# Patient Record
Sex: FEMALE | Race: WHITE | NOT HISPANIC OR LATINO | Employment: OTHER | URBAN - METROPOLITAN AREA
[De-identification: names, ages, dates, MRNs, and addresses within clinical notes are randomized per-mention and may not be internally consistent; named-entity substitution may affect disease eponyms.]

---

## 2024-02-01 PROBLEM — E78.5 HLD (HYPERLIPIDEMIA): Status: ACTIVE | Noted: 2024-02-01

## 2024-02-01 PROBLEM — I50.32 CHRONIC DIASTOLIC HEART FAILURE (MULTI): Status: ACTIVE | Noted: 2024-02-01

## 2024-02-01 PROBLEM — C50.519 MALIGNANT NEOPLASM OF LOWER-OUTER QUADRANT OF FEMALE BREAST (MULTI): Status: ACTIVE | Noted: 2024-02-01

## 2024-02-01 PROBLEM — R07.89 CHEST PAIN, MIDSTERNAL: Status: ACTIVE | Noted: 2024-02-01

## 2024-02-01 PROBLEM — R60.0 LEG EDEMA: Status: ACTIVE | Noted: 2024-02-01

## 2024-02-01 PROBLEM — I25.10 CAD (CORONARY ARTERY DISEASE): Status: ACTIVE | Noted: 2024-02-01

## 2024-02-01 PROBLEM — I10 HTN (HYPERTENSION): Status: ACTIVE | Noted: 2024-02-01

## 2024-02-07 ENCOUNTER — OFFICE VISIT (OUTPATIENT)
Dept: CARDIOLOGY | Facility: CLINIC | Age: 79
End: 2024-02-07
Payer: MEDICARE

## 2024-02-07 VITALS
DIASTOLIC BLOOD PRESSURE: 80 MMHG | SYSTOLIC BLOOD PRESSURE: 138 MMHG | HEART RATE: 71 BPM | WEIGHT: 171 LBS | BODY MASS INDEX: 26.78 KG/M2 | OXYGEN SATURATION: 96 %

## 2024-02-07 DIAGNOSIS — E78.49 OTHER HYPERLIPIDEMIA: ICD-10-CM

## 2024-02-07 DIAGNOSIS — I25.10 CORONARY ARTERY DISEASE INVOLVING NATIVE CORONARY ARTERY OF NATIVE HEART WITHOUT ANGINA PECTORIS: ICD-10-CM

## 2024-02-07 DIAGNOSIS — I10 PRIMARY HYPERTENSION: ICD-10-CM

## 2024-02-07 DIAGNOSIS — G47.62 NOCTURNAL LEG CRAMPS: Primary | ICD-10-CM

## 2024-02-07 DIAGNOSIS — I50.32 CHRONIC DIASTOLIC HEART FAILURE (MULTI): ICD-10-CM

## 2024-02-07 PROCEDURE — 3075F SYST BP GE 130 - 139MM HG: CPT | Performed by: INTERNAL MEDICINE

## 2024-02-07 PROCEDURE — 3079F DIAST BP 80-89 MM HG: CPT | Performed by: INTERNAL MEDICINE

## 2024-02-07 PROCEDURE — 1036F TOBACCO NON-USER: CPT | Performed by: INTERNAL MEDICINE

## 2024-02-07 PROCEDURE — 1159F MED LIST DOCD IN RCRD: CPT | Performed by: INTERNAL MEDICINE

## 2024-02-07 PROCEDURE — 99214 OFFICE O/P EST MOD 30 MIN: CPT | Performed by: INTERNAL MEDICINE

## 2024-02-07 RX ORDER — ATORVASTATIN CALCIUM 20 MG/1
1 TABLET, FILM COATED ORAL
COMMUNITY
Start: 2023-01-05

## 2024-02-07 RX ORDER — METOPROLOL TARTRATE 100 MG/1
100 TABLET ORAL DAILY
COMMUNITY

## 2024-02-07 RX ORDER — ONDANSETRON 8 MG/1
TABLET, ORALLY DISINTEGRATING ORAL EVERY 8 HOURS PRN
COMMUNITY
Start: 2023-08-15

## 2024-02-07 RX ORDER — HUMAN INSULIN 100 [USP'U]/ML
INJECTION, SUSPENSION SUBCUTANEOUS
COMMUNITY
Start: 2012-11-26

## 2024-02-07 RX ORDER — CILOSTAZOL 50 MG/1
50 TABLET ORAL 2 TIMES DAILY
COMMUNITY
Start: 2013-07-18 | End: 2024-11-08

## 2024-02-07 RX ORDER — SUCRALFATE 1 G/10ML
SUSPENSION ORAL
COMMUNITY
Start: 2023-09-12

## 2024-02-07 RX ORDER — POLYMYXIN B SULFATE AND TRIMETHOPRIM 1; 10000 MG/ML; [USP'U]/ML
SOLUTION OPHTHALMIC
COMMUNITY
Start: 2023-07-17

## 2024-02-07 RX ORDER — TORSEMIDE 20 MG/1
1 TABLET ORAL
COMMUNITY
Start: 2023-11-03

## 2024-02-07 RX ORDER — SOLIFENACIN SUCCINATE 5 MG/1
TABLET, FILM COATED ORAL
COMMUNITY
Start: 2023-10-12

## 2024-02-07 RX ORDER — HYDROXYZINE PAMOATE 50 MG/1
50 CAPSULE ORAL
COMMUNITY
Start: 2022-08-30

## 2024-02-07 RX ORDER — BLOOD SUGAR DIAGNOSTIC
STRIP MISCELLANEOUS
COMMUNITY
Start: 2013-07-16

## 2024-02-07 RX ORDER — METFORMIN HYDROCHLORIDE 500 MG/1
TABLET ORAL
COMMUNITY
Start: 2019-02-18

## 2024-02-07 RX ORDER — FUROSEMIDE 20 MG/1
40 TABLET ORAL
COMMUNITY

## 2024-02-07 RX ORDER — BETAMETHASONE DIPROPIONATE 0.5 MG/G
CREAM TOPICAL 2 TIMES DAILY
COMMUNITY

## 2024-02-07 RX ORDER — OXYCODONE AND ACETAMINOPHEN 10; 325 MG/1; MG/1
1 TABLET ORAL EVERY 6 HOURS PRN
COMMUNITY
Start: 2024-02-07 | End: 2024-03-08

## 2024-02-07 ASSESSMENT — ENCOUNTER SYMPTOMS: DYSPNEA ON EXERTION: 1

## 2024-02-07 NOTE — PROGRESS NOTES
Subjective   Nancy Long is a 78 y.o. female.    Chief Complaint:  Follow-up coronary artery disease.    HPI    We last saw her in August 2023.  She has had no anginal symptoms.  She has mild exertional dyspnea but otherwise is asymptomatic.  She complains of nausea after taking statins including atorvastatin and rosuvastatin.  She has a number of arthritis problems.    Her diagnosis of coronary artery disease is based on a positive calcium score of 1399 consistent with the presence of extensive atherosclerotic coronary artery disease.     Past cardiac history: Significant for hypertension and hyperlipidemia. Cardiac risk factors include a positive smoking history of up to 1 pack/day. Denies a family history of heart disease.     Past medical history: Significant for a history of kidney stones. She has chronic renal insufficiency. History of breast cancer.      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     Family history: Denies family history of premature coronary artery disease     Review of Systems   Cardiovascular:  Positive for dyspnea on exertion.   Musculoskeletal:  Positive for arthritis and joint pain.       Visit Vitals  /80 (BP Location: Left arm, Patient Position: Sitting, BP Cuff Size: Adult)   Pulse 71   Wt 77.6 kg (171 lb)   SpO2 96%   BMI 26.78 kg/m²   Smoking Status Former   BSA 1.92 m²        Objective     Constitutional:       Appearance: Not in distress.   Neck:      Vascular: JVD normal.   Pulmonary:      Breath sounds: Normal breath sounds.   Cardiovascular:      Normal rate. Regular rhythm. Normal S1. Normal S2.       Murmurs: There is no murmur.      No gallop.    Pulses:     Intact distal pulses.   Edema:     Peripheral edema absent.   Abdominal:      General: There is no distension.      Palpations: Abdomen is soft.   Neurological:      Mental Status: Alert.         Lab Review:   Lab Results   Component  Value Date     02/04/2020    K 4.3 02/04/2020     02/04/2020    CO2 21 02/04/2020    BUN 25 (H) 02/04/2020    CREATININE 1.32 (H) 02/04/2020    GLUCOSE 171 (H) 02/04/2020    CALCIUM 9.1 02/04/2020       Assessment:    1.  Coronary artery disease.  Extensive coronary disease on the basis of coronary CT calcium scoring.  A stress thallium study 1 year ago showed no ischemia with normal obstructive function.  Will continue medical management for her coronary disease.    2.  Hypertension.  Blood pressures are controlled.    3.  Hyperlipidemia.  She admits that she is not very compliant with her atorvastatin because it causes nausea.  We have recommended she take her atorvastatin at night before she goes to bed.  We have asked her to get a follow-up lipid profile in 2 months.  Should she continue to demonstrate it significant hyperlipidemia and is unable to tolerate the atorvastatin, we would then proceed with on the injectable drugs.    4.  Chronic renal sufficiency.  Most recent creatinine is 1.4.  This is stable and from her prior evaluation.  Let us potassium levels 4.8.

## 2024-02-07 NOTE — PATIENT INSTRUCTIONS
Take atorvastatin 20 mg at night before you go to bed.    If you cannot take atorvastatin we will start one of the injectable medications.    Get a blood test when you see Dr. Bonilla.

## 2024-07-29 PROBLEM — E11.9 DM (DIABETES MELLITUS) (MULTI): Status: ACTIVE | Noted: 2024-07-29

## 2024-07-29 PROBLEM — E10.9 TYPE 1 DIABETES MELLITUS WITHOUT COMPLICATION (MULTI): Status: ACTIVE | Noted: 2018-06-04

## 2024-07-29 PROBLEM — F41.1 GAD (GENERALIZED ANXIETY DISORDER): Status: ACTIVE | Noted: 2020-03-05

## 2024-07-29 PROBLEM — M19.90 OSTEOARTHRITIS: Status: ACTIVE | Noted: 2018-02-20

## 2024-07-29 PROBLEM — K21.9 GASTROESOPHAGEAL REFLUX DISEASE: Status: ACTIVE | Noted: 2023-10-12

## 2024-08-02 DIAGNOSIS — I10 PRIMARY HYPERTENSION: ICD-10-CM

## 2024-08-02 RX ORDER — TORSEMIDE 20 MG/1
20 TABLET ORAL DAILY
Qty: 90 TABLET | Refills: 3 | Status: SHIPPED | OUTPATIENT
Start: 2024-08-02

## 2024-08-20 ENCOUNTER — APPOINTMENT (OUTPATIENT)
Dept: CARDIOLOGY | Facility: CLINIC | Age: 79
End: 2024-08-20
Payer: MEDICARE

## 2024-08-20 VITALS
HEART RATE: 61 BPM | SYSTOLIC BLOOD PRESSURE: 130 MMHG | WEIGHT: 166 LBS | HEIGHT: 67 IN | BODY MASS INDEX: 26.06 KG/M2 | DIASTOLIC BLOOD PRESSURE: 80 MMHG

## 2024-08-20 DIAGNOSIS — I25.10 CORONARY ARTERY DISEASE INVOLVING NATIVE CORONARY ARTERY OF NATIVE HEART WITHOUT ANGINA PECTORIS: Primary | ICD-10-CM

## 2024-08-20 DIAGNOSIS — R60.0 LEG EDEMA: ICD-10-CM

## 2024-08-20 DIAGNOSIS — I83.013 VENOUS ULCER OF ANKLE, RIGHT (MULTI): ICD-10-CM

## 2024-08-20 DIAGNOSIS — G47.62 NOCTURNAL LEG CRAMPS: ICD-10-CM

## 2024-08-20 DIAGNOSIS — R09.89 BRUIT OF RIGHT CAROTID ARTERY: ICD-10-CM

## 2024-08-20 DIAGNOSIS — L97.319 VENOUS ULCER OF ANKLE, RIGHT (MULTI): ICD-10-CM

## 2024-08-20 DIAGNOSIS — I10 PRIMARY HYPERTENSION: ICD-10-CM

## 2024-08-20 DIAGNOSIS — I35.0 NONRHEUMATIC AORTIC VALVE STENOSIS: ICD-10-CM

## 2024-08-20 DIAGNOSIS — E78.49 OTHER HYPERLIPIDEMIA: ICD-10-CM

## 2024-08-20 DIAGNOSIS — I50.32 CHRONIC DIASTOLIC HEART FAILURE (MULTI): ICD-10-CM

## 2024-08-20 PROCEDURE — 99214 OFFICE O/P EST MOD 30 MIN: CPT | Performed by: INTERNAL MEDICINE

## 2024-08-20 PROCEDURE — 3075F SYST BP GE 130 - 139MM HG: CPT | Performed by: INTERNAL MEDICINE

## 2024-08-20 PROCEDURE — 3079F DIAST BP 80-89 MM HG: CPT | Performed by: INTERNAL MEDICINE

## 2024-08-20 PROCEDURE — 93010 ELECTROCARDIOGRAM REPORT: CPT | Performed by: INTERNAL MEDICINE

## 2024-08-20 PROCEDURE — 93005 ELECTROCARDIOGRAM TRACING: CPT | Performed by: INTERNAL MEDICINE

## 2024-08-20 PROCEDURE — 1159F MED LIST DOCD IN RCRD: CPT | Performed by: INTERNAL MEDICINE

## 2024-08-20 RX ORDER — OXYCODONE AND ACETAMINOPHEN 10; 325 MG/1; MG/1
TABLET ORAL EVERY 6 HOURS PRN
COMMUNITY
Start: 2024-08-14

## 2024-08-20 RX ORDER — ROSUVASTATIN CALCIUM 20 MG/1
20 TABLET, COATED ORAL DAILY
Qty: 30 TABLET | Refills: 11 | Status: SHIPPED | OUTPATIENT
Start: 2024-08-20 | End: 2025-08-20

## 2024-08-20 ASSESSMENT — ENCOUNTER SYMPTOMS: DYSPNEA ON EXERTION: 1

## 2024-08-20 NOTE — PROGRESS NOTES
Subjective   Nancy Long is a 79 y.o. female.    Chief Complaint:  Follow-up coronary artery disease.    HPI    She complains of fatigue and generalized weakness.  Also has developed an ulceration on her leg.  She has had problems tolerating atorvastatin and has stopped the medication.  Has lower extremity edema.  Takes her torsemide on an occasional basis.    Her diagnosis of coronary artery disease is based on a positive calcium score of 1399 consistent with the presence of extensive atherosclerotic coronary artery disease.     Past cardiac history: Significant for hypertension and hyperlipidemia. Cardiac risk factors include a positive smoking history of up to 1 pack/day. Denies a family history of heart disease.     Past medical history: Significant for a history of kidney stones. She has chronic renal insufficiency. History of breast cancer.      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     Family history: Denies family history of premature coronary artery disease    Review of Systems   Cardiovascular:  Positive for dyspnea on exertion and leg swelling.   Musculoskeletal:  Positive for arthritis.       Current Outpatient Medications   Medication Sig Dispense Refill    atorvastatin (Lipitor) 20 mg tablet Take 1 tablet (20 mg) by mouth once daily.      betamethasone dipropionate 0.05 % cream Apply topically 2 times a day.      cilostazol (Pletal) 50 mg tablet Take 1 tablet (50 mg) by mouth twice a day.      hydrOXYzine pamoate (Vistaril) 50 mg capsule Take 1 capsule (50 mg) by mouth 4 times a day as needed.      loratadine-pseudoephedrine (Claritin-D 24-hour)  mg 24 hr tablet Take 1 tablet by mouth once daily.      losartan (Cozaar) 100 mg tablet TAKE ONE TABLET BY MOUTH EVERY DAY 90 tablet 3    metFORMIN (Glucophage) 500 mg tablet       metoprolol tartrate (Lopressor) 100 mg tablet Take 1 tablet (100 mg) by mouth once  "daily.      NovoLIN 70/30 U-100 Insulin 100 unit/mL (70-30) injection twice daily with meals.      ondansetron ODT (Zofran-ODT) 8 mg disintegrating tablet every 8 hours if needed.      OneTouch Ultra Test strip       oxyCODONE-acetaminophen (Percocet)  mg tablet every 6 hours if needed.      polymyxin B sulf-trimethoprim (Polytrim) ophthalmic solution APPLY 1/4 INCH TO AFFECTED EYE 4 TIMES A DAY.      solifenacin (VESIcare) 5 mg tablet       torsemide (Demadex) 20 mg tablet TAKE ONE TABLET BY MOUTH DAILY 90 tablet 3    rosuvastatin (Crestor) 20 mg tablet Take 1 tablet (20 mg) by mouth once daily. 30 tablet 11    sucralfate (Carafate) 100 mg/mL suspension TAKE 10 ML BY MOUTH 4 TIMES A DAY       No current facility-administered medications for this visit.        Visit Vitals  /80   Pulse 61   Ht 1.702 m (5' 7\")   Wt 75.3 kg (166 lb)   BMI 26.00 kg/m²   Smoking Status Former   BSA 1.89 m²        Objective     Constitutional:       Appearance: Not in distress.   Neck:      Vascular: JVD normal.   Pulmonary:      Breath sounds: Normal breath sounds.   Cardiovascular:      Normal rate. Regular rhythm. Normal S1. Normal S2.       Murmurs: There is no murmur.      No gallop.    Pulses:     Intact distal pulses.   Edema:     Peripheral edema present.     Pretibial: bilateral 1+ edema of the pretibial area.     Ankle: bilateral 1+ edema of the ankle.  Abdominal:      General: There is no distension.      Palpations: Abdomen is soft.   Neurological:      Mental Status: Alert.         Lab Review:   Lab Results   Component Value Date     02/04/2020    K 4.3 02/04/2020     02/04/2020    CO2 21 02/04/2020    BUN 25 (H) 02/04/2020    CREATININE 1.32 (H) 02/04/2020    GLUCOSE 171 (H) 02/04/2020    CALCIUM 9.1 02/04/2020       Assessment:    1.  Coronary disease.  Extensive coronary artery disease on the basis of coronary CT calcium scoring.  This study was done in November 2022.  She had a follow-up stress " thallium study which showed no ischemia with normal left ventricular function.  Will continue medical management with single antiplatelet therapy.    2.  Hyperlipidemia.  Most recent labs show a cholesterol of 202, HDL 48,  we will give her a trial of rosuvastatin.  If she fails rosuvastatin we will need to use one of the injectable drugs.  We have suggested Levqio as a good option for this patient.  Not sure that she will be compliant with self injected medications.    3.  Chronic renal failure.  Her serum creatinine has ranged from 1.3-1.4.  They have been stable.    4.  Venous ulceration.  We are going to have her take her torsemide regularly for a week to try to reduce the fluid.  If she has no improvement in the ulceration we will send her to the wound clinic.

## 2024-08-20 NOTE — PATIENT INSTRUCTIONS
Take the torsemide daily for the next week.    If the wound on your leg is not healing, call us and we will refer you to the wound care center.    Start rosuvastatin 20 mg daily.  If you have problems on this medication, call us.

## 2024-09-03 ENCOUNTER — TELEPHONE (OUTPATIENT)
Dept: CARDIOLOGY | Facility: CLINIC | Age: 79
End: 2024-09-03
Payer: MEDICARE

## 2024-09-03 NOTE — TELEPHONE ENCOUNTER
Per last ov with Dr. Oliver. Ross wound is not healing. It is still seeping and bleeding. Hurting at times and she would like Dr. Oliver to give her the referral to the wound clinic.

## 2024-09-05 DIAGNOSIS — L97.929 ULCER OF LEFT LOWER EXTREMITY, UNSPECIFIED ULCER STAGE (MULTI): Primary | ICD-10-CM

## 2024-09-16 ENCOUNTER — OFFICE VISIT (OUTPATIENT)
Dept: WOUND CARE | Facility: CLINIC | Age: 79
End: 2024-09-16
Payer: MEDICARE

## 2024-09-16 PROCEDURE — 99214 OFFICE O/P EST MOD 30 MIN: CPT

## 2024-09-16 PROCEDURE — 99213 OFFICE O/P EST LOW 20 MIN: CPT | Performed by: NURSE PRACTITIONER

## 2024-09-23 ENCOUNTER — OFFICE VISIT (OUTPATIENT)
Dept: WOUND CARE | Facility: CLINIC | Age: 79
End: 2024-09-23
Payer: MEDICARE

## 2024-09-23 PROCEDURE — 11042 DBRDMT SUBQ TIS 1ST 20SQCM/<: CPT | Performed by: SURGERY

## 2024-09-23 PROCEDURE — 11045 DBRDMT SUBQ TISS EACH ADDL: CPT

## 2024-09-23 PROCEDURE — 11042 DBRDMT SUBQ TIS 1ST 20SQCM/<: CPT

## 2024-09-23 PROCEDURE — 11045 DBRDMT SUBQ TISS EACH ADDL: CPT | Performed by: SURGERY

## 2024-09-30 ENCOUNTER — LAB REQUISITION (OUTPATIENT)
Dept: LAB | Facility: HOSPITAL | Age: 79
End: 2024-09-30
Payer: MEDICARE

## 2024-09-30 ENCOUNTER — OFFICE VISIT (OUTPATIENT)
Dept: WOUND CARE | Facility: CLINIC | Age: 79
End: 2024-09-30
Payer: MEDICARE

## 2024-09-30 DIAGNOSIS — L97.212 NON-PRESSURE CHRONIC ULCER OF RIGHT CALF WITH FAT LAYER EXPOSED (MULTI): ICD-10-CM

## 2024-09-30 PROCEDURE — 87077 CULTURE AEROBIC IDENTIFY: CPT | Mod: OUT,PARLAB | Performed by: NURSE PRACTITIONER

## 2024-09-30 PROCEDURE — 11042 DBRDMT SUBQ TIS 1ST 20SQCM/<: CPT

## 2024-09-30 PROCEDURE — 11045 DBRDMT SUBQ TISS EACH ADDL: CPT

## 2024-10-03 LAB
BACTERIA SPEC CULT: ABNORMAL
BACTERIA SPEC CULT: ABNORMAL
GRAM STN SPEC: ABNORMAL
GRAM STN SPEC: ABNORMAL

## 2024-10-09 ENCOUNTER — APPOINTMENT (OUTPATIENT)
Dept: WOUND CARE | Facility: CLINIC | Age: 79
End: 2024-10-09
Payer: MEDICARE

## 2024-10-09 ENCOUNTER — OFFICE VISIT (OUTPATIENT)
Dept: WOUND CARE | Facility: CLINIC | Age: 79
End: 2024-10-09
Payer: MEDICARE

## 2024-10-09 PROCEDURE — 11042 DBRDMT SUBQ TIS 1ST 20SQCM/<: CPT

## 2024-10-16 ENCOUNTER — OFFICE VISIT (OUTPATIENT)
Dept: WOUND CARE | Facility: CLINIC | Age: 79
End: 2024-10-16
Payer: MEDICARE

## 2024-10-16 PROCEDURE — 99213 OFFICE O/P EST LOW 20 MIN: CPT

## 2024-10-23 ENCOUNTER — APPOINTMENT (OUTPATIENT)
Dept: WOUND CARE | Facility: CLINIC | Age: 79
End: 2024-10-23
Payer: MEDICARE

## 2024-10-30 ENCOUNTER — OFFICE VISIT (OUTPATIENT)
Dept: WOUND CARE | Facility: CLINIC | Age: 79
End: 2024-10-30
Payer: MEDICARE

## 2024-10-30 PROCEDURE — 29581 APPL MULTLAYER CMPRN SYS LEG: CPT | Mod: RT

## 2024-10-31 ENCOUNTER — APPOINTMENT (OUTPATIENT)
Dept: WOUND CARE | Facility: CLINIC | Age: 79
End: 2024-10-31
Payer: MEDICARE

## 2024-11-06 ENCOUNTER — OFFICE VISIT (OUTPATIENT)
Dept: WOUND CARE | Facility: CLINIC | Age: 79
End: 2024-11-06
Payer: MEDICARE

## 2024-11-06 PROCEDURE — 11042 DBRDMT SUBQ TIS 1ST 20SQCM/<: CPT

## 2024-11-21 ENCOUNTER — LAB REQUISITION (OUTPATIENT)
Dept: LAB | Facility: HOSPITAL | Age: 79
End: 2024-11-21
Payer: MEDICARE

## 2024-11-21 ENCOUNTER — OFFICE VISIT (OUTPATIENT)
Dept: WOUND CARE | Facility: CLINIC | Age: 79
End: 2024-11-21
Payer: MEDICARE

## 2024-11-21 DIAGNOSIS — L97.212 NON-PRESSURE CHRONIC ULCER OF RIGHT CALF WITH FAT LAYER EXPOSED (MULTI): ICD-10-CM

## 2024-11-21 DIAGNOSIS — R60.0 LOCALIZED EDEMA: ICD-10-CM

## 2024-11-21 DIAGNOSIS — F17.208 NICOTINE DEPENDENCE, UNSPECIFIED, WITH OTHER NICOTINE-INDUCED DISORDERS: ICD-10-CM

## 2024-11-21 DIAGNOSIS — S81.801S UNSPECIFIED OPEN WOUND, RIGHT LOWER LEG, SEQUELA: ICD-10-CM

## 2024-11-21 DIAGNOSIS — I87.2 VENOUS INSUFFICIENCY (CHRONIC) (PERIPHERAL): ICD-10-CM

## 2024-11-21 PROCEDURE — 97597 DBRDMT OPN WND 1ST 20 CM/<: CPT

## 2024-11-21 PROCEDURE — 99213 OFFICE O/P EST LOW 20 MIN: CPT | Performed by: SURGERY

## 2024-11-21 PROCEDURE — 97597 DBRDMT OPN WND 1ST 20 CM/<: CPT | Performed by: SURGERY

## 2024-11-21 PROCEDURE — 87070 CULTURE OTHR SPECIMN AEROBIC: CPT | Mod: OUT,PARLAB | Performed by: SURGERY

## 2024-11-24 LAB
BACTERIA SPEC CULT: ABNORMAL
GRAM STN SPEC: ABNORMAL
GRAM STN SPEC: ABNORMAL

## 2024-11-25 ENCOUNTER — OFFICE VISIT (OUTPATIENT)
Dept: WOUND CARE | Facility: CLINIC | Age: 79
End: 2024-11-25
Payer: MEDICARE

## 2024-11-25 PROCEDURE — 11042 DBRDMT SUBQ TIS 1ST 20SQCM/<: CPT

## 2024-12-04 ENCOUNTER — OFFICE VISIT (OUTPATIENT)
Dept: WOUND CARE | Facility: CLINIC | Age: 79
End: 2024-12-04
Payer: MEDICARE

## 2024-12-04 PROCEDURE — 11042 DBRDMT SUBQ TIS 1ST 20SQCM/<: CPT

## 2024-12-11 ENCOUNTER — OFFICE VISIT (OUTPATIENT)
Dept: WOUND CARE | Facility: CLINIC | Age: 79
End: 2024-12-11
Payer: MEDICARE

## 2024-12-11 PROCEDURE — 11042 DBRDMT SUBQ TIS 1ST 20SQCM/<: CPT

## 2024-12-12 DIAGNOSIS — E78.49 OTHER HYPERLIPIDEMIA: ICD-10-CM

## 2024-12-12 DIAGNOSIS — I10 HYPERTENSION, UNSPECIFIED TYPE: ICD-10-CM

## 2024-12-12 DIAGNOSIS — I10 PRIMARY HYPERTENSION: ICD-10-CM

## 2024-12-12 RX ORDER — LOSARTAN POTASSIUM 100 MG/1
100 TABLET ORAL DAILY
Qty: 90 TABLET | Refills: 3 | Status: SHIPPED | OUTPATIENT
Start: 2024-12-12

## 2024-12-12 RX ORDER — METOPROLOL TARTRATE 100 MG/1
100 TABLET ORAL DAILY
Qty: 90 TABLET | Refills: 3 | Status: SHIPPED | OUTPATIENT
Start: 2024-12-12 | End: 2025-12-12

## 2024-12-12 RX ORDER — ROSUVASTATIN CALCIUM 20 MG/1
20 TABLET, COATED ORAL DAILY
Qty: 90 TABLET | Refills: 3 | Status: SHIPPED | OUTPATIENT
Start: 2024-12-12 | End: 2025-12-12

## 2024-12-12 RX ORDER — ATORVASTATIN CALCIUM 20 MG/1
20 TABLET, FILM COATED ORAL
Qty: 90 TABLET | Refills: 3 | Status: SHIPPED | OUTPATIENT
Start: 2024-12-12 | End: 2025-12-12

## 2024-12-12 RX ORDER — TORSEMIDE 20 MG/1
20 TABLET ORAL DAILY
Qty: 90 TABLET | Refills: 3 | Status: SHIPPED | OUTPATIENT
Start: 2024-12-12

## 2024-12-18 ENCOUNTER — OFFICE VISIT (OUTPATIENT)
Dept: WOUND CARE | Facility: CLINIC | Age: 79
End: 2024-12-18
Payer: MEDICARE

## 2024-12-18 PROCEDURE — 11042 DBRDMT SUBQ TIS 1ST 20SQCM/<: CPT

## 2024-12-23 ENCOUNTER — OFFICE VISIT (OUTPATIENT)
Dept: WOUND CARE | Facility: CLINIC | Age: 79
End: 2024-12-23
Payer: MEDICARE

## 2024-12-23 PROCEDURE — 11042 DBRDMT SUBQ TIS 1ST 20SQCM/<: CPT

## 2024-12-30 ENCOUNTER — OFFICE VISIT (OUTPATIENT)
Dept: WOUND CARE | Facility: CLINIC | Age: 79
End: 2024-12-30
Payer: MEDICARE

## 2024-12-30 PROCEDURE — 11042 DBRDMT SUBQ TIS 1ST 20SQCM/<: CPT

## 2025-01-08 ENCOUNTER — OFFICE VISIT (OUTPATIENT)
Dept: WOUND CARE | Facility: CLINIC | Age: 80
End: 2025-01-08
Payer: MEDICARE

## 2025-01-08 PROCEDURE — 11042 DBRDMT SUBQ TIS 1ST 20SQCM/<: CPT

## 2025-01-15 ENCOUNTER — OFFICE VISIT (OUTPATIENT)
Dept: WOUND CARE | Facility: CLINIC | Age: 80
End: 2025-01-15
Payer: MEDICARE

## 2025-01-15 PROCEDURE — 99212 OFFICE O/P EST SF 10 MIN: CPT

## 2025-02-11 PROBLEM — L89.891: Status: ACTIVE | Noted: 2025-01-07

## 2025-02-11 PROBLEM — N18.32 CHRONIC KIDNEY DISEASE, STAGE 3B (MULTI): Status: ACTIVE | Noted: 2024-10-07

## 2025-02-11 PROBLEM — N39.3 STRESS INCONTINENCE OF URINE: Status: ACTIVE | Noted: 2024-04-04

## 2025-02-11 PROBLEM — G25.81 RESTLESS LEG: Status: ACTIVE | Noted: 2021-09-07

## 2025-03-04 ENCOUNTER — OFFICE VISIT (OUTPATIENT)
Dept: CARDIOLOGY | Facility: CLINIC | Age: 80
End: 2025-03-04
Payer: MEDICARE

## 2025-03-04 ENCOUNTER — HOSPITAL ENCOUNTER (OUTPATIENT)
Dept: VASCULAR MEDICINE | Facility: CLINIC | Age: 80
Discharge: HOME | End: 2025-03-04
Payer: MEDICARE

## 2025-03-04 ENCOUNTER — HOSPITAL ENCOUNTER (OUTPATIENT)
Dept: CARDIOLOGY | Facility: CLINIC | Age: 80
Discharge: HOME | End: 2025-03-04
Payer: MEDICARE

## 2025-03-04 VITALS
HEART RATE: 73 BPM | SYSTOLIC BLOOD PRESSURE: 142 MMHG | BODY MASS INDEX: 26.68 KG/M2 | WEIGHT: 170 LBS | DIASTOLIC BLOOD PRESSURE: 64 MMHG | OXYGEN SATURATION: 94 % | HEIGHT: 67 IN

## 2025-03-04 DIAGNOSIS — I50.32 CHRONIC DIASTOLIC HEART FAILURE: ICD-10-CM

## 2025-03-04 DIAGNOSIS — L03.115 CELLULITIS OF RIGHT LOWER EXTREMITY: ICD-10-CM

## 2025-03-04 DIAGNOSIS — I25.10 CORONARY ARTERY DISEASE INVOLVING NATIVE CORONARY ARTERY OF NATIVE HEART WITHOUT ANGINA PECTORIS: ICD-10-CM

## 2025-03-04 DIAGNOSIS — I35.0 NONRHEUMATIC AORTIC VALVE STENOSIS: ICD-10-CM

## 2025-03-04 DIAGNOSIS — I65.22 LEFT CAROTID STENOSIS: ICD-10-CM

## 2025-03-04 DIAGNOSIS — R09.89 BRUIT OF RIGHT CAROTID ARTERY: ICD-10-CM

## 2025-03-04 DIAGNOSIS — I65.21 RIGHT CAROTID ARTERY OCCLUSION: ICD-10-CM

## 2025-03-04 DIAGNOSIS — I70.213 INTERMITTENT CLAUDICATION OF BOTH LOWER EXTREMITIES DUE TO ATHEROSCLEROSIS (CMS-HCC): ICD-10-CM

## 2025-03-04 DIAGNOSIS — I10 PRIMARY HYPERTENSION: ICD-10-CM

## 2025-03-04 DIAGNOSIS — E78.49 OTHER HYPERLIPIDEMIA: ICD-10-CM

## 2025-03-04 DIAGNOSIS — N18.32 CHRONIC KIDNEY DISEASE, STAGE 3B (MULTI): Primary | ICD-10-CM

## 2025-03-04 DIAGNOSIS — R09.89 BRUIT OF LEFT CAROTID ARTERY: ICD-10-CM

## 2025-03-04 PROBLEM — R07.89 CHEST PAIN, MIDSTERNAL: Status: RESOLVED | Noted: 2024-02-01 | Resolved: 2025-03-04

## 2025-03-04 PROBLEM — L03.119 LOWER EXTREMITY CELLULITIS: Status: ACTIVE | Noted: 2025-03-04

## 2025-03-04 PROCEDURE — 99215 OFFICE O/P EST HI 40 MIN: CPT | Mod: 25 | Performed by: INTERNAL MEDICINE

## 2025-03-04 PROCEDURE — 1036F TOBACCO NON-USER: CPT | Performed by: INTERNAL MEDICINE

## 2025-03-04 PROCEDURE — 3077F SYST BP >= 140 MM HG: CPT | Performed by: INTERNAL MEDICINE

## 2025-03-04 PROCEDURE — 3078F DIAST BP <80 MM HG: CPT | Performed by: INTERNAL MEDICINE

## 2025-03-04 PROCEDURE — 93306 TTE W/DOPPLER COMPLETE: CPT | Performed by: INTERNAL MEDICINE

## 2025-03-04 PROCEDURE — 1159F MED LIST DOCD IN RCRD: CPT | Performed by: INTERNAL MEDICINE

## 2025-03-04 PROCEDURE — 93306 TTE W/DOPPLER COMPLETE: CPT

## 2025-03-04 PROCEDURE — 99215 OFFICE O/P EST HI 40 MIN: CPT | Performed by: INTERNAL MEDICINE

## 2025-03-04 PROCEDURE — 93880 EXTRACRANIAL BILAT STUDY: CPT

## 2025-03-04 PROCEDURE — 93880 EXTRACRANIAL BILAT STUDY: CPT | Performed by: SURGERY

## 2025-03-04 NOTE — PATIENT INSTRUCTIONS
We are going to arrange for you to have a vascular study on your legs.    We will refer you to our vascular specialist about your carotid artery problem.

## 2025-03-04 NOTE — PROGRESS NOTES
Subjective   Nancy Long is a 79 y.o. female.    Chief Complaint:  Follow-up coronary artery disease.  Follow-up carotid studies.  Follow-up echo study.    HPI    Since her last visit she has had no anginal symptoms.  Has nonspecific complaints of fatigue and dyspnea.  Has been treated for an ulceration on her leg.  Also notes erythema particularly on the right leg.  She also had a carotid ultrasound study today.    Her diagnosis of coronary artery disease is based on a positive calcium score of 1399 consistent with the presence of extensive atherosclerotic coronary artery disease.     Past cardiac history: Significant for hypertension and hyperlipidemia. Cardiac risk factors include a positive smoking history of up to 1 pack/day. Denies a family history of heart disease.     Past medical history: Significant for a history of kidney stones. She has chronic renal insufficiency. History of breast cancer.      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     Family history: Denies family history of premature coronary artery disease     Review of Systems   Cardiovascular:  Positive for dyspnea on exertion and leg swelling.   Musculoskeletal:  Positive for arthritis.     Current Outpatient Medications   Medication Sig Dispense Refill    betamethasone dipropionate 0.05 % cream Apply topically 2 times a day.      hydrOXYzine pamoate (Vistaril) 50 mg capsule Take 1 capsule (50 mg) by mouth 4 times a day as needed.      loratadine-pseudoephedrine (Claritin-D 24-hour)  mg 24 hr tablet Take 1 tablet by mouth once daily.      losartan (Cozaar) 100 mg tablet Take 1 tablet (100 mg) by mouth once daily. 90 tablet 3    metFORMIN (Glucophage) 500 mg tablet       metoprolol tartrate (Lopressor) 100 mg tablet Take 1 tablet (100 mg) by mouth once daily. 90 tablet 3    NovoLIN 70/30 U-100 Insulin 100 unit/mL (70-30) injection twice daily with meals.    "   ondansetron ODT (Zofran-ODT) 8 mg disintegrating tablet every 8 hours if needed.      OneTouch Ultra Test strip       oxyCODONE-acetaminophen (Percocet)  mg tablet every 6 hours if needed.      polymyxin B sulf-trimethoprim (Polytrim) ophthalmic solution APPLY 1/4 INCH TO AFFECTED EYE 4 TIMES A DAY.      rosuvastatin (Crestor) 20 mg tablet Take 1 tablet (20 mg) by mouth once daily. 90 tablet 3    solifenacin (VESIcare) 5 mg tablet       sucralfate (Carafate) 100 mg/mL suspension TAKE 10 ML BY MOUTH 4 TIMES A DAY      torsemide (Demadex) 20 mg tablet Take 1 tablet (20 mg) by mouth once daily. 90 tablet 3     No current facility-administered medications for this visit.        Visit Vitals  /64 (BP Location: Right arm)   Pulse 73   Ht 1.702 m (5' 7\")   Wt 77.1 kg (170 lb)   SpO2 94%   BMI 26.63 kg/m²   Smoking Status Former   BSA 1.91 m²        Objective     Constitutional:       Appearance: Not in distress.   Neck:      Vascular: Carotid bruit present. JVD normal.   Pulmonary:      Breath sounds: Normal breath sounds.   Cardiovascular:      Normal rate. Regular rhythm. Normal S1. Normal S2.       Murmurs: There is no murmur.      No gallop.    Pulses:     Decreased pulses.   Edema:     Peripheral edema present.     Pretibial: bilateral 1+ edema of the pretibial area.  Abdominal:      General: There is no distension.      Palpations: Abdomen is soft.   Neurological:      Mental Status: Alert.       Assessment:    1.  Coronary artery disease.  Extensive coronary disease based on prior cardiac evaluations.  No anginal symptoms.  Today's echocardiographic study demonstrates normal left ventricular systolic function with no significant focal wall motion abnormalities.    2.  Carotid artery disease.  Patient's carotid ultrasound study today demonstrates total occlusion of the right internal carotid artery.  There is a significant left carotid artery stenosis.  Should be noted that end-diastolic volumes " however are normal.  However this does deserve an evaluation by the endovascular service.  Because of her renal insufficiency we do not feel that a carotid CT angiogram would be appropriate at this point in time.    3.  Peripheral vascular disease.  Having difficulty palpating pulses.  Also has had ulceration and erythema of both lower extremities.  Will get PVR studies.    4.  Hyperlipidemia.  Cholesterol 116, HDL 52, LDL 49.

## 2025-03-05 LAB
AORTIC VALVE MEAN GRADIENT: 11 MMHG
AORTIC VALVE PEAK VELOCITY: 2.23 M/S
AV PEAK GRADIENT: 20 MMHG
AVA (PEAK VEL): 1.58 CM2
AVA (VTI): 1.35 CM2
EJECTION FRACTION APICAL 4 CHAMBER: 68.1
EJECTION FRACTION: 73 %
LEFT ATRIUM VOLUME AREA LENGTH INDEX BSA: 31.2 ML/M2
LEFT VENTRICLE INTERNAL DIMENSION DIASTOLE: 4.06 CM (ref 3.5–6)
LEFT VENTRICULAR OUTFLOW TRACT DIAMETER: 2 CM
MITRAL VALVE E/A RATIO: 0.8
RIGHT VENTRICLE FREE WALL PEAK S': 0.16 CM/S
RIGHT VENTRICLE PEAK SYSTOLIC PRESSURE: 26.8 MMHG
TRICUSPID ANNULAR PLANE SYSTOLIC EXCURSION: 2.5 CM

## 2025-03-13 ENCOUNTER — HOSPITAL ENCOUNTER (OUTPATIENT)
Dept: VASCULAR MEDICINE | Facility: CLINIC | Age: 80
Discharge: HOME | End: 2025-03-13
Payer: MEDICARE

## 2025-03-13 DIAGNOSIS — I70.213 INTERMITTENT CLAUDICATION OF BOTH LOWER EXTREMITIES DUE TO ATHEROSCLEROSIS (CMS-HCC): ICD-10-CM

## 2025-03-13 DIAGNOSIS — I73.9 PERIPHERAL VASCULAR DISEASE, UNSPECIFIED (CMS-HCC): ICD-10-CM

## 2025-03-13 DIAGNOSIS — L03.115 CELLULITIS OF RIGHT LOWER EXTREMITY: ICD-10-CM

## 2025-03-13 PROCEDURE — 93922 UPR/L XTREMITY ART 2 LEVELS: CPT | Performed by: SURGERY

## 2025-03-13 PROCEDURE — 93922 UPR/L XTREMITY ART 2 LEVELS: CPT

## 2025-03-18 ENCOUNTER — OFFICE VISIT (OUTPATIENT)
Dept: CARDIOLOGY | Facility: CLINIC | Age: 80
End: 2025-03-18
Payer: MEDICARE

## 2025-03-18 VITALS
SYSTOLIC BLOOD PRESSURE: 160 MMHG | WEIGHT: 173 LBS | HEART RATE: 64 BPM | BODY MASS INDEX: 27.15 KG/M2 | DIASTOLIC BLOOD PRESSURE: 78 MMHG | OXYGEN SATURATION: 100 % | HEIGHT: 67 IN

## 2025-03-18 DIAGNOSIS — I65.29 CAROTID ATHEROSCLEROSIS, UNSPECIFIED LATERALITY: ICD-10-CM

## 2025-03-18 DIAGNOSIS — I73.9 PAD (PERIPHERAL ARTERY DISEASE) (CMS-HCC): Primary | ICD-10-CM

## 2025-03-18 DIAGNOSIS — I65.23 CAROTID ATHEROSCLEROSIS, BILATERAL: ICD-10-CM

## 2025-03-18 DIAGNOSIS — E78.49 OTHER HYPERLIPIDEMIA: ICD-10-CM

## 2025-03-18 PROCEDURE — 1159F MED LIST DOCD IN RCRD: CPT

## 2025-03-18 PROCEDURE — 3078F DIAST BP <80 MM HG: CPT

## 2025-03-18 PROCEDURE — 99215 OFFICE O/P EST HI 40 MIN: CPT

## 2025-03-18 PROCEDURE — 3077F SYST BP >= 140 MM HG: CPT

## 2025-03-18 PROCEDURE — 99205 OFFICE O/P NEW HI 60 MIN: CPT

## 2025-03-18 PROCEDURE — 1160F RVW MEDS BY RX/DR IN RCRD: CPT

## 2025-03-18 RX ORDER — CILOSTAZOL 50 MG/1
50 TABLET ORAL 2 TIMES DAILY
Qty: 180 TABLET | Refills: 3 | Status: SHIPPED | OUTPATIENT
Start: 2025-03-18 | End: 2026-03-18

## 2025-03-18 RX ORDER — ROSUVASTATIN CALCIUM 20 MG/1
20 TABLET, COATED ORAL DAILY
Qty: 90 TABLET | Refills: 3 | Status: SHIPPED | OUTPATIENT
Start: 2025-03-18 | End: 2026-03-18

## 2025-03-18 NOTE — PROGRESS NOTES
PCP: Deric Bonilla MD  Cardiologist: Dr. Oliver  Podiatrist: ***    Subjective   Nancy Long is a 79 y.o. female who {actions; complains of:11414[id number] ***    Cardiovascular risk factors include: {risk factors:510}.      Review of Systems:  Otherwise, limited cardiovascular review of systems is negative.    Past Medical History:  She has a past medical history of Personal history of other diseases of the circulatory system and Personal history of other endocrine, nutritional and metabolic disease.    Surgical History:   She has a past surgical history that includes Cholecystectomy (01/03/2014); Appendectomy (01/03/2014); Breast lumpectomy (01/03/2014); and Oophorectomy (01/03/2014).    Family History:   No family history on file.    Social History:   Tobacco Use: Medium Risk (3/4/2025)    Patient History     Smoking Tobacco Use: Former     Smokeless Tobacco Use: Never     Passive Exposure: Not on file     Outpatient Medications:  Current Outpatient Medications:     betamethasone dipropionate 0.05 % cream, Apply topically 2 times a day., Disp: , Rfl:     hydrOXYzine pamoate (Vistaril) 50 mg capsule, Take 1 capsule (50 mg) by mouth 4 times a day as needed., Disp: , Rfl:     loratadine-pseudoephedrine (Claritin-D 24-hour)  mg 24 hr tablet, Take 1 tablet by mouth once daily., Disp: , Rfl:     losartan (Cozaar) 100 mg tablet, Take 1 tablet (100 mg) by mouth once daily., Disp: 90 tablet, Rfl: 3    metFORMIN (Glucophage) 500 mg tablet, , Disp: , Rfl:     metoprolol tartrate (Lopressor) 100 mg tablet, Take 1 tablet (100 mg) by mouth once daily., Disp: 90 tablet, Rfl: 3    NovoLIN 70/30 U-100 Insulin 100 unit/mL (70-30) injection, twice daily with meals., Disp: , Rfl:     ondansetron ODT (Zofran-ODT) 8 mg disintegrating tablet, every 8 hours if needed., Disp: , Rfl:     OneTouch Ultra Test strip, , Disp: , Rfl:     oxyCODONE-acetaminophen (Percocet)  mg tablet, every 6 hours if needed., Disp: ,  Rfl:     polymyxin B sulf-trimethoprim (Polytrim) ophthalmic solution, APPLY 1/4 INCH TO AFFECTED EYE 4 TIMES A DAY., Disp: , Rfl:     rosuvastatin (Crestor) 20 mg tablet, Take 1 tablet (20 mg) by mouth once daily., Disp: 90 tablet, Rfl: 3    solifenacin (VESIcare) 5 mg tablet, , Disp: , Rfl:     sucralfate (Carafate) 100 mg/mL suspension, TAKE 10 ML BY MOUTH 4 TIMES A DAY, Disp: , Rfl:     torsemide (Demadex) 20 mg tablet, Take 1 tablet (20 mg) by mouth once daily., Disp: 90 tablet, Rfl: 3     Allergies:  Penicillins and Sulfa (sulfonamide antibiotics)     Objective   Vital Signs:  There were no vitals taken for this visit.    Physical Exam:  General: no acute distress  HEENT: EOMI, no scleral icterus.  Lungs: Clear to auscultation bilaterally without wheezing, rales, or rhonchi.  Cardiovascular: Regular rhythm and rate. Normal S1 and S2. No murmurs, rubs, or gallops are appreciated. JVP normal.  Abdomen: Soft, nontender, nondistended. Bowel sounds present.  Extremities: Warm and well perfused with equal 2+ pulses bilaterally.  No edema present.  Neurologic: Alert and oriented x3.    Pertinent Recent Cardiovascular Studies (personally reviewed):  Vascular studies:  MIKEY/TBI 3/13/2025: diminished RLE MIKEY, diminished LLE MIKEY, diminished RLE TBI and/or PPG, and diminished LLE TBI and/or PPG        Carotid duplex 3/04/2025:   Right Carotid: occlusion of the right internal carotid artery. No flow seen by color Doppler. There are elevated velocities in the right ECA that are suggestive of disease. There is a >50% stenosis noted in the right external carotid artery. The right vertebral artery is patent with antegrade flow. There are elevated velocities in the right subclavian artery that are suggestive of disease.  Left Carotid: >70% stenosis of the left proximal internal carotid artery. Turbulent flow seen by color Doppler. Left external carotid artery appears patent with no evidence of stenosis. The left vertebral artery  "is patent with antegrade flow. No evidence of hemodynamically significant stenosis in the left subclavian artery.    Laboratory values:  CMP:  Recent Labs     02/04/20  1336      K 4.3      CO2 21   ANIONGAP 18   BUN 25*   CREATININE 1.32*     Recent Labs     02/04/20  1336   ALBUMIN 3.9     CBC:  Recent Labs     02/04/20  1336   WBC 10.2   HGB 12.7   HCT 41.1           COAG: No results for input(s): \"PTT\", \"INR\", \"HAUF\", \"DDIMERVTE\", \"HAPTOGLOBIN\", \"FIBRINOGEN\" in the last 94514 hours.  ABO: No results for input(s): \"ABO\" in the last 35210 hours.  HEME/ENDO:  Recent Labs     07/11/24  1417 01/04/24  1408 08/15/23  1352 06/23/23  1142   HGBA1C 7* 6.8* 7.2* 7.1*      CARDIAC: No results for input(s): \"LDH\", \"CKMB\", \"TROPHS\", \"BNP\" in the last 97838 hours.    No lab exists for component: \"CK\", \"CKMBP\"No results for input(s): \"CHOL\", \"LDLF\", \"HDL\", \"TRIG\" in the last 79642 hours.  MICRO: No results for input(s): \"ESR\", \"CRP\", \"PROCAL\" in the last 65188 hours.  No results found for the last 90 days.    I have personally reviewed most recent PCP, cardiology, vascular, and/or podiatry documentation.    Assessment/Plan   79 y.o. female with chronic diastolic heart failure, hypertension, hyperlipidemia, and peripheral artery disease in the background of advanced age (older than 55 for men, 65 for women), diabetes mellitus, dyslipidemia, and hypertension.    Plan:  PAD, MIKEY reviewed   Cilostazol 50mg BID.    Carotid artery stenosis, right side complete occlusion stenosis per pt report has been like this >10yrs, left sided carotid artery stenosis >70% (). She is asymptomatic on exam and denies any hx of strokes. Will repeat carotid in 6 months for surveillance and have her follow up with Dr. Parish.     HLD, 1/07/25 LDL 49, HDL 52, trig 115, she is on rosuvastatin 20mg daily. Repeat fasting labs with PCP.     HTN, elevated, she has not taken her medications yet today.     Follow up with Dr. Parish in 3 " months.     Rhiannon Torres, APRN-CNP

## 2025-03-20 ENCOUNTER — APPOINTMENT (OUTPATIENT)
Dept: CARDIOLOGY | Facility: CLINIC | Age: 80
End: 2025-03-20
Payer: MEDICARE

## 2025-04-12 ENCOUNTER — APPOINTMENT (OUTPATIENT)
Dept: RADIOLOGY | Facility: HOSPITAL | Age: 80
DRG: 336 | End: 2025-04-12
Payer: MEDICARE

## 2025-04-12 ENCOUNTER — APPOINTMENT (OUTPATIENT)
Dept: CARDIOLOGY | Facility: HOSPITAL | Age: 80
DRG: 336 | End: 2025-04-12
Payer: MEDICARE

## 2025-04-12 ENCOUNTER — HOSPITAL ENCOUNTER (INPATIENT)
Facility: HOSPITAL | Age: 80
DRG: 336 | End: 2025-04-12
Attending: STUDENT IN AN ORGANIZED HEALTH CARE EDUCATION/TRAINING PROGRAM | Admitting: NURSE PRACTITIONER
Payer: MEDICARE

## 2025-04-12 DIAGNOSIS — R10.84 GENERALIZED ABDOMINAL PAIN: Primary | ICD-10-CM

## 2025-04-12 DIAGNOSIS — K56.609 SBO (SMALL BOWEL OBSTRUCTION) (MULTI): ICD-10-CM

## 2025-04-12 DIAGNOSIS — K56.609 SMALL BOWEL OBSTRUCTION (MULTI): ICD-10-CM

## 2025-04-12 PROBLEM — D72.829 LEUKOCYTOSIS: Status: ACTIVE | Noted: 2025-04-12

## 2025-04-12 PROBLEM — Z71.89 ADVANCED CARE PLANNING/COUNSELING DISCUSSION: Status: ACTIVE | Noted: 2025-04-12

## 2025-04-12 PROBLEM — F17.210 DEPENDENCE ON NICOTINE FROM CIGARETTES: Status: ACTIVE | Noted: 2025-04-12

## 2025-04-12 LAB
ABO GROUP (TYPE) IN BLOOD: NORMAL
ABO GROUP (TYPE) IN BLOOD: NORMAL
ALBUMIN SERPL BCP-MCNC: 3.6 G/DL (ref 3.4–5)
ALP SERPL-CCNC: 96 U/L (ref 33–136)
ALT SERPL W P-5'-P-CCNC: 8 U/L (ref 7–45)
ANION GAP SERPL CALC-SCNC: 15 MMOL/L (ref 10–20)
ANTIBODY SCREEN: NORMAL
APPEARANCE UR: CLEAR
AST SERPL W P-5'-P-CCNC: 16 U/L (ref 9–39)
BACTERIA #/AREA URNS AUTO: ABNORMAL /HPF
BASOPHILS # BLD AUTO: 0.06 X10*3/UL (ref 0–0.1)
BASOPHILS NFR BLD AUTO: 0.3 %
BILIRUB SERPL-MCNC: 0.7 MG/DL (ref 0–1.2)
BILIRUB UR STRIP.AUTO-MCNC: NEGATIVE MG/DL
BNP SERPL-MCNC: 137 PG/ML (ref 0–99)
BUN SERPL-MCNC: 45 MG/DL (ref 6–23)
CALCIUM SERPL-MCNC: 8.9 MG/DL (ref 8.6–10.3)
CARDIAC TROPONIN I PNL SERPL HS: 23 NG/L (ref 0–13)
CARDIAC TROPONIN I PNL SERPL HS: 27 NG/L (ref 0–13)
CARDIAC TROPONIN I PNL SERPL HS: 29 NG/L (ref 0–13)
CHLORIDE SERPL-SCNC: 94 MMOL/L (ref 98–107)
CO2 SERPL-SCNC: 21 MMOL/L (ref 21–32)
COLOR UR: ABNORMAL
CREAT SERPL-MCNC: 1.64 MG/DL (ref 0.5–1.05)
EGFRCR SERPLBLD CKD-EPI 2021: 31 ML/MIN/1.73M*2
EOSINOPHIL # BLD AUTO: 0 X10*3/UL (ref 0–0.4)
EOSINOPHIL NFR BLD AUTO: 0 %
ERYTHROCYTE [DISTWIDTH] IN BLOOD BY AUTOMATED COUNT: 13 % (ref 11.5–14.5)
FLUAV RNA RESP QL NAA+PROBE: NOT DETECTED
FLUBV RNA RESP QL NAA+PROBE: NOT DETECTED
GLUCOSE BLD MANUAL STRIP-MCNC: 131 MG/DL (ref 74–99)
GLUCOSE BLD MANUAL STRIP-MCNC: 171 MG/DL (ref 74–99)
GLUCOSE BLD MANUAL STRIP-MCNC: 183 MG/DL (ref 74–99)
GLUCOSE BLD MANUAL STRIP-MCNC: 81 MG/DL (ref 74–99)
GLUCOSE SERPL-MCNC: 220 MG/DL (ref 74–99)
GLUCOSE UR STRIP.AUTO-MCNC: NORMAL MG/DL
HCT VFR BLD AUTO: 41.7 % (ref 36–46)
HGB BLD-MCNC: 12.8 G/DL (ref 12–16)
HYALINE CASTS #/AREA URNS AUTO: ABNORMAL /LPF
IMM GRANULOCYTES # BLD AUTO: 0.13 X10*3/UL (ref 0–0.5)
IMM GRANULOCYTES NFR BLD AUTO: 0.6 % (ref 0–0.9)
INR PPP: 1 (ref 0.9–1.1)
KETONES UR STRIP.AUTO-MCNC: NEGATIVE MG/DL
LACTATE SERPL-SCNC: 1.6 MMOL/L (ref 0.4–2)
LEUKOCYTE ESTERASE UR QL STRIP.AUTO: ABNORMAL
LIPASE SERPL-CCNC: 9 U/L (ref 9–82)
LYMPHOCYTES # BLD AUTO: 2.34 X10*3/UL (ref 0.8–3)
LYMPHOCYTES NFR BLD AUTO: 11.2 %
MAGNESIUM SERPL-MCNC: 2.05 MG/DL (ref 1.6–2.4)
MCH RBC QN AUTO: 29 PG (ref 26–34)
MCHC RBC AUTO-ENTMCNC: 30.7 G/DL (ref 32–36)
MCV RBC AUTO: 94 FL (ref 80–100)
MONOCYTES # BLD AUTO: 1.1 X10*3/UL (ref 0.05–0.8)
MONOCYTES NFR BLD AUTO: 5.2 %
MUCOUS THREADS #/AREA URNS AUTO: ABNORMAL /LPF
NEUTROPHILS # BLD AUTO: 17.35 X10*3/UL (ref 1.6–5.5)
NEUTROPHILS NFR BLD AUTO: 82.7 %
NITRITE UR QL STRIP.AUTO: NEGATIVE
NRBC BLD-RTO: 0 /100 WBCS (ref 0–0)
PH UR STRIP.AUTO: 5 [PH]
PLATELET # BLD AUTO: 206 X10*3/UL (ref 150–450)
POTASSIUM SERPL-SCNC: 5 MMOL/L (ref 3.5–5.3)
PROT SERPL-MCNC: 6.5 G/DL (ref 6.4–8.2)
PROT UR STRIP.AUTO-MCNC: ABNORMAL MG/DL
PROTHROMBIN TIME: 11.5 SECONDS (ref 9.8–12.4)
RBC # BLD AUTO: 4.42 X10*6/UL (ref 4–5.2)
RBC # UR STRIP.AUTO: NEGATIVE MG/DL
RBC #/AREA URNS AUTO: ABNORMAL /HPF
RH FACTOR (ANTIGEN D): NORMAL
RH FACTOR (ANTIGEN D): NORMAL
SARS-COV-2 RNA RESP QL NAA+PROBE: NOT DETECTED
SODIUM SERPL-SCNC: 125 MMOL/L (ref 136–145)
SP GR UR STRIP.AUTO: 1.02
UROBILINOGEN UR STRIP.AUTO-MCNC: NORMAL MG/DL
WBC # BLD AUTO: 21 X10*3/UL (ref 4.4–11.3)
WBC #/AREA URNS AUTO: ABNORMAL /HPF

## 2025-04-12 PROCEDURE — 99223 1ST HOSP IP/OBS HIGH 75: CPT | Performed by: SURGERY

## 2025-04-12 PROCEDURE — 51798 US URINE CAPACITY MEASURE: CPT

## 2025-04-12 PROCEDURE — 71046 X-RAY EXAM CHEST 2 VIEWS: CPT

## 2025-04-12 PROCEDURE — 96365 THER/PROPH/DIAG IV INF INIT: CPT

## 2025-04-12 PROCEDURE — 2500000004 HC RX 250 GENERAL PHARMACY W/ HCPCS (ALT 636 FOR OP/ED): Performed by: NURSE PRACTITIONER

## 2025-04-12 PROCEDURE — 82947 ASSAY GLUCOSE BLOOD QUANT: CPT

## 2025-04-12 PROCEDURE — 87636 SARSCOV2 & INF A&B AMP PRB: CPT | Performed by: NURSE PRACTITIONER

## 2025-04-12 PROCEDURE — 85025 COMPLETE CBC W/AUTO DIFF WBC: CPT | Performed by: NURSE PRACTITIONER

## 2025-04-12 PROCEDURE — 74176 CT ABD & PELVIS W/O CONTRAST: CPT | Performed by: RADIOLOGY

## 2025-04-12 PROCEDURE — 84484 ASSAY OF TROPONIN QUANT: CPT | Performed by: NURSE PRACTITIONER

## 2025-04-12 PROCEDURE — 81001 URINALYSIS AUTO W/SCOPE: CPT | Performed by: NURSE PRACTITIONER

## 2025-04-12 PROCEDURE — 80053 COMPREHEN METABOLIC PANEL: CPT | Performed by: NURSE PRACTITIONER

## 2025-04-12 PROCEDURE — 83735 ASSAY OF MAGNESIUM: CPT | Performed by: NURSE PRACTITIONER

## 2025-04-12 PROCEDURE — 96376 TX/PRO/DX INJ SAME DRUG ADON: CPT

## 2025-04-12 PROCEDURE — 93005 ELECTROCARDIOGRAM TRACING: CPT

## 2025-04-12 PROCEDURE — 99223 1ST HOSP IP/OBS HIGH 75: CPT | Performed by: NURSE PRACTITIONER

## 2025-04-12 PROCEDURE — 36415 COLL VENOUS BLD VENIPUNCTURE: CPT | Performed by: NURSE PRACTITIONER

## 2025-04-12 PROCEDURE — 83690 ASSAY OF LIPASE: CPT | Performed by: NURSE PRACTITIONER

## 2025-04-12 PROCEDURE — 83605 ASSAY OF LACTIC ACID: CPT | Performed by: NURSE PRACTITIONER

## 2025-04-12 PROCEDURE — 86901 BLOOD TYPING SEROLOGIC RH(D): CPT | Performed by: NURSE PRACTITIONER

## 2025-04-12 PROCEDURE — 99223 1ST HOSP IP/OBS HIGH 75: CPT | Performed by: REGISTERED NURSE

## 2025-04-12 PROCEDURE — 2500000004 HC RX 250 GENERAL PHARMACY W/ HCPCS (ALT 636 FOR OP/ED): Performed by: REGISTERED NURSE

## 2025-04-12 PROCEDURE — 96361 HYDRATE IV INFUSION ADD-ON: CPT

## 2025-04-12 PROCEDURE — 87086 URINE CULTURE/COLONY COUNT: CPT | Mod: PARLAB | Performed by: NURSE PRACTITIONER

## 2025-04-12 PROCEDURE — 83880 ASSAY OF NATRIURETIC PEPTIDE: CPT | Performed by: NURSE PRACTITIONER

## 2025-04-12 PROCEDURE — 96375 TX/PRO/DX INJ NEW DRUG ADDON: CPT

## 2025-04-12 PROCEDURE — 85610 PROTHROMBIN TIME: CPT | Performed by: NURSE PRACTITIONER

## 2025-04-12 PROCEDURE — 96367 TX/PROPH/DG ADDL SEQ IV INF: CPT

## 2025-04-12 PROCEDURE — 2500000002 HC RX 250 W HCPCS SELF ADMINISTERED DRUGS (ALT 637 FOR MEDICARE OP, ALT 636 FOR OP/ED): Performed by: NURSE PRACTITIONER

## 2025-04-12 PROCEDURE — 99285 EMERGENCY DEPT VISIT HI MDM: CPT | Mod: 25 | Performed by: STUDENT IN AN ORGANIZED HEALTH CARE EDUCATION/TRAINING PROGRAM

## 2025-04-12 PROCEDURE — 74176 CT ABD & PELVIS W/O CONTRAST: CPT

## 2025-04-12 PROCEDURE — 1200000002 HC GENERAL ROOM WITH TELEMETRY DAILY

## 2025-04-12 RX ORDER — METOPROLOL TARTRATE 100 MG/1
100 TABLET ORAL NIGHTLY
Status: DISCONTINUED | OUTPATIENT
Start: 2025-04-12 | End: 2025-04-21 | Stop reason: HOSPADM

## 2025-04-12 RX ORDER — MORPHINE SULFATE 2 MG/ML
2 INJECTION, SOLUTION INTRAMUSCULAR; INTRAVENOUS EVERY 4 HOURS PRN
Status: DISCONTINUED | OUTPATIENT
Start: 2025-04-12 | End: 2025-04-12

## 2025-04-12 RX ORDER — ONDANSETRON HYDROCHLORIDE 2 MG/ML
4 INJECTION, SOLUTION INTRAVENOUS EVERY 8 HOURS PRN
Status: DISCONTINUED | OUTPATIENT
Start: 2025-04-12 | End: 2025-04-12

## 2025-04-12 RX ORDER — ACETAMINOPHEN 500 MG
500 TABLET ORAL EVERY 8 HOURS PRN
COMMUNITY

## 2025-04-12 RX ORDER — ASCORBIC ACID 500 MG
500 TABLET ORAL DAILY
COMMUNITY

## 2025-04-12 RX ORDER — PANTOPRAZOLE SODIUM 40 MG/10ML
40 INJECTION, POWDER, LYOPHILIZED, FOR SOLUTION INTRAVENOUS DAILY
Status: DISCONTINUED | OUTPATIENT
Start: 2025-04-12 | End: 2025-04-21 | Stop reason: HOSPADM

## 2025-04-12 RX ORDER — DEXTROSE 50 % IN WATER (D50W) INTRAVENOUS SYRINGE
25
Status: DISCONTINUED | OUTPATIENT
Start: 2025-04-12 | End: 2025-04-21 | Stop reason: HOSPADM

## 2025-04-12 RX ORDER — ONDANSETRON HYDROCHLORIDE 2 MG/ML
4 INJECTION, SOLUTION INTRAVENOUS EVERY 4 HOURS PRN
Status: DISCONTINUED | OUTPATIENT
Start: 2025-04-12 | End: 2025-04-21 | Stop reason: HOSPADM

## 2025-04-12 RX ORDER — METOPROLOL TARTRATE 1 MG/ML
5 INJECTION, SOLUTION INTRAVENOUS EVERY 6 HOURS
Status: DISCONTINUED | OUTPATIENT
Start: 2025-04-12 | End: 2025-04-18

## 2025-04-12 RX ORDER — CILOSTAZOL 50 MG/1
50 TABLET ORAL 2 TIMES DAILY
Status: DISCONTINUED | OUTPATIENT
Start: 2025-04-12 | End: 2025-04-21 | Stop reason: HOSPADM

## 2025-04-12 RX ORDER — DEXTROSE 50 % IN WATER (D50W) INTRAVENOUS SYRINGE
12.5
Status: DISCONTINUED | OUTPATIENT
Start: 2025-04-12 | End: 2025-04-21 | Stop reason: HOSPADM

## 2025-04-12 RX ORDER — ASPIRIN 325 MG
325 TABLET ORAL NIGHTLY
COMMUNITY

## 2025-04-12 RX ORDER — IBUPROFEN 200 MG
1 TABLET ORAL DAILY PRN
Status: DISCONTINUED | OUTPATIENT
Start: 2025-04-12 | End: 2025-04-21 | Stop reason: HOSPADM

## 2025-04-12 RX ORDER — DEXTROSE MONOHYDRATE AND SODIUM CHLORIDE 5; .9 G/100ML; G/100ML
100 INJECTION, SOLUTION INTRAVENOUS CONTINUOUS
Status: ACTIVE | OUTPATIENT
Start: 2025-04-12 | End: 2025-04-14

## 2025-04-12 RX ORDER — ASPIRIN 325 MG
325 TABLET ORAL NIGHTLY
Status: DISCONTINUED | OUTPATIENT
Start: 2025-04-13 | End: 2025-04-21 | Stop reason: HOSPADM

## 2025-04-12 RX ORDER — METRONIDAZOLE 500 MG/100ML
500 INJECTION, SOLUTION INTRAVENOUS EVERY 8 HOURS
Status: DISCONTINUED | OUTPATIENT
Start: 2025-04-12 | End: 2025-04-18

## 2025-04-12 RX ORDER — OXYCODONE AND ACETAMINOPHEN 10; 325 MG/1; MG/1
1 TABLET ORAL EVERY 8 HOURS
Status: DISCONTINUED | OUTPATIENT
Start: 2025-04-12 | End: 2025-04-18

## 2025-04-12 RX ORDER — CEFEPIME 1 G/50ML
2 INJECTION, SOLUTION INTRAVENOUS ONCE
Status: COMPLETED | OUTPATIENT
Start: 2025-04-12 | End: 2025-04-12

## 2025-04-12 RX ORDER — B12/LEVOMEFOLATE CALCIUM/B-6 2-1.13-25
1 TABLET ORAL DAILY
COMMUNITY
Start: 2025-04-09

## 2025-04-12 RX ORDER — HEPARIN SODIUM 5000 [USP'U]/ML
5000 INJECTION, SOLUTION INTRAVENOUS; SUBCUTANEOUS EVERY 8 HOURS
Status: DISCONTINUED | OUTPATIENT
Start: 2025-04-12 | End: 2025-04-21 | Stop reason: HOSPADM

## 2025-04-12 RX ORDER — SODIUM CHLORIDE 9 MG/ML
125 INJECTION, SOLUTION INTRAVENOUS CONTINUOUS
Status: DISCONTINUED | OUTPATIENT
Start: 2025-04-12 | End: 2025-04-12

## 2025-04-12 RX ORDER — METFORMIN HYDROCHLORIDE 500 MG/1
500 TABLET, EXTENDED RELEASE ORAL
Status: DISCONTINUED | OUTPATIENT
Start: 2025-04-13 | End: 2025-04-21 | Stop reason: HOSPADM

## 2025-04-12 RX ORDER — MORPHINE SULFATE 4 MG/ML
4 INJECTION, SOLUTION INTRAMUSCULAR; INTRAVENOUS ONCE
Status: COMPLETED | OUTPATIENT
Start: 2025-04-12 | End: 2025-04-12

## 2025-04-12 RX ORDER — ZINC SULFATE 50(220)MG
50 CAPSULE ORAL DAILY
COMMUNITY

## 2025-04-12 RX ORDER — LANSOPRAZOLE 30 MG/1
1 CAPSULE, DELAYED RELEASE ORAL 2 TIMES DAILY PRN
COMMUNITY
Start: 2025-04-08 | End: 2025-04-22

## 2025-04-12 RX ORDER — INSULIN LISPRO 100 [IU]/ML
0-5 INJECTION, SOLUTION INTRAVENOUS; SUBCUTANEOUS EVERY 4 HOURS
Status: DISCONTINUED | OUTPATIENT
Start: 2025-04-12 | End: 2025-04-20

## 2025-04-12 RX ORDER — METRONIDAZOLE 500 MG/100ML
500 INJECTION, SOLUTION INTRAVENOUS ONCE
Status: COMPLETED | OUTPATIENT
Start: 2025-04-12 | End: 2025-04-12

## 2025-04-12 RX ORDER — LOSARTAN POTASSIUM 50 MG/1
100 TABLET ORAL DAILY
Status: DISCONTINUED | OUTPATIENT
Start: 2025-04-12 | End: 2025-04-21 | Stop reason: HOSPADM

## 2025-04-12 RX ORDER — ONDANSETRON HYDROCHLORIDE 2 MG/ML
4 INJECTION, SOLUTION INTRAVENOUS ONCE
Status: COMPLETED | OUTPATIENT
Start: 2025-04-12 | End: 2025-04-12

## 2025-04-12 RX ORDER — INSULIN GLARGINE 100 [IU]/ML
30 INJECTION, SOLUTION SUBCUTANEOUS EVERY 24 HOURS
Status: DISCONTINUED | OUTPATIENT
Start: 2025-04-12 | End: 2025-04-13

## 2025-04-12 RX ADMIN — SODIUM CHLORIDE 125 ML/HR: 0.9 INJECTION, SOLUTION INTRAVENOUS at 14:43

## 2025-04-12 RX ADMIN — METOPROLOL TARTRATE 5 MG: 5 INJECTION INTRAVENOUS at 20:09

## 2025-04-12 RX ADMIN — DEXTROSE AND SODIUM CHLORIDE 100 ML/HR: 5; .9 INJECTION, SOLUTION INTRAVENOUS at 16:13

## 2025-04-12 RX ADMIN — METRONIDAZOLE 500 MG: 500 INJECTION, SOLUTION INTRAVENOUS at 14:03

## 2025-04-12 RX ADMIN — ONDANSETRON 4 MG: 2 INJECTION INTRAMUSCULAR; INTRAVENOUS at 12:35

## 2025-04-12 RX ADMIN — MORPHINE SULFATE 4 MG: 4 INJECTION, SOLUTION INTRAMUSCULAR; INTRAVENOUS at 12:35

## 2025-04-12 RX ADMIN — HEPARIN SODIUM 5000 UNITS: 5000 INJECTION INTRAVENOUS; SUBCUTANEOUS at 20:09

## 2025-04-12 RX ADMIN — METRONIDAZOLE 500 MG: 500 INJECTION, SOLUTION INTRAVENOUS at 22:19

## 2025-04-12 RX ADMIN — DEXTROSE AND SODIUM CHLORIDE 100 ML/HR: 5; .9 INJECTION, SOLUTION INTRAVENOUS at 20:09

## 2025-04-12 RX ADMIN — INSULIN GLARGINE 30 UNITS: 100 INJECTION, SOLUTION SUBCUTANEOUS at 20:39

## 2025-04-12 RX ADMIN — MORPHINE SULFATE 2 MG: 2 INJECTION, SOLUTION INTRAMUSCULAR; INTRAVENOUS at 16:13

## 2025-04-12 RX ADMIN — PANTOPRAZOLE SODIUM 40 MG: 40 INJECTION, POWDER, FOR SOLUTION INTRAVENOUS at 20:09

## 2025-04-12 RX ADMIN — CEFEPIME 2 G: 1 INJECTION, SOLUTION INTRAVENOUS at 16:04

## 2025-04-12 SDOH — SOCIAL STABILITY: SOCIAL INSECURITY
WITHIN THE LAST YEAR, HAVE YOU BEEN RAPED OR FORCED TO HAVE ANY KIND OF SEXUAL ACTIVITY BY YOUR PARTNER OR EX-PARTNER?: NO

## 2025-04-12 SDOH — ECONOMIC STABILITY: FOOD INSECURITY: WITHIN THE PAST 12 MONTHS, YOU WORRIED THAT YOUR FOOD WOULD RUN OUT BEFORE YOU GOT THE MONEY TO BUY MORE.: NEVER TRUE

## 2025-04-12 SDOH — SOCIAL STABILITY: SOCIAL INSECURITY: WITHIN THE LAST YEAR, HAVE YOU BEEN HUMILIATED OR EMOTIONALLY ABUSED IN OTHER WAYS BY YOUR PARTNER OR EX-PARTNER?: NO

## 2025-04-12 SDOH — ECONOMIC STABILITY: TRANSPORTATION INSECURITY: IN THE PAST 12 MONTHS, HAS LACK OF TRANSPORTATION KEPT YOU FROM MEDICAL APPOINTMENTS OR FROM GETTING MEDICATIONS?: NO

## 2025-04-12 SDOH — SOCIAL STABILITY: SOCIAL INSECURITY: DOES ANYONE TRY TO KEEP YOU FROM HAVING/CONTACTING OTHER FRIENDS OR DOING THINGS OUTSIDE YOUR HOME?: NO

## 2025-04-12 SDOH — ECONOMIC STABILITY: HOUSING INSECURITY: IN THE PAST 12 MONTHS, HOW MANY TIMES HAVE YOU MOVED WHERE YOU WERE LIVING?: 0

## 2025-04-12 SDOH — ECONOMIC STABILITY: FOOD INSECURITY: HOW HARD IS IT FOR YOU TO PAY FOR THE VERY BASICS LIKE FOOD, HOUSING, MEDICAL CARE, AND HEATING?: NOT VERY HARD

## 2025-04-12 SDOH — SOCIAL STABILITY: SOCIAL INSECURITY: WITHIN THE LAST YEAR, HAVE YOU BEEN AFRAID OF YOUR PARTNER OR EX-PARTNER?: NO

## 2025-04-12 SDOH — ECONOMIC STABILITY: INCOME INSECURITY: IN THE PAST 12 MONTHS HAS THE ELECTRIC, GAS, OIL, OR WATER COMPANY THREATENED TO SHUT OFF SERVICES IN YOUR HOME?: NO

## 2025-04-12 SDOH — SOCIAL STABILITY: SOCIAL INSECURITY: DO YOU FEEL ANYONE HAS EXPLOITED OR TAKEN ADVANTAGE OF YOU FINANCIALLY OR OF YOUR PERSONAL PROPERTY?: NO

## 2025-04-12 SDOH — SOCIAL STABILITY: SOCIAL INSECURITY: HAVE YOU HAD THOUGHTS OF HARMING ANYONE ELSE?: NO

## 2025-04-12 SDOH — ECONOMIC STABILITY: HOUSING INSECURITY: IN THE LAST 12 MONTHS, WAS THERE A TIME WHEN YOU WERE NOT ABLE TO PAY THE MORTGAGE OR RENT ON TIME?: NO

## 2025-04-12 SDOH — SOCIAL STABILITY: SOCIAL INSECURITY
WITHIN THE LAST YEAR, HAVE YOU BEEN KICKED, HIT, SLAPPED, OR OTHERWISE PHYSICALLY HURT BY YOUR PARTNER OR EX-PARTNER?: NO

## 2025-04-12 SDOH — SOCIAL STABILITY: SOCIAL INSECURITY: HAS ANYONE EVER THREATENED TO HURT YOUR FAMILY OR YOUR PETS?: NO

## 2025-04-12 SDOH — ECONOMIC STABILITY: FOOD INSECURITY: WITHIN THE PAST 12 MONTHS, THE FOOD YOU BOUGHT JUST DIDN'T LAST AND YOU DIDN'T HAVE MONEY TO GET MORE.: NEVER TRUE

## 2025-04-12 SDOH — SOCIAL STABILITY: SOCIAL INSECURITY: DO YOU FEEL UNSAFE GOING BACK TO THE PLACE WHERE YOU ARE LIVING?: NO

## 2025-04-12 SDOH — ECONOMIC STABILITY: HOUSING INSECURITY: AT ANY TIME IN THE PAST 12 MONTHS, WERE YOU HOMELESS OR LIVING IN A SHELTER (INCLUDING NOW)?: NO

## 2025-04-12 SDOH — SOCIAL STABILITY: SOCIAL INSECURITY: ARE YOU OR HAVE YOU BEEN THREATENED OR ABUSED PHYSICALLY, EMOTIONALLY, OR SEXUALLY BY ANYONE?: NO

## 2025-04-12 SDOH — SOCIAL STABILITY: SOCIAL INSECURITY: ARE THERE ANY APPARENT SIGNS OF INJURIES/BEHAVIORS THAT COULD BE RELATED TO ABUSE/NEGLECT?: NO

## 2025-04-12 SDOH — SOCIAL STABILITY: SOCIAL INSECURITY: WERE YOU ABLE TO COMPLETE ALL THE BEHAVIORAL HEALTH SCREENINGS?: YES

## 2025-04-12 SDOH — SOCIAL STABILITY: SOCIAL INSECURITY: HAVE YOU HAD ANY THOUGHTS OF HARMING ANYONE ELSE?: NO

## 2025-04-12 SDOH — SOCIAL STABILITY: SOCIAL INSECURITY: ABUSE: ADULT

## 2025-04-12 ASSESSMENT — PATIENT HEALTH QUESTIONNAIRE - PHQ9
1. LITTLE INTEREST OR PLEASURE IN DOING THINGS: NOT AT ALL
SUM OF ALL RESPONSES TO PHQ9 QUESTIONS 1 & 2: 0
2. FEELING DOWN, DEPRESSED OR HOPELESS: NOT AT ALL

## 2025-04-12 ASSESSMENT — ACTIVITIES OF DAILY LIVING (ADL)
WALKS IN HOME: NEEDS ASSISTANCE
HEARING - LEFT EAR: FUNCTIONAL
LACK_OF_TRANSPORTATION: NO
TOILETING: NEEDS ASSISTANCE
TOILETING: NEEDS ASSISTANCE
JUDGMENT_ADEQUATE_SAFELY_COMPLETE_DAILY_ACTIVITIES: YES
BATHING: INDEPENDENT
LACK_OF_TRANSPORTATION: NO
HEARING - RIGHT EAR: FUNCTIONAL
GROOMING: INDEPENDENT
BATHING: INDEPENDENT
JUDGMENT_ADEQUATE_SAFELY_COMPLETE_DAILY_ACTIVITIES: YES
DRESSING YOURSELF: INDEPENDENT
FEEDING YOURSELF: INDEPENDENT
GROOMING: INDEPENDENT
WALKS IN HOME: NEEDS ASSISTANCE
HEARING - LEFT EAR: FUNCTIONAL
LACK_OF_TRANSPORTATION: NO
ADEQUATE_TO_COMPLETE_ADL: YES
FEEDING YOURSELF: INDEPENDENT
PATIENT'S MEMORY ADEQUATE TO SAFELY COMPLETE DAILY ACTIVITIES?: YES
PATIENT'S MEMORY ADEQUATE TO SAFELY COMPLETE DAILY ACTIVITIES?: YES
HEARING - RIGHT EAR: FUNCTIONAL
ADEQUATE_TO_COMPLETE_ADL: YES

## 2025-04-12 ASSESSMENT — PAIN SCALES - GENERAL
PAINLEVEL_OUTOF10: 5 - MODERATE PAIN
PAINLEVEL_OUTOF10: 3
PAINLEVEL_OUTOF10: 4
PAINLEVEL_OUTOF10: 5 - MODERATE PAIN
PAINLEVEL_OUTOF10: 0 - NO PAIN

## 2025-04-12 ASSESSMENT — LIFESTYLE VARIABLES
HOW MANY STANDARD DRINKS CONTAINING ALCOHOL DO YOU HAVE ON A TYPICAL DAY: PATIENT DOES NOT DRINK
AUDIT-C TOTAL SCORE: 0
SKIP TO QUESTIONS 9-10: 1
HAVE PEOPLE ANNOYED YOU BY CRITICIZING YOUR DRINKING: NO
HOW OFTEN DO YOU HAVE A DRINK CONTAINING ALCOHOL: NEVER
HAVE YOU EVER FELT YOU SHOULD CUT DOWN ON YOUR DRINKING: NO
HOW OFTEN DO YOU HAVE 6 OR MORE DRINKS ON ONE OCCASION: NEVER
AUDIT-C TOTAL SCORE: 0
EVER FELT BAD OR GUILTY ABOUT YOUR DRINKING: NO
TOTAL SCORE: 0
EVER HAD A DRINK FIRST THING IN THE MORNING TO STEADY YOUR NERVES TO GET RID OF A HANGOVER: NO

## 2025-04-12 ASSESSMENT — COGNITIVE AND FUNCTIONAL STATUS - GENERAL
WALKING IN HOSPITAL ROOM: A LITTLE
PATIENT BASELINE BEDBOUND: NO
STANDING UP FROM CHAIR USING ARMS: A LITTLE
CLIMB 3 TO 5 STEPS WITH RAILING: A LITTLE
MOVING TO AND FROM BED TO CHAIR: A LITTLE
MOBILITY SCORE: 20
DAILY ACTIVITIY SCORE: 24

## 2025-04-12 NOTE — CONSULTS
Reason For Consult  Small bowel obstruction    History Of Present Illness  Nancy Long is a 79 y.o. female who complains of a 2-day history of abdominal pain.  Initially the pain was rather severe.  She had nausea with 1 episode of vomiting.  She took a Percocet tablet and the pain improved.  She has continued to have less severe abdominal discomfort.  She has had a poor appetite with persistent nausea.  She had chills and a low-grade fever yesterday.  She has felt weak.  She has been passing gas and had a bowel movement yesterday.  No blood in her stool.  No diarrhea.  She came into the emergency room today for evaluation.  She now complains of just an aching discomfort of her abdomen.  She does not appear to be in any distress at this time.    Past medical history:  Coronary disease  Hypertension  Diabetes mellitus  Chronic kidney disease  Peripheral arterial disease  Carotid artery disease  Breast cancer status post left breast lumpectomy and radiation  Open cholecystectomy  Open oophorectomy with appendectomy    Allergies: Penicillin causes severe nausea    Tobacco: Smokes 1 pack every few days.       Past Medical History  She has a past medical history of Personal history of other diseases of the circulatory system and Personal history of other endocrine, nutritional and metabolic disease.    Surgical History  She has a past surgical history that includes Cholecystectomy (01/03/2014); Appendectomy (01/03/2014); Breast lumpectomy (01/03/2014); and Oophorectomy (01/03/2014).     Social History  She reports that she has quit smoking. Her smoking use included cigarettes. She has never used smokeless tobacco. She reports that she does not currently use alcohol. She reports that she does not use drugs.    Family History  No family history on file.     Allergies  Penicillins and Sulfa (sulfonamide antibiotics)    Review of Systems  As above     Physical Exam: Pulse 80, blood pressure 142/63  Constitutional:  "Well-developed, well-nourished, alert and oriented, no acute distress  Skin: Warm and dry, no lesions, no rashes, no jaundice  HEENT: Normocephalic, atraumatic, EOMI, no scleral icterus, eyes have no redness or swelling or discharge, external inspection of ears and nose is normal  Neck: Soft, nontender, no mass or adenopathy  Cardiac: Regular rate and rhythm, systolic murmur  Chest: Patent airway, clear to auscultation, normal breath sounds with good chest expansion, no wheezes or rales or rhonchi noted, thorax symmetric  Abdomen: Possible mild distention, right upper quadrant subcostal scar and lower abdominal midline scar, high-pitched positive bowel sounds, soft, mild generalized tenderness worst in the lower abdomen, no mass.  Indurated areas of the abdomen bilaterally likely from chronic insulin injection.  Rectal: Not performed  Extremities: No injury, no calf tenderness, mild bilateral lower extremity edema  Lymphatic: No cervical adenopathy  Musculoskeletal: Range of motion intact, no joint swelling  Neurological: Alert and oriented x3, no obvious focal neurologic abnormalities  Psychological: Appropriate mood and behavior     Last Recorded Vitals  Blood pressure 142/63, pulse 79, temperature 36.8 °C (98.2 °F), temperature source Temporal, resp. rate (!) 25, height 1.702 m (5' 7\"), weight 77.1 kg (170 lb), SpO2 95%.    Relevant Results  Labs: WBC 21.0, hemoglobin 12.8, platelet 206  Sodium 125, potassium 5.0, chloride 94, BUN 45, creatinine 1.64, glucose 220  Liver function tests and lipase normal  Troponin 29,     I reviewed the CT abdomen/pelvis report and images:  IMPRESSION:  1.  Small-bowel obstruction with mesenteric edema as described.  2. Small volume of ascites.  3. Diverticulosis.  4. Atherosclerosis as described.    Assessment/Plan     79-year-old female with small bowel dilatation associated with mesenteric edema and small volume ascites.  The radiologist feels that this is due to small " bowel obstruction.  The patient has significant vascular disease and could have mesenteric ischemia by imaging.  However, she has less abdominal pain and tenderness than I would expect with mesenteric ischemia.  Lactate level is pending.  I told the patient that I would recommend admission, n.p.o., IV hydration, nasogastric decompression.  I discussed the possibility of mesenteric ischemia and the fact that this could be a life-threatening problem.  I discussed the option of an emergent operation with laparoscopy and possible laparotomy.  She states that she declines to have any operation even for a life-threatening problem.  She does agree to hospital admission and she will consider nasogastric tube placement.  The patient's sister who is also her closest relative was present for the conversation.  Given the fact that she refuses any operation, she can be admitted to the internal medicine service.  Evaluation and treatment of multiple medical issues per internal medicine service, including electrolyte abnormalities, diabetes and elevated BUN/creatinine/troponin/BNP.    Elier Martinez MD

## 2025-04-12 NOTE — PROGRESS NOTES
Pharmacy Medication History Review    Nancy Long is a 79 y.o. female admitted for No Principal Problem: There is no principal problem currently on the Problem List. Please update the Problem List and refresh.. Pharmacy reviewed the patient's [id number] medications and allergies for accuracy.    The list below reflectives the updated PTA list. Please review each medication in order reconciliation for additional clarification and justification.  Prior to Admission medications    Medication Sig Start Date End Date Taking? Authorizing Provider   acetaminophen (Tylenol) 500 mg tablet Take 1 tablet (500 mg) by mouth every 8 hours if needed for mild pain (1 - 3).   Yes Historical Provider, MD   ascorbic acid (Vitamin C) 500 mg tablet Take 1 tablet (500 mg) by mouth once daily.   Yes Historical Provider, MD   aspirin 325 mg tablet Take 1 tablet (325 mg) by mouth once daily at bedtime.   Yes Historical Provider, MD   B12-levomefolate calcium-B6 (Foltx) 2-1.13-25 mg tablet Take 1 tablet by mouth once daily. 4/9/25  Yes Historical Provider, MD   cilostazol (Pletal) 50 mg tablet Take 1 tablet (50 mg) by mouth 2 times a day. 3/18/25 3/18/26 Yes Rhiannon Torres, APRN-CNP   lansoprazole (Prevacid) 30 mg DR capsule Take 1 capsule (30 mg) by mouth 2 times a day as needed. 4/8/25 4/22/25 Yes Historical Provider, MD   losartan (Cozaar) 100 mg tablet Take 1 tablet (100 mg) by mouth once daily. 12/12/24  Yes Justin Oliver MD   metFORMIN XR (Glucophage-XR) 500 mg 24 hr tablet Take 1 tablet (500 mg) by mouth once daily in the evening. Take with meals.  Patient taking differently: Take 1 tablet (500 mg) by mouth once daily as needed (BG >200). 2/18/19  Yes Historical Provider, MD   metoprolol tartrate (Lopressor) 100 mg tablet Take 1 tablet (100 mg) by mouth once daily.  Patient taking differently: Take 1 tablet (100 mg) by mouth once daily at bedtime. 12/12/24 12/12/25 Yes Justin Oliver MD   NovoLIN 70/30 U-100  Insulin 100 unit/mL (70-30) injection Inject 43 Units under the skin 2 times a day before meals. 11/26/12  Yes Historical Provider, MD   oxyCODONE-acetaminophen (Percocet)  mg tablet Take 1 tablet by mouth every 8 hours. 3/24/25  Yes Historical Provider, MD   rosuvastatin (Crestor) 20 mg tablet Take 1 tablet (20 mg) by mouth once daily.  Patient taking differently: Take 1 tablet (20 mg) by mouth once daily at bedtime. 3/18/25 3/18/26 Yes Rhiannon Torres APRN-CNP   zinc sulfate (Zincate) 220 (50 Zn) MG capsule Take 1 capsule (50 mg of elemental zinc) by mouth once daily.   Yes Historical Provider, MD   betamethasone dipropionate 0.05 % cream Apply topically 2 times a day.  Patient not taking: Reported on 4/12/2025   no Historical Provider, MD   hydrOXYzine pamoate (Vistaril) 50 mg capsule Take 1 capsule (50 mg) by mouth 4 times a day as needed.  Patient not taking: Reported on 4/12/2025 8/30/22  no Historical Provider, MD   loratadine-pseudoephedrine (Claritin-D 24-hour)  mg 24 hr tablet Take 1 tablet by mouth once daily.  Patient not taking: Reported on 4/12/2025 7/11/24  no Historical Provider, MD   ondansetron ODT (Zofran-ODT) 8 mg disintegrating tablet every 8 hours if needed.  Patient not taking: Reported on 4/12/2025 8/15/23  no Historical Provider, MD   OneTouch Ultra Test strip  7/16/13  Yes Historical Provider, MD   polymyxin B sulf-trimethoprim (Polytrim) ophthalmic solution APPLY 1/4 INCH TO AFFECTED EYE 4 TIMES A DAY.  Patient not taking: Reported on 4/12/2025 7/17/23  no Historical Provider, MD   solifenacin (VESIcare) 5 mg tablet  10/12/23  no Historical Provider, MD   sucralfate (Carafate) 100 mg/mL suspension TAKE 10 ML BY MOUTH 4 TIMES A DAY  Patient not taking: Reported on 4/12/2025 9/12/23  no Historical Provider, MD   torsemide (Demadex) 20 mg tablet Take 1 tablet (20 mg) by mouth once daily.  Patient not taking: Reported on 4/12/2025 12/12/24  no Justin Oliver MD        The  list below reflectives the updated allergy list. Please review each documented allergy for additional clarification and justification.  Allergies  Reviewed by Zehra Rosen RN on 4/12/2025        Severity Reactions Comments    Penicillins Not Specified Unknown     Sulfa (sulfonamide Antibiotics) Not Specified Unknown             Below are additional concerns with the patient's PTA list.      Gisella Lake

## 2025-04-12 NOTE — H&P
History Of Present Illness  Nancy Long is a 79 y.o. female with CAD,  chronic diastolic heart failure, hypertension, hyperlipidemia, peripheral artery disease, diabetes mellitus, carotid artery disease (Right occlusion of ICA, left ICA with > 70% stenosis; follows with Dr. Parish), CKD, Left breast cancer s/p lumpectomy and radiation, kidney stones, and open cholecystectomy and open appendectomy with right ovary removal (for tumor- at age 16) who presented today with abdominal pain.  Patient reports yesterday she developed severe, sharp, continuous epigastric pain associated with nausea and diaphoresis.  She notes she took some of her prescribed Percocet that seemed to help a little however the symptoms persisted into today so she decided to come to the emergency room.  She also notes a decreased appetite and when she did try to eat the food did not taste good.  She denies a history of bowel obstruction.  She articulates she told the surgery team she would not like surgical intervention.  CODE STATUS discussed and she would like to be a DNR CCA.    In the ED lab work, EKG, chest x-ray and CTAP were performed, labs revealed glucose 200, sodium 125, chloride 94, bun 45, creatinine 1.64 (1.32 on 2/4/2020), , lactate 1.6, troponins 27 and 29 respectively, white count 21, neutrophils 17.35, monocytes 1.1. EKG Interpretation, per ER physician impression: Sinus at rate of 79, , QRS 91, QTc 407 without evidence of STEMI or ischemia. Chest x-ray without acute process.  CT abdomen/pelvis revealed small bowel obstruction with mesenteric edema as described, small volume of ascites, diverticulosis, atherosclerosis, subcutaneous opacities and reticulation that may be due to injections although hematoma is possible.  Patient was evaluated by surgery team that noted her symptoms are likely consistent with partial adhesive small bowel obstruction and surgical intervention was offered however patient declined.  Her vital  signs were acceptable with a slightly elevated blood pressure of 157/70.  She was given cefepime Flagyl morphine Zofran and started on IV fluids and admitted for further evaluation and treatment under the care of Dr. Saez.      Echo March 2025:    CONCLUSIONS:  1. Left ventricular ejection fraction is normal, by visual estimate at 70-75%.  2. There is moderate left ventricular hypertrophy.  3. The left atrium is mildly dilated.  4. There is mild mitral valve stenosis.  5. There is moderate mitral annular calcification.  6. Right ventricular systolic pressure is within normal limits.  7. Mild aortic valve stenosis.  8. Mild left ventricular outflow obstruction at rest and with Valsalva (10 mmHg).     Past Medical History  Past Medical History:   Diagnosis Date    Personal history of other diseases of the circulatory system     History of hypertension    Personal history of other endocrine, nutritional and metabolic disease     History of diabetes mellitus       Surgical History  Past Surgical History:   Procedure Laterality Date    APPENDECTOMY  01/03/2014    Appendectomy    BREAST LUMPECTOMY  01/03/2014    Breast Surgery Lumpectomy    CHOLECYSTECTOMY  01/03/2014    Cholecystectomy    OOPHORECTOMY  01/03/2014    Oophorectomy        Social History  Denies alcohol or illicit drug use.  Reports she smokes 2 to 3 packs of cigarettes per week.  Lives alone.  Ambulates with cane as needed.    Family History  No family history on file.     Allergies  Penicillins and Sulfa (sulfonamide antibiotics)    10 point review of systems performed and is negative besides what is stated in HPI.     Physical Exam  Constitutional:       Appearance: Normal appearance.   HENT:      Head: Normocephalic and atraumatic.      Nose: Nose normal.      Mouth/Throat:      Mouth: Mucous membranes are moist.   Eyes:      Conjunctiva/sclera: Conjunctivae normal.   Cardiovascular:      Rate and Rhythm: Normal rate and regular rhythm.      Heart  "sounds: Murmur heard.   Pulmonary:      Effort: Pulmonary effort is normal.      Comments: Crackles left base  Abdominal:      General: Bowel sounds are normal. There is no distension.      Tenderness: There is abdominal tenderness.      Comments: Somewhat firm   Musculoskeletal:         General: Normal range of motion.      Cervical back: Neck supple.   Skin:     General: Skin is warm and dry.   Neurological:      Mental Status: She is alert. Mental status is at baseline.   Psychiatric:         Mood and Affect: Mood normal.          Last Recorded Vitals  Blood pressure 168/71, pulse 86, temperature 36.8 °C (98.2 °F), temperature source Temporal, resp. rate 18, height 1.702 m (5' 7\"), weight 77.1 kg (170 lb), SpO2 97%.    Relevant Results    No current facility-administered medications on file prior to encounter.     Current Outpatient Medications on File Prior to Encounter   Medication Sig Dispense Refill    acetaminophen (Tylenol) 500 mg tablet Take 1 tablet (500 mg) by mouth every 8 hours if needed for mild pain (1 - 3).      ascorbic acid (Vitamin C) 500 mg tablet Take 1 tablet (500 mg) by mouth once daily.      aspirin 325 mg tablet Take 1 tablet (325 mg) by mouth once daily at bedtime.      B12-levomefolate calcium-B6 (Foltx) 2-1.13-25 mg tablet Take 1 tablet by mouth once daily.      cilostazol (Pletal) 50 mg tablet Take 1 tablet (50 mg) by mouth 2 times a day. 180 tablet 3    lansoprazole (Prevacid) 30 mg DR capsule Take 1 capsule (30 mg) by mouth 2 times a day as needed.      losartan (Cozaar) 100 mg tablet Take 1 tablet (100 mg) by mouth once daily. 90 tablet 3    metFORMIN XR (Glucophage-XR) 500 mg 24 hr tablet Take 1 tablet (500 mg) by mouth once daily in the evening. Take with meals. (Patient taking differently: Take 1 tablet (500 mg) by mouth once daily as needed (BG >200).)      metoprolol tartrate (Lopressor) 100 mg tablet Take 1 tablet (100 mg) by mouth once daily. (Patient taking differently: Take " 1 tablet (100 mg) by mouth once daily at bedtime.) 90 tablet 3    NovoLIN 70/30 U-100 Insulin 100 unit/mL (70-30) injection Inject 43 Units under the skin 2 times a day before meals.      OneTouch Ultra Test strip       oxyCODONE-acetaminophen (Percocet)  mg tablet Take 1 tablet by mouth every 8 hours.      rosuvastatin (Crestor) 20 mg tablet Take 1 tablet (20 mg) by mouth once daily. (Patient taking differently: Take 1 tablet (20 mg) by mouth once daily at bedtime.) 90 tablet 3    zinc sulfate (Zincate) 220 (50 Zn) MG capsule Take 1 capsule (50 mg of elemental zinc) by mouth once daily.      betamethasone dipropionate 0.05 % cream Apply topically 2 times a day. (Patient not taking: Reported on 4/12/2025)      hydrOXYzine pamoate (Vistaril) 50 mg capsule Take 1 capsule (50 mg) by mouth 4 times a day as needed. (Patient not taking: Reported on 4/12/2025)      loratadine-pseudoephedrine (Claritin-D 24-hour)  mg 24 hr tablet Take 1 tablet by mouth once daily. (Patient not taking: Reported on 4/12/2025)      ondansetron ODT (Zofran-ODT) 8 mg disintegrating tablet every 8 hours if needed. (Patient not taking: Reported on 4/12/2025)      polymyxin B sulf-trimethoprim (Polytrim) ophthalmic solution APPLY 1/4 INCH TO AFFECTED EYE 4 TIMES A DAY. (Patient not taking: Reported on 4/12/2025)      solifenacin (VESIcare) 5 mg tablet  (Patient not taking: Reported on 4/12/2025)      sucralfate (Carafate) 100 mg/mL suspension TAKE 10 ML BY MOUTH 4 TIMES A DAY (Patient not taking: Reported on 4/12/2025)      torsemide (Demadex) 20 mg tablet Take 1 tablet (20 mg) by mouth once daily. (Patient not taking: Reported on 4/12/2025) 90 tablet 3       Results for orders placed or performed during the hospital encounter of 04/12/25 (from the past 24 hours)   CBC and Auto Differential   Result Value Ref Range    WBC 21.0 (H) 4.4 - 11.3 x10*3/uL    nRBC 0.0 0.0 - 0.0 /100 WBCs    RBC 4.42 4.00 - 5.20 x10*6/uL    Hemoglobin 12.8  12.0 - 16.0 g/dL    Hematocrit 41.7 36.0 - 46.0 %    MCV 94 80 - 100 fL    MCH 29.0 26.0 - 34.0 pg    MCHC 30.7 (L) 32.0 - 36.0 g/dL    RDW 13.0 11.5 - 14.5 %    Platelets 206 150 - 450 x10*3/uL    Neutrophils % 82.7 40.0 - 80.0 %    Immature Granulocytes %, Automated 0.6 0.0 - 0.9 %    Lymphocytes % 11.2 13.0 - 44.0 %    Monocytes % 5.2 2.0 - 10.0 %    Eosinophils % 0.0 0.0 - 6.0 %    Basophils % 0.3 0.0 - 2.0 %    Neutrophils Absolute 17.35 (H) 1.60 - 5.50 x10*3/uL    Immature Granulocytes Absolute, Automated 0.13 0.00 - 0.50 x10*3/uL    Lymphocytes Absolute 2.34 0.80 - 3.00 x10*3/uL    Monocytes Absolute 1.10 (H) 0.05 - 0.80 x10*3/uL    Eosinophils Absolute 0.00 0.00 - 0.40 x10*3/uL    Basophils Absolute 0.06 0.00 - 0.10 x10*3/uL   Magnesium   Result Value Ref Range    Magnesium 2.05 1.60 - 2.40 mg/dL   Comprehensive metabolic panel   Result Value Ref Range    Glucose 220 (H) 74 - 99 mg/dL    Sodium 125 (L) 136 - 145 mmol/L    Potassium 5.0 3.5 - 5.3 mmol/L    Chloride 94 (L) 98 - 107 mmol/L    Bicarbonate 21 21 - 32 mmol/L    Anion Gap 15 10 - 20 mmol/L    Urea Nitrogen 45 (H) 6 - 23 mg/dL    Creatinine 1.64 (H) 0.50 - 1.05 mg/dL    eGFR 31 (L) >60 mL/min/1.73m*2    Calcium 8.9 8.6 - 10.3 mg/dL    Albumin 3.6 3.4 - 5.0 g/dL    Alkaline Phosphatase 96 33 - 136 U/L    Total Protein 6.5 6.4 - 8.2 g/dL    AST 16 9 - 39 U/L    Bilirubin, Total 0.7 0.0 - 1.2 mg/dL    ALT 8 7 - 45 U/L   Lipase   Result Value Ref Range    Lipase 9 9 - 82 U/L   Troponin I, High Sensitivity   Result Value Ref Range    Troponin I, High Sensitivity 27 (H) 0 - 13 ng/L   B-Type Natriuretic Peptide   Result Value Ref Range     (H) 0 - 99 pg/mL   Troponin I, High Sensitivity   Result Value Ref Range    Troponin I, High Sensitivity 29 (H) 0 - 13 ng/L   Lactate   Result Value Ref Range    Lactate 1.6 0.4 - 2.0 mmol/L   Protime-INR   Result Value Ref Range    Protime 11.5 9.8 - 12.4 seconds    INR 1.0 0.9 - 1.1   Type And Screen   Result  Value Ref Range    ABO TYPE O     Rh TYPE NEG     ANTIBODY SCREEN NEG    VERIFY ABO/Rh Group Test   Result Value Ref Range    ABO TYPE O     Rh TYPE NEG         CT abdomen pelvis wo IV contrast    Addendum Date: 4/12/2025    Interpreted By:  Chano Marshall, ADDENDUM: Also to be noted at the impression: Subcutaneous opacities and reticulation that may be due to injections although hematoma is possible.   Signed by: Chano Marshall 4/12/2025 2:38 PM   -------- ORIGINAL REPORT -------- Dictation workstation:   [id number]    Result Date: 4/12/2025  Interpreted By:  Chano Marshall, STUDY: CT ABDOMEN PELVIS WO IV CONTRAST;  4/12/2025 2:05 pm   INDICATION: Signs/Symptoms:abd pain.   COMPARISON: None.   ACCESSION NUMBER(S): [id number]   ORDERING CLINICIAN: FRANK BREWSTER   TECHNIQUE: CT of the abdomen and pelvis was performed. Contiguous axial images were obtained at 3 mm slice thickness through the abdomen and pelvis. Coronal and sagittal reconstructions at 3 mm slice thickness were performed.   FINDINGS: LOWER CHEST: Small right pleural effusion and trace left pleural effusion. Coronary artery atherosclerotic calcification and mitral annular calcification.   ABDOMEN:   LIVER: The hepatic parenchyma is homogeneous without evidence of focal liver lesions.The hepatic size is normal.   SPLEEN: The spleen is normal in size and homogeneous.   ADRENAL GLANDS: Bilateral adrenal glands appear normal.   KIDNEYS AND URETERS: Mild bilateral cortical atrophy, the renal cortices otherwise are homogeneous. Radiodensities at the renal sinuses is probably atherosclerotic calcification, although superimposed nonobstructing calculi are possible.   The ureters throughout their distal course are not well identified due to overlying crowded bowel loops, but the tracts otherwise are unremarkable without identified ureteral dilatation or additional radiodense calculi.   PANCREAS: The pancreas appears unremarkable, there is no ductal dilatation or  masses.   GALLBLADDER: Not visualized, presumably with cholecystectomy.   BILE DUCTS: Dilatation of the extrahepatic ducts, the common bile duct measuring 1.4 cm in diameter, most likely from post cholecystectomy state. However as the no prior exams for comparison MRCP would be recommended if there is symptomatology compatible with biliary colic.     VESSELS: Fairly extensive atherosclerotic calcifications are noted predominantly at the aorta and iliac system. There is also moderate atherosclerotic calcification at the mid segment of the superior mesenteric artery, and fairly marked of the inferior mesenteric artery.   PERITONEUM AND RETROPERITONEUM: There is a small volume of ascites best seen in the right upper quadrant along the liver. No free intraperitoneal air.   The retroperitoneum appears unremarkable, and without significant adenopathy.   No enlarged mesenteric lymph nodes.   BOWEL: There is mild distention of multiple small bowel segments with air-fluid levels, associated with reticulation of the mesentery indicating edema. There is decompression of distal small bowel segments, and the pattern would be most compatible with mid to early or partial distal small bowel obstruction. There is also moderate diverticulosis of the distal colon.   PELVIS:   BLADDER: The urinary bladder contour is smooth.   REPRODUCTIVE ORGANS: No pelvic masses.     BONE, ABDOMINAL WALL AND OTHER FINDINGS: No suspicious osseous lesions are identified.   There is a small non incarcerated umbilical hernia containing intra-abdominal fat. There is also attenuation within the subcutaneous fat of the mid/lower abdomen anteriorly that may be from subcutaneous injections. However, hematomas are also possible. There is also reticulation of the subcutaneous fat at the right lateral abdominal wall indicating additional edema.       1.  Small-bowel obstruction with mesenteric edema as described. 2. Small volume of ascites. 3. Diverticulosis. 4.  "Atherosclerosis as described.   MACRO: 1. None   Signed by: Chano Marshall 4/12/2025 2:28 PM Dictation workstation:   [id number]    XR chest 2 views    Result Date: 4/12/2025  Interpreted By:  Beka Mays, STUDY: Chest dated  4/12/2025.   INDICATION: Signs/Symptoms:sob with ambulation   COMPARISON: Chest dated 11/17/2015.   ACCESSION NUMBER(S): [id number]   ORDERING CLINICIAN: FRANK BREWSTER   TECHNIQUE: One view of the chest.   FINDINGS: The lungs are clear.  No pneumothorax or effusion is evident. The cardiomediastinal silhouette is  not enlarged.Degenerative change is seen of the spine and shoulders.       No acute cardiopulmonary process is evident.   MACRO: None   Signed by: Beka Mays 4/12/2025 1:26 PM Dictation workstation:   [id number]        Assessment/Plan   Assessment & Plan  Generalized abdominal pain    Small bowel obstruction (Multi)    Dependence on nicotine from cigarettes    Advanced care planning/counseling discussion    Leukocytosis      Per surgery:  \"Impression:  #SBO   > small bowel dilation; however, other concerning CT findings particularly mesenteric edema and ascites. CT imaging worrisome for possible ischemic/low flow state to bowel; however, serum lactate normal and patient's clinical presentation/symptoms/pain/bowel sounds are consistent with an adhesive small bowel obstruction (partial)  #Leukocytosis (possibly 2/2 above, but may have other infectious etiology, possibly UTI?)   > UA ordered; however, not yet collected and already received antibiotics  #Hyponatremia and hypochloremia  #CKD   Recommendations:  - no acute surgical intervention at this time   > patient offered surgical intervention if she were to worsen clinically; however, she was adamant about not wanting surgery   > would recommend placing NG tube for decompression; however, patient again declined NG tube. She said she may be agreeable if she begins to feel worse/nauseated  - PRN IV Zofran  - PRN IV " "Morphine for severe pain (limit use as able, but unable to give NSAIDs or PO meds)  - IVF: D5NS @ 100  - repeat CBC and BMP in AM\"    Additionally:  Change morphine to Dilaudid given CKD  Check flu and COVID  KUB in a.m.      #Advance care planning    Patient would like to be a DNR CCA    #Nicotine dependence    Reports she lets most of her cigarettes to burn out without smoking them so we will order a nicotine patch as needed    #Chronic conditions:    CAD,  chronic diastolic heart failure, hypertension, hyperlipidemia, peripheral artery disease, diabetes mellitus, carotid artery disease (Right occlusion of ICA, left ICA with > 70% stenosis; follows with Dr. Parish), CKD, Left breast cancer s/p lumpectomy and radiation, kidney stones, and open cholecystectomy and open appendectomy with right ovary removal (for tumor- at age 16)     Hold all p.o. meds  Give IV Protonix   Metoprolol IV around-the-clock with parameters  Patient takes 70/30 at home will conservatively give 30 units of Lantus daily which would be a little less than half of the equivalent of 70/30/day with sliding scale insulin and hypoglycemic protocol    #DVT prophylaxis    SCDs  Heparin                    Nelda Lester, APRN-CNP    "

## 2025-04-12 NOTE — CONSULTS
"Reason For Consult  SBO    History Of Present Illness  Nancy Long is a 79 y.o. female who presented to [facility] ED on 4/12 with 2 day history of abdominal pain associated with nausea, 1 episode of emesis, and chills. Found to have a small bowel obstruction. Admitted to medicine with consult to surgery.    Patient states that two days ago she first noticed significant upper abdominal pain- points to epigastric. Says it was a constant ache and rated pain as 9/10. Took one of her Percocet pills (prescribed for arthritis) and this helped alleviate pain for a few hours. Pain returned and was associated with nausea and emesis x1 (but patient thinks it was \"something I ate\").   States she is still passing gas. Last BM was yesterday; formed, firm. Denies diarrhea, bloody stool, or black stool.  Sister at bedside and confirms that patient had low grade fever of 99.6 associated with chills. Nothing made pain better. Pain was exacerbated by standing/walking. Ultimately patient called EMS for ED evaluation.    Patient has a history significant for HTN, HLD, CAD, PAD (follows with Dr. Oliver), carotid artery disease (Right occlusion of ICA, left ICA with > 70% stenosis; follows with Dr. Parish), CKD (Creatinine in January was 1.5), Left breast cancer s/p lumpectomy and radiation, kidney stones, and IDDM Type 2 (follows with PCP Dr. Bonilla). Surgical history includes open cholecystectomy and open appendectomy with right ovary removal (for tumor- at age 16).    I personally reviewed patient's CT A/P and note extensive diverticulosis in descending and sigmoid colon. The sigmoid and descending are both fluid-filled. There is gas and stool in the rectum and right colon. There is mid small bowel dilation with air-fluid levels. Additionally, there is ascites present and mesenteric edema concerning for potential bowel ischemia/hypoperfusion.     In ED, patient received 4mg IVP Morphine and Cefepime/Flagyl (penicillin allergy). Lab values " significant for hyponatremia and hypochloremia (125/94), elevated serum creatinine (1.64- however, I believe this is somewhere around patient's baseline), and leukocytosis (21).     Past Medical History  She has a past medical history of Personal history of other diseases of the circulatory system and Personal history of other endocrine, nutritional and metabolic disease.    Surgical History  She has a past surgical history that includes Cholecystectomy (01/03/2014); Appendectomy (01/03/2014); Breast lumpectomy (01/03/2014); and Oophorectomy (01/03/2014).     Social History  She reports that she has quit smoking. Her smoking use included cigarettes. She has never used smokeless tobacco. She reports that she does not currently use alcohol. She reports that she does not use drugs.    Family History  No family history on file.     Allergies  Penicillins and Sulfa (sulfonamide antibiotics)    Review of Systems  Chills, low grade fever (99.6)     Physical Exam:  Constitutional:         Elderly female patient resting on cart in ED with sister in law present at bedside   HENT:     MMM, carotid bruit  Eyes:      sclera white  Cardiovascular:      Systolic murmur, NSR 80s on telemetry. SBP 140s  Pulmonary:     Clear breath sounds, room air, no increased WOB  Abdominal:      Right subcostal scar, low midline scar. Abdomen not distended, non tender. Soft. Firmness palpated to lateral aspects of abdominal wall with eechymosis related to subQ injections. Higher pitched tinkling bowel sounds  Skin:      BLE with anterior shin erythema bilaterally R > L, friable thin skin, but no significant ecchymosis noted  Extremities:     No pitting edema appreciated, cool feet slightly with 1+ DP pulses, 2+ radial.  Neurological:      A&O x3  Psychiatric:        Calm, appropriate mood and behaviors        Last Recorded Vitals  Blood pressure 142/63, pulse 79, temperature 36.8 °C (98.2 °F), temperature source Temporal, resp. rate (!) 25,  "height 1.702 m (5' 7\"), weight 77.1 kg (170 lb), SpO2 95%.    Relevant Results  Results for orders placed or performed during the hospital encounter of 04/12/25 (from the past 24 hours)   CBC and Auto Differential   Result Value Ref Range    WBC 21.0 (H) 4.4 - 11.3 x10*3/uL    nRBC 0.0 0.0 - 0.0 /100 WBCs    RBC 4.42 4.00 - 5.20 x10*6/uL    Hemoglobin 12.8 12.0 - 16.0 g/dL    Hematocrit 41.7 36.0 - 46.0 %    MCV 94 80 - 100 fL    MCH 29.0 26.0 - 34.0 pg    MCHC 30.7 (L) 32.0 - 36.0 g/dL    RDW 13.0 11.5 - 14.5 %    Platelets 206 150 - 450 x10*3/uL    Neutrophils % 82.7 40.0 - 80.0 %    Immature Granulocytes %, Automated 0.6 0.0 - 0.9 %    Lymphocytes % 11.2 13.0 - 44.0 %    Monocytes % 5.2 2.0 - 10.0 %    Eosinophils % 0.0 0.0 - 6.0 %    Basophils % 0.3 0.0 - 2.0 %    Neutrophils Absolute 17.35 (H) 1.60 - 5.50 x10*3/uL    Immature Granulocytes Absolute, Automated 0.13 0.00 - 0.50 x10*3/uL    Lymphocytes Absolute 2.34 0.80 - 3.00 x10*3/uL    Monocytes Absolute 1.10 (H) 0.05 - 0.80 x10*3/uL    Eosinophils Absolute 0.00 0.00 - 0.40 x10*3/uL    Basophils Absolute 0.06 0.00 - 0.10 x10*3/uL   Magnesium   Result Value Ref Range    Magnesium 2.05 1.60 - 2.40 mg/dL   Comprehensive metabolic panel   Result Value Ref Range    Glucose 220 (H) 74 - 99 mg/dL    Sodium 125 (L) 136 - 145 mmol/L    Potassium 5.0 3.5 - 5.3 mmol/L    Chloride 94 (L) 98 - 107 mmol/L    Bicarbonate 21 21 - 32 mmol/L    Anion Gap 15 10 - 20 mmol/L    Urea Nitrogen 45 (H) 6 - 23 mg/dL    Creatinine 1.64 (H) 0.50 - 1.05 mg/dL    eGFR 31 (L) >60 mL/min/1.73m*2    Calcium 8.9 8.6 - 10.3 mg/dL    Albumin 3.6 3.4 - 5.0 g/dL    Alkaline Phosphatase 96 33 - 136 U/L    Total Protein 6.5 6.4 - 8.2 g/dL    AST 16 9 - 39 U/L    Bilirubin, Total 0.7 0.0 - 1.2 mg/dL    ALT 8 7 - 45 U/L   Lipase   Result Value Ref Range    Lipase 9 9 - 82 U/L   Troponin I, High Sensitivity   Result Value Ref Range    Troponin I, High Sensitivity 27 (H) 0 - 13 ng/L   B-Type Natriuretic " Peptide   Result Value Ref Range     (H) 0 - 99 pg/mL   Troponin I, High Sensitivity   Result Value Ref Range    Troponin I, High Sensitivity 29 (H) 0 - 13 ng/L          Assessment/Plan     79 year old female patient of Dr. Bonilla presented to [facility] ED on 4/12 with 2 day history of abdominal pain. Admitted to medicine with consult to general surgery with SBO.  Patient has a history of HTN, HLD, CAD, PAD (follows with Dr. Oliver), carotid artery disease (Right occlusion of ICA, left ICA with > 70% stenosis; follows with Dr. Parish), CKD, Left breast cancer s/p lumpectomy, kidney stones, and IDDM Type 2 (follows with PCP Dr. Bonilla).  Surgical history of open cholecystectomy and open appendectomy and Right oophorectomy.     Impression:  #SBO   > small bowel dilation; however, other concerning CT findings particularly mesenteric edema and ascites. CT imaging worrisome for possible ischemic/low flow state to bowel; however, serum lactate normal and patient's clinical presentation/symptoms/pain/bowel sounds are consistent with an adhesive small bowel obstruction (partial)  #Leukocytosis (possibly 2/2 above, but may have other infectious etiology, possibly UTI?)   > UA ordered; however, not yet collected and already received antibiotics   #Hyponatremia and hypochloremia  #CKD    Recommendations:  - no acute surgical intervention at this time   > patient offered surgical intervention if she were to worsen clinically; however, she was adamant about not wanting surgery   > would recommend placing NG tube for decompression; however, patient again declined NG tube. She said she may be agreeable if she begins to feel worse/nauseated  - PRN IV Zofran  - PRN IV Morphine for severe pain (limit use as able, but unable to give NSAIDs or PO meds)  - IVF: D5NS @ 100  - repeat CBC and BMP in AM  - appreciate medicine recs for management of primary medical issues    Okay for DVT chemoprophylaxis     The patient was seen and  discussed with the attending surgeon Dr. Martinez     I spent 45 minutes in the professional and overall care of this patient.  Greater than 50% of this time was spent counseling patient, reviewing plan of care, and in coordination of care.        Sarah Michaels, IAN-CNP

## 2025-04-12 NOTE — ED PROVIDER NOTES
EMERGENCY DEPARTMENT ENCOUNTER      Pt Name: Nancy Long  MRN: [id number]  Birthdate 1945  Date of evaluation: 4/12/2025  Provider: DEL Guzman    CHIEF COMPLAINT       Chief Complaint   Patient presents with    Abdominal Pain     PT PRESENTS TO ED FROM HOME VIA PRIVATE AUTO FOR ACHING UPPER ABD PAIN THAT HAS BEEN WORSENING FOR 2 DAYS. PT STATES SHE ATE PASTA 2 DAYS AGO WITH WAS ACCOMPANIED BY 1 EPISODE OF EMESIS. ENDORSES NAUSEA AND CHILLS. DENIES DIARRHEA, DENIES URINARY CHANGES, CHEST PAIN, SOB.        HISTORY OF PRESENT ILLNESS    Nancy Long is a 79 y.o. female who presents to the emergency department with with complaints of middle/upper abdominal pain onset 2 days ago.  Patient states that prior to her symptoms starting she did eat Pasta and is unsure if this is related.  She endorses associated nausea with 1 episode of nonbloody emesis, chills, sweating, decreased appetite and states that her symptoms make her feel short of breath when she has to go to the bathroom.  She has taken no medications at home for her symptoms.  She does endorse prior abdominal surgeries but cannot remember what she has had done.  She denies any trauma, fall, injury or anticoagulation use.  She denies any fevers, chest pain, numbness, tingling, diarrhea, urinary symptoms, back pain loss of sensation or any additional symptoms or complaints at this time.  Denies any known sick contacts, current smoking, drugs or alcohol.    Nursing Notes were reviewed.    History provided by patient.  No  used.    REVIEW OF SYSTEMS     ROS is otherwise negative unless stated above.    PAST MEDICAL HISTORY     Past Medical History:   Diagnosis Date    Personal history of other diseases of the circulatory system     History of hypertension    Personal history of other endocrine, nutritional and metabolic disease     History of diabetes mellitus       SURGICAL HISTORY       Past Surgical History:   Procedure  Laterality Date    APPENDECTOMY  01/03/2014    Appendectomy    BREAST LUMPECTOMY  01/03/2014    Breast Surgery Lumpectomy    CHOLECYSTECTOMY  01/03/2014    Cholecystectomy    OOPHORECTOMY  01/03/2014    Oophorectomy       ALLERGIES     Penicillins and Sulfa (sulfonamide antibiotics)    FAMILY HISTORY     No family history on file.     SOCIAL HISTORY       Social History     Socioeconomic History    Marital status:    Tobacco Use    Smoking status: Former     Types: Cigarettes    Smokeless tobacco: Never   Substance and Sexual Activity    Alcohol use: Not Currently    Drug use: Never     Social Drivers of Health     Financial Resource Strain: Low Risk  (4/12/2025)    Overall Financial Resource Strain (CARDIA)     Difficulty of Paying Living Expenses: Not very hard   Food Insecurity: No Food Insecurity (4/12/2025)    Hunger Vital Sign     Worried About Running Out of Food in the Last Year: Never true     Ran Out of Food in the Last Year: Never true   Transportation Needs: No Transportation Needs (4/12/2025)    PRAPARE - Transportation     Lack of Transportation (Medical): No     Lack of Transportation (Non-Medical): No   Physical Activity: Insufficiently Active (12/1/2022)    Received from [employer]    Exercise Vital Sign     Days of Exercise per Week: 1 day     Minutes of Exercise per Session: 20 min   Stress: No Stress Concern Present (12/1/2022)    Received from [employer]    [place] [employer] - Occupational Stress Questionnaire     Feeling of Stress : Only a little   Social Connections: Moderately Integrated (12/1/2022)    Received from [employer]    Social Connection and Isolation Panel [NHANES]     Frequency of Communication with Friends and Family: More than three times a week     Frequency of Social Gatherings with Friends and Family: Twice a week     Attends Baptism Services: More than 4 times per year     Active Member of Clubs or  Organizations: Yes     Attends Club or Organization Meetings: More than 4 times per year     Marital Status:    Intimate Partner Violence: Not At Risk (4/12/2025)    Humiliation, Afraid, Rape, and Kick questionnaire     Fear of Current or Ex-Partner: No     Emotionally Abused: No     Physically Abused: No     Sexually Abused: No   Housing Stability: Low Risk  (4/12/2025)    Housing Stability Vital Sign     Unable to Pay for Housing in the Last Year: No     Number of Times Moved in the Last Year: 0     Homeless in the Last Year: No       PHYSICAL EXAM   VS: As documented in the triage note and EMR flowsheet from this visit were reviewed.    GEN: NAD, nontoxic, well-appearing, resting comfortably in ER cart without difficulty or dyspnea  EYES:  EOMs grossly intact, anicteric sclera, no nystagmus noted, clear and equal bilaterally, no foreign body noted  HEENT: Airway patent  CARD: RRR, nontender chest, no crepitus deformities, no JVD, no murmurs rubs or gallops ; No edema noted.  Positive pulses bilaterally throughout.  Capillary refill less than 3 seconds.  No abnormal redness, warmth, tenderness or swelling noted to bilateral lower extremities.  PULMONARY: Clear all lung fields. Moving air well, Nonlabored, no accessory muscle use, able to speak complete sentences  ABDOMEN: Abdomen soft, non-distended, no rebound, no guarding. Bowel sounds normal in all 4 quadrants.  Mid upper epigastric/middle abdominal tenderness to palpation.  No CVA tenderness.  No masses or organomegaly noted.  No evidence of peritonitis. Negative Cazares's and McBurney's point tenderness  : deferred  MUSK: Spine appears normal, range of motion is not limited, no muscle or joint tenderness. Strength 5 out of 5 equal bilaterally throughout.  Generalized weakness noted.  No step-offs, deformities or additional signs of trauma noted.  No spinal/midline tenderness to palpation  SKIN: Skin normal color for race, warm, dry and intact. No  evidence of trauma. No rash noted.  NEURO: Alert and oriented x 3, speech is clear, no obvious deficits noted. No facial droop noted.  GCS 15  PSYCH: Alert and oriented to person, place, time/situation. normal mood and affect. No apparent risk to self or others. Thoughts are linear.  Does not appear decompensated.  Does not appear internally stimulated.  LYMPH: No adenopathy or splenomegaly. No cervical, supraclavicular or inguinal lymphadenopathy.    DIAGNOSTIC RESULTS   RADIOLOGY:   Non-plain film images such as CT, Ultrasound and MRI are read by the radiologist. Plain radiographic images are visualized and preliminarily interpreted by myself with the below findings: Chest x-ray which revealed no acute cardiopulmonary process      Interpretation per the Radiologist below, if available at the time of this note:    CT abdomen pelvis wo IV contrast   Final Result   Addendum (preliminary) 1 of 1   Interpreted By:  Chano Marshall,    ADDENDUM:   Also to be noted at the impression:   Subcutaneous opacities and reticulation that may be due to injections   although hematoma is possible.        Signed by: Chano Marshall 4/12/2025 2:38 PM        -------- ORIGINAL REPORT --------   Dictation workstation:   [id number]      Final   1.  Small-bowel obstruction with mesenteric edema as described.   2. Small volume of ascites.   3. Diverticulosis.   4. Atherosclerosis as described.        MACRO:   1. None        Signed by: Chano Marshall 4/12/2025 2:28 PM   Dictation workstation:   [id number]      XR chest 2 views   Final Result   No acute cardiopulmonary process is evident.        MACRO:   None        Signed by: Beka Mays 4/12/2025 1:26 PM   Dictation workstation:   [id number]      XR abdomen 1 view    (Results Pending)         ED BEDSIDE ULTRASOUND:   Performed by myself - none    LABS:  Labs Reviewed   CBC WITH AUTO DIFFERENTIAL - Abnormal       Result Value    WBC 21.0 (*)     nRBC 0.0      RBC 4.42      Hemoglobin 12.8       Hematocrit 41.7      MCV 94      MCH 29.0      MCHC 30.7 (*)     RDW 13.0      Platelets 206      Neutrophils % 82.7      Immature Granulocytes %, Automated 0.6      Lymphocytes % 11.2      Monocytes % 5.2      Eosinophils % 0.0      Basophils % 0.3      Neutrophils Absolute 17.35 (*)     Immature Granulocytes Absolute, Automated 0.13      Lymphocytes Absolute 2.34      Monocytes Absolute 1.10 (*)     Eosinophils Absolute 0.00      Basophils Absolute 0.06     COMPREHENSIVE METABOLIC PANEL - Abnormal    Glucose 220 (*)     Sodium 125 (*)     Potassium 5.0      Chloride 94 (*)     Bicarbonate 21      Anion Gap 15      Urea Nitrogen 45 (*)     Creatinine 1.64 (*)     eGFR 31 (*)     Calcium 8.9      Albumin 3.6      Alkaline Phosphatase 96      Total Protein 6.5      AST 16      Bilirubin, Total 0.7      ALT 8     TROPONIN I, HIGH SENSITIVITY - Abnormal    Troponin I, High Sensitivity 27 (*)     Narrative:     Less than 99th percentile of normal range cutoff-  Female and children under 18 years old <14 ng/L; Male <21 ng/L: Negative  Repeat testing should be performed if clinically indicated.     Female and children under 18 years old 14-50 ng/L; Male 21-50 ng/L:  Consistent with possible cardiac damage and possible increased clinical   risk. Serial measurements may help to assess extent of myocardial damage.     >50 ng/L: Consistent with cardiac damage, increased clinical risk and  myocardial infarction. Serial measurements may help assess extent of   myocardial damage.      NOTE: Children less than 1 year old may have higher baseline troponin   levels and results should be interpreted in conjunction with the overall   clinical context.     NOTE: Troponin I testing is performed using a different   testing methodology at [facility] than at other   [facility]. Direct result comparisons should only   be made within the same method.   URINALYSIS WITH REFLEX CULTURE AND MICROSCOPIC - Abnormal     Color, Urine Light-Yellow      Appearance, Urine Clear      Specific Gravity, Urine 1.017      pH, Urine 5.0      Protein, Urine 10 (TRACE)      Glucose, Urine Normal      Blood, Urine NEGATIVE      Ketones, Urine NEGATIVE      Bilirubin, Urine NEGATIVE      Urobilinogen, Urine Normal      Nitrite, Urine NEGATIVE      Leukocyte Esterase, Urine 250 Triny/uL (*)    B-TYPE NATRIURETIC PEPTIDE - Abnormal     (*)     Narrative:        <100 pg/mL - Heart failure unlikely  100-299 pg/mL - Intermediate probability of acute heart                  failure exacerbation. Correlate with clinical                  context and patient history.    >=300 pg/mL - Heart Failure likely. Correlate with clinical                  context and patient history.    BNP testing is performed using different testing methodology at [facility] than at other [facility] hospitals. Direct result comparisons should only be made within the same method.      TROPONIN I, HIGH SENSITIVITY - Abnormal    Troponin I, High Sensitivity 29 (*)     Narrative:     Less than 99th percentile of normal range cutoff-  Female and children under 18 years old <14 ng/L; Male <21 ng/L: Negative  Repeat testing should be performed if clinically indicated.     Female and children under 18 years old 14-50 ng/L; Male 21-50 ng/L:  Consistent with possible cardiac damage and possible increased clinical   risk. Serial measurements may help to assess extent of myocardial damage.     >50 ng/L: Consistent with cardiac damage, increased clinical risk and  myocardial infarction. Serial measurements may help assess extent of   myocardial damage.      NOTE: Children less than 1 year old may have higher baseline troponin   levels and results should be interpreted in conjunction with the overall   clinical context.     NOTE: Troponin I testing is performed using a different   testing methodology at [facility] than at other   [facility]. Direct result  comparisons should only   be made within the same method.   TROPONIN I, HIGH SENSITIVITY - Abnormal    Troponin I, High Sensitivity 23 (*)     Narrative:     Less than 99th percentile of normal range cutoff-  Female and children under 18 years old <14 ng/L; Male <21 ng/L: Negative  Repeat testing should be performed if clinically indicated.     Female and children under 18 years old 14-50 ng/L; Male 21-50 ng/L:  Consistent with possible cardiac damage and possible increased clinical   risk. Serial measurements may help to assess extent of myocardial damage.     >50 ng/L: Consistent with cardiac damage, increased clinical risk and  myocardial infarction. Serial measurements may help assess extent of   myocardial damage.      NOTE: Children less than 1 year old may have higher baseline troponin   levels and results should be interpreted in conjunction with the overall   clinical context.     NOTE: Troponin I testing is performed using a different   testing methodology at [facility] than at other   [facility]. Direct result comparisons should only   be made within the same method.   MICROSCOPIC ONLY, URINE - Abnormal    WBC, Urine 11-20 (*)     RBC, Urine 1-2      Bacteria, Urine 4+ (*)     Mucus, Urine FEW      Hyaline Casts, Urine OCCASIONAL (*)    POCT GLUCOSE - Abnormal    POCT Glucose 183 (*)    POCT GLUCOSE - Abnormal    POCT Glucose 171 (*)    POCT GLUCOSE - Abnormal    POCT Glucose 131 (*)    POCT GLUCOSE - Abnormal    POCT Glucose 67 (*)    POCT GLUCOSE - Abnormal    POCT Glucose 64 (*)    POCT GLUCOSE - Abnormal    POCT Glucose 147 (*)    MAGNESIUM - Normal    Magnesium 2.05     LIPASE - Normal    Lipase 9      Narrative:     Venipuncture immediately after or during the administration of Metamizole may lead to falsely low results. Testing should be performed immediately prior to Metamizole dosing.   LACTATE - Normal    Lactate 1.6      Narrative:     Venipuncture immediately after or  during the administration of Metamizole may lead to falsely low results. Testing should be performed immediately prior to Metamizole dosing.   PROTIME-INR - Normal    Protime 11.5      INR 1.0     SARS-COV-2 AND INFLUENZA A/B PCR - Normal    Flu A Result Not Detected      Flu B Result Not Detected      Coronavirus 2019, PCR Not Detected      Narrative:     This assay is an FDA-cleared, in vitro diagnostic nucleic acid amplification test for the qualitative detection and differentiation of SARS CoV-2/ Influenza A/B from nasopharyngeal specimens collected from individuals with signs and symptoms of respiratory tract infections, and has been validated for use at [facility]. Negative results do not preclude COVID-19/ Influenza A/B infections and should not be used as the sole basis for diagnosis, treatment, or other management decisions. Testing for SARS CoV-2 is recommended only for patients who meet current clinical and/or epidemiological criteria defined by federal, state, or local public health directives.   POCT GLUCOSE - Normal    POCT Glucose 81     URINE CULTURE   TYPE AND SCREEN    ABO TYPE O      Rh TYPE NEG      ANTIBODY SCREEN NEG     VERAB/VERIFY ABORH    ABO TYPE O      Rh TYPE NEG     URINALYSIS WITH REFLEX CULTURE AND MICROSCOPIC    Narrative:     The following orders were created for panel order Urinalysis with Reflex Culture and Microscopic.  Procedure                               Abnormality         Status                     ---------                               -----------         ------                     Urinalysis with Reflex C...[[id number]]  Abnormal            Final result               Extra Urine Gray Tube[[id number]]                            In process                   Please view results for these tests on the individual orders.   EXTRA URINE GRAY TUBE   CBC   BASIC METABOLIC PANEL   POCT GLUCOSE METER   POCT GLUCOSE METER   POCT GLUCOSE METER   POCT GLUCOSE  METER   POCT GLUCOSE METER   POCT GLUCOSE METER   POCT GLUCOSE METER   POCT GLUCOSE METER       All other labs were within normal range or not returned as of this dictation.    EMERGENCY DEPARTMENT COURSE/MDM:   Vitals:    Vitals:    04/12/25 1906 04/12/25 2029 04/13/25 0015 04/13/25 0344   BP: 131/59 141/73 154/67 143/63   BP Location:       Patient Position:       Pulse: 83 88 76 77   Resp:  16 18 18   Temp: 36.8 °C (98.2 °F) 36.4 °C (97.5 °F) 35.9 °C (96.6 °F) 36.4 °C (97.5 °F)   TempSrc:  Temporal  Temporal   SpO2: 94% 97% 96% 94%   Weight:       Height:           I reviewed the patient's triage vitals.    This is an 79-year-old female presents ED for evaluation abdominal pain.  She is resting comfortably in the ER cart with her difficulty or dyspnea.  She is placed on continuous cardiac monitor and pulse oximetry.  She has no peritonitis on my examination.  She is given medications for her symptoms with improvement.    Workup was reviewed.  She does have a leukocytosis and is initially on antibiotics.  She does have a hyponatremia of 125, last prior to compare to was 5 years ago, she is asymptomatic in regards to this.  Initial troponin was 27, stayed within delta at 29-hour recheck.  No indication for heparin drip at this time.  CT scan did show small bowel obstruction, patient made n.p.o. and NG tube order placed but per bedside RN she is declining at this time.  I did speak with general surgery who agreed to evaluate patient in the emergency department.  They recommended admission to medicine with them on consult.  Patient is agreeable with this plan.  She remained hemodynamically stable throughout my ED course of care.    Diagnoses as of 04/13/25 0602   Generalized abdominal pain   SBO (small bowel obstruction) (Multi)       Patient was counseled regarding labs, imaging, likely diagnosis, and plan. All questions were answered.     ------------------------------------------------------------------  EKG  Interpretation: Sinus at rate of 79, , QRS 91, QTc 407 without evidence of STEMI or ischemia    Differential Diagnoses Considered: Viral gastroenteritis, gastritis, GERD, peptic bowel ulcer disease, AAA, ACS, aortic dissection, UTI, acute intra-abdominal process, SBO, electrolyte abnormality, dehydration, norovirus    Chronic Medical Conditions Significantly Affecting Care: none    External Records Reviewed: I reviewed recent and relevant outside records including: PCP notes, prior discharge summary, previous radiologic studies, HIE    Escalation of Care:  Appropriate for admission to general medicine service for further management, Dr. Saez    Social Determinants of Health Significantly Affecting Care:  none    Prescription Drug Consideration: N/A    Diagnostic testing considered: No additional indicated this time    Discussion of Management with Other Providers:   I discussed the patient/results with: Admitting team, general surgery      ------------------------------------------------------------------  ED Medications administered this visit:    Medications   D5 % and 0.9 % sodium chloride infusion (100 mL/hr intravenous Rate/Dose Verify 4/12/25 2142)   ondansetron (Zofran) injection 4 mg (has no administration in time range)   aspirin tablet 325 mg ( oral Dose Auto Held 4/17/25 2100)   cilostazol (Pletal) tablet 50 mg ( oral Dose Auto Held 4/20/25 2100)   pantoprazole (Protonix) injection 40 mg (40 mg intravenous Given 4/12/25 2009)   losartan (Cozaar) tablet 100 mg ( oral Dose Auto Held 4/20/25 0900)   metFORMIN XR (Glucophage-XR) 24 hr tablet 500 mg ( oral Dose Auto Held 4/17/25 1600)   metoprolol tartrate (Lopressor) tablet 100 mg ( oral Dose Auto Held 4/20/25 2100)   metoprolol tartrate (Lopressor) injection 5 mg (5 mg intravenous Given 4/13/25 0205)   oxyCODONE-acetaminophen (Percocet)  mg per tablet 1 tablet ( oral Dose Auto Held 4/20/25 1745)   heparin (porcine) injection 5,000 Units (5,000  Units subcutaneous Given 4/13/25 0205)   insulin glargine (Lantus) injection 30 Units (30 Units subcutaneous Given 4/12/25 2039)   cefepime (Maxipime) 1 g in dextrose 5% IV 50 mL (0 g intravenous Stopped 4/13/25 0249)   metroNIDAZOLE (Flagyl) 500 mg in sodium chloride (iso)  mL (500 mg intravenous New Bag 4/13/25 0519)   HYDROmorphone (Dilaudid) injection 0.5 mg (has no administration in time range)   insulin lispro injection 0-5 Units ( subcutaneous Not Given 4/13/25 0522)   nicotine (Nicoderm CQ) 14 mg/24 hr patch 1 patch (has no administration in time range)   glucagon (Glucagen) injection 1 mg (has no administration in time range)   dextrose 50 % injection 25 g (has no administration in time range)   glucagon (Glucagen) injection 1 mg (has no administration in time range)   dextrose 50 % injection 12.5 g (12.5 g intravenous Given 4/13/25 0442)   ondansetron (Zofran) injection 4 mg (4 mg intravenous Given 4/12/25 1235)   morphine injection 4 mg (4 mg intravenous Given 4/12/25 1235)   cefepime (Maxipime) 2 g in dextrose (iso)  mL (0 g intravenous Stopped 4/12/25 1634)   metroNIDAZOLE (Flagyl) 500 mg in sodium chloride (iso)  mL (0 mg intravenous Stopped 4/12/25 1458)       New Prescriptions from this visit:    Current Discharge Medication List          Follow-up:  No follow-up provider specified.      Final Impression:   1. Generalized abdominal pain    2. SBO (small bowel obstruction) (Multi)          Ashely Mcdermott, IAN-CNP    This patient was staffed with ED Attending Dr. Javi Morgan to review the plan of care during ED course.    (Please note that portions of this note were completed with a voice recognition program.  Efforts were made to edit the dictations but occasionally words are mis-transcribed.)     IAN Guzman-CNP  04/13/25 0603

## 2025-04-13 ENCOUNTER — APPOINTMENT (OUTPATIENT)
Dept: CARDIOLOGY | Facility: HOSPITAL | Age: 80
DRG: 336 | End: 2025-04-13
Payer: MEDICARE

## 2025-04-13 ENCOUNTER — APPOINTMENT (OUTPATIENT)
Dept: RADIOLOGY | Facility: HOSPITAL | Age: 80
DRG: 336 | End: 2025-04-13
Payer: MEDICARE

## 2025-04-13 VITALS
HEIGHT: 67 IN | DIASTOLIC BLOOD PRESSURE: 74 MMHG | RESPIRATION RATE: 16 BRPM | WEIGHT: 170 LBS | SYSTOLIC BLOOD PRESSURE: 180 MMHG | OXYGEN SATURATION: 95 % | HEART RATE: 73 BPM | BODY MASS INDEX: 26.68 KG/M2 | TEMPERATURE: 98.2 F

## 2025-04-13 LAB
ANION GAP SERPL CALC-SCNC: 10 MMOL/L (ref 10–20)
BUN SERPL-MCNC: 39 MG/DL (ref 6–23)
CALCIUM SERPL-MCNC: 8.2 MG/DL (ref 8.6–10.3)
CHLORIDE SERPL-SCNC: 103 MMOL/L (ref 98–107)
CO2 SERPL-SCNC: 23 MMOL/L (ref 21–32)
CREAT SERPL-MCNC: 1.33 MG/DL (ref 0.5–1.05)
EGFRCR SERPLBLD CKD-EPI 2021: 40 ML/MIN/1.73M*2
ERYTHROCYTE [DISTWIDTH] IN BLOOD BY AUTOMATED COUNT: 13.1 % (ref 11.5–14.5)
GLUCOSE BLD MANUAL STRIP-MCNC: 108 MG/DL (ref 74–99)
GLUCOSE BLD MANUAL STRIP-MCNC: 147 MG/DL (ref 74–99)
GLUCOSE BLD MANUAL STRIP-MCNC: 64 MG/DL (ref 74–99)
GLUCOSE BLD MANUAL STRIP-MCNC: 67 MG/DL (ref 74–99)
GLUCOSE BLD MANUAL STRIP-MCNC: 72 MG/DL (ref 74–99)
GLUCOSE BLD MANUAL STRIP-MCNC: 75 MG/DL (ref 74–99)
GLUCOSE BLD MANUAL STRIP-MCNC: 84 MG/DL (ref 74–99)
GLUCOSE BLD MANUAL STRIP-MCNC: 99 MG/DL (ref 74–99)
GLUCOSE SERPL-MCNC: 74 MG/DL (ref 74–99)
HCT VFR BLD AUTO: 36.3 % (ref 36–46)
HGB BLD-MCNC: 11 G/DL (ref 12–16)
HOLD SPECIMEN: NORMAL
MCH RBC QN AUTO: 28.7 PG (ref 26–34)
MCHC RBC AUTO-ENTMCNC: 30.3 G/DL (ref 32–36)
MCV RBC AUTO: 95 FL (ref 80–100)
NRBC BLD-RTO: 0 /100 WBCS (ref 0–0)
PLATELET # BLD AUTO: 163 X10*3/UL (ref 150–450)
POTASSIUM SERPL-SCNC: 4.1 MMOL/L (ref 3.5–5.3)
RBC # BLD AUTO: 3.83 X10*6/UL (ref 4–5.2)
SODIUM SERPL-SCNC: 132 MMOL/L (ref 136–145)
WBC # BLD AUTO: 15.8 X10*3/UL (ref 4.4–11.3)

## 2025-04-13 PROCEDURE — 80051 ELECTROLYTE PANEL: CPT | Performed by: REGISTERED NURSE

## 2025-04-13 PROCEDURE — 36415 COLL VENOUS BLD VENIPUNCTURE: CPT | Performed by: REGISTERED NURSE

## 2025-04-13 PROCEDURE — 1200000002 HC GENERAL ROOM WITH TELEMETRY DAILY

## 2025-04-13 PROCEDURE — 82947 ASSAY GLUCOSE BLOOD QUANT: CPT

## 2025-04-13 PROCEDURE — 99232 SBSQ HOSP IP/OBS MODERATE 35: CPT | Performed by: REGISTERED NURSE

## 2025-04-13 PROCEDURE — 93005 ELECTROCARDIOGRAM TRACING: CPT

## 2025-04-13 PROCEDURE — 2500000004 HC RX 250 GENERAL PHARMACY W/ HCPCS (ALT 636 FOR OP/ED): Performed by: REGISTERED NURSE

## 2025-04-13 PROCEDURE — 99232 SBSQ HOSP IP/OBS MODERATE 35: CPT | Performed by: SURGERY

## 2025-04-13 PROCEDURE — 74018 RADEX ABDOMEN 1 VIEW: CPT

## 2025-04-13 PROCEDURE — 2500000004 HC RX 250 GENERAL PHARMACY W/ HCPCS (ALT 636 FOR OP/ED): Performed by: NURSE PRACTITIONER

## 2025-04-13 PROCEDURE — 2500000005 HC RX 250 GENERAL PHARMACY W/O HCPCS: Performed by: NURSE PRACTITIONER

## 2025-04-13 PROCEDURE — 85027 COMPLETE CBC AUTOMATED: CPT | Performed by: REGISTERED NURSE

## 2025-04-13 PROCEDURE — 74018 RADEX ABDOMEN 1 VIEW: CPT | Performed by: RADIOLOGY

## 2025-04-13 RX ORDER — INSULIN GLARGINE 100 [IU]/ML
15 INJECTION, SOLUTION SUBCUTANEOUS EVERY 24 HOURS
Status: DISCONTINUED | OUTPATIENT
Start: 2025-04-13 | End: 2025-04-21 | Stop reason: HOSPADM

## 2025-04-13 RX ADMIN — DEXTROSE MONOHYDRATE 12.5 G: 25 INJECTION, SOLUTION INTRAVENOUS at 04:42

## 2025-04-13 RX ADMIN — CEFEPIME 1 G: 1 INJECTION, POWDER, FOR SOLUTION INTRAMUSCULAR; INTRAVENOUS at 02:05

## 2025-04-13 RX ADMIN — METOPROLOL TARTRATE 5 MG: 5 INJECTION INTRAVENOUS at 21:13

## 2025-04-13 RX ADMIN — ONDANSETRON 4 MG: 2 INJECTION INTRAMUSCULAR; INTRAVENOUS at 08:37

## 2025-04-13 RX ADMIN — ONDANSETRON 4 MG: 2 INJECTION INTRAMUSCULAR; INTRAVENOUS at 13:08

## 2025-04-13 RX ADMIN — METRONIDAZOLE 500 MG: 500 INJECTION, SOLUTION INTRAVENOUS at 14:05

## 2025-04-13 RX ADMIN — ONDANSETRON 4 MG: 2 INJECTION INTRAMUSCULAR; INTRAVENOUS at 17:30

## 2025-04-13 RX ADMIN — METOPROLOL TARTRATE 5 MG: 5 INJECTION INTRAVENOUS at 08:29

## 2025-04-13 RX ADMIN — METRONIDAZOLE 500 MG: 500 INJECTION, SOLUTION INTRAVENOUS at 21:16

## 2025-04-13 RX ADMIN — METRONIDAZOLE 500 MG: 500 INJECTION, SOLUTION INTRAVENOUS at 05:19

## 2025-04-13 RX ADMIN — METOPROLOL TARTRATE 5 MG: 5 INJECTION INTRAVENOUS at 13:10

## 2025-04-13 RX ADMIN — DEXTROSE AND SODIUM CHLORIDE 100 ML/HR: 5; .9 INJECTION, SOLUTION INTRAVENOUS at 10:35

## 2025-04-13 RX ADMIN — METOPROLOL TARTRATE 5 MG: 5 INJECTION INTRAVENOUS at 02:05

## 2025-04-13 RX ADMIN — HEPARIN SODIUM 5000 UNITS: 5000 INJECTION INTRAVENOUS; SUBCUTANEOUS at 10:38

## 2025-04-13 RX ADMIN — PANTOPRAZOLE SODIUM 40 MG: 40 INJECTION, POWDER, FOR SOLUTION INTRAVENOUS at 08:29

## 2025-04-13 RX ADMIN — HEPARIN SODIUM 5000 UNITS: 5000 INJECTION INTRAVENOUS; SUBCUTANEOUS at 02:05

## 2025-04-13 RX ADMIN — CEFEPIME 1 G: 1 INJECTION, POWDER, FOR SOLUTION INTRAMUSCULAR; INTRAVENOUS at 13:09

## 2025-04-13 RX ADMIN — HEPARIN SODIUM 5000 UNITS: 5000 INJECTION INTRAVENOUS; SUBCUTANEOUS at 17:37

## 2025-04-13 ASSESSMENT — COGNITIVE AND FUNCTIONAL STATUS - GENERAL
DAILY ACTIVITIY SCORE: 23
HELP NEEDED FOR BATHING: A LITTLE
MOVING TO AND FROM BED TO CHAIR: A LITTLE
WALKING IN HOSPITAL ROOM: A LITTLE
TOILETING: A LITTLE
STANDING UP FROM CHAIR USING ARMS: A LITTLE
CLIMB 3 TO 5 STEPS WITH RAILING: A LITTLE
DAILY ACTIVITIY SCORE: 23
MOBILITY SCORE: 20
CLIMB 3 TO 5 STEPS WITH RAILING: A LOT
WALKING IN HOSPITAL ROOM: A LITTLE
MOBILITY SCORE: 19
MOVING TO AND FROM BED TO CHAIR: A LITTLE
STANDING UP FROM CHAIR USING ARMS: A LITTLE

## 2025-04-13 NOTE — H&P
History Of Present Illness  Nancy Long is a 79 y.o. female with CAD,  chronic diastolic heart failure, hypertension, hyperlipidemia, peripheral artery disease, diabetes mellitus, carotid artery disease (Right occlusion of ICA, left ICA with > 70% stenosis; follows with Dr. Parish), CKD, Left breast cancer s/p lumpectomy and radiation, kidney stones, and open cholecystectomy and open appendectomy with right ovary removal (for tumor- at age 16) who presented today with abdominal pain.  Patient reports yesterday she developed severe, sharp, continuous epigastric pain associated with nausea and diaphoresis.  She notes she took some of her prescribed Percocet that seemed to help a little however the symptoms persisted into today so she decided to come to the emergency room.  She also notes a decreased appetite and when she did try to eat the food did not taste good.  She denies a history of bowel obstruction.  She articulates she told the surgery team she would not like surgical intervention.  CODE STATUS discussed and she would like to be a DNR CCA.    In the ED lab work, EKG, chest x-ray and CTAP were performed, labs revealed glucose 200, sodium 125, chloride 94, bun 45, creatinine 1.64 (1.32 on 2/4/2020), , lactate 1.6, troponins 27 and 29 respectively, white count 21, neutrophils 17.35, monocytes 1.1. EKG Interpretation, per ER physician impression: Sinus at rate of 79, , QRS 91, QTc 407 without evidence of STEMI or ischemia. Chest x-ray without acute process.  CT abdomen/pelvis revealed small bowel obstruction with mesenteric edema as described, small volume of ascites, diverticulosis, atherosclerosis, subcutaneous opacities and reticulation that may be due to injections although hematoma is possible.  Patient was evaluated by surgery team that noted her symptoms are likely consistent with partial adhesive small bowel obstruction and surgical intervention was offered however patient declined.  Her vital  signs were acceptable with a slightly elevated blood pressure of 157/70.  She was given cefepime Flagyl morphine Zofran and started on IV fluids and admitted for further evaluation and treatment under the care of Dr. Saez.      Echo March 2025:    CONCLUSIONS:  1. Left ventricular ejection fraction is normal, by visual estimate at 70-75%.  2. There is moderate left ventricular hypertrophy.  3. The left atrium is mildly dilated.  4. There is mild mitral valve stenosis.  5. There is moderate mitral annular calcification.  6. Right ventricular systolic pressure is within normal limits.  7. Mild aortic valve stenosis.  8. Mild left ventricular outflow obstruction at rest and with Valsalva (10 mmHg).     Past Medical History  Past Medical History:   Diagnosis Date    Personal history of other diseases of the circulatory system     History of hypertension    Personal history of other endocrine, nutritional and metabolic disease     History of diabetes mellitus       Surgical History  Past Surgical History:   Procedure Laterality Date    APPENDECTOMY  01/03/2014    Appendectomy    BREAST LUMPECTOMY  01/03/2014    Breast Surgery Lumpectomy    CHOLECYSTECTOMY  01/03/2014    Cholecystectomy    OOPHORECTOMY  01/03/2014    Oophorectomy        Social History  Denies alcohol or illicit drug use.  Reports she smokes 2 to 3 packs of cigarettes per week.  Lives alone.  Ambulates with cane as needed.    Family History  No family history on file.     Allergies  Penicillins and Sulfa (sulfonamide antibiotics)    10 point review of systems performed and is negative besides what is stated in HPI.     Physical Exam  Constitutional:       Appearance: Normal appearance.   HENT:      Head: Normocephalic and atraumatic.      Nose: Nose normal.      Mouth/Throat:      Mouth: Mucous membranes are moist.   Eyes:      Conjunctiva/sclera: Conjunctivae normal.   Cardiovascular:      Rate and Rhythm: Normal rate and regular rhythm.      Heart  "sounds: Murmur heard.   Pulmonary:      Effort: Pulmonary effort is normal.      Comments: Crackles left base  Abdominal:      General: Bowel sounds are normal. There is no distension.      Tenderness: There is abdominal tenderness.      Comments: Somewhat firm   Musculoskeletal:         General: Normal range of motion.      Cervical back: Neck supple.   Skin:     General: Skin is warm and dry.   Neurological:      Mental Status: She is alert. Mental status is at baseline.   Psychiatric:         Mood and Affect: Mood normal.          Last Recorded Vitals  Blood pressure (!) 193/77, pulse 74, temperature 37 °C (98.6 °F), temperature source Temporal, resp. rate 20, height 1.702 m (5' 7\"), weight 77.1 kg (170 lb), SpO2 94%.    Relevant Results    No current facility-administered medications on file prior to encounter.     Current Outpatient Medications on File Prior to Encounter   Medication Sig Dispense Refill    acetaminophen (Tylenol) 500 mg tablet Take 1 tablet (500 mg) by mouth every 8 hours if needed for mild pain (1 - 3).      ascorbic acid (Vitamin C) 500 mg tablet Take 1 tablet (500 mg) by mouth once daily.      aspirin 325 mg tablet Take 1 tablet (325 mg) by mouth once daily at bedtime.      B12-levomefolate calcium-B6 (Foltx) 2-1.13-25 mg tablet Take 1 tablet by mouth once daily.      cilostazol (Pletal) 50 mg tablet Take 1 tablet (50 mg) by mouth 2 times a day. 180 tablet 3    lansoprazole (Prevacid) 30 mg DR capsule Take 1 capsule (30 mg) by mouth 2 times a day as needed.      losartan (Cozaar) 100 mg tablet Take 1 tablet (100 mg) by mouth once daily. 90 tablet 3    metFORMIN XR (Glucophage-XR) 500 mg 24 hr tablet Take 1 tablet (500 mg) by mouth once daily in the evening. Take with meals. (Patient taking differently: Take 1 tablet (500 mg) by mouth once daily as needed (BG >200).)      metoprolol tartrate (Lopressor) 100 mg tablet Take 1 tablet (100 mg) by mouth once daily. (Patient taking differently: " Take 1 tablet (100 mg) by mouth once daily at bedtime.) 90 tablet 3    NovoLIN 70/30 U-100 Insulin 100 unit/mL (70-30) injection Inject 43 Units under the skin 2 times a day before meals.      OneTouch Ultra Test strip       oxyCODONE-acetaminophen (Percocet)  mg tablet Take 1 tablet by mouth every 8 hours.      rosuvastatin (Crestor) 20 mg tablet Take 1 tablet (20 mg) by mouth once daily. (Patient taking differently: Take 1 tablet (20 mg) by mouth once daily at bedtime.) 90 tablet 3    zinc sulfate (Zincate) 220 (50 Zn) MG capsule Take 1 capsule (50 mg of elemental zinc) by mouth once daily.      betamethasone dipropionate 0.05 % cream Apply topically 2 times a day. (Patient not taking: Reported on 4/12/2025)      hydrOXYzine pamoate (Vistaril) 50 mg capsule Take 1 capsule (50 mg) by mouth 4 times a day as needed. (Patient not taking: Reported on 4/12/2025)      loratadine-pseudoephedrine (Claritin-D 24-hour)  mg 24 hr tablet Take 1 tablet by mouth once daily. (Patient not taking: Reported on 4/12/2025)      ondansetron ODT (Zofran-ODT) 8 mg disintegrating tablet every 8 hours if needed. (Patient not taking: Reported on 4/12/2025)      polymyxin B sulf-trimethoprim (Polytrim) ophthalmic solution APPLY 1/4 INCH TO AFFECTED EYE 4 TIMES A DAY. (Patient not taking: Reported on 4/12/2025)      solifenacin (VESIcare) 5 mg tablet  (Patient not taking: Reported on 4/12/2025)      sucralfate (Carafate) 100 mg/mL suspension TAKE 10 ML BY MOUTH 4 TIMES A DAY (Patient not taking: Reported on 4/12/2025)      torsemide (Demadex) 20 mg tablet Take 1 tablet (20 mg) by mouth once daily. (Patient not taking: Reported on 4/12/2025) 90 tablet 3       Results for orders placed or performed during the hospital encounter of 04/12/25 (from the past 24 hours)   POCT GLUCOSE   Result Value Ref Range    POCT Glucose 183 (H) 74 - 99 mg/dL   Troponin I, High Sensitivity   Result Value Ref Range    Troponin I, High Sensitivity 23  (H) 0 - 13 ng/L   Sars-CoV-2 and Influenza A/B PCR   Result Value Ref Range    Flu A Result Not Detected Not Detected    Flu B Result Not Detected Not Detected    Coronavirus 2019, PCR Not Detected Not Detected   POCT GLUCOSE   Result Value Ref Range    POCT Glucose 171 (H) 74 - 99 mg/dL   Urinalysis with Reflex Culture and Microscopic   Result Value Ref Range    Color, Urine Light-Yellow Light-Yellow, Yellow, Dark-Yellow    Appearance, Urine Clear Clear    Specific Gravity, Urine 1.017 1.005 - 1.035    pH, Urine 5.0 5.0, 5.5, 6.0, 6.5, 7.0, 7.5, 8.0    Protein, Urine 10 (TRACE) NEGATIVE, 10 (TRACE), 20 (TRACE) mg/dL    Glucose, Urine Normal Normal mg/dL    Blood, Urine NEGATIVE NEGATIVE mg/dL    Ketones, Urine NEGATIVE NEGATIVE mg/dL    Bilirubin, Urine NEGATIVE NEGATIVE mg/dL    Urobilinogen, Urine Normal Normal mg/dL    Nitrite, Urine NEGATIVE NEGATIVE    Leukocyte Esterase, Urine 250 Triny/uL (A) NEGATIVE   Extra Urine Gray Tube   Result Value Ref Range    Extra Tube Hold for add-ons.    POCT GLUCOSE   Result Value Ref Range    POCT Glucose 131 (H) 74 - 99 mg/dL   Microscopic Only, Urine   Result Value Ref Range    WBC, Urine 11-20 (A) 1-5, NONE /HPF    RBC, Urine 1-2 NONE, 1-2, 3-5 /HPF    Bacteria, Urine 4+ (A) NONE SEEN /HPF    Mucus, Urine FEW Reference range not established. /LPF    Hyaline Casts, Urine OCCASIONAL (A) NONE /LPF   POCT GLUCOSE   Result Value Ref Range    POCT Glucose 81 74 - 99 mg/dL   POCT GLUCOSE   Result Value Ref Range    POCT Glucose 67 (L) 74 - 99 mg/dL   POCT GLUCOSE   Result Value Ref Range    POCT Glucose 64 (L) 74 - 99 mg/dL   POCT GLUCOSE   Result Value Ref Range    POCT Glucose 147 (H) 74 - 99 mg/dL   CBC   Result Value Ref Range    WBC 15.8 (H) 4.4 - 11.3 x10*3/uL    nRBC 0.0 0.0 - 0.0 /100 WBCs    RBC 3.83 (L) 4.00 - 5.20 x10*6/uL    Hemoglobin 11.0 (L) 12.0 - 16.0 g/dL    Hematocrit 36.3 36.0 - 46.0 %    MCV 95 80 - 100 fL    MCH 28.7 26.0 - 34.0 pg    MCHC 30.3 (L) 32.0 - 36.0  g/dL    RDW 13.1 11.5 - 14.5 %    Platelets 163 150 - 450 x10*3/uL   Basic Metabolic Panel   Result Value Ref Range    Glucose 74 74 - 99 mg/dL    Sodium 132 (L) 136 - 145 mmol/L    Potassium 4.1 3.5 - 5.3 mmol/L    Chloride 103 98 - 107 mmol/L    Bicarbonate 23 21 - 32 mmol/L    Anion Gap 10 10 - 20 mmol/L    Urea Nitrogen 39 (H) 6 - 23 mg/dL    Creatinine 1.33 (H) 0.50 - 1.05 mg/dL    eGFR 40 (L) >60 mL/min/1.73m*2    Calcium 8.2 (L) 8.6 - 10.3 mg/dL   POCT GLUCOSE   Result Value Ref Range    POCT Glucose 72 (L) 74 - 99 mg/dL   POCT GLUCOSE   Result Value Ref Range    POCT Glucose 75 74 - 99 mg/dL   POCT GLUCOSE   Result Value Ref Range    POCT Glucose 84 74 - 99 mg/dL   POCT GLUCOSE   Result Value Ref Range    POCT Glucose 99 74 - 99 mg/dL        XR abdomen 1 view    Result Date: 4/13/2025  Interpreted By:  Beka Mays, STUDY: Abdominal series dated 4/13/2025.   INDICATION: Follow-up bowel dilation.   COMPARISON: Correlation is made with 04/12/2025 CT.   ACCESSION NUMBER(S): [id number]   ORDERING CLINICIAN: ROZ RAMESH   TECHNIQUE: Two AP radiographs of the abdomen.   FINDINGS: There are multiple stacked dilated loops of small bowel in the central abdomen measuring up to a proximally 4.1 cm. There is gas and stool in the colon. Lung bases are clear. Degenerative changes seen of the spine and hips.       Multiple dilated loops of small bowel in the central abdomen most compatible with a degree of obstructive process as further discussed on prior date CT.   Signed by: Beka Mays 4/13/2025 1:01 PM Dictation workstation:   [id number]    CT abdomen pelvis wo IV contrast    Addendum Date: 4/12/2025    Interpreted By:  Chano Marshall, ADDENDUM: Also to be noted at the impression: Subcutaneous opacities and reticulation that may be due to injections although hematoma is possible.   Signed by: Chano Marshall 4/12/2025 2:38 PM   -------- ORIGINAL REPORT -------- Dictation workstation:   [id number]    Result Date:  4/12/2025  Interpreted By:  Chano Marshall, STUDY: CT ABDOMEN PELVIS WO IV CONTRAST;  4/12/2025 2:05 pm   INDICATION: Signs/Symptoms:abd pain.   COMPARISON: None.   ACCESSION NUMBER(S): [id number]   ORDERING CLINICIAN: FRANK BREWSTER   TECHNIQUE: CT of the abdomen and pelvis was performed. Contiguous axial images were obtained at 3 mm slice thickness through the abdomen and pelvis. Coronal and sagittal reconstructions at 3 mm slice thickness were performed.   FINDINGS: LOWER CHEST: Small right pleural effusion and trace left pleural effusion. Coronary artery atherosclerotic calcification and mitral annular calcification.   ABDOMEN:   LIVER: The hepatic parenchyma is homogeneous without evidence of focal liver lesions.The hepatic size is normal.   SPLEEN: The spleen is normal in size and homogeneous.   ADRENAL GLANDS: Bilateral adrenal glands appear normal.   KIDNEYS AND URETERS: Mild bilateral cortical atrophy, the renal cortices otherwise are homogeneous. Radiodensities at the renal sinuses is probably atherosclerotic calcification, although superimposed nonobstructing calculi are possible.   The ureters throughout their distal course are not well identified due to overlying crowded bowel loops, but the tracts otherwise are unremarkable without identified ureteral dilatation or additional radiodense calculi.   PANCREAS: The pancreas appears unremarkable, there is no ductal dilatation or masses.   GALLBLADDER: Not visualized, presumably with cholecystectomy.   BILE DUCTS: Dilatation of the extrahepatic ducts, the common bile duct measuring 1.4 cm in diameter, most likely from post cholecystectomy state. However as the no prior exams for comparison MRCP would be recommended if there is symptomatology compatible with biliary colic.     VESSELS: Fairly extensive atherosclerotic calcifications are noted predominantly at the aorta and iliac system. There is also moderate atherosclerotic calcification at the mid  segment of the superior mesenteric artery, and fairly marked of the inferior mesenteric artery.   PERITONEUM AND RETROPERITONEUM: There is a small volume of ascites best seen in the right upper quadrant along the liver. No free intraperitoneal air.   The retroperitoneum appears unremarkable, and without significant adenopathy.   No enlarged mesenteric lymph nodes.   BOWEL: There is mild distention of multiple small bowel segments with air-fluid levels, associated with reticulation of the mesentery indicating edema. There is decompression of distal small bowel segments, and the pattern would be most compatible with mid to early or partial distal small bowel obstruction. There is also moderate diverticulosis of the distal colon.   PELVIS:   BLADDER: The urinary bladder contour is smooth.   REPRODUCTIVE ORGANS: No pelvic masses.     BONE, ABDOMINAL WALL AND OTHER FINDINGS: No suspicious osseous lesions are identified.   There is a small non incarcerated umbilical hernia containing intra-abdominal fat. There is also attenuation within the subcutaneous fat of the mid/lower abdomen anteriorly that may be from subcutaneous injections. However, hematomas are also possible. There is also reticulation of the subcutaneous fat at the right lateral abdominal wall indicating additional edema.       1.  Small-bowel obstruction with mesenteric edema as described. 2. Small volume of ascites. 3. Diverticulosis. 4. Atherosclerosis as described.   MACRO: 1. None   Signed by: Chano Marshall 4/12/2025 2:28 PM Dictation workstation:   [id number]    XR chest 2 views    Result Date: 4/12/2025  Interpreted By:  Beka Mays, STUDY: Chest dated  4/12/2025.   INDICATION: Signs/Symptoms:sob with ambulation   COMPARISON: Chest dated 11/17/2015.   ACCESSION NUMBER(S): [id number]   ORDERING CLINICIAN: FRANK BREWSTER   TECHNIQUE: One view of the chest.   FINDINGS: The lungs are clear.  No pneumothorax or effusion is evident. The  "cardiomediastinal silhouette is  not enlarged.Degenerative change is seen of the spine and shoulders.       No acute cardiopulmonary process is evident.   MACRO: None   Signed by: Beka Mays 4/12/2025 1:26 PM Dictation workstation:   [id number]        Assessment/Plan   Assessment & Plan  Generalized abdominal pain    Small bowel obstruction (Multi)    Dependence on nicotine from cigarettes    Advanced care planning/counseling discussion    Leukocytosis      Per surgery:  \"Impression:  #SBO   > small bowel dilation; however, other concerning CT findings particularly mesenteric edema and ascites. CT imaging worrisome for possible ischemic/low flow state to bowel; however, serum lactate normal and patient's clinical presentation/symptoms/pain/bowel sounds are consistent with an adhesive small bowel obstruction (partial)  #Leukocytosis (possibly 2/2 above, but may have other infectious etiology, possibly UTI?)   > UA ordered; however, not yet collected and already received antibiotics  #Hyponatremia and hypochloremia  #CKD   Recommendations:  - no acute surgical intervention at this time   > patient offered surgical intervention if she were to worsen clinically; however, she was adamant about not wanting surgery   > would recommend placing NG tube for decompression; however, patient again declined NG tube. She said she may be agreeable if she begins to feel worse/nauseated  - PRN IV Zofran  - PRN IV Morphine for severe pain (limit use as able, but unable to give NSAIDs or PO meds)  - IVF: D5NS @ 100  - repeat CBC and BMP in AM\"    Additionally:  Change morphine to Dilaudid given CKD  Check flu and COVID  KUB in a.m.      #Advance care planning    Patient would like to be a DNR CCA    #Nicotine dependence    Reports she lets most of her cigarettes to burn out without smoking them so we will order a nicotine patch as needed    #Chronic conditions:    CAD,  chronic diastolic heart failure, hypertension, " hyperlipidemia, peripheral artery disease, diabetes mellitus, carotid artery disease (Right occlusion of ICA, left ICA with > 70% stenosis; follows with Dr. Parish), CKD, Left breast cancer s/p lumpectomy and radiation, kidney stones, and open cholecystectomy and open appendectomy with right ovary removal (for tumor- at age 16)     Hold all p.o. meds  Give IV Protonix   Metoprolol IV around-the-clock with parameters  Patient takes 70/30 at home will conservatively give 30 units of Lantus daily which would be a little less than half of the equivalent of 70/30/day with sliding scale insulin and hypoglycemic protocol    #DVT prophylaxis    SCDs  Heparin                    Chadwick Medel, DO    Review of Systems

## 2025-04-13 NOTE — CARE PLAN
The patient's goals for the shift include to get some sleep.     The clinical goals for the shift include safety and comfort    Over the shift, the patient did make progress toward the following goals.

## 2025-04-13 NOTE — PROGRESS NOTES
Nancy Long is a 79 y.o. female on day 1 of admission presenting with Generalized abdominal pain.    Subjective   Patient states she does not feel good this morning. Endorsing nausea. Just received PRN med from nurse. I advised her that an NG placement is strongly advised, but she still declined.  Denies any emesis.  Denies abdominal pain.    Thinks she is passing gas, but is not sure. Has not had BM. Getting OOB to urinate.    Patient endorsed feeling shaky and sweaty overnight related to low blood glucose. She required 12.5g D50.   Her med-rec was done and in place of home Novolin 70/30 she was placed on Lantus 30 units at bedtime despite being NPO.  I have decreased the PM dose of Lantus to 15, but have it currently on hold. She remains on Dextrose containing fluids.       Objective     Physical Exam  Vitals reviewed.   Constitutional:       General: She is not in acute distress.     Appearance: She is ill-appearing.   Eyes:      General: No scleral icterus.  Neck:      Comments: Carotid bruit Left  Cardiovascular:      Rate and Rhythm: Normal rate and regular rhythm.      Heart sounds: Murmur heard.      Comments: NSR 60  Pulmonary:      Effort: Pulmonary effort is normal. No respiratory distress.   Abdominal:      General: Bowel sounds are decreased. There is distension (Mild).      Palpations: Abdomen is soft.      Tenderness: There is no abdominal tenderness.      Comments: Ecchymosis, firmness from subQ injection sites   Musculoskeletal:      Right lower leg: No edema.      Left lower leg: No edema.   Skin:     General: Skin is warm.      Coloration: Skin is not jaundiced or pale.      Comments: Scattered ecchymosis over body- erythematous shins bilaterally R>L   Neurological:      Mental Status: She is alert and oriented to person, place, and time.   Psychiatric:         Behavior: Behavior normal.         Last Recorded Vitals  Blood pressure 133/63, pulse 95, temperature 36.3 °C (97.3 °F), temperature  "source Temporal, resp. rate 18, height 1.702 m (5' 7\"), weight 77.1 kg (170 lb), SpO2 92%.  Intake/Output last 3 Shifts:  I/O last 3 completed shifts:  In: 1042.1 (13.5 mL/kg) [I.V.:692.1 (9 mL/kg); IV Piggyback:350]  Out: 950 (12.3 mL/kg) [Urine:950 (0.3 mL/kg/hr)]  Weight: 77.1 kg     Relevant Results  Results for orders placed or performed during the hospital encounter of 04/12/25 (from the past 24 hours)   CBC and Auto Differential   Result Value Ref Range    WBC 21.0 (H) 4.4 - 11.3 x10*3/uL    nRBC 0.0 0.0 - 0.0 /100 WBCs    RBC 4.42 4.00 - 5.20 x10*6/uL    Hemoglobin 12.8 12.0 - 16.0 g/dL    Hematocrit 41.7 36.0 - 46.0 %    MCV 94 80 - 100 fL    MCH 29.0 26.0 - 34.0 pg    MCHC 30.7 (L) 32.0 - 36.0 g/dL    RDW 13.0 11.5 - 14.5 %    Platelets 206 150 - 450 x10*3/uL    Neutrophils % 82.7 40.0 - 80.0 %    Immature Granulocytes %, Automated 0.6 0.0 - 0.9 %    Lymphocytes % 11.2 13.0 - 44.0 %    Monocytes % 5.2 2.0 - 10.0 %    Eosinophils % 0.0 0.0 - 6.0 %    Basophils % 0.3 0.0 - 2.0 %    Neutrophils Absolute 17.35 (H) 1.60 - 5.50 x10*3/uL    Immature Granulocytes Absolute, Automated 0.13 0.00 - 0.50 x10*3/uL    Lymphocytes Absolute 2.34 0.80 - 3.00 x10*3/uL    Monocytes Absolute 1.10 (H) 0.05 - 0.80 x10*3/uL    Eosinophils Absolute 0.00 0.00 - 0.40 x10*3/uL    Basophils Absolute 0.06 0.00 - 0.10 x10*3/uL   Magnesium   Result Value Ref Range    Magnesium 2.05 1.60 - 2.40 mg/dL   Comprehensive metabolic panel   Result Value Ref Range    Glucose 220 (H) 74 - 99 mg/dL    Sodium 125 (L) 136 - 145 mmol/L    Potassium 5.0 3.5 - 5.3 mmol/L    Chloride 94 (L) 98 - 107 mmol/L    Bicarbonate 21 21 - 32 mmol/L    Anion Gap 15 10 - 20 mmol/L    Urea Nitrogen 45 (H) 6 - 23 mg/dL    Creatinine 1.64 (H) 0.50 - 1.05 mg/dL    eGFR 31 (L) >60 mL/min/1.73m*2    Calcium 8.9 8.6 - 10.3 mg/dL    Albumin 3.6 3.4 - 5.0 g/dL    Alkaline Phosphatase 96 33 - 136 U/L    Total Protein 6.5 6.4 - 8.2 g/dL    AST 16 9 - 39 U/L    Bilirubin, Total " 0.7 0.0 - 1.2 mg/dL    ALT 8 7 - 45 U/L   Lipase   Result Value Ref Range    Lipase 9 9 - 82 U/L   Troponin I, High Sensitivity   Result Value Ref Range    Troponin I, High Sensitivity 27 (H) 0 - 13 ng/L   B-Type Natriuretic Peptide   Result Value Ref Range     (H) 0 - 99 pg/mL   Troponin I, High Sensitivity   Result Value Ref Range    Troponin I, High Sensitivity 29 (H) 0 - 13 ng/L   Lactate   Result Value Ref Range    Lactate 1.6 0.4 - 2.0 mmol/L   Protime-INR   Result Value Ref Range    Protime 11.5 9.8 - 12.4 seconds    INR 1.0 0.9 - 1.1   Type And Screen   Result Value Ref Range    ABO TYPE O     Rh TYPE NEG     ANTIBODY SCREEN NEG    VERIFY ABO/Rh Group Test   Result Value Ref Range    ABO TYPE O     Rh TYPE NEG    POCT GLUCOSE   Result Value Ref Range    POCT Glucose 183 (H) 74 - 99 mg/dL   Troponin I, High Sensitivity   Result Value Ref Range    Troponin I, High Sensitivity 23 (H) 0 - 13 ng/L   Sars-CoV-2 and Influenza A/B PCR   Result Value Ref Range    Flu A Result Not Detected Not Detected    Flu B Result Not Detected Not Detected    Coronavirus 2019, PCR Not Detected Not Detected   POCT GLUCOSE   Result Value Ref Range    POCT Glucose 171 (H) 74 - 99 mg/dL   Urinalysis with Reflex Culture and Microscopic   Result Value Ref Range    Color, Urine Light-Yellow Light-Yellow, Yellow, Dark-Yellow    Appearance, Urine Clear Clear    Specific Gravity, Urine 1.017 1.005 - 1.035    pH, Urine 5.0 5.0, 5.5, 6.0, 6.5, 7.0, 7.5, 8.0    Protein, Urine 10 (TRACE) NEGATIVE, 10 (TRACE), 20 (TRACE) mg/dL    Glucose, Urine Normal Normal mg/dL    Blood, Urine NEGATIVE NEGATIVE mg/dL    Ketones, Urine NEGATIVE NEGATIVE mg/dL    Bilirubin, Urine NEGATIVE NEGATIVE mg/dL    Urobilinogen, Urine Normal Normal mg/dL    Nitrite, Urine NEGATIVE NEGATIVE    Leukocyte Esterase, Urine 250 Triny/uL (A) NEGATIVE   Extra Urine Gray Tube   Result Value Ref Range    Extra Tube Hold for add-ons.    POCT GLUCOSE   Result Value Ref Range     POCT Glucose 131 (H) 74 - 99 mg/dL   Microscopic Only, Urine   Result Value Ref Range    WBC, Urine 11-20 (A) 1-5, NONE /HPF    RBC, Urine 1-2 NONE, 1-2, 3-5 /HPF    Bacteria, Urine 4+ (A) NONE SEEN /HPF    Mucus, Urine FEW Reference range not established. /LPF    Hyaline Casts, Urine OCCASIONAL (A) NONE /LPF   POCT GLUCOSE   Result Value Ref Range    POCT Glucose 81 74 - 99 mg/dL   POCT GLUCOSE   Result Value Ref Range    POCT Glucose 67 (L) 74 - 99 mg/dL   POCT GLUCOSE   Result Value Ref Range    POCT Glucose 64 (L) 74 - 99 mg/dL   POCT GLUCOSE   Result Value Ref Range    POCT Glucose 147 (H) 74 - 99 mg/dL   CBC   Result Value Ref Range    WBC 15.8 (H) 4.4 - 11.3 x10*3/uL    nRBC 0.0 0.0 - 0.0 /100 WBCs    RBC 3.83 (L) 4.00 - 5.20 x10*6/uL    Hemoglobin 11.0 (L) 12.0 - 16.0 g/dL    Hematocrit 36.3 36.0 - 46.0 %    MCV 95 80 - 100 fL    MCH 28.7 26.0 - 34.0 pg    MCHC 30.3 (L) 32.0 - 36.0 g/dL    RDW 13.1 11.5 - 14.5 %    Platelets 163 150 - 450 x10*3/uL   Basic Metabolic Panel   Result Value Ref Range    Glucose 74 74 - 99 mg/dL    Sodium 132 (L) 136 - 145 mmol/L    Potassium 4.1 3.5 - 5.3 mmol/L    Chloride 103 98 - 107 mmol/L    Bicarbonate 23 21 - 32 mmol/L    Anion Gap 10 10 - 20 mmol/L    Urea Nitrogen 39 (H) 6 - 23 mg/dL    Creatinine 1.33 (H) 0.50 - 1.05 mg/dL    eGFR 40 (L) >60 mL/min/1.73m*2    Calcium 8.2 (L) 8.6 - 10.3 mg/dL   POCT GLUCOSE   Result Value Ref Range    POCT Glucose 72 (L) 74 - 99 mg/dL           Assessment/Plan   Assessment & Plan  Generalized abdominal pain    Small bowel obstruction (Multi)    Dependence on nicotine from cigarettes    Advanced care planning/counseling discussion    Leukocytosis    79 year old female patient of Dr. Bonilla presented to [facility] ED on 4/12 with 2 day history of abdominal pain. Admitted to medicine with consult to general surgery with SBO.  Patient has a history of HTN, HLD, CAD, PAD (follows with Dr. Oliver), carotid artery disease (Right occlusion of  ICA, left ICA with > 70% stenosis; follows with Dr. Parish), CKD, Left breast cancer s/p lumpectomy, kidney stones, and IDDM Type 2 (follows with PCP Dr. Bonilla).  Surgical history of open cholecystectomy and open appendectomy and Right oophorectomy.      Impression:  #SBO - ongoing significant small bowel dilation on KUB this AM   > small bowel dilation; however, other concerning CT findings particularly mesenteric edema and ascites. CT imaging worrisome for possible ischemic/low flow state to bowel; however, serum lactate normal and patient's clinical presentation/symptoms/pain/bowel sounds are consistent with an adhesive small bowel obstruction (partial)  #Leukocytosis (possibly 2/2 above, but may have other infectious etiology, possibly UTI?)   > of note, UA was collected after patient already received antibiotics   #Hyponatremia - improved  #hypochloremia - resolved   #CKD     Recommendations:  - no acute surgical intervention at this time   > patient offered surgical intervention if she were to worsen clinically; however, she was adamant about not wanting surgery   > would recommend placing NG tube for decompression; however, patient continues to declined NG tube. Will continue to recommend, particularly if she vomits today  - PRN IV Zofran  - PRN IV Morphine for severe pain (limit use as able, but unable to give NSAIDs or PO meds)  - continue IV Cefepime/Flagyl   - follow up urine culture results   - IVF: D5NS @ 100  - repeat CBC and BMP in AM  - appreciate medicine recs for management of primary medical issues   > HOLD Lantus for now; resume lower dose if Blood glucose reached 200s today   > continue hypoglycemia order set      PPX: SCDs, SQH     The patient will be seen and discussed with the attending surgeon Dr. Martinez     I spent 30 minutes in the professional and overall care of this patient.      Sarah Michaels, APRN-CNP

## 2025-04-14 ENCOUNTER — APPOINTMENT (OUTPATIENT)
Dept: RADIOLOGY | Facility: HOSPITAL | Age: 80
DRG: 336 | End: 2025-04-14
Payer: MEDICARE

## 2025-04-14 LAB
ANION GAP SERPL CALC-SCNC: 10 MMOL/L (ref 10–20)
BACTERIA UR CULT: NORMAL
BUN SERPL-MCNC: 26 MG/DL (ref 6–23)
CALCIUM SERPL-MCNC: 8.3 MG/DL (ref 8.6–10.3)
CHLORIDE SERPL-SCNC: 105 MMOL/L (ref 98–107)
CO2 SERPL-SCNC: 24 MMOL/L (ref 21–32)
CREAT SERPL-MCNC: 1.2 MG/DL (ref 0.5–1.05)
EGFRCR SERPLBLD CKD-EPI 2021: 46 ML/MIN/1.73M*2
ERYTHROCYTE [DISTWIDTH] IN BLOOD BY AUTOMATED COUNT: 13 % (ref 11.5–14.5)
GLUCOSE BLD MANUAL STRIP-MCNC: 111 MG/DL (ref 74–99)
GLUCOSE BLD MANUAL STRIP-MCNC: 114 MG/DL (ref 74–99)
GLUCOSE BLD MANUAL STRIP-MCNC: 115 MG/DL (ref 74–99)
GLUCOSE BLD MANUAL STRIP-MCNC: 129 MG/DL (ref 74–99)
GLUCOSE BLD MANUAL STRIP-MCNC: 72 MG/DL (ref 74–99)
GLUCOSE BLD MANUAL STRIP-MCNC: 82 MG/DL (ref 74–99)
GLUCOSE SERPL-MCNC: 80 MG/DL (ref 74–99)
HCT VFR BLD AUTO: 34.8 % (ref 36–46)
HGB BLD-MCNC: 10.9 G/DL (ref 12–16)
MCH RBC QN AUTO: 28.9 PG (ref 26–34)
MCHC RBC AUTO-ENTMCNC: 31.3 G/DL (ref 32–36)
MCV RBC AUTO: 92 FL (ref 80–100)
NRBC BLD-RTO: 0 /100 WBCS (ref 0–0)
PLATELET # BLD AUTO: 158 X10*3/UL (ref 150–450)
POTASSIUM SERPL-SCNC: 3.9 MMOL/L (ref 3.5–5.3)
RBC # BLD AUTO: 3.77 X10*6/UL (ref 4–5.2)
SODIUM SERPL-SCNC: 135 MMOL/L (ref 136–145)
WBC # BLD AUTO: 12.7 X10*3/UL (ref 4.4–11.3)

## 2025-04-14 PROCEDURE — 2500000004 HC RX 250 GENERAL PHARMACY W/ HCPCS (ALT 636 FOR OP/ED): Performed by: REGISTERED NURSE

## 2025-04-14 PROCEDURE — 85027 COMPLETE CBC AUTOMATED: CPT

## 2025-04-14 PROCEDURE — 99232 SBSQ HOSP IP/OBS MODERATE 35: CPT | Performed by: SURGERY

## 2025-04-14 PROCEDURE — 74018 RADEX ABDOMEN 1 VIEW: CPT

## 2025-04-14 PROCEDURE — 82947 ASSAY GLUCOSE BLOOD QUANT: CPT

## 2025-04-14 PROCEDURE — 2500000004 HC RX 250 GENERAL PHARMACY W/ HCPCS (ALT 636 FOR OP/ED)

## 2025-04-14 PROCEDURE — 36415 COLL VENOUS BLD VENIPUNCTURE: CPT

## 2025-04-14 PROCEDURE — 80048 BASIC METABOLIC PNL TOTAL CA: CPT

## 2025-04-14 PROCEDURE — 99232 SBSQ HOSP IP/OBS MODERATE 35: CPT

## 2025-04-14 PROCEDURE — 2500000004 HC RX 250 GENERAL PHARMACY W/ HCPCS (ALT 636 FOR OP/ED): Performed by: NURSE PRACTITIONER

## 2025-04-14 PROCEDURE — 1200000002 HC GENERAL ROOM WITH TELEMETRY DAILY

## 2025-04-14 RX ORDER — DEXTROSE MONOHYDRATE AND SODIUM CHLORIDE 5; .9 G/100ML; G/100ML
100 INJECTION, SOLUTION INTRAVENOUS CONTINUOUS
Status: DISCONTINUED | OUTPATIENT
Start: 2025-04-14 | End: 2025-04-16

## 2025-04-14 RX ADMIN — HEPARIN SODIUM 5000 UNITS: 5000 INJECTION INTRAVENOUS; SUBCUTANEOUS at 14:14

## 2025-04-14 RX ADMIN — HEPARIN SODIUM 5000 UNITS: 5000 INJECTION INTRAVENOUS; SUBCUTANEOUS at 05:40

## 2025-04-14 RX ADMIN — DEXTROSE AND SODIUM CHLORIDE 100 ML/HR: 5; .9 INJECTION, SOLUTION INTRAVENOUS at 03:58

## 2025-04-14 RX ADMIN — HYDROMORPHONE HYDROCHLORIDE 0.5 MG: 1 INJECTION, SOLUTION INTRAMUSCULAR; INTRAVENOUS; SUBCUTANEOUS at 14:00

## 2025-04-14 RX ADMIN — HEPARIN SODIUM 5000 UNITS: 5000 INJECTION INTRAVENOUS; SUBCUTANEOUS at 22:34

## 2025-04-14 RX ADMIN — CEFEPIME 1 G: 1 INJECTION, POWDER, FOR SOLUTION INTRAMUSCULAR; INTRAVENOUS at 04:06

## 2025-04-14 RX ADMIN — DEXTROSE AND SODIUM CHLORIDE 100 ML/HR: 5; .9 INJECTION, SOLUTION INTRAVENOUS at 14:20

## 2025-04-14 RX ADMIN — METRONIDAZOLE 500 MG: 500 INJECTION, SOLUTION INTRAVENOUS at 22:35

## 2025-04-14 RX ADMIN — ONDANSETRON 4 MG: 2 INJECTION INTRAMUSCULAR; INTRAVENOUS at 04:00

## 2025-04-14 RX ADMIN — METOPROLOL TARTRATE 5 MG: 5 INJECTION INTRAVENOUS at 14:28

## 2025-04-14 RX ADMIN — HYDROMORPHONE HYDROCHLORIDE 0.5 MG: 1 INJECTION, SOLUTION INTRAMUSCULAR; INTRAVENOUS; SUBCUTANEOUS at 18:04

## 2025-04-14 RX ADMIN — DEXTROSE AND SODIUM CHLORIDE 100 ML/HR: 5; .9 INJECTION, SOLUTION INTRAVENOUS at 17:29

## 2025-04-14 RX ADMIN — HYDROMORPHONE HYDROCHLORIDE 0.5 MG: 1 INJECTION, SOLUTION INTRAMUSCULAR; INTRAVENOUS; SUBCUTANEOUS at 22:53

## 2025-04-14 RX ADMIN — PANTOPRAZOLE SODIUM 40 MG: 40 INJECTION, POWDER, FOR SOLUTION INTRAVENOUS at 10:03

## 2025-04-14 RX ADMIN — METOPROLOL TARTRATE 5 MG: 5 INJECTION INTRAVENOUS at 04:02

## 2025-04-14 RX ADMIN — METOPROLOL TARTRATE 5 MG: 5 INJECTION INTRAVENOUS at 09:57

## 2025-04-14 RX ADMIN — METRONIDAZOLE 500 MG: 500 INJECTION, SOLUTION INTRAVENOUS at 14:52

## 2025-04-14 RX ADMIN — METOPROLOL TARTRATE 5 MG: 5 INJECTION INTRAVENOUS at 20:53

## 2025-04-14 RX ADMIN — METRONIDAZOLE 500 MG: 500 INJECTION, SOLUTION INTRAVENOUS at 05:36

## 2025-04-14 RX ADMIN — ONDANSETRON 4 MG: 2 INJECTION INTRAMUSCULAR; INTRAVENOUS at 12:47

## 2025-04-14 RX ADMIN — CEFEPIME 1 G: 1 INJECTION, POWDER, FOR SOLUTION INTRAMUSCULAR; INTRAVENOUS at 14:17

## 2025-04-14 ASSESSMENT — COGNITIVE AND FUNCTIONAL STATUS - GENERAL
MOBILITY SCORE: 19
STANDING UP FROM CHAIR USING ARMS: A LITTLE
CLIMB 3 TO 5 STEPS WITH RAILING: A LOT
HELP NEEDED FOR BATHING: A LITTLE
WALKING IN HOSPITAL ROOM: A LITTLE
MOVING TO AND FROM BED TO CHAIR: A LITTLE
DAILY ACTIVITIY SCORE: 23

## 2025-04-14 ASSESSMENT — PAIN SCALES - GENERAL
PAINLEVEL_OUTOF10: 7
PAINLEVEL_OUTOF10: 0 - NO PAIN
PAINLEVEL_OUTOF10: 0 - NO PAIN
PAINLEVEL_OUTOF10: 8
PAINLEVEL_OUTOF10: 5 - MODERATE PAIN
PAINLEVEL_OUTOF10: 7

## 2025-04-14 ASSESSMENT — PAIN - FUNCTIONAL ASSESSMENT
PAIN_FUNCTIONAL_ASSESSMENT: 0-10

## 2025-04-14 ASSESSMENT — PAIN DESCRIPTION - LOCATION
LOCATION: ABDOMEN

## 2025-04-14 ASSESSMENT — PAIN DESCRIPTION - ORIENTATION: ORIENTATION: RIGHT

## 2025-04-14 NOTE — PROGRESS NOTES
"Nancy Long is a 79 y.o. female on day 2 of admission presenting with Generalized abdominal pain.    Subjective   Patient is laying in bed and states she is still nauseated. Denies episodes of emesis. I once again offered her the option to have an NGT placed to aid in relieving nausea, but she denies. She also asks if she can eat and I explained that eating would make her nausea worse, but she continues to focus on wanting a diet added. She does endorse a small amount of flatus, but denies having a BM.        Objective     Physical Exam  Vitals reviewed.   Constitutional:       General: She is not in acute distress.  Eyes:      General: No scleral icterus.  Cardiovascular:      Rate and Rhythm: Normal rate and regular rhythm.      Heart sounds: Murmur heard.      Comments: NSR 60  Pulmonary:      Effort: Pulmonary effort is normal. No respiratory distress.   Abdominal:      General: Bowel sounds are decreased. There is distension (Mild).      Palpations: Abdomen is soft.      Tenderness: There is no abdominal tenderness.   Musculoskeletal:      Right lower leg: No edema.      Left lower leg: No edema.   Skin:     General: Skin is warm.      Coloration: Skin is not jaundiced or pale.      Comments: Scattered ecchymosis over body- erythematous shins bilaterally R>L   Neurological:      Mental Status: She is alert and oriented to person, place, and time.   Psychiatric:         Behavior: Behavior normal.         Last Recorded Vitals  Blood pressure 179/79, pulse 72, temperature 36.1 °C (97 °F), temperature source Temporal, resp. rate 18, height 1.702 m (5' 7\"), weight 77.1 kg (170 lb), SpO2 92%.  Intake/Output last 3 Shifts:  I/O last 3 completed shifts:  In: 2048.3 (26.6 mL/kg) [I.V.:1648.3 (21.4 mL/kg); IV Piggyback:400]  Out: 1850 (24 mL/kg) [Urine:1850 (0.7 mL/kg/hr)]  Weight: 77.1 kg     Relevant Results  Results for orders placed or performed during the hospital encounter of 04/12/25 (from the past 24 hours) "   POCT GLUCOSE   Result Value Ref Range    POCT Glucose 84 74 - 99 mg/dL   POCT GLUCOSE   Result Value Ref Range    POCT Glucose 99 74 - 99 mg/dL   POCT GLUCOSE   Result Value Ref Range    POCT Glucose 108 (H) 74 - 99 mg/dL   POCT GLUCOSE   Result Value Ref Range    POCT Glucose 72 (L) 74 - 99 mg/dL   CBC   Result Value Ref Range    WBC 12.7 (H) 4.4 - 11.3 x10*3/uL    nRBC 0.0 0.0 - 0.0 /100 WBCs    RBC 3.77 (L) 4.00 - 5.20 x10*6/uL    Hemoglobin 10.9 (L) 12.0 - 16.0 g/dL    Hematocrit 34.8 (L) 36.0 - 46.0 %    MCV 92 80 - 100 fL    MCH 28.9 26.0 - 34.0 pg    MCHC 31.3 (L) 32.0 - 36.0 g/dL    RDW 13.0 11.5 - 14.5 %    Platelets 158 150 - 450 x10*3/uL   Basic metabolic panel   Result Value Ref Range    Glucose 80 74 - 99 mg/dL    Sodium 135 (L) 136 - 145 mmol/L    Potassium 3.9 3.5 - 5.3 mmol/L    Chloride 105 98 - 107 mmol/L    Bicarbonate 24 21 - 32 mmol/L    Anion Gap 10 10 - 20 mmol/L    Urea Nitrogen 26 (H) 6 - 23 mg/dL    Creatinine 1.20 (H) 0.50 - 1.05 mg/dL    eGFR 46 (L) >60 mL/min/1.73m*2    Calcium 8.3 (L) 8.6 - 10.3 mg/dL   POCT GLUCOSE   Result Value Ref Range    POCT Glucose 82 74 - 99 mg/dL           Assessment/Plan   Assessment & Plan  Generalized abdominal pain    Small bowel obstruction (Multi)    Dependence on nicotine from cigarettes    Advanced care planning/counseling discussion    Leukocytosis    79 year old female patient of Dr. Bonilla presented to [facility] ED on 4/12 with 2 day history of abdominal pain. Admitted to medicine with consult to general surgery with SBO.  Patient has a history of HTN, HLD, CAD, PAD (follows with Dr. Oliver), carotid artery disease (Right occlusion of ICA, left ICA with > 70% stenosis; follows with Dr. Parish), CKD, Left breast cancer s/p lumpectomy, kidney stones, and IDDM Type 2 (follows with PCP Dr. Bonilla).  Surgical history of open cholecystectomy and open appendectomy and Right oophorectomy.      Impression:  #SBO - ongoing significant small bowel dilation on KUB  this AM   > small bowel dilation; however, other concerning CT findings particularly mesenteric edema and ascites. CT imaging worrisome for possible ischemic/low flow state to bowel; however, serum lactate normal and patient's clinical presentation/symptoms/pain/bowel sounds are consistent with an adhesive small bowel obstruction (partial)  #Leukocytosis (possibly 2/2 above, but may have other infectious etiology, possibly UTI?)- improving   > of note, UA was collected after patient already received antibiotics   #Hyponatremia - improved  #hypochloremia - resolved   #CKD     Recommendations:  - no acute surgical intervention at this time   > patient offered surgical intervention if she were to worsen clinically; however, she was adamant about not wanting surgery   > would recommend placing NG tube for decompression; however, patient continues to declined NG tube. Will continue to recommend  - Monitor for ROBF  - PRN IV Zofran  - PRN IV Morphine for severe pain (limit use as able, but unable to give NSAIDs or PO meds)  - continue IV Cefepime/Flagyl   - follow up urine culture results   - IVF: D5NS @ 100  - repeat CBC and BMP in AM  - appreciate medicine recs for management of primary medical issues   > Lantus currently held   > continue hypoglycemia order set      PPX: SCDs, SQH     The patient seen and discussed with the attending surgeon Dr. Michelle Barrett PA-C

## 2025-04-14 NOTE — PROGRESS NOTES
Nancy Long is a 79 y.o. female on day 2 of admission presenting with Generalized abdominal pain.      Subjective   No significant events overnight. Patient seen and evaluated at bedside.        Objective     Last Recorded Vitals  /79   Pulse 72   Temp 36.1 °C (97 °F) (Temporal)   Resp 18   Wt 77.1 kg (170 lb)   SpO2 92%   Intake/Output last 3 Shifts:    Intake/Output Summary (Last 24 hours) at 4/14/2025 1039  Last data filed at 4/13/2025 1800  Gross per 24 hour   Intake 1250 ml   Output 400 ml   Net 850 ml       Admission Weight  Weight: 77.1 kg (170 lb) (04/12/25 1212)    Daily Weight  04/12/25 : 77.1 kg (170 lb)    Image Results  XR abdomen 1 view  Narrative: Interpreted By:  Beka Myas,   STUDY:  Abdominal series dated 4/13/2025.      INDICATION:  Follow-up bowel dilation.      COMPARISON:  Correlation is made with 04/12/2025 CT.      ACCESSION NUMBER(S):  [id number]      ORDERING CLINICIAN:  ROZ RAMESH      TECHNIQUE:  Two AP radiographs of the abdomen.      FINDINGS:  There are multiple stacked dilated loops of small bowel in the  central abdomen measuring up to a proximally 4.1 cm. There is gas and  stool in the colon. Lung bases are clear. Degenerative changes seen  of the spine and hips.      Impression: Multiple dilated loops of small bowel in the central abdomen most  compatible with a degree of obstructive process as further discussed  on prior date CT.      Signed by: Beka Mays 4/13/2025 1:01 PM  Dictation workstation:   [id number]      Physical Exam    Date of Service: 4/13/2025  6:18 PM     Signed       Expand All Collapse All    History Of Present Illness  Nancy Long is a 79 y.o. female with CAD,  chronic diastolic heart failure, hypertension, hyperlipidemia, peripheral artery disease, diabetes mellitus, carotid artery disease (Right occlusion of ICA, left ICA with > 70% stenosis; follows with Dr. Parish), CKD, Left breast cancer s/p lumpectomy and radiation, kidney  stones, and open cholecystectomy and open appendectomy with right ovary removal (for tumor- at age 16) who presented today with abdominal pain.  Patient reports yesterday she developed severe, sharp, continuous epigastric pain associated with nausea and diaphoresis.  She notes she took some of her prescribed Percocet that seemed to help a little however the symptoms persisted into today so she decided to come to the emergency room.  She also notes a decreased appetite and when she did try to eat the food did not taste good.  She denies a history of bowel obstruction.  She articulates she told the surgery team she would not like surgical intervention.  CODE STATUS discussed and she would like to be a DNR CCA.     In the ED lab work, EKG, chest x-ray and CTAP were performed, labs revealed glucose 200, sodium 125, chloride 94, bun 45, creatinine 1.64 (1.32 on 2/4/2020), , lactate 1.6, troponins 27 and 29 respectively, white count 21, neutrophils 17.35, monocytes 1.1. EKG Interpretation, per ER physician impression: Sinus at rate of 79, , QRS 91, QTc 407 without evidence of STEMI or ischemia. Chest x-ray without acute process.  CT abdomen/pelvis revealed small bowel obstruction with mesenteric edema as described, small volume of ascites, diverticulosis, atherosclerosis, subcutaneous opacities and reticulation that may be due to injections although hematoma is possible.  Patient was evaluated by surgery team that noted her symptoms are likely consistent with partial adhesive small bowel obstruction and surgical intervention was offered however patient declined.  Her vital signs were acceptable with a slightly elevated blood pressure of 157/70.  She was given cefepime Flagyl morphine Zofran and started on IV fluids and admitted for further evaluation and treatment under the care of Dr. Saez.        Echo March 2025:     CONCLUSIONS:  1. Left ventricular ejection fraction is normal, by visual estimate at  70-75%.  2. There is moderate left ventricular hypertrophy.  3. The left atrium is mildly dilated.  4. There is mild mitral valve stenosis.  5. There is moderate mitral annular calcification.  6. Right ventricular systolic pressure is within normal limits.  7. Mild aortic valve stenosis.  8. Mild left ventricular outflow obstruction at rest and with Valsalva (10 mmHg).     Past Medical History  Medical History        Past Medical History:   Diagnosis Date    Personal history of other diseases of the circulatory system       History of hypertension    Personal history of other endocrine, nutritional and metabolic disease       History of diabetes mellitus            Surgical History  Surgical History         Past Surgical History:   Procedure Laterality Date    APPENDECTOMY   01/03/2014     Appendectomy    BREAST LUMPECTOMY   01/03/2014     Breast Surgery Lumpectomy    CHOLECYSTECTOMY   01/03/2014     Cholecystectomy    OOPHORECTOMY   01/03/2014     Oophorectomy            Social History  Denies alcohol or illicit drug use.  Reports she smokes 2 to 3 packs of cigarettes per week.  Lives alone.  Ambulates with cane as needed.     Family History  Family History   No family history on file.        Allergies  Penicillins and Sulfa (sulfonamide antibiotics)     10 point review of systems performed and is negative besides what is stated in HPI.     Physical Exam  Constitutional:       Appearance: Normal appearance.   HENT:      Head: Normocephalic and atraumatic.      Nose: Nose normal.      Mouth/Throat:      Mouth: Mucous membranes are moist.   Eyes:      Conjunctiva/sclera: Conjunctivae normal.   Cardiovascular:      Rate and Rhythm: Normal rate and regular rhythm.      Heart sounds: Murmur heard.   Pulmonary:      Effort: Pulmonary effort is normal.      Comments: Crackles left base  Abdominal:      General: Bowel sounds are normal. There is no distension.      Tenderness: There is abdominal tenderness.      Comments:  "Somewhat firm   Musculoskeletal:         General: Normal range of motion.      Cervical back: Neck supple.   Skin:     General: Skin is warm and dry.   Neurological:      Mental Status: She is alert. Mental status is at baseline.   Psychiatric:         Mood and Affect: Mood normal.            Last Recorded Vitals  Blood pressure (!) 193/77, pulse 74, temperature 37 °C (98.6 °F), temperature source Temporal, resp. rate 20, height 1.702 m (5' 7\"), weight 77.1 kg (170 lb), SpO2 94%.     Relevant Results     Medications Ordered Prior to Encounter   No current facility-administered medications on file prior to encounter.             Current Outpatient Medications on File Prior to Encounter   Medication Sig Dispense Refill    acetaminophen (Tylenol) 500 mg tablet Take 1 tablet (500 mg) by mouth every 8 hours if needed for mild pain (1 - 3).        ascorbic acid (Vitamin C) 500 mg tablet Take 1 tablet (500 mg) by mouth once daily.        aspirin 325 mg tablet Take 1 tablet (325 mg) by mouth once daily at bedtime.        B12-levomefolate calcium-B6 (Foltx) 2-1.13-25 mg tablet Take 1 tablet by mouth once daily.        cilostazol (Pletal) 50 mg tablet Take 1 tablet (50 mg) by mouth 2 times a day. 180 tablet 3    lansoprazole (Prevacid) 30 mg DR capsule Take 1 capsule (30 mg) by mouth 2 times a day as needed.        losartan (Cozaar) 100 mg tablet Take 1 tablet (100 mg) by mouth once daily. 90 tablet 3    metFORMIN XR (Glucophage-XR) 500 mg 24 hr tablet Take 1 tablet (500 mg) by mouth once daily in the evening. Take with meals. (Patient taking differently: Take 1 tablet (500 mg) by mouth once daily as needed (BG >200).)        metoprolol tartrate (Lopressor) 100 mg tablet Take 1 tablet (100 mg) by mouth once daily. (Patient taking differently: Take 1 tablet (100 mg) by mouth once daily at bedtime.) 90 tablet 3    NovoLIN 70/30 U-100 Insulin 100 unit/mL (70-30) injection Inject 43 Units under the skin 2 times a day before " meals.        OneTouch Ultra Test strip          oxyCODONE-acetaminophen (Percocet)  mg tablet Take 1 tablet by mouth every 8 hours.        rosuvastatin (Crestor) 20 mg tablet Take 1 tablet (20 mg) by mouth once daily. (Patient taking differently: Take 1 tablet (20 mg) by mouth once daily at bedtime.) 90 tablet 3    zinc sulfate (Zincate) 220 (50 Zn) MG capsule Take 1 capsule (50 mg of elemental zinc) by mouth once daily.        betamethasone dipropionate 0.05 % cream Apply topically 2 times a day. (Patient not taking: Reported on 4/12/2025)        hydrOXYzine pamoate (Vistaril) 50 mg capsule Take 1 capsule (50 mg) by mouth 4 times a day as needed. (Patient not taking: Reported on 4/12/2025)        loratadine-pseudoephedrine (Claritin-D 24-hour)  mg 24 hr tablet Take 1 tablet by mouth once daily. (Patient not taking: Reported on 4/12/2025)        ondansetron ODT (Zofran-ODT) 8 mg disintegrating tablet every 8 hours if needed. (Patient not taking: Reported on 4/12/2025)        polymyxin B sulf-trimethoprim (Polytrim) ophthalmic solution APPLY 1/4 INCH TO AFFECTED EYE 4 TIMES A DAY. (Patient not taking: Reported on 4/12/2025)        solifenacin (VESIcare) 5 mg tablet  (Patient not taking: Reported on 4/12/2025)        sucralfate (Carafate) 100 mg/mL suspension TAKE 10 ML BY MOUTH 4 TIMES A DAY (Patient not taking: Reported on 4/12/2025)        torsemide (Demadex) 20 mg tablet Take 1 tablet (20 mg) by mouth once daily. (Patient not taking: Reported on 4/12/2025) 90 tablet 3                  Results for orders placed or performed during the hospital encounter of 04/12/25 (from the past 24 hours)   POCT GLUCOSE   Result Value Ref Range     POCT Glucose 183 (H) 74 - 99 mg/dL   Troponin I, High Sensitivity   Result Value Ref Range     Troponin I, High Sensitivity 23 (H) 0 - 13 ng/L   Sars-CoV-2 and Influenza A/B PCR   Result Value Ref Range     Flu A Result Not Detected Not Detected     Flu B Result Not  Detected Not Detected     Coronavirus 2019, PCR Not Detected Not Detected   POCT GLUCOSE   Result Value Ref Range     POCT Glucose 171 (H) 74 - 99 mg/dL   Urinalysis with Reflex Culture and Microscopic   Result Value Ref Range     Color, Urine Light-Yellow Light-Yellow, Yellow, Dark-Yellow     Appearance, Urine Clear Clear     Specific Gravity, Urine 1.017 1.005 - 1.035     pH, Urine 5.0 5.0, 5.5, 6.0, 6.5, 7.0, 7.5, 8.0     Protein, Urine 10 (TRACE) NEGATIVE, 10 (TRACE), 20 (TRACE) mg/dL     Glucose, Urine Normal Normal mg/dL     Blood, Urine NEGATIVE NEGATIVE mg/dL     Ketones, Urine NEGATIVE NEGATIVE mg/dL     Bilirubin, Urine NEGATIVE NEGATIVE mg/dL     Urobilinogen, Urine Normal Normal mg/dL     Nitrite, Urine NEGATIVE NEGATIVE     Leukocyte Esterase, Urine 250 Triny/uL (A) NEGATIVE   Extra Urine Gray Tube   Result Value Ref Range     Extra Tube Hold for add-ons.     POCT GLUCOSE   Result Value Ref Range     POCT Glucose 131 (H) 74 - 99 mg/dL   Microscopic Only, Urine   Result Value Ref Range     WBC, Urine 11-20 (A) 1-5, NONE /HPF     RBC, Urine 1-2 NONE, 1-2, 3-5 /HPF     Bacteria, Urine 4+ (A) NONE SEEN /HPF     Mucus, Urine FEW Reference range not established. /LPF     Hyaline Casts, Urine OCCASIONAL (A) NONE /LPF   POCT GLUCOSE   Result Value Ref Range     POCT Glucose 81 74 - 99 mg/dL   POCT GLUCOSE   Result Value Ref Range     POCT Glucose 67 (L) 74 - 99 mg/dL   POCT GLUCOSE   Result Value Ref Range     POCT Glucose 64 (L) 74 - 99 mg/dL   POCT GLUCOSE   Result Value Ref Range     POCT Glucose 147 (H) 74 - 99 mg/dL   CBC   Result Value Ref Range     WBC 15.8 (H) 4.4 - 11.3 x10*3/uL     nRBC 0.0 0.0 - 0.0 /100 WBCs     RBC 3.83 (L) 4.00 - 5.20 x10*6/uL     Hemoglobin 11.0 (L) 12.0 - 16.0 g/dL     Hematocrit 36.3 36.0 - 46.0 %     MCV 95 80 - 100 fL     MCH 28.7 26.0 - 34.0 pg     MCHC 30.3 (L) 32.0 - 36.0 g/dL     RDW 13.1 11.5 - 14.5 %     Platelets 163 150 - 450 x10*3/uL   Basic Metabolic Panel   Result  Value Ref Range     Glucose 74 74 - 99 mg/dL     Sodium 132 (L) 136 - 145 mmol/L     Potassium 4.1 3.5 - 5.3 mmol/L     Chloride 103 98 - 107 mmol/L     Bicarbonate 23 21 - 32 mmol/L     Anion Gap 10 10 - 20 mmol/L     Urea Nitrogen 39 (H) 6 - 23 mg/dL     Creatinine 1.33 (H) 0.50 - 1.05 mg/dL     eGFR 40 (L) >60 mL/min/1.73m*2     Calcium 8.2 (L) 8.6 - 10.3 mg/dL   POCT GLUCOSE   Result Value Ref Range     POCT Glucose 72 (L) 74 - 99 mg/dL   POCT GLUCOSE   Result Value Ref Range     POCT Glucose 75 74 - 99 mg/dL   POCT GLUCOSE   Result Value Ref Range     POCT Glucose 84 74 - 99 mg/dL   POCT GLUCOSE   Result Value Ref Range     POCT Glucose 99 74 - 99 mg/dL         XR abdomen 1 view     Result Date: 4/13/2025  Interpreted By:  Beka Mays, STUDY: Abdominal series dated 4/13/2025.   INDICATION: Follow-up bowel dilation.   COMPARISON: Correlation is made with 04/12/2025 CT.   ACCESSION NUMBER(S): [id number]   ORDERING CLINICIAN: ROZ RAMESH   TECHNIQUE: Two AP radiographs of the abdomen.   FINDINGS: There are multiple stacked dilated loops of small bowel in the central abdomen measuring up to a proximally 4.1 cm. There is gas and stool in the colon. Lung bases are clear. Degenerative changes seen of the spine and hips.        Multiple dilated loops of small bowel in the central abdomen most compatible with a degree of obstructive process as further discussed on prior date CT.   Signed by: Beka Mays 4/13/2025 1:01 PM Dictation workstation:   [id number]     CT abdomen pelvis wo IV contrast     Addendum Date: 4/12/2025    Interpreted By:  Chano Marshall, ADDENDUM: Also to be noted at the impression: Subcutaneous opacities and reticulation that may be due to injections although hematoma is possible.   Signed by: Chano Marshall 4/12/2025 2:38 PM   -------- ORIGINAL REPORT -------- Dictation workstation:   [id number]     Result Date: 4/12/2025  Interpreted By:  Chano Marshall, STUDY: CT ABDOMEN PELVIS WO IV  CONTRAST;  4/12/2025 2:05 pm   INDICATION: Signs/Symptoms:abd pain.   COMPARISON: None.   ACCESSION NUMBER(S): [id number]   ORDERING CLINICIAN: FRANK BREWSTER   TECHNIQUE: CT of the abdomen and pelvis was performed. Contiguous axial images were obtained at 3 mm slice thickness through the abdomen and pelvis. Coronal and sagittal reconstructions at 3 mm slice thickness were performed.   FINDINGS: LOWER CHEST: Small right pleural effusion and trace left pleural effusion. Coronary artery atherosclerotic calcification and mitral annular calcification.   ABDOMEN:   LIVER: The hepatic parenchyma is homogeneous without evidence of focal liver lesions.The hepatic size is normal.   SPLEEN: The spleen is normal in size and homogeneous.   ADRENAL GLANDS: Bilateral adrenal glands appear normal.   KIDNEYS AND URETERS: Mild bilateral cortical atrophy, the renal cortices otherwise are homogeneous. Radiodensities at the renal sinuses is probably atherosclerotic calcification, although superimposed nonobstructing calculi are possible.   The ureters throughout their distal course are not well identified due to overlying crowded bowel loops, but the tracts otherwise are unremarkable without identified ureteral dilatation or additional radiodense calculi.   PANCREAS: The pancreas appears unremarkable, there is no ductal dilatation or masses.   GALLBLADDER: Not visualized, presumably with cholecystectomy.   BILE DUCTS: Dilatation of the extrahepatic ducts, the common bile duct measuring 1.4 cm in diameter, most likely from post cholecystectomy state. However as the no prior exams for comparison MRCP would be recommended if there is symptomatology compatible with biliary colic.     VESSELS: Fairly extensive atherosclerotic calcifications are noted predominantly at the aorta and iliac system. There is also moderate atherosclerotic calcification at the mid segment of the superior mesenteric artery, and fairly marked of the inferior  mesenteric artery.   PERITONEUM AND RETROPERITONEUM: There is a small volume of ascites best seen in the right upper quadrant along the liver. No free intraperitoneal air.   The retroperitoneum appears unremarkable, and without significant adenopathy.   No enlarged mesenteric lymph nodes.   BOWEL: There is mild distention of multiple small bowel segments with air-fluid levels, associated with reticulation of the mesentery indicating edema. There is decompression of distal small bowel segments, and the pattern would be most compatible with mid to early or partial distal small bowel obstruction. There is also moderate diverticulosis of the distal colon.   PELVIS:   BLADDER: The urinary bladder contour is smooth.   REPRODUCTIVE ORGANS: No pelvic masses.     BONE, ABDOMINAL WALL AND OTHER FINDINGS: No suspicious osseous lesions are identified.   There is a small non incarcerated umbilical hernia containing intra-abdominal fat. There is also attenuation within the subcutaneous fat of the mid/lower abdomen anteriorly that may be from subcutaneous injections. However, hematomas are also possible. There is also reticulation of the subcutaneous fat at the right lateral abdominal wall indicating additional edema.        1.  Small-bowel obstruction with mesenteric edema as described. 2. Small volume of ascites. 3. Diverticulosis. 4. Atherosclerosis as described.   MACRO: 1. None   Signed by: Chano Marshall 4/12/2025 2:28 PM Dictation workstation:   [id number]     XR chest 2 views     Result Date: 4/12/2025  Interpreted By:  Beka Mays, STUDY: Chest dated  4/12/2025.   INDICATION: Signs/Symptoms:sob with ambulation   COMPARISON: Chest dated 11/17/2015.   ACCESSION NUMBER(S): [id number]   ORDERING CLINICIAN: FRANK BREWSTER   TECHNIQUE: One view of the chest.   FINDINGS: The lungs are clear.  No pneumothorax or effusion is evident. The cardiomediastinal silhouette is  not enlarged.Degenerative change is seen of the spine  "and shoulders.        No acute cardiopulmonary process is evident.   MACRO: None   Signed by: Beka Mays 4/12/2025 1:26 PM Dictation workstation:   [id number]             Assessment/Plan     Assessment & Plan  Generalized abdominal pain     Small bowel obstruction (Multi)     Dependence on nicotine from cigarettes     Advanced care planning/counseling discussion     Leukocytosis        Per surgery:  \"Impression:  #SBO   > small bowel dilation; however, other concerning CT findings particularly mesenteric edema and ascites. CT imaging worrisome for possible ischemic/low flow state to bowel; however, serum lactate normal and patient's clinical presentation/symptoms/pain/bowel sounds are consistent with an adhesive small bowel obstruction (partial)  #Leukocytosis (possibly 2/2 above, but may have other infectious etiology, possibly UTI?)   > UA ordered; however, not yet collected and already received antibiotics  #Hyponatremia and hypochloremia  #CKD   Recommendations:  - no acute surgical intervention at this time   > patient offered surgical intervention if she were to worsen clinically; however, she was adamant about not wanting surgery   > would recommend placing NG tube for decompression; however, patient again declined NG tube. She said she may be agreeable if she begins to feel worse/nauseated  - PRN IV Zofran  - PRN IV Morphine for severe pain (limit use as able, but unable to give NSAIDs or PO meds)  - IVF: D5NS @ 100  - repeat CBC and BMP in AM\"     Additionally:  Change morphine to Dilaudid given CKD  Check flu and COVID  KUB in a.m.        #Advance care planning     Patient would like to be a DNR CCA     #Nicotine dependence     Reports she lets most of her cigarettes to burn out without smoking them so we will order a nicotine patch as needed     #Chronic conditions:     CAD,  chronic diastolic heart failure, hypertension, hyperlipidemia, peripheral artery disease, diabetes mellitus, carotid artery " disease (Right occlusion of ICA, left ICA with > 70% stenosis; follows with Dr. Parish), CKD, Left breast cancer s/p lumpectomy and radiation, kidney stones, and open cholecystectomy and open appendectomy with right ovary removal (for tumor- at age 16)      Hold all p.o. meds  Give IV Protonix   Metoprolol IV around-the-clock with parameters  Patient takes 70/30 at home will conservatively give 30 units of Lantus daily which would be a little less than half of the equivalent of 70/30/day with sliding scale insulin and hypoglycemic protocol     #DVT prophylaxis     SCDs  Heparin                             Chadwick Medel, DO     Review of Systems                   Relevant Results               Assessment/Plan                  Assessment & Plan  Generalized abdominal pain    Small bowel obstruction (Multi)    Dependence on nicotine from cigarettes    Advanced care planning/counseling discussion    Leukocytosis    Patient fully evaluated on 4/14. CT showing SBO with mesenteric edema, small volume ascites, diverticulosis. Evaluated by general surgery today, recommending NG tube for decompression, ordered zofran, morphine, continuing IV cefepime/flagyl. Repeat KUB ordered, results pending. If KUB looks okay, plan to advance diet. Leukocytosis downtrending, continue to monitor. Hypertension noted this morning, cardiac PO meds held d/t NPO status. Continue to monitor and restart PO meds when diet advanced. Recheck labs in AM.        Patient still requiring frequent cardiac and SPO2 monitoring. Continue aggressive pulmonary hygiene and oral hygiene. Off loading as tolerated for skin integrity. Medications and labs reviewed.    I spent a total of 60 minutes on the date of the service which included preparing to see the patient, face-to-face patient care, completing clinical documentation, obtaining and/or reviewing separately obtained history, performing a medically appropriate examination, counseling and educating the  patient/family/caregiver, ordering medications, tests, or procedures, communicating with other HCPs (not separately reported), independently interpreting results (not separately reported), communicating results to the patient/family/caregiver, and care coordination (not separately reported).        ISABELL ERICKSON

## 2025-04-14 NOTE — CARE PLAN
The patient's goals for the shift include  adequate comfort    The clinical goals for the shift include Remain hemodynamically stable

## 2025-04-15 ENCOUNTER — APPOINTMENT (OUTPATIENT)
Dept: RADIOLOGY | Facility: HOSPITAL | Age: 80
DRG: 336 | End: 2025-04-15
Payer: MEDICARE

## 2025-04-15 LAB
ALBUMIN SERPL BCP-MCNC: 3.1 G/DL (ref 3.4–5)
ALP SERPL-CCNC: 72 U/L (ref 33–136)
ALT SERPL W P-5'-P-CCNC: 15 U/L (ref 7–45)
ANION GAP SERPL CALC-SCNC: 11 MMOL/L (ref 10–20)
AST SERPL W P-5'-P-CCNC: 27 U/L (ref 9–39)
ATRIAL RATE: 75 BPM
BASOPHILS # BLD AUTO: 0.05 X10*3/UL (ref 0–0.1)
BASOPHILS NFR BLD AUTO: 0.4 %
BILIRUB SERPL-MCNC: 0.6 MG/DL (ref 0–1.2)
BUN SERPL-MCNC: 20 MG/DL (ref 6–23)
CALCIUM SERPL-MCNC: 8 MG/DL (ref 8.6–10.3)
CHLORIDE SERPL-SCNC: 106 MMOL/L (ref 98–107)
CO2 SERPL-SCNC: 22 MMOL/L (ref 21–32)
CREAT SERPL-MCNC: 1.14 MG/DL (ref 0.5–1.05)
EGFRCR SERPLBLD CKD-EPI 2021: 48 ML/MIN/1.73M*2
EOSINOPHIL # BLD AUTO: 0.29 X10*3/UL (ref 0–0.4)
EOSINOPHIL NFR BLD AUTO: 2.4 %
ERYTHROCYTE [DISTWIDTH] IN BLOOD BY AUTOMATED COUNT: 13 % (ref 11.5–14.5)
GLUCOSE BLD MANUAL STRIP-MCNC: 111 MG/DL (ref 74–99)
GLUCOSE BLD MANUAL STRIP-MCNC: 117 MG/DL (ref 74–99)
GLUCOSE BLD MANUAL STRIP-MCNC: 140 MG/DL (ref 74–99)
GLUCOSE BLD MANUAL STRIP-MCNC: 146 MG/DL (ref 74–99)
GLUCOSE BLD MANUAL STRIP-MCNC: 172 MG/DL (ref 74–99)
GLUCOSE BLD MANUAL STRIP-MCNC: 173 MG/DL (ref 74–99)
GLUCOSE BLD MANUAL STRIP-MCNC: 183 MG/DL (ref 74–99)
GLUCOSE SERPL-MCNC: 109 MG/DL (ref 74–99)
HCT VFR BLD AUTO: 36.5 % (ref 36–46)
HGB BLD-MCNC: 11.4 G/DL (ref 12–16)
IMM GRANULOCYTES # BLD AUTO: 0.06 X10*3/UL (ref 0–0.5)
IMM GRANULOCYTES NFR BLD AUTO: 0.5 % (ref 0–0.9)
LYMPHOCYTES # BLD AUTO: 1.73 X10*3/UL (ref 0.8–3)
LYMPHOCYTES NFR BLD AUTO: 14.3 %
MCH RBC QN AUTO: 29.3 PG (ref 26–34)
MCHC RBC AUTO-ENTMCNC: 31.2 G/DL (ref 32–36)
MCV RBC AUTO: 94 FL (ref 80–100)
MONOCYTES # BLD AUTO: 0.97 X10*3/UL (ref 0.05–0.8)
MONOCYTES NFR BLD AUTO: 8 %
NEUTROPHILS # BLD AUTO: 8.99 X10*3/UL (ref 1.6–5.5)
NEUTROPHILS NFR BLD AUTO: 74.4 %
NRBC BLD-RTO: 0 /100 WBCS (ref 0–0)
P AXIS: 24 DEGREES
P OFFSET: 197 MS
P ONSET: 144 MS
PLATELET # BLD AUTO: 161 X10*3/UL (ref 150–450)
POTASSIUM SERPL-SCNC: 3.7 MMOL/L (ref 3.5–5.3)
PR INTERVAL: 156 MS
PROT SERPL-MCNC: 5.7 G/DL (ref 6.4–8.2)
Q ONSET: 222 MS
QRS COUNT: 13 BEATS
QRS DURATION: 80 MS
QT INTERVAL: 388 MS
QTC CALCULATION(BAZETT): 433 MS
QTC FREDERICIA: 417 MS
R AXIS: 40 DEGREES
RBC # BLD AUTO: 3.89 X10*6/UL (ref 4–5.2)
SODIUM SERPL-SCNC: 135 MMOL/L (ref 136–145)
T AXIS: 141 DEGREES
T OFFSET: 416 MS
VENTRICULAR RATE: 75 BPM
WBC # BLD AUTO: 12.1 X10*3/UL (ref 4.4–11.3)

## 2025-04-15 PROCEDURE — 97165 OT EVAL LOW COMPLEX 30 MIN: CPT | Mod: GO

## 2025-04-15 PROCEDURE — 82947 ASSAY GLUCOSE BLOOD QUANT: CPT

## 2025-04-15 PROCEDURE — 2500000004 HC RX 250 GENERAL PHARMACY W/ HCPCS (ALT 636 FOR OP/ED): Performed by: REGISTERED NURSE

## 2025-04-15 PROCEDURE — 85025 COMPLETE CBC W/AUTO DIFF WBC: CPT | Performed by: INTERNAL MEDICINE

## 2025-04-15 PROCEDURE — 1200000002 HC GENERAL ROOM WITH TELEMETRY DAILY

## 2025-04-15 PROCEDURE — 97161 PT EVAL LOW COMPLEX 20 MIN: CPT | Mod: GP

## 2025-04-15 PROCEDURE — 36415 COLL VENOUS BLD VENIPUNCTURE: CPT | Performed by: INTERNAL MEDICINE

## 2025-04-15 PROCEDURE — 74018 RADEX ABDOMEN 1 VIEW: CPT

## 2025-04-15 PROCEDURE — 2500000004 HC RX 250 GENERAL PHARMACY W/ HCPCS (ALT 636 FOR OP/ED): Performed by: NURSE PRACTITIONER

## 2025-04-15 PROCEDURE — 80053 COMPREHEN METABOLIC PANEL: CPT | Performed by: INTERNAL MEDICINE

## 2025-04-15 PROCEDURE — 2500000002 HC RX 250 W HCPCS SELF ADMINISTERED DRUGS (ALT 637 FOR MEDICARE OP, ALT 636 FOR OP/ED): Performed by: NURSE PRACTITIONER

## 2025-04-15 PROCEDURE — 2500000004 HC RX 250 GENERAL PHARMACY W/ HCPCS (ALT 636 FOR OP/ED)

## 2025-04-15 PROCEDURE — 74018 RADEX ABDOMEN 1 VIEW: CPT | Performed by: RADIOLOGY

## 2025-04-15 PROCEDURE — 99232 SBSQ HOSP IP/OBS MODERATE 35: CPT | Performed by: SURGERY

## 2025-04-15 PROCEDURE — 99232 SBSQ HOSP IP/OBS MODERATE 35: CPT

## 2025-04-15 RX ADMIN — HYDROMORPHONE HYDROCHLORIDE 0.5 MG: 1 INJECTION, SOLUTION INTRAMUSCULAR; INTRAVENOUS; SUBCUTANEOUS at 06:57

## 2025-04-15 RX ADMIN — METOPROLOL TARTRATE 5 MG: 5 INJECTION INTRAVENOUS at 02:18

## 2025-04-15 RX ADMIN — PANTOPRAZOLE SODIUM 40 MG: 40 INJECTION, POWDER, FOR SOLUTION INTRAVENOUS at 08:46

## 2025-04-15 RX ADMIN — HEPARIN SODIUM 5000 UNITS: 5000 INJECTION INTRAVENOUS; SUBCUTANEOUS at 14:17

## 2025-04-15 RX ADMIN — METRONIDAZOLE 500 MG: 500 INJECTION, SOLUTION INTRAVENOUS at 14:59

## 2025-04-15 RX ADMIN — HEPARIN SODIUM 5000 UNITS: 5000 INJECTION INTRAVENOUS; SUBCUTANEOUS at 22:14

## 2025-04-15 RX ADMIN — CEFEPIME 1 G: 1 INJECTION, POWDER, FOR SOLUTION INTRAMUSCULAR; INTRAVENOUS at 14:03

## 2025-04-15 RX ADMIN — METRONIDAZOLE 500 MG: 500 INJECTION, SOLUTION INTRAVENOUS at 22:14

## 2025-04-15 RX ADMIN — CEFEPIME 1 G: 1 INJECTION, POWDER, FOR SOLUTION INTRAMUSCULAR; INTRAVENOUS at 02:18

## 2025-04-15 RX ADMIN — METRONIDAZOLE 500 MG: 500 INJECTION, SOLUTION INTRAVENOUS at 06:49

## 2025-04-15 RX ADMIN — ONDANSETRON 4 MG: 2 INJECTION INTRAMUSCULAR; INTRAVENOUS at 13:58

## 2025-04-15 RX ADMIN — METOPROLOL TARTRATE 5 MG: 5 INJECTION INTRAVENOUS at 14:12

## 2025-04-15 RX ADMIN — HYDROMORPHONE HYDROCHLORIDE 0.5 MG: 1 INJECTION, SOLUTION INTRAMUSCULAR; INTRAVENOUS; SUBCUTANEOUS at 20:51

## 2025-04-15 RX ADMIN — DEXTROSE AND SODIUM CHLORIDE 100 ML/HR: 5; .9 INJECTION, SOLUTION INTRAVENOUS at 07:43

## 2025-04-15 RX ADMIN — INSULIN LISPRO 1 UNITS: 100 INJECTION, SOLUTION INTRAVENOUS; SUBCUTANEOUS at 22:09

## 2025-04-15 RX ADMIN — DEXTROSE AND SODIUM CHLORIDE 100 ML/HR: 5; .9 INJECTION, SOLUTION INTRAVENOUS at 21:03

## 2025-04-15 RX ADMIN — METOPROLOL TARTRATE 5 MG: 5 INJECTION INTRAVENOUS at 20:51

## 2025-04-15 RX ADMIN — ONDANSETRON 4 MG: 2 INJECTION INTRAMUSCULAR; INTRAVENOUS at 07:03

## 2025-04-15 RX ADMIN — HYDROMORPHONE HYDROCHLORIDE 0.5 MG: 1 INJECTION, SOLUTION INTRAMUSCULAR; INTRAVENOUS; SUBCUTANEOUS at 12:08

## 2025-04-15 RX ADMIN — METOPROLOL TARTRATE 5 MG: 5 INJECTION INTRAVENOUS at 08:47

## 2025-04-15 RX ADMIN — ONDANSETRON 4 MG: 2 INJECTION INTRAMUSCULAR; INTRAVENOUS at 20:54

## 2025-04-15 RX ADMIN — HEPARIN SODIUM 5000 UNITS: 5000 INJECTION INTRAVENOUS; SUBCUTANEOUS at 06:49

## 2025-04-15 ASSESSMENT — PAIN SCALES - GENERAL
PAINLEVEL_OUTOF10: 4
PAINLEVEL_OUTOF10: 7
PAINLEVEL_OUTOF10: 9
PAINLEVEL_OUTOF10: 8
PAINLEVEL_OUTOF10: 0 - NO PAIN
PAINLEVEL_OUTOF10: 0 - NO PAIN
PAINLEVEL_OUTOF10: 10 - WORST POSSIBLE PAIN
PAINLEVEL_OUTOF10: 8

## 2025-04-15 ASSESSMENT — COGNITIVE AND FUNCTIONAL STATUS - GENERAL
MOVING FROM LYING ON BACK TO SITTING ON SIDE OF FLAT BED WITH BEDRAILS: A LITTLE
MOVING TO AND FROM BED TO CHAIR: A LITTLE
HELP NEEDED FOR BATHING: A LOT
MOVING TO AND FROM BED TO CHAIR: A LITTLE
HELP NEEDED FOR BATHING: A LITTLE
TURNING FROM BACK TO SIDE WHILE IN FLAT BAD: A LITTLE
DRESSING REGULAR LOWER BODY CLOTHING: A LOT
PERSONAL GROOMING: A LITTLE
MOBILITY SCORE: 19
WALKING IN HOSPITAL ROOM: A LITTLE
DAILY ACTIVITIY SCORE: 22
WALKING IN HOSPITAL ROOM: A LITTLE
STANDING UP FROM CHAIR USING ARMS: A LITTLE
STANDING UP FROM CHAIR USING ARMS: A LITTLE
TOILETING: A LOT
CLIMB 3 TO 5 STEPS WITH RAILING: TOTAL
MOBILITY SCORE: 16
DRESSING REGULAR UPPER BODY CLOTHING: A LITTLE
TOILETING: A LITTLE
CLIMB 3 TO 5 STEPS WITH RAILING: A LOT
DAILY ACTIVITIY SCORE: 16

## 2025-04-15 ASSESSMENT — PAIN - FUNCTIONAL ASSESSMENT
PAIN_FUNCTIONAL_ASSESSMENT: 0-10

## 2025-04-15 ASSESSMENT — PAIN DESCRIPTION - LOCATION: LOCATION: ABDOMEN

## 2025-04-15 NOTE — CONSULTS
"Nutrition Initial Assessment:   Nutrition Assessment    Reason for Assessment: Admission nursing screening (MST=2 for weight loss)    Patient is a 79 y.o. female presenting with SBO; generalized abdominal pain    PmHx: HTN, HLD, CAD, PAD, CKD, DM2, L breast cancer s/p lumpectomy and radiation, kidney stones, diverticulitis, cholecystectomy, appendectomy     Nutrition History:  Energy Intake: Poor < 50 %  Pain affecting nutrition status: N/A  Food and Nutrient History: Pt sitting in chair in room at time of visit today.  Per pt symptoms started about  1 week ago (nausea, pain, not eating)  Pt endorses nausea and passing flatus, no BM since prior to admission.  Pt notes he stomach is growling and doesn't understand why she can't have at least juice. Per surgery note today, pt may be more amenable to NGT for decompression.  Vitamin/Herbal Supplement Use: none noted       Anthropometrics:  Height: 170.2 cm (5' 7.01\")   Weight: 77.1 kg (169 lb 15.6 oz)   BMI (Calculated): 26.62  IBW/kg (Dietitian Calculated): 61.4 kg  Percent of IBW: 125 %   Weight History:     Weight Change %:  Weight History / % Weight Change: 3/18 78.5kg, 8/20/24 75.3kg, 2/7/24 77.6kg  Significant Weight Loss: No    Nutrition Focused Physical Exam Findings:    Subcutaneous Fat Loss:   Defer Subcutaneous Fat Loss Assessment: Defer all  Defer All Reason: pt declined  Muscle Wasting:  Defer Muscle Wasting Assessment: Defer all  Defer All Reason: pt declined  Edema:  Edema: none  Physical Findings:  Hair: Negative  Eyes: Negative  Nails: Negative  Skin: Negative  Respiratory : Negative  Digestive System Findings: Nausea, Other (Comment) (small bowel obstruction)    Nutrition Significant Labs:  CBC Trend:   Results from last 7 days   Lab Units 04/15/25  0641 04/14/25  0713 04/13/25  0640 04/12/25  1222   WBC AUTO x10*3/uL 12.1* 12.7* 15.8* 21.0*   RBC AUTO x10*6/uL 3.89* 3.77* 3.83* 4.42   HEMOGLOBIN g/dL 11.4* 10.9* 11.0* 12.8   HEMATOCRIT % 36.5 34.8* " 36.3 41.7   MCV fL 94 92 95 94   PLATELETS AUTO x10*3/uL 161 158 163 206    , BMP Trend:   Results from last 7 days   Lab Units 04/15/25  0641 04/14/25  0713 04/13/25  0640 04/12/25  1222   GLUCOSE mg/dL 109* 80 74 220*   CALCIUM mg/dL 8.0* 8.3* 8.2* 8.9   SODIUM mmol/L 135* 135* 132* 125*   POTASSIUM mmol/L 3.7 3.9 4.1 5.0   CO2 mmol/L 22 24 23 21   CHLORIDE mmol/L 106 105 103 94*   BUN mg/dL 20 26* 39* 45*   CREATININE mg/dL 1.14* 1.20* 1.33* 1.64*    , A1C:  Lab Results   Component Value Date    HGBA1C 7.5 (H) 04/08/2025   , BG POCT trend:   Results from last 7 days   Lab Units 04/15/25  1120 04/15/25  0807 04/15/25  0542 04/14/25  2356 04/14/25  2053   POCT GLUCOSE mg/dL 140* 111* 117* 114* 129*        Nutrition Specific Medications:  Reviewed     I/O:   Last BM Date: 04/10/25;      Dietary Orders (From admission, onward)       Start     Ordered    04/13/25 1323  NPO Diet Except: Ice chips; Effective now  Diet effective now        Question:  Except:  Answer:  Ice chips    04/13/25 1322    04/12/25 2138  May Participate in Room Service  ( ROOM SERVICE MAY PARTICIPATE)  Once        Question:  .  Answer:  Yes    04/12/25 2137                     Estimated Needs:      Method for Estimating Needs: 1540-1700kcals (20-22kcals/kg ABW)     Method for Estimating 24 Hour Protein Needs: 61-77g (0.8-1.0g/kg ABW)     Method for Estimating 24 Hour Fluid Needs: 1 mL/kcal or as per MD  Patient on Order Fluid Restriction: No        Nutrition Diagnosis   Malnutrition Diagnosis  Patient has Malnutrition Diagnosis: No    Nutrition Diagnosis  Patient has Nutrition Diagnosis: Yes  Diagnosis Status (1): New  Nutrition Diagnosis 1: Inadequate protein energy intake  Related to (1): SBO  As Evidenced by (1): NPO  Additional Assessment Information (1): nausea       Nutrition Interventions/Recommendations   Nutrition prescription for oral nutrition    Nutrition Recommendations:  Individualized Nutrition Prescription Provided for : Advance  diet to LRD, starting with CLD as able    Nutrition Interventions/Goals:          Education Documentation  No documentation found.     N/A at this time      Nutrition Monitoring and Evaluation   Food/Nutrient Related History Monitoring  Additional Plans: monitor ability to advance diet    Anthropometric Measurements  Monitoring and Evaluation Plan: Body weight  Body Weight: Body weight - Maintain stable weight    Biochemical Data, Medical Tests and Procedures  Monitoring and Evaluation Plan: Electrolyte/renal panel, Glucose/endocrine profile  Electrolyte and Renal Panel: Electrolytes within normal limits, Sodium, Creatinine  Glucose/Endocrine Profile: Glucose within normal limits ( mg/dL)    Physical Exam Findings  Monitoring and Evaluation Plan: Digestive System  Digestive System Finding: Nausea  Criteria: resolution of GI symptoms; formed BM at least every 2-3 days    Goal Status: New goal(s) identified    Time Spent (min): 45 minutes

## 2025-04-15 NOTE — PROGRESS NOTES
Nancy Long is a 79 y.o. female on day 3 of admission presenting with Generalized abdominal pain.      Subjective   No significant events overnight. Patient seen and evaluated at bedside.        Objective     Last Recorded Vitals  /67   Pulse (!) 37   Temp 36.4 °C (97.5 °F)   Resp 18   Wt 77.1 kg (170 lb)   SpO2 93%   Intake/Output last 3 Shifts:    Intake/Output Summary (Last 24 hours) at 4/15/2025 0835  Last data filed at 4/15/2025 0250  Gross per 24 hour   Intake 950 ml   Output --   Net 950 ml       Admission Weight  Weight: 77.1 kg (170 lb) (04/12/25 1212)    Daily Weight  04/12/25 : 77.1 kg (170 lb)    Image Results  XR abdomen 1 view  Narrative: Interpreted By:  Arun Escamilla,   STUDY:  XR ABDOMEN 1 VIEW;  4/14/2025 7:06 am      INDICATION:  Signs/Symptoms:Follow up bowel distention.          COMPARISON:  04/13/2025      ACCESSION NUMBER(S):  [id number]      ORDERING CLINICIAN:  ROZ RAMESH      FINDINGS:  Three view abdomen      Multiple dilated gas-filled bowel loops predominantly centrally with  stacked appearance most concerning for obstruction redemonstrated  grossly similar to prior. Limited evaluation for free air on supine  films. Some gas and stool noted within the colon as previously.  Approximate 5 cm gaseous lucency overlying the pelvis could represent  focal distended bowel loop possibly distal colon within the pelvis  and new or more pronounced compared to prior. Mild gaseous distention  of the stomach.      Prominent vascular calcifications overlying abdomen and pelvis and  overlying the groin regions.      Impression: 1.  Multiple dilated gas-filled loops of small bowel redemonstrated  most suggestive of sequela of obstruction as noted previously and  probably grossly similar to prior. Focal gaseous lucency overlying  the pelvis new or more pronounced may reflect focal distended bowel  loop possibly distal colon within the pelvis new or more pronounced.  Mild gaseous  distention of the stomach. Limited evaluation for free  air on supine films. Continued clinical correlation and follow-up  advised.      MACRO:  None      Signed by: Arun Escamilla 4/14/2025 2:45 PM  Dictation workstation:   [id number]      Physical Exam    Date of Service: 4/13/2025  6:18 PM     Signed       Expand All Collapse All    History Of Present Illness  Nancy Long is a 79 y.o. female with CAD,  chronic diastolic heart failure, hypertension, hyperlipidemia, peripheral artery disease, diabetes mellitus, carotid artery disease (Right occlusion of ICA, left ICA with > 70% stenosis; follows with Dr. Parish), CKD, Left breast cancer s/p lumpectomy and radiation, kidney stones, and open cholecystectomy and open appendectomy with right ovary removal (for tumor- at age 16) who presented today with abdominal pain.  Patient reports yesterday she developed severe, sharp, continuous epigastric pain associated with nausea and diaphoresis.  She notes she took some of her prescribed Percocet that seemed to help a little however the symptoms persisted into today so she decided to come to the emergency room.  She also notes a decreased appetite and when she did try to eat the food did not taste good.  She denies a history of bowel obstruction.  She articulates she told the surgery team she would not like surgical intervention.  CODE STATUS discussed and she would like to be a DNR CCA.     In the ED lab work, EKG, chest x-ray and CTAP were performed, labs revealed glucose 200, sodium 125, chloride 94, bun 45, creatinine 1.64 (1.32 on 2/4/2020), , lactate 1.6, troponins 27 and 29 respectively, white count 21, neutrophils 17.35, monocytes 1.1. EKG Interpretation, per ER physician impression: Sinus at rate of 79, , QRS 91, QTc 407 without evidence of STEMI or ischemia. Chest x-ray without acute process.  CT abdomen/pelvis revealed small bowel obstruction with mesenteric edema as described, small volume of ascites,  diverticulosis, atherosclerosis, subcutaneous opacities and reticulation that may be due to injections although hematoma is possible.  Patient was evaluated by surgery team that noted her symptoms are likely consistent with partial adhesive small bowel obstruction and surgical intervention was offered however patient declined.  Her vital signs were acceptable with a slightly elevated blood pressure of 157/70.  She was given cefepime Flagyl morphine Zofran and started on IV fluids and admitted for further evaluation and treatment under the care of Dr. Saez.        Echo March 2025:     CONCLUSIONS:  1. Left ventricular ejection fraction is normal, by visual estimate at 70-75%.  2. There is moderate left ventricular hypertrophy.  3. The left atrium is mildly dilated.  4. There is mild mitral valve stenosis.  5. There is moderate mitral annular calcification.  6. Right ventricular systolic pressure is within normal limits.  7. Mild aortic valve stenosis.  8. Mild left ventricular outflow obstruction at rest and with Valsalva (10 mmHg).     Past Medical History  Medical History        Past Medical History:   Diagnosis Date    Personal history of other diseases of the circulatory system       History of hypertension    Personal history of other endocrine, nutritional and metabolic disease       History of diabetes mellitus            Surgical History  Surgical History         Past Surgical History:   Procedure Laterality Date    APPENDECTOMY   01/03/2014     Appendectomy    BREAST LUMPECTOMY   01/03/2014     Breast Surgery Lumpectomy    CHOLECYSTECTOMY   01/03/2014     Cholecystectomy    OOPHORECTOMY   01/03/2014     Oophorectomy            Social History  Denies alcohol or illicit drug use.  Reports she smokes 2 to 3 packs of cigarettes per week.  Lives alone.  Ambulates with cane as needed.     Family History  Family History   No family history on file.        Allergies  Penicillins and Sulfa (sulfonamide  "antibiotics)     10 point review of systems performed and is negative besides what is stated in HPI.     Physical Exam  Constitutional:       Appearance: Normal appearance.   HENT:      Head: Normocephalic and atraumatic.      Nose: Nose normal.      Mouth/Throat:      Mouth: Mucous membranes are moist.   Eyes:      Conjunctiva/sclera: Conjunctivae normal.   Cardiovascular:      Rate and Rhythm: Normal rate and regular rhythm.      Heart sounds: Murmur heard.   Pulmonary:      Effort: Pulmonary effort is normal.      Comments: Crackles left base  Abdominal:      General: Bowel sounds are normal. There is no distension.      Tenderness: There is abdominal tenderness.      Comments: Somewhat firm   Musculoskeletal:         General: Normal range of motion.      Cervical back: Neck supple.   Skin:     General: Skin is warm and dry.   Neurological:      Mental Status: She is alert. Mental status is at baseline.   Psychiatric:         Mood and Affect: Mood normal.            Last Recorded Vitals  Blood pressure (!) 193/77, pulse 74, temperature 37 °C (98.6 °F), temperature source Temporal, resp. rate 20, height 1.702 m (5' 7\"), weight 77.1 kg (170 lb), SpO2 94%.     Relevant Results     Medications Ordered Prior to Encounter   No current facility-administered medications on file prior to encounter.             Current Outpatient Medications on File Prior to Encounter   Medication Sig Dispense Refill    acetaminophen (Tylenol) 500 mg tablet Take 1 tablet (500 mg) by mouth every 8 hours if needed for mild pain (1 - 3).        ascorbic acid (Vitamin C) 500 mg tablet Take 1 tablet (500 mg) by mouth once daily.        aspirin 325 mg tablet Take 1 tablet (325 mg) by mouth once daily at bedtime.        B12-levomefolate calcium-B6 (Foltx) 2-1.13-25 mg tablet Take 1 tablet by mouth once daily.        cilostazol (Pletal) 50 mg tablet Take 1 tablet (50 mg) by mouth 2 times a day. 180 tablet 3    lansoprazole (Prevacid) 30 mg " capsule Take 1 capsule (30 mg) by mouth 2 times a day as needed.        losartan (Cozaar) 100 mg tablet Take 1 tablet (100 mg) by mouth once daily. 90 tablet 3    metFORMIN XR (Glucophage-XR) 500 mg 24 hr tablet Take 1 tablet (500 mg) by mouth once daily in the evening. Take with meals. (Patient taking differently: Take 1 tablet (500 mg) by mouth once daily as needed (BG >200).)        metoprolol tartrate (Lopressor) 100 mg tablet Take 1 tablet (100 mg) by mouth once daily. (Patient taking differently: Take 1 tablet (100 mg) by mouth once daily at bedtime.) 90 tablet 3    NovoLIN 70/30 U-100 Insulin 100 unit/mL (70-30) injection Inject 43 Units under the skin 2 times a day before meals.        OneTouch Ultra Test strip          oxyCODONE-acetaminophen (Percocet)  mg tablet Take 1 tablet by mouth every 8 hours.        rosuvastatin (Crestor) 20 mg tablet Take 1 tablet (20 mg) by mouth once daily. (Patient taking differently: Take 1 tablet (20 mg) by mouth once daily at bedtime.) 90 tablet 3    zinc sulfate (Zincate) 220 (50 Zn) MG capsule Take 1 capsule (50 mg of elemental zinc) by mouth once daily.        betamethasone dipropionate 0.05 % cream Apply topically 2 times a day. (Patient not taking: Reported on 4/12/2025)        hydrOXYzine pamoate (Vistaril) 50 mg capsule Take 1 capsule (50 mg) by mouth 4 times a day as needed. (Patient not taking: Reported on 4/12/2025)        loratadine-pseudoephedrine (Claritin-D 24-hour)  mg 24 hr tablet Take 1 tablet by mouth once daily. (Patient not taking: Reported on 4/12/2025)        ondansetron ODT (Zofran-ODT) 8 mg disintegrating tablet every 8 hours if needed. (Patient not taking: Reported on 4/12/2025)        polymyxin B sulf-trimethoprim (Polytrim) ophthalmic solution APPLY 1/4 INCH TO AFFECTED EYE 4 TIMES A DAY. (Patient not taking: Reported on 4/12/2025)        solifenacin (VESIcare) 5 mg tablet  (Patient not taking: Reported on 4/12/2025)        sucralfate  (Carafate) 100 mg/mL suspension TAKE 10 ML BY MOUTH 4 TIMES A DAY (Patient not taking: Reported on 4/12/2025)        torsemide (Demadex) 20 mg tablet Take 1 tablet (20 mg) by mouth once daily. (Patient not taking: Reported on 4/12/2025) 90 tablet 3                  Results for orders placed or performed during the hospital encounter of 04/12/25 (from the past 24 hours)   POCT GLUCOSE   Result Value Ref Range     POCT Glucose 183 (H) 74 - 99 mg/dL   Troponin I, High Sensitivity   Result Value Ref Range     Troponin I, High Sensitivity 23 (H) 0 - 13 ng/L   Sars-CoV-2 and Influenza A/B PCR   Result Value Ref Range     Flu A Result Not Detected Not Detected     Flu B Result Not Detected Not Detected     Coronavirus 2019, PCR Not Detected Not Detected   POCT GLUCOSE   Result Value Ref Range     POCT Glucose 171 (H) 74 - 99 mg/dL   Urinalysis with Reflex Culture and Microscopic   Result Value Ref Range     Color, Urine Light-Yellow Light-Yellow, Yellow, Dark-Yellow     Appearance, Urine Clear Clear     Specific Gravity, Urine 1.017 1.005 - 1.035     pH, Urine 5.0 5.0, 5.5, 6.0, 6.5, 7.0, 7.5, 8.0     Protein, Urine 10 (TRACE) NEGATIVE, 10 (TRACE), 20 (TRACE) mg/dL     Glucose, Urine Normal Normal mg/dL     Blood, Urine NEGATIVE NEGATIVE mg/dL     Ketones, Urine NEGATIVE NEGATIVE mg/dL     Bilirubin, Urine NEGATIVE NEGATIVE mg/dL     Urobilinogen, Urine Normal Normal mg/dL     Nitrite, Urine NEGATIVE NEGATIVE     Leukocyte Esterase, Urine 250 Triny/uL (A) NEGATIVE   Extra Urine Gray Tube   Result Value Ref Range     Extra Tube Hold for add-ons.     POCT GLUCOSE   Result Value Ref Range     POCT Glucose 131 (H) 74 - 99 mg/dL   Microscopic Only, Urine   Result Value Ref Range     WBC, Urine 11-20 (A) 1-5, NONE /HPF     RBC, Urine 1-2 NONE, 1-2, 3-5 /HPF     Bacteria, Urine 4+ (A) NONE SEEN /HPF     Mucus, Urine FEW Reference range not established. /LPF     Hyaline Casts, Urine OCCASIONAL (A) NONE /LPF   POCT GLUCOSE   Result  Value Ref Range     POCT Glucose 81 74 - 99 mg/dL   POCT GLUCOSE   Result Value Ref Range     POCT Glucose 67 (L) 74 - 99 mg/dL   POCT GLUCOSE   Result Value Ref Range     POCT Glucose 64 (L) 74 - 99 mg/dL   POCT GLUCOSE   Result Value Ref Range     POCT Glucose 147 (H) 74 - 99 mg/dL   CBC   Result Value Ref Range     WBC 15.8 (H) 4.4 - 11.3 x10*3/uL     nRBC 0.0 0.0 - 0.0 /100 WBCs     RBC 3.83 (L) 4.00 - 5.20 x10*6/uL     Hemoglobin 11.0 (L) 12.0 - 16.0 g/dL     Hematocrit 36.3 36.0 - 46.0 %     MCV 95 80 - 100 fL     MCH 28.7 26.0 - 34.0 pg     MCHC 30.3 (L) 32.0 - 36.0 g/dL     RDW 13.1 11.5 - 14.5 %     Platelets 163 150 - 450 x10*3/uL   Basic Metabolic Panel   Result Value Ref Range     Glucose 74 74 - 99 mg/dL     Sodium 132 (L) 136 - 145 mmol/L     Potassium 4.1 3.5 - 5.3 mmol/L     Chloride 103 98 - 107 mmol/L     Bicarbonate 23 21 - 32 mmol/L     Anion Gap 10 10 - 20 mmol/L     Urea Nitrogen 39 (H) 6 - 23 mg/dL     Creatinine 1.33 (H) 0.50 - 1.05 mg/dL     eGFR 40 (L) >60 mL/min/1.73m*2     Calcium 8.2 (L) 8.6 - 10.3 mg/dL   POCT GLUCOSE   Result Value Ref Range     POCT Glucose 72 (L) 74 - 99 mg/dL   POCT GLUCOSE   Result Value Ref Range     POCT Glucose 75 74 - 99 mg/dL   POCT GLUCOSE   Result Value Ref Range     POCT Glucose 84 74 - 99 mg/dL   POCT GLUCOSE   Result Value Ref Range     POCT Glucose 99 74 - 99 mg/dL         XR abdomen 1 view     Result Date: 4/13/2025  Interpreted By:  Beka Mays, STUDY: Abdominal series dated 4/13/2025.   INDICATION: Follow-up bowel dilation.   COMPARISON: Correlation is made with 04/12/2025 CT.   ACCESSION NUMBER(S): [id number]   ORDERING CLINICIAN: ROZ RAMESH   TECHNIQUE: Two AP radiographs of the abdomen.   FINDINGS: There are multiple stacked dilated loops of small bowel in the central abdomen measuring up to a proximally 4.1 cm. There is gas and stool in the colon. Lung bases are clear. Degenerative changes seen of the spine and hips.        Multiple  dilated loops of small bowel in the central abdomen most compatible with a degree of obstructive process as further discussed on prior date CT.   Signed by: Beka Mays 4/13/2025 1:01 PM Dictation workstation:   [id number]     CT abdomen pelvis wo IV contrast     Addendum Date: 4/12/2025    Interpreted By:  Chano Marshall, ADDENDUM: Also to be noted at the impression: Subcutaneous opacities and reticulation that may be due to injections although hematoma is possible.   Signed by: Chano Marshall 4/12/2025 2:38 PM   -------- ORIGINAL REPORT -------- Dictation workstation:   [id number]     Result Date: 4/12/2025  Interpreted By:  Chano aMrshall, STUDY: CT ABDOMEN PELVIS WO IV CONTRAST;  4/12/2025 2:05 pm   INDICATION: Signs/Symptoms:abd pain.   COMPARISON: None.   ACCESSION NUMBER(S): [id number]   ORDERING CLINICIAN: FRANK BREWSTER   TECHNIQUE: CT of the abdomen and pelvis was performed. Contiguous axial images were obtained at 3 mm slice thickness through the abdomen and pelvis. Coronal and sagittal reconstructions at 3 mm slice thickness were performed.   FINDINGS: LOWER CHEST: Small right pleural effusion and trace left pleural effusion. Coronary artery atherosclerotic calcification and mitral annular calcification.   ABDOMEN:   LIVER: The hepatic parenchyma is homogeneous without evidence of focal liver lesions.The hepatic size is normal.   SPLEEN: The spleen is normal in size and homogeneous.   ADRENAL GLANDS: Bilateral adrenal glands appear normal.   KIDNEYS AND URETERS: Mild bilateral cortical atrophy, the renal cortices otherwise are homogeneous. Radiodensities at the renal sinuses is probably atherosclerotic calcification, although superimposed nonobstructing calculi are possible.   The ureters throughout their distal course are not well identified due to overlying crowded bowel loops, but the tracts otherwise are unremarkable without identified ureteral dilatation or additional radiodense calculi.    PANCREAS: The pancreas appears unremarkable, there is no ductal dilatation or masses.   GALLBLADDER: Not visualized, presumably with cholecystectomy.   BILE DUCTS: Dilatation of the extrahepatic ducts, the common bile duct measuring 1.4 cm in diameter, most likely from post cholecystectomy state. However as the no prior exams for comparison MRCP would be recommended if there is symptomatology compatible with biliary colic.     VESSELS: Fairly extensive atherosclerotic calcifications are noted predominantly at the aorta and iliac system. There is also moderate atherosclerotic calcification at the mid segment of the superior mesenteric artery, and fairly marked of the inferior mesenteric artery.   PERITONEUM AND RETROPERITONEUM: There is a small volume of ascites best seen in the right upper quadrant along the liver. No free intraperitoneal air.   The retroperitoneum appears unremarkable, and without significant adenopathy.   No enlarged mesenteric lymph nodes.   BOWEL: There is mild distention of multiple small bowel segments with air-fluid levels, associated with reticulation of the mesentery indicating edema. There is decompression of distal small bowel segments, and the pattern would be most compatible with mid to early or partial distal small bowel obstruction. There is also moderate diverticulosis of the distal colon.   PELVIS:   BLADDER: The urinary bladder contour is smooth.   REPRODUCTIVE ORGANS: No pelvic masses.     BONE, ABDOMINAL WALL AND OTHER FINDINGS: No suspicious osseous lesions are identified.   There is a small non incarcerated umbilical hernia containing intra-abdominal fat. There is also attenuation within the subcutaneous fat of the mid/lower abdomen anteriorly that may be from subcutaneous injections. However, hematomas are also possible. There is also reticulation of the subcutaneous fat at the right lateral abdominal wall indicating additional edema.        1.  Small-bowel obstruction with  "mesenteric edema as described. 2. Small volume of ascites. 3. Diverticulosis. 4. Atherosclerosis as described.   MACRO: 1. None   Signed by: Chano Marshall 4/12/2025 2:28 PM Dictation workstation:   [id number]     XR chest 2 views     Result Date: 4/12/2025  Interpreted By:  Beka Mays, STUDY: Chest dated  4/12/2025.   INDICATION: Signs/Symptoms:sob with ambulation   COMPARISON: Chest dated 11/17/2015.   ACCESSION NUMBER(S): [id number]   ORDERING CLINICIAN: FRANK BREWSTER   TECHNIQUE: One view of the chest.   FINDINGS: The lungs are clear.  No pneumothorax or effusion is evident. The cardiomediastinal silhouette is  not enlarged.Degenerative change is seen of the spine and shoulders.        No acute cardiopulmonary process is evident.   MACRO: None   Signed by: Beka Mays 4/12/2025 1:26 PM Dictation workstation:   [id number]             Assessment/Plan     Assessment & Plan  Generalized abdominal pain     Small bowel obstruction (Multi)     Dependence on nicotine from cigarettes     Advanced care planning/counseling discussion     Leukocytosis        Per surgery:  \"Impression:  #SBO   > small bowel dilation; however, other concerning CT findings particularly mesenteric edema and ascites. CT imaging worrisome for possible ischemic/low flow state to bowel; however, serum lactate normal and patient's clinical presentation/symptoms/pain/bowel sounds are consistent with an adhesive small bowel obstruction (partial)  #Leukocytosis (possibly 2/2 above, but may have other infectious etiology, possibly UTI?)   > UA ordered; however, not yet collected and already received antibiotics  #Hyponatremia and hypochloremia  #CKD   Recommendations:  - no acute surgical intervention at this time   > patient offered surgical intervention if she were to worsen clinically; however, she was adamant about not wanting surgery   > would recommend placing NG tube for decompression; however, patient again declined NG tube. She " "said she may be agreeable if she begins to feel worse/nauseated  - PRN IV Zofran  - PRN IV Morphine for severe pain (limit use as able, but unable to give NSAIDs or PO meds)  - IVF: D5NS @ 100  - repeat CBC and BMP in AM\"     Additionally:  Change morphine to Dilaudid given CKD  Check flu and COVID  KUB in a.m.        #Advance care planning     Patient would like to be a DNR CCA     #Nicotine dependence     Reports she lets most of her cigarettes to burn out without smoking them so we will order a nicotine patch as needed     #Chronic conditions:     CAD,  chronic diastolic heart failure, hypertension, hyperlipidemia, peripheral artery disease, diabetes mellitus, carotid artery disease (Right occlusion of ICA, left ICA with > 70% stenosis; follows with Dr. Parish), CKD, Left breast cancer s/p lumpectomy and radiation, kidney stones, and open cholecystectomy and open appendectomy with right ovary removal (for tumor- at age 16)      Hold all p.o. meds  Give IV Protonix   Metoprolol IV around-the-clock with parameters  Patient takes 70/30 at home will conservatively give 30 units of Lantus daily which would be a little less than half of the equivalent of 70/30/day with sliding scale insulin and hypoglycemic protocol     #DVT prophylaxis     SCDs  Heparin                             Chadwick Medel, DO     Review of Systems                   Relevant Results               Assessment/Plan                  Assessment & Plan  Generalized abdominal pain    Small bowel obstruction (Multi)    Dependence on nicotine from cigarettes    Advanced care planning/counseling discussion    Leukocytosis    Patient fully evaluated on 4/14. CT showing SBO with mesenteric edema, small volume ascites, diverticulosis. Evaluated by general surgery today, recommending NG tube for decompression, ordered zofran, morphine, continuing IV cefepime/flagyl. Repeat KUB ordered, results pending. If KUB looks okay, plan to advance diet. Leukocytosis " downtrending, continue to monitor. Hypertension noted this morning, cardiac PO meds held d/t NPO status. Continue to monitor and restart PO meds when diet advanced. Recheck labs in AM.        Patient still requiring frequent cardiac and SPO2 monitoring. Continue aggressive pulmonary hygiene and oral hygiene. Off loading as tolerated for skin integrity. Medications and labs reviewed.    I spent a total of 60 minutes on the date of the service which included preparing to see the patient, face-to-face patient care, completing clinical documentation, obtaining and/or reviewing separately obtained history, performing a medically appropriate examination, counseling and educating the patient/family/caregiver, ordering medications, tests, or procedures, communicating with other HCPs (not separately reported), independently interpreting results (not separately reported), communicating results to the patient/family/caregiver, and care coordination (not separately reported).      Patient fully evaluated  04/15  for    Problem List Items Addressed This Visit       * (Principal) Generalized abdominal pain - Primary     Other Visit Diagnoses       SBO (small bowel obstruction) (Multi)              Patient seen resting in bed with head of bed elevated, no s/s or c/o acute difficulties at this time.  Vital signs for last 24 hours Temp:  [36.2 °C (97.2 °F)-36.9 °C (98.4 °F)] 36.4 °C (97.5 °F)  Heart Rate:  [37-76] 37  Resp:  [18-20] 18  BP: (136-186)/(63-82) 152/67    No intake/output data recorded.  Patient still requiring frequent cardiac and SPO2 monitoring. Continue aggressive pulmonary hygiene and oral hygiene. Off loading as tolerated for skin integrity. Medications and labs reviewed-   Results for orders placed or performed during the hospital encounter of 04/12/25 (from the past 24 hours)   POCT GLUCOSE   Result Value Ref Range    POCT Glucose 111 (H) 74 - 99 mg/dL   POCT GLUCOSE   Result Value Ref Range    POCT Glucose 115  (H) 74 - 99 mg/dL   POCT GLUCOSE   Result Value Ref Range    POCT Glucose 129 (H) 74 - 99 mg/dL   POCT GLUCOSE   Result Value Ref Range    POCT Glucose 114 (H) 74 - 99 mg/dL   POCT GLUCOSE   Result Value Ref Range    POCT Glucose 117 (H) 74 - 99 mg/dL   CBC and Auto Differential   Result Value Ref Range    WBC 12.1 (H) 4.4 - 11.3 x10*3/uL    nRBC 0.0 0.0 - 0.0 /100 WBCs    RBC 3.89 (L) 4.00 - 5.20 x10*6/uL    Hemoglobin 11.4 (L) 12.0 - 16.0 g/dL    Hematocrit 36.5 36.0 - 46.0 %    MCV 94 80 - 100 fL    MCH 29.3 26.0 - 34.0 pg    MCHC 31.2 (L) 32.0 - 36.0 g/dL    RDW 13.0 11.5 - 14.5 %    Platelets 161 150 - 450 x10*3/uL    Neutrophils % 74.4 40.0 - 80.0 %    Immature Granulocytes %, Automated 0.5 0.0 - 0.9 %    Lymphocytes % 14.3 13.0 - 44.0 %    Monocytes % 8.0 2.0 - 10.0 %    Eosinophils % 2.4 0.0 - 6.0 %    Basophils % 0.4 0.0 - 2.0 %    Neutrophils Absolute 8.99 (H) 1.60 - 5.50 x10*3/uL    Immature Granulocytes Absolute, Automated 0.06 0.00 - 0.50 x10*3/uL    Lymphocytes Absolute 1.73 0.80 - 3.00 x10*3/uL    Monocytes Absolute 0.97 (H) 0.05 - 0.80 x10*3/uL    Eosinophils Absolute 0.29 0.00 - 0.40 x10*3/uL    Basophils Absolute 0.05 0.00 - 0.10 x10*3/uL   Comprehensive Metabolic Panel   Result Value Ref Range    Glucose 109 (H) 74 - 99 mg/dL    Sodium 135 (L) 136 - 145 mmol/L    Potassium 3.7 3.5 - 5.3 mmol/L    Chloride 106 98 - 107 mmol/L    Bicarbonate 22 21 - 32 mmol/L    Anion Gap 11 10 - 20 mmol/L    Urea Nitrogen 20 6 - 23 mg/dL    Creatinine 1.14 (H) 0.50 - 1.05 mg/dL    eGFR 48 (L) >60 mL/min/1.73m*2    Calcium 8.0 (L) 8.6 - 10.3 mg/dL    Albumin 3.1 (L) 3.4 - 5.0 g/dL    Alkaline Phosphatase 72 33 - 136 U/L    Total Protein 5.7 (L) 6.4 - 8.2 g/dL    AST 27 9 - 39 U/L    Bilirubin, Total 0.6 0.0 - 1.2 mg/dL    ALT 15 7 - 45 U/L   POCT GLUCOSE   Result Value Ref Range    POCT Glucose 111 (H) 74 - 99 mg/dL      Patient recently received an antibiotic (last 12 hours)       Date/Time Action Medication Dose     04/15/25 0649 New Bag    metroNIDAZOLE (Flagyl) 500 mg in sodium chloride (iso)  mL 500 mg    04/15/25 0218 New Bag    cefepime (Maxipime) 1 g in dextrose 5% IV 50 mL 1 g    04/14/25 2235 New Bag    metroNIDAZOLE (Flagyl) 500 mg in sodium chloride (iso)  mL 500 mg           Plan discussed with interdisciplinary team,  NG tube for decompression, PRN IV zofran, IV morphine, will continue on IV Antibiotics-continuing IV cefepime/flagyl, WBCs downtrending. Repeat KUB on 4/14 unchanged, will repeat KUB today. Unable to advance diet at this time, will continue IV fluids, NPO. Ice chips ok.  PO meds held d/t NPO status. Continue to monitor and restart PO meds when diet advanced. continue current and repeat labs in the AM.     Discharge planning discussed with patient and care team. Therapy evaluations ordered. Anticipate HHC/SNF at discharge. Patient aware and agreeable to current plan, continue plan as above.     I spent a total of 50 minutes on the date of the service which included preparing to see the patient, face-to-face patient care, completing clinical documentation, obtaining and/or reviewing separately obtained history, performing a medically appropriate examination, counseling and educating the patient/family/caregiver, ordering medications, tests, or procedures, communicating with other HCPs (not separately reported), independently interpreting results (not separately reported), communicating results to the patient/family/caregiver, and care coordination (not separately reported).   Angela Fatima

## 2025-04-15 NOTE — PROGRESS NOTES
"Nancy Long is a 79 y.o. female on day 3 of admission presenting with Generalized abdominal pain.    Subjective   Patient states she had an episode of nausea and pain earlier this morning, but she received medication and is feeling a bit better. She is still only passing a little bit of flatus and has not had a BM. She states she has only been out of bed to use the bathroom. She endorses a cough with phlegm. I provided her with a spirometer and instructed her how to use it. We once again discussed an NGT. She seems more open to the idea today, but states she wanted to discuss it with a couple of people today, but will consider it later.        Objective     Physical Exam  Vitals reviewed.   Constitutional:       General: She is not in acute distress.  HENT:      Mouth/Throat:      Mouth: Mucous membranes are moist.   Eyes:      General: No scleral icterus.  Cardiovascular:      Rate and Rhythm: Normal rate and regular rhythm.   Pulmonary:      Effort: Pulmonary effort is normal. No respiratory distress.      Breath sounds: Normal breath sounds.      Comments: Diminished, poor inspiratory effort, RA  Abdominal:      General: Bowel sounds are decreased. There is distension (Mild).      Palpations: Abdomen is soft.      Tenderness: There is no abdominal tenderness.   Musculoskeletal:      Right lower leg: No edema.      Left lower leg: No edema.   Skin:     General: Skin is warm and dry.      Coloration: Skin is not jaundiced or pale.      Comments: Scattered ecchymosis over body- erythematous shins bilaterally R>L   Neurological:      Mental Status: She is alert and oriented to person, place, and time.   Psychiatric:         Behavior: Behavior normal.         Last Recorded Vitals  Blood pressure 152/67, pulse 65, temperature 36.4 °C (97.5 °F), resp. rate 18, height 1.702 m (5' 7\"), weight 77.1 kg (170 lb), SpO2 93%.  Intake/Output last 3 Shifts:  I/O last 3 completed shifts:  In: 950 (12.3 mL/kg) [I.V.:400 (5.2 " mL/kg); IV Piggyback:550]  Out: - (0 mL/kg)   Weight: 77.1 kg     Relevant Results  Results for orders placed or performed during the hospital encounter of 04/12/25 (from the past 24 hours)   POCT GLUCOSE   Result Value Ref Range    POCT Glucose 111 (H) 74 - 99 mg/dL   POCT GLUCOSE   Result Value Ref Range    POCT Glucose 115 (H) 74 - 99 mg/dL   POCT GLUCOSE   Result Value Ref Range    POCT Glucose 129 (H) 74 - 99 mg/dL   POCT GLUCOSE   Result Value Ref Range    POCT Glucose 114 (H) 74 - 99 mg/dL   POCT GLUCOSE   Result Value Ref Range    POCT Glucose 117 (H) 74 - 99 mg/dL   CBC and Auto Differential   Result Value Ref Range    WBC 12.1 (H) 4.4 - 11.3 x10*3/uL    nRBC 0.0 0.0 - 0.0 /100 WBCs    RBC 3.89 (L) 4.00 - 5.20 x10*6/uL    Hemoglobin 11.4 (L) 12.0 - 16.0 g/dL    Hematocrit 36.5 36.0 - 46.0 %    MCV 94 80 - 100 fL    MCH 29.3 26.0 - 34.0 pg    MCHC 31.2 (L) 32.0 - 36.0 g/dL    RDW 13.0 11.5 - 14.5 %    Platelets 161 150 - 450 x10*3/uL    Neutrophils % 74.4 40.0 - 80.0 %    Immature Granulocytes %, Automated 0.5 0.0 - 0.9 %    Lymphocytes % 14.3 13.0 - 44.0 %    Monocytes % 8.0 2.0 - 10.0 %    Eosinophils % 2.4 0.0 - 6.0 %    Basophils % 0.4 0.0 - 2.0 %    Neutrophils Absolute 8.99 (H) 1.60 - 5.50 x10*3/uL    Immature Granulocytes Absolute, Automated 0.06 0.00 - 0.50 x10*3/uL    Lymphocytes Absolute 1.73 0.80 - 3.00 x10*3/uL    Monocytes Absolute 0.97 (H) 0.05 - 0.80 x10*3/uL    Eosinophils Absolute 0.29 0.00 - 0.40 x10*3/uL    Basophils Absolute 0.05 0.00 - 0.10 x10*3/uL   Comprehensive Metabolic Panel   Result Value Ref Range    Glucose 109 (H) 74 - 99 mg/dL    Sodium 135 (L) 136 - 145 mmol/L    Potassium 3.7 3.5 - 5.3 mmol/L    Chloride 106 98 - 107 mmol/L    Bicarbonate 22 21 - 32 mmol/L    Anion Gap 11 10 - 20 mmol/L    Urea Nitrogen 20 6 - 23 mg/dL    Creatinine 1.14 (H) 0.50 - 1.05 mg/dL    eGFR 48 (L) >60 mL/min/1.73m*2    Calcium 8.0 (L) 8.6 - 10.3 mg/dL    Albumin 3.1 (L) 3.4 - 5.0 g/dL    Alkaline  Phosphatase 72 33 - 136 U/L    Total Protein 5.7 (L) 6.4 - 8.2 g/dL    AST 27 9 - 39 U/L    Bilirubin, Total 0.6 0.0 - 1.2 mg/dL    ALT 15 7 - 45 U/L   POCT GLUCOSE   Result Value Ref Range    POCT Glucose 111 (H) 74 - 99 mg/dL           Assessment/Plan   79 year old female with PMH significant for HTN, HLD, CAD, PAD (follows with Dr. Oliver), carotid artery disease (Right occlusion of ICA, left ICA with > 70% stenosis; follows with Dr. Parish), CKD, Left breast cancer s/p lumpectomy, kidney stones, and IDDM Type 2 (follows with PCP Dr. Bonilla) who presented to [facility] ED on 4/12 with 2 day history of abdominal pain. Admitted to medicine with consult to general surgery with SBO.       Impression:  #pSBO    > CT suggestion of mesenteric edema and ascites concerning for ischemia is not supported by exam of lab findings   #Leukocytosis - improving   > Reactive vs infectious  UTI?    > UC with no growth, but was collected after patient already received antibiotics   #Hyponatremia - improved     Recommendations:  - No acute surgical intervention is indicated at this time    > Pt previously refused surgery, now is more open to it if needed    > Continue to recommend NGT, pt previously adamantly refused but now is considering  - NPO except ice chips   > Lantus currently held   > continue hypoglycemia order set   - Monitor for ROBF  - PRN IV Zofran  - PRN IV Morphine for severe pain (limit use as able, but unable to give NSAIDs or PO meds)  - Continue IV Cefepime/Flagyl   - IVF: D5NS @ 100  - Encourage OOB and ambulation  - IS  - Repeat CBC and BMP in AM    PT/OT ordered today     PPX: SCDs, SQH     The patient will be seen and discussed with the attending surgeon Dr. Michelle Diaz PA-C

## 2025-04-15 NOTE — PROGRESS NOTES
Physical Therapy    Physical Therapy Evaluation    Patient Name: Nancy Long  MRN: [id number]  Today's Date: 4/15/2025   Time Calculation  Start Time: 1112  Stop Time: 1133  Time Calculation (min): 21 min  632/632-A    Assessment/Plan   PT Assessment  PT Assessment Results: Decreased strength, Impaired balance, Decreased mobility  Rehab Prognosis: Fair  Barriers to Discharge Home: Physical needs  Physical Needs: Stair navigation into home limited by function/safety  Evaluation/Treatment Tolerance: Patient tolerated treatment well  Medical Staff Made Aware: Yes  Strengths: Attitude of self  Barriers to Participation: Comorbidities  End of Session Communication: Bedside nurse  Assessment Comment: pt tolerated session. pt required assist during functional mobility to maintain safety. pt presented with decreased functional mobility, strength, and balance. pt would benefit from mod int therapy in order to return to PLOF.  End of Session Patient Position: Bed, 2 rail up, Alarm on (call light in reach)  IP OR SWING BED PT PLAN  Inpatient or Swing Bed: Inpatient  PT Plan  Treatment/Interventions: Bed mobility, Transfer training, Gait training, Balance training, Strengthening, Therapeutic exercise, Therapeutic activity  PT Plan: Ongoing PT  PT Frequency: 3 times per week  PT Discharge Recommendations: Moderate intensity level of continued care  PT Recommended Transfer Status: Assist x1  PT - OK to Discharge:  (once medically cleared)    Subjective     Current Problem:  1. Generalized abdominal pain        2. SBO (small bowel obstruction) (Multi)          Patient Active Problem List   Diagnosis    Malignant neoplasm of lower-outer quadrant of female breast    Chronic diastolic heart failure    CAD (coronary artery disease)    HTN (hypertension)    HLD (hyperlipidemia)    Leg edema    Nocturnal leg cramps    Gastroesophageal reflux disease    RHYS (generalized anxiety disorder)    Osteoarthritis    Type 1 diabetes mellitus     Aortic stenosis    Bruit of left carotid artery    Venous ulcer of ankle, right    Chronic kidney disease, stage 3b (Multi)    Decubitus ulcer of right leg, stage 1    Stress incontinence of urine    Restless leg    Intermittent claudication of both lower extremities due to atherosclerosis    Lower extremity cellulitis    Right carotid artery occlusion    Left carotid stenosis    Generalized abdominal pain    Small bowel obstruction (Multi)    Dependence on nicotine from cigarettes    Advanced care planning/counseling discussion    Leukocytosis       General Visit Information:  General  Reason for Referral: PT eval (pt admitted 4/12 with abdominal pain, SBO, and leukocytosis)  Referred By: Se  Past Medical History Relevant to Rehab: HTN, DM, CAD, HLD, PAD, CKD, L breast cancer s/p lumpectomy, and appendectomy  Co-Treatment: OT  Co-Treatment Reason: to maximize pt safety and mobility  Prior to Session Communication: Bedside nurse  Patient Position Received: Bed, 2 rail up, Alarm off, not on at start of session  General Comment: pt agreeable to therapy    Home Living:  Home Living  Home Living Comments: lives alone in house with 3 RD and rail. first floor set up. laundry in basement. pt has WIS, GBs, HHS, and chair. pt has elevated toilet with GBs. pt amb IND in house and uses cane in community. IND in ADLs/iADLs. has hired help for cleaning. TAY drives. sleeps in adj bed. denies falls.    Prior Level of Function:  Prior Function Per Pt/Caregiver Report  Level of [place]: Independent with ADLs and functional transfers, Independent with homemaking with ambulation    Precautions:  Precautions  Precautions Comment: NPO, DNR, OOB with assist    Objective     Pain:  Pain Assessment  Pain Assessment: 0-10  0-10 (Numeric) Pain Score: 8  Pain Type: Acute pain  Pain Location: Abdomen  Pain Interventions: Repositioned (RN notified)    Cognition:  Cognition  Overall Cognitive Status: Within Functional Limits    General  Assessments:         Sensation  Sensation Comment: N/T in bilateral hands  Strength  Strength Comments: BLE strength 4-/5 grossly. BLE ROM WFL for pts age          Functional Assessments:     Bed Mobility  Bed Mobility: Yes  Bed Mobility 1  Bed Mobility 1: Supine to sitting, Sitting to supine  Level of Assistance 1:  (SBA)  Bed Mobility Comments 1: completed with increased time and effort. HOB elevated and use of rails  Transfers  Transfer: Yes  Transfer 1  Transfer From 1: Bed to, Sit to, Stand to  Trials/Comments 1: completed STS from bed with CGA; completed STS from toilet with modAx1  Ambulation/Gait Training  Ambulation/Gait Training Performed: Yes  Ambulation/Gait Training 1  Surface 1: Level tile  Device 1: No device  Comments/Distance (ft) 1: completed amb of 15ft with HHA and minAx1. v/c for safety awareness.               Outcome Measures:     [facility] Basic Mobility  Turning from your back to your side while in a flat bed without using bedrails: A little  Moving from lying on your back to sitting on the side of a flat bed without using bedrails: A little  Moving to and from bed to chair (including a wheelchair): A little  Standing up from a chair using your arms (e.g. wheelchair or bedside chair): A little  To walk in hospital room: A little  Climbing 3-5 steps with railing: Total  Basic Mobility - Total Score: 16                Goals:  Encounter Problems       Encounter Problems (Active)       PT Problem       STG - Pt will perform a B LE ther ex program of 2-3 sets of 10  (Progressing)       Start:  04/15/25    Expected End:  04/29/25            STG - Pt will transfer STS with SBA (Progressing)       Start:  04/15/25    Expected End:  04/29/25            STG - Pt will amb 100' using LRAD with SBA  (Progressing)       Start:  04/15/25    Expected End:  04/29/25            STG -  Pt will navigate 4 stairs using rail with SBA  (Progressing)       Start:  04/15/25    Expected End:  04/29/25               Pain  - Adult            Education Documentation  Mobility Training, taught by Oksana Osborne, PT at 4/15/2025 12:23 PM.  Learner: Patient  Readiness: Acceptance  Method: Explanation  Response: Verbalizes Understanding  Comment: PT POC

## 2025-04-15 NOTE — PROGRESS NOTES
04/15/25 1342   Discharge Planning   Living Arrangements Alone   Support Systems Family members;Friends/neighbors   Assistance Needed cane when out   Type of Residence Private residence   Number of Stairs to Enter Residence 3   Number of Stairs Within Residence 0   Expected Discharge Disposition Home   Intensity of Service   Intensity of Service 0-30 min     4/15/2025  Met with pt in room, introduced self and explained role.  Verified insurance, address, phone.   PCP- Dr Deric Bonilla  Ph[facility]acy- [facility]       Able to obtain and afford medications.   Has all her diabetic supplies.   Pt is from home, lives alone.  Uses a cane when she goes out.  Performs own ADL's,  Has grab bars and a shower seat.  Stated she occasionally drives.  Normally family or friends take her shopping and to appointments.   Therapies ordered.  Ct Team will follow for needs.   Pt stated she has nausea, RN informed.  Alka Khan RN T[facility]

## 2025-04-15 NOTE — PROGRESS NOTES
Occupational Therapy    Evaluation    Patient Name: Nancy Long  MRN: [id number]  Today's Date: 4/15/2025  Time Calculation  Start Time: 1113  Stop Time: 1135  Time Calculation (min): 22 min    Assessment  IP OT Assessment  OT Assessment: Patient presents with a decline in functional status and would benefit from 24 hour support with O.T. services at a moderate intensity to improve independence with ADLs, transfers & mobility.  Prognosis: Good  Barriers to Discharge Home: Caregiver assistance, Physical needs  Caregiver Assistance: Patient lives alone and/or does not have reliable caregiver assistance  Physical Needs: 24hr mobility assistance needed, 24hr ADL assistance needed  End of Session Communication: Bedside nurse  End of Session Patient Position: Bed, 2 rail up, Alarm on (call light in reach)    Plan:  Treatment Interventions: ADL retraining, Functional transfer training, Endurance training, Neuromuscular reeducation, Compensatory technique education  OT Frequency: 3 times per week  OT Discharge Recommendations: Moderate intensity level of continued care  OT - OK to Discharge: Yes (from an O.T. standpoint)    Subjective     Current Problem:  Patient Active Problem List   Diagnosis    Malignant neoplasm of lower-outer quadrant of female breast    Chronic diastolic heart failure    CAD (coronary artery disease)    HTN (hypertension)    HLD (hyperlipidemia)    Leg edema    Nocturnal leg cramps    Gastroesophageal reflux disease    RHYS (generalized anxiety disorder)    Osteoarthritis    Type 1 diabetes mellitus    Aortic stenosis    Bruit of left carotid artery    Venous ulcer of ankle, right    Chronic kidney disease, stage 3b (Multi)    Decubitus ulcer of right leg, stage 1    Stress incontinence of urine    Restless leg    Intermittent claudication of both lower extremities due to atherosclerosis    Lower extremity cellulitis    Right carotid artery occlusion    Left carotid stenosis    Generalized abdominal  pain    Small bowel obstruction (Multi)    Dependence on nicotine from cigarettes    Advanced care planning/counseling discussion    Leukocytosis       General:  General  Reason for Referral: OT eval & treat for ADls (generalized abd pain, SBO, leukocytosis)  Referred By: Morenita Diaz PA-C  Past Medical History Relevant to Rehab: 4/12/25: generalized abd pain. CT abd: SBO with mesenteric edema, diverticulosis.    PMH: anxiety, DM, CAD, CKD, left breast CA s/p lumpectomy, PAD, diastolic heart failure, kidney stones, open franklin.  Co-Treatment: PT  Co-Treatment Reason: To maximize patient safety & mobility  Prior to Session Communication: Bedside nurse  Patient Position Received: Bed, 2 rail up, Alarm off, not on at start of session  General Comment: Patient cleared for therapy by nursing.    Precautions:  Precautions Comment: OOB with assist, NPO      Pain:  Pain Assessment  Pain Assessment: 0-10  0-10 (Numeric) Pain Score: 8  Pain Type: Acute pain  Pain Location: Abdomen  Pain Interventions: Repositioned (Nursing notified of patient request for pain meds)    Objective     Cognition:  Overall Cognitive Status: Within Functional Limits             Home Living:  Home Living Comments: Lives alone in a house with 3 steps to enter with a rail; bed/bath 1st floor. Independent ADLs, transfers & mobility without a device; used cane in community. Has elevated toilet with rails, walk in shower with grab bar, seat & hand held shower, adjustable bed; Independent to manage laundry in basement. Hired assist for cleaning, sister in law or friend assists with driving/shopping.       ADL:  Grooming Assistance: Independent  UE Dressing Assistance: Independent  LE Dressing Assistance: Moderate  Toileting Assistance with Device: Moderate    Activity Tolerance:  Endurance: Decreased tolerance for upright activites    Bed Mobility/Transfers:   Bed Mobility 1  Bed Mobility 1:  (SBA supine <-> sit with HOB elevated; labored,  increased time.)  Transfers  Transfer:  (CGA sit to stand from edge of bed; mod assist sit to stand from low toilet with grab bar.)    Ambulation/Gait Training:  Functional Mobility  Functional Mobility Performed:  (min assist without device, unsteady at times)    Sitting Balance:  Dynamic Sitting Balance  Dynamic Sitting-Level of Assistance: Close supervision    Standing Balance:  Dynamic Standing Balance  Dynamic Standing-Level of Assistance: Minimum assistance        Sensation:  Sensation Comment: chronic numbness/tingling B hands due to arthritis    Strength:  Strength Comments: BUE grossly WFL    Coordination:  Movements are Fluid and Coordinated: Yes       Outcome Measures: [facility] Daily Activity  Putting on and taking off regular lower body clothing: A lot  Bathing (including washing, rinsing, drying): A lot  Putting on and taking off regular upper body clothing: A little  Toileting, which includes using toilet, bedpan or urinal: A lot  Taking care of personal grooming such as brushing teeth: A little  Eating Meals: None  Daily Activity - Total Score: 16                    EDUCATION:     Education Documentation  ADL Training, taught by Maddy Hernández, OT at 4/15/2025  1:55 PM.  Learner: Patient  Readiness: Acceptance  Method: Explanation  Response: Verbalizes Understanding    Education Comments  No comments found.        Goals:   Encounter Problems       Encounter Problems (Active)       OT Goals       Increase LE bathing, dressing & toileting to supervision with adaptive equipment as needed.  (Progressing)       Start:  04/15/25    Expected End:  04/29/25            Increase functional transfers & mobility to/from bed, chair & commode to supervision with DME for safety  (Progressing)       Start:  04/15/25    Expected End:  04/29/25            Increase dynamic stand balance to supervision with UE support to promote increased independence with ADL & functional transfers  (Progressing)       Start:  04/15/25     Expected End:  04/29/25            Demonstrate compliance to energy conservation techs and safety techs during ADLs   (Progressing)       Start:  04/15/25    Expected End:  04/29/25

## 2025-04-16 ENCOUNTER — APPOINTMENT (OUTPATIENT)
Dept: RADIOLOGY | Facility: HOSPITAL | Age: 80
DRG: 336 | End: 2025-04-16
Payer: MEDICARE

## 2025-04-16 LAB
ALBUMIN SERPL BCP-MCNC: 2.8 G/DL (ref 3.4–5)
ALP SERPL-CCNC: 63 U/L (ref 33–136)
ALT SERPL W P-5'-P-CCNC: 13 U/L (ref 7–45)
ANION GAP SERPL CALC-SCNC: 9 MMOL/L (ref 10–20)
AST SERPL W P-5'-P-CCNC: 21 U/L (ref 9–39)
BASOPHILS # BLD AUTO: 0.03 X10*3/UL (ref 0–0.1)
BASOPHILS NFR BLD AUTO: 0.3 %
BILIRUB SERPL-MCNC: 0.5 MG/DL (ref 0–1.2)
BUN SERPL-MCNC: 18 MG/DL (ref 6–23)
CALCIUM SERPL-MCNC: 7.8 MG/DL (ref 8.6–10.3)
CHLORIDE SERPL-SCNC: 109 MMOL/L (ref 98–107)
CO2 SERPL-SCNC: 23 MMOL/L (ref 21–32)
CREAT SERPL-MCNC: 1.12 MG/DL (ref 0.5–1.05)
EGFRCR SERPLBLD CKD-EPI 2021: 50 ML/MIN/1.73M*2
EOSINOPHIL # BLD AUTO: 0.27 X10*3/UL (ref 0–0.4)
EOSINOPHIL NFR BLD AUTO: 2.9 %
ERYTHROCYTE [DISTWIDTH] IN BLOOD BY AUTOMATED COUNT: 13.1 % (ref 11.5–14.5)
GLUCOSE BLD MANUAL STRIP-MCNC: 112 MG/DL (ref 74–99)
GLUCOSE BLD MANUAL STRIP-MCNC: 142 MG/DL (ref 74–99)
GLUCOSE BLD MANUAL STRIP-MCNC: 148 MG/DL (ref 74–99)
GLUCOSE BLD MANUAL STRIP-MCNC: 153 MG/DL (ref 74–99)
GLUCOSE BLD MANUAL STRIP-MCNC: 163 MG/DL (ref 74–99)
GLUCOSE BLD MANUAL STRIP-MCNC: 172 MG/DL (ref 74–99)
GLUCOSE BLD MANUAL STRIP-MCNC: 172 MG/DL (ref 74–99)
GLUCOSE BLD MANUAL STRIP-MCNC: 205 MG/DL (ref 74–99)
GLUCOSE SERPL-MCNC: 168 MG/DL (ref 74–99)
HCT VFR BLD AUTO: 33.8 % (ref 36–46)
HGB BLD-MCNC: 10 G/DL (ref 12–16)
IMM GRANULOCYTES # BLD AUTO: 0.07 X10*3/UL (ref 0–0.5)
IMM GRANULOCYTES NFR BLD AUTO: 0.8 % (ref 0–0.9)
LYMPHOCYTES # BLD AUTO: 1.67 X10*3/UL (ref 0.8–3)
LYMPHOCYTES NFR BLD AUTO: 17.9 %
MCH RBC QN AUTO: 28.5 PG (ref 26–34)
MCHC RBC AUTO-ENTMCNC: 29.6 G/DL (ref 32–36)
MCV RBC AUTO: 96 FL (ref 80–100)
MONOCYTES # BLD AUTO: 0.87 X10*3/UL (ref 0.05–0.8)
MONOCYTES NFR BLD AUTO: 9.3 %
NEUTROPHILS # BLD AUTO: 6.42 X10*3/UL (ref 1.6–5.5)
NEUTROPHILS NFR BLD AUTO: 68.8 %
NRBC BLD-RTO: 0 /100 WBCS (ref 0–0)
PLATELET # BLD AUTO: 142 X10*3/UL (ref 150–450)
POTASSIUM SERPL-SCNC: 3.6 MMOL/L (ref 3.5–5.3)
PROT SERPL-MCNC: 5.1 G/DL (ref 6.4–8.2)
RBC # BLD AUTO: 3.51 X10*6/UL (ref 4–5.2)
SODIUM SERPL-SCNC: 137 MMOL/L (ref 136–145)
WBC # BLD AUTO: 9.3 X10*3/UL (ref 4.4–11.3)

## 2025-04-16 PROCEDURE — 1100000001 HC PRIVATE ROOM DAILY

## 2025-04-16 PROCEDURE — 74018 RADEX ABDOMEN 1 VIEW: CPT

## 2025-04-16 PROCEDURE — 2500000001 HC RX 250 WO HCPCS SELF ADMINISTERED DRUGS (ALT 637 FOR MEDICARE OP): Performed by: REGISTERED NURSE

## 2025-04-16 PROCEDURE — 99232 SBSQ HOSP IP/OBS MODERATE 35: CPT | Performed by: REGISTERED NURSE

## 2025-04-16 PROCEDURE — 2500000005 HC RX 250 GENERAL PHARMACY W/O HCPCS: Performed by: REGISTERED NURSE

## 2025-04-16 PROCEDURE — 2500000004 HC RX 250 GENERAL PHARMACY W/ HCPCS (ALT 636 FOR OP/ED): Performed by: NURSE PRACTITIONER

## 2025-04-16 PROCEDURE — 99232 SBSQ HOSP IP/OBS MODERATE 35: CPT | Performed by: SURGERY

## 2025-04-16 PROCEDURE — 36415 COLL VENOUS BLD VENIPUNCTURE: CPT | Performed by: INTERNAL MEDICINE

## 2025-04-16 PROCEDURE — 0D9670Z DRAINAGE OF STOMACH WITH DRAINAGE DEVICE, VIA NATURAL OR ARTIFICIAL OPENING: ICD-10-PCS | Performed by: SURGERY

## 2025-04-16 PROCEDURE — 80053 COMPREHEN METABOLIC PANEL: CPT | Performed by: INTERNAL MEDICINE

## 2025-04-16 PROCEDURE — 2500000002 HC RX 250 W HCPCS SELF ADMINISTERED DRUGS (ALT 637 FOR MEDICARE OP, ALT 636 FOR OP/ED): Performed by: NURSE PRACTITIONER

## 2025-04-16 PROCEDURE — 74018 RADEX ABDOMEN 1 VIEW: CPT | Performed by: RADIOLOGY

## 2025-04-16 PROCEDURE — 85025 COMPLETE CBC W/AUTO DIFF WBC: CPT | Performed by: INTERNAL MEDICINE

## 2025-04-16 PROCEDURE — 2500000004 HC RX 250 GENERAL PHARMACY W/ HCPCS (ALT 636 FOR OP/ED): Mod: JZ | Performed by: REGISTERED NURSE

## 2025-04-16 PROCEDURE — 82947 ASSAY GLUCOSE BLOOD QUANT: CPT

## 2025-04-16 RX ORDER — LIDOCAINE HYDROCHLORIDE 20 MG/ML
1 JELLY TOPICAL ONCE
Status: COMPLETED | OUTPATIENT
Start: 2025-04-16 | End: 2025-04-16

## 2025-04-16 RX ORDER — DEXTROSE MONOHYDRATE, SODIUM CHLORIDE, AND POTASSIUM CHLORIDE 50; 1.49; 4.5 G/1000ML; G/1000ML; G/1000ML
100 INJECTION, SOLUTION INTRAVENOUS CONTINUOUS
Status: ACTIVE | OUTPATIENT
Start: 2025-04-16 | End: 2025-04-18

## 2025-04-16 RX ADMIN — HYDROMORPHONE HYDROCHLORIDE 0.5 MG: 1 INJECTION, SOLUTION INTRAMUSCULAR; INTRAVENOUS; SUBCUTANEOUS at 18:15

## 2025-04-16 RX ADMIN — LIDOCAINE HYDROCHLORIDE 1 APPLICATION: 20 JELLY TOPICAL at 11:24

## 2025-04-16 RX ADMIN — CEFEPIME 1 G: 1 INJECTION, POWDER, FOR SOLUTION INTRAMUSCULAR; INTRAVENOUS at 02:00

## 2025-04-16 RX ADMIN — METRONIDAZOLE 500 MG: 500 INJECTION, SOLUTION INTRAVENOUS at 21:56

## 2025-04-16 RX ADMIN — BENZOCAINE AND MENTHOL 1 LOZENGE: 15; 3.6 LOZENGE ORAL at 18:24

## 2025-04-16 RX ADMIN — HEPARIN SODIUM 5000 UNITS: 5000 INJECTION INTRAVENOUS; SUBCUTANEOUS at 21:56

## 2025-04-16 RX ADMIN — METRONIDAZOLE 500 MG: 500 INJECTION, SOLUTION INTRAVENOUS at 14:03

## 2025-04-16 RX ADMIN — METOPROLOL TARTRATE 5 MG: 5 INJECTION INTRAVENOUS at 13:49

## 2025-04-16 RX ADMIN — CEFEPIME 1 G: 1 INJECTION, POWDER, FOR SOLUTION INTRAMUSCULAR; INTRAVENOUS at 14:03

## 2025-04-16 RX ADMIN — BENZOCAINE AND MENTHOL 1 LOZENGE: 15; 3.6 LOZENGE ORAL at 12:33

## 2025-04-16 RX ADMIN — HEPARIN SODIUM 5000 UNITS: 5000 INJECTION INTRAVENOUS; SUBCUTANEOUS at 13:49

## 2025-04-16 RX ADMIN — HYDROMORPHONE HYDROCHLORIDE 0.5 MG: 1 INJECTION, SOLUTION INTRAMUSCULAR; INTRAVENOUS; SUBCUTANEOUS at 09:14

## 2025-04-16 RX ADMIN — METOPROLOL TARTRATE 5 MG: 5 INJECTION INTRAVENOUS at 21:01

## 2025-04-16 RX ADMIN — INSULIN LISPRO 1 UNITS: 100 INJECTION, SOLUTION INTRAVENOUS; SUBCUTANEOUS at 18:26

## 2025-04-16 RX ADMIN — HYDROMORPHONE HYDROCHLORIDE 0.5 MG: 1 INJECTION, SOLUTION INTRAMUSCULAR; INTRAVENOUS; SUBCUTANEOUS at 03:25

## 2025-04-16 RX ADMIN — METOPROLOL TARTRATE 5 MG: 5 INJECTION INTRAVENOUS at 02:15

## 2025-04-16 RX ADMIN — HEPARIN SODIUM 5000 UNITS: 5000 INJECTION INTRAVENOUS; SUBCUTANEOUS at 06:33

## 2025-04-16 RX ADMIN — BENZOCAINE AND MENTHOL 1 LOZENGE: 15; 3.6 LOZENGE ORAL at 23:12

## 2025-04-16 RX ADMIN — BENZOCAINE AND MENTHOL 1 LOZENGE: 15; 3.6 LOZENGE ORAL at 14:30

## 2025-04-16 RX ADMIN — ONDANSETRON 4 MG: 2 INJECTION INTRAMUSCULAR; INTRAVENOUS at 14:03

## 2025-04-16 RX ADMIN — METOPROLOL TARTRATE 5 MG: 5 INJECTION INTRAVENOUS at 09:02

## 2025-04-16 RX ADMIN — PANTOPRAZOLE SODIUM 40 MG: 40 INJECTION, POWDER, FOR SOLUTION INTRAVENOUS at 09:02

## 2025-04-16 RX ADMIN — METRONIDAZOLE 500 MG: 500 INJECTION, SOLUTION INTRAVENOUS at 06:33

## 2025-04-16 RX ADMIN — INSULIN LISPRO 1 UNITS: 100 INJECTION, SOLUTION INTRAVENOUS; SUBCUTANEOUS at 14:04

## 2025-04-16 RX ADMIN — DEXTROSE, SODIUM CHLORIDE, AND POTASSIUM CHLORIDE 100 ML/HR: 5; .45; .15 INJECTION INTRAVENOUS at 09:22

## 2025-04-16 RX ADMIN — ONDANSETRON 4 MG: 2 INJECTION INTRAMUSCULAR; INTRAVENOUS at 03:24

## 2025-04-16 RX ADMIN — ONDANSETRON 4 MG: 2 INJECTION INTRAMUSCULAR; INTRAVENOUS at 09:07

## 2025-04-16 RX ADMIN — DEXTROSE, SODIUM CHLORIDE, AND POTASSIUM CHLORIDE 100 ML/HR: 5; .45; .15 INJECTION INTRAVENOUS at 21:08

## 2025-04-16 RX ADMIN — ONDANSETRON 4 MG: 2 INJECTION INTRAMUSCULAR; INTRAVENOUS at 18:14

## 2025-04-16 RX ADMIN — HYDROMORPHONE HYDROCHLORIDE 0.5 MG: 1 INJECTION, SOLUTION INTRAMUSCULAR; INTRAVENOUS; SUBCUTANEOUS at 13:49

## 2025-04-16 RX ADMIN — ONDANSETRON 4 MG: 2 INJECTION INTRAMUSCULAR; INTRAVENOUS at 22:13

## 2025-04-16 RX ADMIN — HYDROMORPHONE HYDROCHLORIDE 0.5 MG: 1 INJECTION, SOLUTION INTRAMUSCULAR; INTRAVENOUS; SUBCUTANEOUS at 22:13

## 2025-04-16 RX ADMIN — INSULIN LISPRO 1 UNITS: 100 INJECTION, SOLUTION INTRAVENOUS; SUBCUTANEOUS at 06:34

## 2025-04-16 ASSESSMENT — PAIN - FUNCTIONAL ASSESSMENT
PAIN_FUNCTIONAL_ASSESSMENT: 0-10

## 2025-04-16 ASSESSMENT — PAIN SCALES - GENERAL
PAINLEVEL_OUTOF10: 0 - NO PAIN
PAINLEVEL_OUTOF10: 8
PAINLEVEL_OUTOF10: 0 - NO PAIN
PAINLEVEL_OUTOF10: 9
PAINLEVEL_OUTOF10: 0 - NO PAIN
PAINLEVEL_OUTOF10: 3
PAINLEVEL_OUTOF10: 9
PAINLEVEL_OUTOF10: 9
PAINLEVEL_OUTOF10: 7

## 2025-04-16 ASSESSMENT — COGNITIVE AND FUNCTIONAL STATUS - GENERAL
STANDING UP FROM CHAIR USING ARMS: A LITTLE
TOILETING: A LITTLE
HELP NEEDED FOR BATHING: A LITTLE
MOBILITY SCORE: 19
MOVING TO AND FROM BED TO CHAIR: A LITTLE
DAILY ACTIVITIY SCORE: 22
CLIMB 3 TO 5 STEPS WITH RAILING: A LOT
WALKING IN HOSPITAL ROOM: A LITTLE

## 2025-04-16 ASSESSMENT — PAIN DESCRIPTION - LOCATION
LOCATION: ABDOMEN

## 2025-04-16 NOTE — PROGRESS NOTES
Nancy Long is a 79 y.o. female on day 4 of admission presenting with Generalized abdominal pain.      Subjective   No significant events overnight. Patient seen and evaluated at bedside with sister present.        Objective     Last Recorded Vitals  /68   Pulse 53   Temp 36.5 °C (97.7 °F)   Resp 18   Wt 77.1 kg (169 lb 15.6 oz)   SpO2 96%   Intake/Output last 3 Shifts:    Intake/Output Summary (Last 24 hours) at 4/16/2025 1148  Last data filed at 4/16/2025 0735  Gross per 24 hour   Intake 4483.33 ml   Output --   Net 4483.33 ml       Admission Weight  Weight: 77.1 kg (170 lb) (04/12/25 1212)    Daily Weight  04/15/25 : 77.1 kg (169 lb 15.6 oz)    Image Results  XR abdomen 1 view  Narrative: Interpreted By:  Chano Marshall,   STUDY:  XR ABDOMEN 1 VIEW;  4/16/2025 7:28 am      INDICATION:  Signs/Symptoms:sbo.      COMPARISON:  04/15/2025      ACCESSION NUMBER(S):  [id number]      ORDERING CLINICIAN:  GIANFRANCO OLIVEROS      FINDINGS:  Again there is moderate gastric distention and distention of central  small bowel segments with decompression of the colon. The pattern  would be most consistent with small-bowel obstruction fairly similar  to the prior exam. The study is of limited evaluation of  pneumoperitoneum on supine imaging, however no gross evidence of free  air is noted.      The soft tissue shadows are unremarkable.      Visualized lungs bases are clear.      Osseous structures demonstrate no acute bony changes.      Other findings: None significant      Impression: 1.  Small-bowel obstruction.      Signed by: Chano Marshall 4/16/2025 8:28 AM  Dictation workstation:   [id number]      Physical Exam        Gianfranco Oliveros MD   Physician  Internal Medicine     Progress Notes      Signed     Date of Service: 4/15/2025  8:35 AM     Signed       Expand All Collapse All    Nancy Long is a 79 y.o. female on day 3 of admission presenting with Generalized abdominal pain.        Subjective  No significant  events overnight. Patient seen and evaluated at bedside.         Objective[]Expand by Default  Last Recorded Vitals  /67   Pulse (!) 37   Temp 36.4 °C (97.5 °F)   Resp 18   Wt 77.1 kg (170 lb)   SpO2 93%   Intake/Output last 3 Shifts:     Intake/Output Summary (Last 24 hours) at 4/15/2025 0835  Last data filed at 4/15/2025 0250      Gross per 24 hour   Intake 950 ml   Output --   Net 950 ml         Admission Weight  Weight: 77.1 kg (170 lb) (04/12/25 1212)     Daily Weight  04/12/25 : 77.1 kg (170 lb)     Image Results  XR abdomen 1 view  Narrative: Interpreted By:  Arun Escamilla,   STUDY:  XR ABDOMEN 1 VIEW;  4/14/2025 7:06 am      INDICATION:  Signs/Symptoms:Follow up bowel distention.          COMPARISON:  04/13/2025      ACCESSION NUMBER(S):  [id number]      ORDERING CLINICIAN:  ROZ RAMESH      FINDINGS:  Three view abdomen      Multiple dilated gas-filled bowel loops predominantly centrally with  stacked appearance most concerning for obstruction redemonstrated  grossly similar to prior. Limited evaluation for free air on supine  films. Some gas and stool noted within the colon as previously.  Approximate 5 cm gaseous lucency overlying the pelvis could represent  focal distended bowel loop possibly distal colon within the pelvis  and new or more pronounced compared to prior. Mild gaseous distention  of the stomach.      Prominent vascular calcifications overlying abdomen and pelvis and  overlying the groin regions.      Impression: 1.  Multiple dilated gas-filled loops of small bowel redemonstrated  most suggestive of sequela of obstruction as noted previously and  probably grossly similar to prior. Focal gaseous lucency overlying  the pelvis new or more pronounced may reflect focal distended bowel  loop possibly distal colon within the pelvis new or more pronounced.  Mild gaseous distention of the stomach. Limited evaluation for free  air on supine films. Continued clinical correlation and  follow-up  advised.      MACRO:  None      Signed by: Arun Escamilla 4/14/2025 2:45 PM  Dictation workstation:   [id number]        Physical Exam     Date of Service: 4/13/2025  6:18 PM      Signed        Expand All Collapse All    History Of Present Illness  Nancy Long is a 79 y.o. female with CAD,  chronic diastolic heart failure, hypertension, hyperlipidemia, peripheral artery disease, diabetes mellitus, carotid artery disease (Right occlusion of ICA, left ICA with > 70% stenosis; follows with Dr. Parish), CKD, Left breast cancer s/p lumpectomy and radiation, kidney stones, and open cholecystectomy and open appendectomy with right ovary removal (for tumor- at age 16) who presented today with abdominal pain.  Patient reports yesterday she developed severe, sharp, continuous epigastric pain associated with nausea and diaphoresis.  She notes she took some of her prescribed Percocet that seemed to help a little however the symptoms persisted into today so she decided to come to the emergency room.  She also notes a decreased appetite and when she did try to eat the food did not taste good.  She denies a history of bowel obstruction.  She articulates she told the surgery team she would not like surgical intervention.  CODE STATUS discussed and she would like to be a DNR CCA.     In the ED lab work, EKG, chest x-ray and CTAP were performed, labs revealed glucose 200, sodium 125, chloride 94, bun 45, creatinine 1.64 (1.32 on 2/4/2020), , lactate 1.6, troponins 27 and 29 respectively, white count 21, neutrophils 17.35, monocytes 1.1. EKG Interpretation, per ER physician impression: Sinus at rate of 79, , QRS 91, QTc 407 without evidence of STEMI or ischemia. Chest x-ray without acute process.  CT abdomen/pelvis revealed small bowel obstruction with mesenteric edema as described, small volume of ascites, diverticulosis, atherosclerosis, subcutaneous opacities and reticulation that may be due to injections  although hematoma is possible.  Patient was evaluated by surgery team that noted her symptoms are likely consistent with partial adhesive small bowel obstruction and surgical intervention was offered however patient declined.  Her vital signs were acceptable with a slightly elevated blood pressure of 157/70.  She was given cefepime Flagyl morphine Zofran and started on IV fluids and admitted for further evaluation and treatment under the care of Dr. Saez.        Echo March 2025:     CONCLUSIONS:  1. Left ventricular ejection fraction is normal, by visual estimate at 70-75%.  2. There is moderate left ventricular hypertrophy.  3. The left atrium is mildly dilated.  4. There is mild mitral valve stenosis.  5. There is moderate mitral annular calcification.  6. Right ventricular systolic pressure is within normal limits.  7. Mild aortic valve stenosis.  8. Mild left ventricular outflow obstruction at rest and with Valsalva (10 mmHg).     Past Medical History  Medical History           Past Medical History:   Diagnosis Date    Personal history of other diseases of the circulatory system       History of hypertension    Personal history of other endocrine, nutritional and metabolic disease       History of diabetes mellitus            Surgical History  Surgical History             Past Surgical History:   Procedure Laterality Date    APPENDECTOMY   01/03/2014     Appendectomy    BREAST LUMPECTOMY   01/03/2014     Breast Surgery Lumpectomy    CHOLECYSTECTOMY   01/03/2014     Cholecystectomy    OOPHORECTOMY   01/03/2014     Oophorectomy            Social History  Denies alcohol or illicit drug use.  Reports she smokes 2 to 3 packs of cigarettes per week.  Lives alone.  Ambulates with cane as needed.     Family History  Family History   No family history on file.         Allergies  Penicillins and Sulfa (sulfonamide antibiotics)     10 point review of systems performed and is negative besides what is stated in HPI.    "  Physical Exam  Constitutional:       Appearance: Normal appearance.   HENT:      Head: Normocephalic and atraumatic.      Nose: Nose normal.      Mouth/Throat:      Mouth: Mucous membranes are moist.   Eyes:      Conjunctiva/sclera: Conjunctivae normal.   Cardiovascular:      Rate and Rhythm: Normal rate and regular rhythm.      Heart sounds: Murmur heard.   Pulmonary:      Effort: Pulmonary effort is normal.      Comments: Crackles left base  Abdominal:      General: Bowel sounds are normal. There is no distension.      Tenderness: There is abdominal tenderness.      Comments: Somewhat firm   Musculoskeletal:         General: Normal range of motion.      Cervical back: Neck supple.   Skin:     General: Skin is warm and dry.   Neurological:      Mental Status: She is alert. Mental status is at baseline.   Psychiatric:         Mood and Affect: Mood normal.            Last Recorded Vitals  Blood pressure (!) 193/77, pulse 74, temperature 37 °C (98.6 °F), temperature source Temporal, resp. rate 20, height 1.702 m (5' 7\"), weight 77.1 kg (170 lb), SpO2 94%.     Relevant Results     Medications Ordered Prior to Encounter   No current facility-administered medications on file prior to encounter.                  Current Outpatient Medications on File Prior to Encounter   Medication Sig Dispense Refill    acetaminophen (Tylenol) 500 mg tablet Take 1 tablet (500 mg) by mouth every 8 hours if needed for mild pain (1 - 3).        ascorbic acid (Vitamin C) 500 mg tablet Take 1 tablet (500 mg) by mouth once daily.        aspirin 325 mg tablet Take 1 tablet (325 mg) by mouth once daily at bedtime.        B12-levomefolate calcium-B6 (Foltx) 2-1.13-25 mg tablet Take 1 tablet by mouth once daily.        cilostazol (Pletal) 50 mg tablet Take 1 tablet (50 mg) by mouth 2 times a day. 180 tablet 3    lansoprazole (Prevacid) 30 mg DR capsule Take 1 capsule (30 mg) by mouth 2 times a day as needed.        losartan (Cozaar) 100 mg " tablet Take 1 tablet (100 mg) by mouth once daily. 90 tablet 3    metFORMIN XR (Glucophage-XR) 500 mg 24 hr tablet Take 1 tablet (500 mg) by mouth once daily in the evening. Take with meals. (Patient taking differently: Take 1 tablet (500 mg) by mouth once daily as needed (BG >200).)        metoprolol tartrate (Lopressor) 100 mg tablet Take 1 tablet (100 mg) by mouth once daily. (Patient taking differently: Take 1 tablet (100 mg) by mouth once daily at bedtime.) 90 tablet 3    NovoLIN 70/30 U-100 Insulin 100 unit/mL (70-30) injection Inject 43 Units under the skin 2 times a day before meals.        OneTouch Ultra Test strip          oxyCODONE-acetaminophen (Percocet)  mg tablet Take 1 tablet by mouth every 8 hours.        rosuvastatin (Crestor) 20 mg tablet Take 1 tablet (20 mg) by mouth once daily. (Patient taking differently: Take 1 tablet (20 mg) by mouth once daily at bedtime.) 90 tablet 3    zinc sulfate (Zincate) 220 (50 Zn) MG capsule Take 1 capsule (50 mg of elemental zinc) by mouth once daily.        betamethasone dipropionate 0.05 % cream Apply topically 2 times a day. (Patient not taking: Reported on 4/12/2025)        hydrOXYzine pamoate (Vistaril) 50 mg capsule Take 1 capsule (50 mg) by mouth 4 times a day as needed. (Patient not taking: Reported on 4/12/2025)        loratadine-pseudoephedrine (Claritin-D 24-hour)  mg 24 hr tablet Take 1 tablet by mouth once daily. (Patient not taking: Reported on 4/12/2025)        ondansetron ODT (Zofran-ODT) 8 mg disintegrating tablet every 8 hours if needed. (Patient not taking: Reported on 4/12/2025)        polymyxin B sulf-trimethoprim (Polytrim) ophthalmic solution APPLY 1/4 INCH TO AFFECTED EYE 4 TIMES A DAY. (Patient not taking: Reported on 4/12/2025)        solifenacin (VESIcare) 5 mg tablet  (Patient not taking: Reported on 4/12/2025)        sucralfate (Carafate) 100 mg/mL suspension TAKE 10 ML BY MOUTH 4 TIMES A DAY (Patient not taking: Reported on  4/12/2025)        torsemide (Demadex) 20 mg tablet Take 1 tablet (20 mg) by mouth once daily. (Patient not taking: Reported on 4/12/2025) 90 tablet 3                      Results for orders placed or performed during the hospital encounter of 04/12/25 (from the past 24 hours)   POCT GLUCOSE   Result Value Ref Range     POCT Glucose 183 (H) 74 - 99 mg/dL   Troponin I, High Sensitivity   Result Value Ref Range     Troponin I, High Sensitivity 23 (H) 0 - 13 ng/L   Sars-CoV-2 and Influenza A/B PCR   Result Value Ref Range     Flu A Result Not Detected Not Detected     Flu B Result Not Detected Not Detected     Coronavirus 2019, PCR Not Detected Not Detected   POCT GLUCOSE   Result Value Ref Range     POCT Glucose 171 (H) 74 - 99 mg/dL   Urinalysis with Reflex Culture and Microscopic   Result Value Ref Range     Color, Urine Light-Yellow Light-Yellow, Yellow, Dark-Yellow     Appearance, Urine Clear Clear     Specific Gravity, Urine 1.017 1.005 - 1.035     pH, Urine 5.0 5.0, 5.5, 6.0, 6.5, 7.0, 7.5, 8.0     Protein, Urine 10 (TRACE) NEGATIVE, 10 (TRACE), 20 (TRACE) mg/dL     Glucose, Urine Normal Normal mg/dL     Blood, Urine NEGATIVE NEGATIVE mg/dL     Ketones, Urine NEGATIVE NEGATIVE mg/dL     Bilirubin, Urine NEGATIVE NEGATIVE mg/dL     Urobilinogen, Urine Normal Normal mg/dL     Nitrite, Urine NEGATIVE NEGATIVE     Leukocyte Esterase, Urine 250 Triny/uL (A) NEGATIVE   Extra Urine Gray Tube   Result Value Ref Range     Extra Tube Hold for add-ons.     POCT GLUCOSE   Result Value Ref Range     POCT Glucose 131 (H) 74 - 99 mg/dL   Microscopic Only, Urine   Result Value Ref Range     WBC, Urine 11-20 (A) 1-5, NONE /HPF     RBC, Urine 1-2 NONE, 1-2, 3-5 /HPF     Bacteria, Urine 4+ (A) NONE SEEN /HPF     Mucus, Urine FEW Reference range not established. /LPF     Hyaline Casts, Urine OCCASIONAL (A) NONE /LPF   POCT GLUCOSE   Result Value Ref Range     POCT Glucose 81 74 - 99 mg/dL   POCT GLUCOSE   Result Value Ref Range      POCT Glucose 67 (L) 74 - 99 mg/dL   POCT GLUCOSE   Result Value Ref Range     POCT Glucose 64 (L) 74 - 99 mg/dL   POCT GLUCOSE   Result Value Ref Range     POCT Glucose 147 (H) 74 - 99 mg/dL   CBC   Result Value Ref Range     WBC 15.8 (H) 4.4 - 11.3 x10*3/uL     nRBC 0.0 0.0 - 0.0 /100 WBCs     RBC 3.83 (L) 4.00 - 5.20 x10*6/uL     Hemoglobin 11.0 (L) 12.0 - 16.0 g/dL     Hematocrit 36.3 36.0 - 46.0 %     MCV 95 80 - 100 fL     MCH 28.7 26.0 - 34.0 pg     MCHC 30.3 (L) 32.0 - 36.0 g/dL     RDW 13.1 11.5 - 14.5 %     Platelets 163 150 - 450 x10*3/uL   Basic Metabolic Panel   Result Value Ref Range     Glucose 74 74 - 99 mg/dL     Sodium 132 (L) 136 - 145 mmol/L     Potassium 4.1 3.5 - 5.3 mmol/L     Chloride 103 98 - 107 mmol/L     Bicarbonate 23 21 - 32 mmol/L     Anion Gap 10 10 - 20 mmol/L     Urea Nitrogen 39 (H) 6 - 23 mg/dL     Creatinine 1.33 (H) 0.50 - 1.05 mg/dL     eGFR 40 (L) >60 mL/min/1.73m*2     Calcium 8.2 (L) 8.6 - 10.3 mg/dL   POCT GLUCOSE   Result Value Ref Range     POCT Glucose 72 (L) 74 - 99 mg/dL   POCT GLUCOSE   Result Value Ref Range     POCT Glucose 75 74 - 99 mg/dL   POCT GLUCOSE   Result Value Ref Range     POCT Glucose 84 74 - 99 mg/dL   POCT GLUCOSE   Result Value Ref Range     POCT Glucose 99 74 - 99 mg/dL         XR abdomen 1 view     Result Date: 4/13/2025  Interpreted By:  Beka Mays, STUDY: Abdominal series dated 4/13/2025.   INDICATION: Follow-up bowel dilation.   COMPARISON: Correlation is made with 04/12/2025 CT.   ACCESSION NUMBER(S): [id number]   ORDERING CLINICIAN: ROZ RAMESH   TECHNIQUE: Two AP radiographs of the abdomen.   FINDINGS: There are multiple stacked dilated loops of small bowel in the central abdomen measuring up to a proximally 4.1 cm. There is gas and stool in the colon. Lung bases are clear. Degenerative changes seen of the spine and hips.        Multiple dilated loops of small bowel in the central abdomen most compatible with a degree of obstructive  process as further discussed on prior date CT.   Signed by: Beka Mays 4/13/2025 1:01 PM Dictation workstation:   [id number]     CT abdomen pelvis wo IV contrast     Addendum Date: 4/12/2025    Interpreted By:  Chano Marshall, ADDENDUM: Also to be noted at the impression: Subcutaneous opacities and reticulation that may be due to injections although hematoma is possible.   Signed by: Chano Marshall 4/12/2025 2:38 PM   -------- ORIGINAL REPORT -------- Dictation workstation:   [id number]     Result Date: 4/12/2025  Interpreted By:  Chano Marshall, STUDY: CT ABDOMEN PELVIS WO IV CONTRAST;  4/12/2025 2:05 pm   INDICATION: Signs/Symptoms:abd pain.   COMPARISON: None.   ACCESSION NUMBER(S): [id number]   ORDERING CLINICIAN: FRANK BREWSTER   TECHNIQUE: CT of the abdomen and pelvis was performed. Contiguous axial images were obtained at 3 mm slice thickness through the abdomen and pelvis. Coronal and sagittal reconstructions at 3 mm slice thickness were performed.   FINDINGS: LOWER CHEST: Small right pleural effusion and trace left pleural effusion. Coronary artery atherosclerotic calcification and mitral annular calcification.   ABDOMEN:   LIVER: The hepatic parenchyma is homogeneous without evidence of focal liver lesions.The hepatic size is normal.   SPLEEN: The spleen is normal in size and homogeneous.   ADRENAL GLANDS: Bilateral adrenal glands appear normal.   KIDNEYS AND URETERS: Mild bilateral cortical atrophy, the renal cortices otherwise are homogeneous. Radiodensities at the renal sinuses is probably atherosclerotic calcification, although superimposed nonobstructing calculi are possible.   The ureters throughout their distal course are not well identified due to overlying crowded bowel loops, but the tracts otherwise are unremarkable without identified ureteral dilatation or additional radiodense calculi.   PANCREAS: The pancreas appears unremarkable, there is no ductal dilatation or masses.   GALLBLADDER:  Not visualized, presumably with cholecystectomy.   BILE DUCTS: Dilatation of the extrahepatic ducts, the common bile duct measuring 1.4 cm in diameter, most likely from post cholecystectomy state. However as the no prior exams for comparison MRCP would be recommended if there is symptomatology compatible with biliary colic.     VESSELS: Fairly extensive atherosclerotic calcifications are noted predominantly at the aorta and iliac system. There is also moderate atherosclerotic calcification at the mid segment of the superior mesenteric artery, and fairly marked of the inferior mesenteric artery.   PERITONEUM AND RETROPERITONEUM: There is a small volume of ascites best seen in the right upper quadrant along the liver. No free intraperitoneal air.   The retroperitoneum appears unremarkable, and without significant adenopathy.   No enlarged mesenteric lymph nodes.   BOWEL: There is mild distention of multiple small bowel segments with air-fluid levels, associated with reticulation of the mesentery indicating edema. There is decompression of distal small bowel segments, and the pattern would be most compatible with mid to early or partial distal small bowel obstruction. There is also moderate diverticulosis of the distal colon.   PELVIS:   BLADDER: The urinary bladder contour is smooth.   REPRODUCTIVE ORGANS: No pelvic masses.     BONE, ABDOMINAL WALL AND OTHER FINDINGS: No suspicious osseous lesions are identified.   There is a small non incarcerated umbilical hernia containing intra-abdominal fat. There is also attenuation within the subcutaneous fat of the mid/lower abdomen anteriorly that may be from subcutaneous injections. However, hematomas are also possible. There is also reticulation of the subcutaneous fat at the right lateral abdominal wall indicating additional edema.        1.  Small-bowel obstruction with mesenteric edema as described. 2. Small volume of ascites. 3. Diverticulosis. 4. Atherosclerosis as  "described.   MACRO: 1. None   Signed by: Chano Marshall 4/12/2025 2:28 PM Dictation workstation:   [id number]     XR chest 2 views     Result Date: 4/12/2025  Interpreted By:  Beka Mays, STUDY: Chest dated  4/12/2025.   INDICATION: Signs/Symptoms:sob with ambulation   COMPARISON: Chest dated 11/17/2015.   ACCESSION NUMBER(S): [id number]   ORDERING CLINICIAN: FRANK BREWSTER   TECHNIQUE: One view of the chest.   FINDINGS: The lungs are clear.  No pneumothorax or effusion is evident. The cardiomediastinal silhouette is  not enlarged.Degenerative change is seen of the spine and shoulders.        No acute cardiopulmonary process is evident.   MACRO: None   Signed by: Beka Mays 4/12/2025 1:26 PM Dictation workstation:   [id number]             Assessment/Plan     Assessment & Plan  Generalized abdominal pain     Small bowel obstruction (Multi)     Dependence on nicotine from cigarettes     Advanced care planning/counseling discussion     Leukocytosis        Per surgery:  \"Impression:  #SBO   > small bowel dilation; however, other concerning CT findings particularly mesenteric edema and ascites. CT imaging worrisome for possible ischemic/low flow state to bowel; however, serum lactate normal and patient's clinical presentation/symptoms/pain/bowel sounds are consistent with an adhesive small bowel obstruction (partial)  #Leukocytosis (possibly 2/2 above, but may have other infectious etiology, possibly UTI?)   > UA ordered; however, not yet collected and already received antibiotics  #Hyponatremia and hypochloremia  #CKD   Recommendations:  - no acute surgical intervention at this time   > patient offered surgical intervention if she were to worsen clinically; however, she was adamant about not wanting surgery   > would recommend placing NG tube for decompression; however, patient again declined NG tube. She said she may be agreeable if she begins to feel worse/nauseated  - PRN IV Zofran  - PRN IV Morphine " "for severe pain (limit use as able, but unable to give NSAIDs or PO meds)  - IVF: D5NS @ 100  - repeat CBC and BMP in AM\"     Additionally:  Change morphine to Dilaudid given CKD  Check flu and COVID  KUB in a.m.        #Advance care planning     Patient would like to be a DNR CCA     #Nicotine dependence     Reports she lets most of her cigarettes to burn out without smoking them so we will order a nicotine patch as needed     #Chronic conditions:     CAD,  chronic diastolic heart failure, hypertension, hyperlipidemia, peripheral artery disease, diabetes mellitus, carotid artery disease (Right occlusion of ICA, left ICA with > 70% stenosis; follows with Dr. Parish), CKD, Left breast cancer s/p lumpectomy and radiation, kidney stones, and open cholecystectomy and open appendectomy with right ovary removal (for tumor- at age 16)      Hold all p.o. meds  Give IV Protonix   Metoprolol IV around-the-clock with parameters  Patient takes 70/30 at home will conservatively give 30 units of Lantus daily which would be a little less than half of the equivalent of 70/30/day with sliding scale insulin and hypoglycemic protocol     #DVT prophylaxis     SCDs  Heparin                             Chadwick Medel, DO     Review of Systems                     Relevant Results                   Assessment/Plan                      Assessment & Plan  Generalized abdominal pain     Small bowel obstruction (Multi)     Dependence on nicotine from cigarettes     Advanced care planning/counseling discussion     Leukocytosis     Patient fully evaluated on 4/14. CT showing SBO with mesenteric edema, small volume ascites, diverticulosis. Evaluated by general surgery today, recommending NG tube for decompression, ordered zofran, morphine, continuing IV cefepime/flagyl. Repeat KUB ordered, results pending. If KUB looks okay, plan to advance diet. Leukocytosis downtrending, continue to monitor. Hypertension noted this morning, cardiac PO meds " held d/t NPO status. Continue to monitor and restart PO meds when diet advanced. Recheck labs in AM.      Patient still requiring frequent cardiac and SPO2 monitoring. Continue aggressive pulmonary hygiene and oral hygiene. Off loading as tolerated for skin integrity. Medications and labs reviewed.     I spent a total of 60 minutes on the date of the service which included preparing to see the patient, face-to-face patient care, completing clinical documentation, obtaining and/or reviewing separately obtained history, performing a medically appropriate examination, counseling and educating the patient/family/caregiver, ordering medications, tests, or procedures, communicating with other HCPs (not separately reported), independently interpreting results (not separately reported), communicating results to the patient/family/caregiver, and care coordination (not separately reported).   Patient fully evaluated  04/15  for    Problem List Items Addressed This Visit         * (Principal) Generalized abdominal pain - Primary      Other Visit Diagnoses         SBO (small bowel obstruction) (Multi)                 Patient seen resting in bed with head of bed elevated, no s/s or c/o acute difficulties at this time.  Vital signs for last 24 hours Temp:  [36.2 °C (97.2 °F)-36.9 °C (98.4 °F)] 36.4 °C (97.5 °F)  Heart Rate:  [37-76] 37  Resp:  [18-20] 18  BP: (136-186)/(63-82) 152/67    No intake/output data recorded.  Patient still requiring frequent cardiac and SPO2 monitoring. Continue aggressive pulmonary hygiene and oral hygiene. Off loading as tolerated for skin integrity. Medications and labs reviewed-         Results for orders placed or performed during the hospital encounter of 04/12/25 (from the past 24 hours)   POCT GLUCOSE   Result Value Ref Range     POCT Glucose 111 (H) 74 - 99 mg/dL   POCT GLUCOSE   Result Value Ref Range     POCT Glucose 115 (H) 74 - 99 mg/dL   POCT GLUCOSE   Result Value Ref Range     POCT  Glucose 129 (H) 74 - 99 mg/dL   POCT GLUCOSE   Result Value Ref Range     POCT Glucose 114 (H) 74 - 99 mg/dL   POCT GLUCOSE   Result Value Ref Range     POCT Glucose 117 (H) 74 - 99 mg/dL   CBC and Auto Differential   Result Value Ref Range     WBC 12.1 (H) 4.4 - 11.3 x10*3/uL     nRBC 0.0 0.0 - 0.0 /100 WBCs     RBC 3.89 (L) 4.00 - 5.20 x10*6/uL     Hemoglobin 11.4 (L) 12.0 - 16.0 g/dL     Hematocrit 36.5 36.0 - 46.0 %     MCV 94 80 - 100 fL     MCH 29.3 26.0 - 34.0 pg     MCHC 31.2 (L) 32.0 - 36.0 g/dL     RDW 13.0 11.5 - 14.5 %     Platelets 161 150 - 450 x10*3/uL     Neutrophils % 74.4 40.0 - 80.0 %     Immature Granulocytes %, Automated 0.5 0.0 - 0.9 %     Lymphocytes % 14.3 13.0 - 44.0 %     Monocytes % 8.0 2.0 - 10.0 %     Eosinophils % 2.4 0.0 - 6.0 %     Basophils % 0.4 0.0 - 2.0 %     Neutrophils Absolute 8.99 (H) 1.60 - 5.50 x10*3/uL     Immature Granulocytes Absolute, Automated 0.06 0.00 - 0.50 x10*3/uL     Lymphocytes Absolute 1.73 0.80 - 3.00 x10*3/uL     Monocytes Absolute 0.97 (H) 0.05 - 0.80 x10*3/uL     Eosinophils Absolute 0.29 0.00 - 0.40 x10*3/uL     Basophils Absolute 0.05 0.00 - 0.10 x10*3/uL   Comprehensive Metabolic Panel   Result Value Ref Range     Glucose 109 (H) 74 - 99 mg/dL     Sodium 135 (L) 136 - 145 mmol/L     Potassium 3.7 3.5 - 5.3 mmol/L     Chloride 106 98 - 107 mmol/L     Bicarbonate 22 21 - 32 mmol/L     Anion Gap 11 10 - 20 mmol/L     Urea Nitrogen 20 6 - 23 mg/dL     Creatinine 1.14 (H) 0.50 - 1.05 mg/dL     eGFR 48 (L) >60 mL/min/1.73m*2     Calcium 8.0 (L) 8.6 - 10.3 mg/dL     Albumin 3.1 (L) 3.4 - 5.0 g/dL     Alkaline Phosphatase 72 33 - 136 U/L     Total Protein 5.7 (L) 6.4 - 8.2 g/dL     AST 27 9 - 39 U/L     Bilirubin, Total 0.6 0.0 - 1.2 mg/dL     ALT 15 7 - 45 U/L   POCT GLUCOSE   Result Value Ref Range     POCT Glucose 111 (H) 74 - 99 mg/dL      Patient recently received an antibiotic (last 12 hours)         Date/Time Action Medication Dose     04/15/25 0649 New  Bag    metroNIDAZOLE (Flagyl) 500 mg in sodium chloride (iso)  mL 500 mg     04/15/25 0218 New Bag    cefepime (Maxipime) 1 g in dextrose 5% IV 50 mL 1 g     04/14/25 2235 New Bag    metroNIDAZOLE (Flagyl) 500 mg in sodium chloride (iso)  mL 500 mg             Plan discussed with interdisciplinary team,  NG tube for decompression, PRN IV zofran, IV morphine, will continue on IV Antibiotics-continuing IV cefepime/flagyl, WBCs downtrending. Repeat KUB on 4/14 unchanged, will repeat KUB today. Unable to advance diet at this time, will continue IV fluids, NPO. Ice chips ok.  PO meds held d/t NPO status. Continue to monitor and restart PO meds when diet advanced. continue current and repeat labs in the AM.      Discharge planning discussed with patient and care team. Therapy evaluations ordered. Anticipate HHC/SNF at discharge. Patient aware and agreeable to current plan, continue plan as above.      I spent a total of 50 minutes on the date of the service which included preparing to see the patient, face-to-face patient care, completing clinical documentation, obtaining and/or reviewing separately obtained history, performing a medically appropriate examination, counseling and educating the patient/family/caregiver, ordering medications, tests, or procedures, communicating with other HCPs (not separately reported), independently interpreting results (not separately reported), communicating results to the patient/family/caregiver, and care coordination (not separately reported).   Angela Fatima                      Revision History     Scheduled medications  Scheduled Medications[1]  Continuous medications  Continuous Medications[2]  PRN medications  PRN Medications[3]    Results for orders placed or performed during the hospital encounter of 04/12/25 (from the past 24 hours)   POCT GLUCOSE   Result Value Ref Range    POCT Glucose 146 (H) 74 - 99 mg/dL   POCT GLUCOSE   Result Value Ref Range    POCT Glucose  172 (H) 74 - 99 mg/dL   POCT GLUCOSE   Result Value Ref Range    POCT Glucose 183 (H) 74 - 99 mg/dL   POCT GLUCOSE   Result Value Ref Range    POCT Glucose 173 (H) 74 - 99 mg/dL   POCT GLUCOSE   Result Value Ref Range    POCT Glucose 205 (H) 74 - 99 mg/dL   POCT GLUCOSE   Result Value Ref Range    POCT Glucose 163 (H) 74 - 99 mg/dL   CBC and Auto Differential   Result Value Ref Range    WBC 9.3 4.4 - 11.3 x10*3/uL    nRBC 0.0 0.0 - 0.0 /100 WBCs    RBC 3.51 (L) 4.00 - 5.20 x10*6/uL    Hemoglobin 10.0 (L) 12.0 - 16.0 g/dL    Hematocrit 33.8 (L) 36.0 - 46.0 %    MCV 96 80 - 100 fL    MCH 28.5 26.0 - 34.0 pg    MCHC 29.6 (L) 32.0 - 36.0 g/dL    RDW 13.1 11.5 - 14.5 %    Platelets 142 (L) 150 - 450 x10*3/uL    Neutrophils % 68.8 40.0 - 80.0 %    Immature Granulocytes %, Automated 0.8 0.0 - 0.9 %    Lymphocytes % 17.9 13.0 - 44.0 %    Monocytes % 9.3 2.0 - 10.0 %    Eosinophils % 2.9 0.0 - 6.0 %    Basophils % 0.3 0.0 - 2.0 %    Neutrophils Absolute 6.42 (H) 1.60 - 5.50 x10*3/uL    Immature Granulocytes Absolute, Automated 0.07 0.00 - 0.50 x10*3/uL    Lymphocytes Absolute 1.67 0.80 - 3.00 x10*3/uL    Monocytes Absolute 0.87 (H) 0.05 - 0.80 x10*3/uL    Eosinophils Absolute 0.27 0.00 - 0.40 x10*3/uL    Basophils Absolute 0.03 0.00 - 0.10 x10*3/uL   Comprehensive Metabolic Panel   Result Value Ref Range    Glucose 168 (H) 74 - 99 mg/dL    Sodium 137 136 - 145 mmol/L    Potassium 3.6 3.5 - 5.3 mmol/L    Chloride 109 (H) 98 - 107 mmol/L    Bicarbonate 23 21 - 32 mmol/L    Anion Gap 9 (L) 10 - 20 mmol/L    Urea Nitrogen 18 6 - 23 mg/dL    Creatinine 1.12 (H) 0.50 - 1.05 mg/dL    eGFR 50 (L) >60 mL/min/1.73m*2    Calcium 7.8 (L) 8.6 - 10.3 mg/dL    Albumin 2.8 (L) 3.4 - 5.0 g/dL    Alkaline Phosphatase 63 33 - 136 U/L    Total Protein 5.1 (L) 6.4 - 8.2 g/dL    AST 21 9 - 39 U/L    Bilirubin, Total 0.5 0.0 - 1.2 mg/dL    ALT 13 7 - 45 U/L   POCT GLUCOSE   Result Value Ref Range    POCT Glucose 148 (H) 74 - 99 mg/dL   POCT  GLUCOSE   Result Value Ref Range    POCT Glucose 172 (H) 74 - 99 mg/dL     XR abdomen 1 view  Result Date: 4/16/2025  Interpreted By:  Chano Marshall, STUDY: XR ABDOMEN 1 VIEW;  4/16/2025 7:28 am   INDICATION: Signs/Symptoms:sbo.   COMPARISON: 04/15/2025   ACCESSION NUMBER(S): [id number]   ORDERING CLINICIAN: OLIVIA OLIVEROS   FINDINGS: Again there is moderate gastric distention and distention of central small bowel segments with decompression of the colon. The pattern would be most consistent with small-bowel obstruction fairly similar to the prior exam. The study is of limited evaluation of pneumoperitoneum on supine imaging, however no gross evidence of free air is noted.   The soft tissue shadows are unremarkable.   Visualized lungs bases are clear.   Osseous structures demonstrate no acute bony changes.   Other findings: None significant       1.  Small-bowel obstruction.   Signed by: Chano Marshall 4/16/2025 8:28 AM Dictation workstation:   [id number]    XR abdomen 1 view  Result Date: 4/15/2025  Interpreted By:  Chano Marshall, STUDY: XR ABDOMEN 1 VIEW;  4/15/2025 11:35 am   INDICATION: Signs/Symptoms:SBO.   COMPARISON: 04/14/2025   ACCESSION NUMBER(S): [id number]   ORDERING CLINICIAN: OLIVIA OLIVEROS   FINDINGS: There is persistent moderate-to-marked gastric distention as well as central small bowel segments. There is relative decompression of the colon in the pattern would be most consistent with small-bowel obstruction. The study is of limited evaluation of pneumoperitoneum on supine imaging, however no gross evidence of free air is noted.   The soft tissue shadows are unremarkable.   Visualized lungs bases are clear.   Osseous structures demonstrate no acute bony changes.   Other findings: None significant       1.  Persistent small-bowel obstruction.   Signed by: Chano Marshall 4/15/2025 5:50 PM Dictation workstation:   [id number]                Assessment & Plan  Generalized abdominal pain    Small bowel  obstruction (Multi)    Dependence on nicotine from cigarettes    Advanced care planning/counseling discussion    Leukocytosis    Patient fully evaluated on 4/16. Underwent successful NG tube placement this morning with good suction output so far. Repeat KUB ordered, pending results. Labs and medications reviewed. Mild anemia noted, continue to monitor. Continue NPO status with IVF. Plan to stay another day or two with NG in, then trial clear liquids after removal. Recheck labs in AM.        I spent a total of 60 minutes on the date of the service which included preparing to see the patient, face-to-face patient care, completing clinical documentation, obtaining and/or reviewing separately obtained history, performing a medically appropriate examination, counseling and educating the patient/family/caregiver, ordering medications, tests, or procedures, communicating with other HCPs (not separately reported), independently interpreting results (not separately reported), communicating results to the patient/family/caregiver, and care coordination (not separately reported).       ISABELL ERICKSON           [1]   [Held by provider] aspirin, 325 mg, oral, Nightly  cefepime, 1 g, intravenous, q12h  [Held by provider] cilostazol, 50 mg, oral, BID  heparin (porcine), 5,000 Units, subcutaneous, q8h  [Held by provider] insulin glargine, 15 Units, subcutaneous, q24h  insulin lispro, 0-5 Units, subcutaneous, q4h  [Held by provider] losartan, 100 mg, oral, Daily  [Held by provider] metFORMIN XR, 500 mg, oral, Daily before evening meal  metoprolol, 5 mg, intravenous, q6h  [Held by provider] metoprolol tartrate, 100 mg, oral, Nightly  metroNIDAZOLE, 500 mg, intravenous, q8h  [Held by provider] oxyCODONE-acetaminophen, 1 tablet, oral, q8h  pantoprazole, 40 mg, intravenous, Daily  [2]   potassium [id number]-G3-8.45%NaCl, 100 mL/hr, Last Rate: 100 mL/hr (04/16/25 0922)  [3]   PRN medications: dextrose, dextrose, glucagon, glucagon,  HYDROmorphone, nicotine, ondansetron

## 2025-04-16 NOTE — PROGRESS NOTES
"Nancy Long is a 79 y.o. female on day 4 of admission presenting with Generalized abdominal pain.    Subjective   Patient still endorsing nausea and abdominal pain. Told me, \"I'm waiting for my nurse. I need my meds\".    Her KUB still demonstrates significant dilation of stomach and small bowel loops consistent with obstruction; however, there is a tiny amount of air in colon, mainly right. Stool and tiny amount of air on left.    Patient states she knows she passed gas this AM when urinating on the toilet. No BM.       Objective     Physical Exam  Vitals reviewed.   Constitutional:       General: She is not in acute distress.  HENT:      Mouth/Throat:      Mouth: Mucous membranes are moist.   Eyes:      General: No scleral icterus.  Cardiovascular:      Rate and Rhythm: Normal rate and regular rhythm.      Comments: NSR 72  Pulmonary:      Effort: Pulmonary effort is normal. No respiratory distress.      Breath sounds: Normal breath sounds.      Comments: Diminished, poor inspiratory effort, RA  Moist, productive cough  Abdominal:      General: Bowel sounds are absent. There is distension (Mild).      Palpations: Abdomen is soft.      Tenderness: There is no abdominal tenderness.   Musculoskeletal:      Right lower leg: No edema.      Left lower leg: No edema.   Skin:     General: Skin is warm and dry.      Coloration: Skin is not jaundiced or pale.      Comments: Scattered ecchymosis over body- erythematous shins bilaterally R>L   Neurological:      Mental Status: She is alert and oriented to person, place, and time.   Psychiatric:         Behavior: Behavior normal.      Comments: Tearful this morning when describing events of yesterday          Last Recorded Vitals  Blood pressure 159/68, pulse 53, temperature 36.5 °C (97.7 °F), resp. rate 18, height 1.702 m (5' 7.01\"), weight 77.1 kg (169 lb 15.6 oz), SpO2 96%.  Intake/Output last 3 Shifts:  I/O last 3 completed shifts:  In: 3688.3 (47.8 mL/kg) [I.V.:3088.3 " (40.1 mL/kg); IV Piggyback:600]  Out: - (0 mL/kg)   Weight: 77.1 kg     Relevant Results  Results for orders placed or performed during the hospital encounter of 04/12/25 (from the past 24 hours)   POCT GLUCOSE   Result Value Ref Range    POCT Glucose 140 (H) 74 - 99 mg/dL   POCT GLUCOSE   Result Value Ref Range    POCT Glucose 146 (H) 74 - 99 mg/dL   POCT GLUCOSE   Result Value Ref Range    POCT Glucose 172 (H) 74 - 99 mg/dL   POCT GLUCOSE   Result Value Ref Range    POCT Glucose 183 (H) 74 - 99 mg/dL   POCT GLUCOSE   Result Value Ref Range    POCT Glucose 173 (H) 74 - 99 mg/dL   POCT GLUCOSE   Result Value Ref Range    POCT Glucose 205 (H) 74 - 99 mg/dL   POCT GLUCOSE   Result Value Ref Range    POCT Glucose 163 (H) 74 - 99 mg/dL   CBC and Auto Differential   Result Value Ref Range    WBC 9.3 4.4 - 11.3 x10*3/uL    nRBC 0.0 0.0 - 0.0 /100 WBCs    RBC 3.51 (L) 4.00 - 5.20 x10*6/uL    Hemoglobin 10.0 (L) 12.0 - 16.0 g/dL    Hematocrit 33.8 (L) 36.0 - 46.0 %    MCV 96 80 - 100 fL    MCH 28.5 26.0 - 34.0 pg    MCHC 29.6 (L) 32.0 - 36.0 g/dL    RDW 13.1 11.5 - 14.5 %    Platelets 142 (L) 150 - 450 x10*3/uL    Neutrophils % 68.8 40.0 - 80.0 %    Immature Granulocytes %, Automated 0.8 0.0 - 0.9 %    Lymphocytes % 17.9 13.0 - 44.0 %    Monocytes % 9.3 2.0 - 10.0 %    Eosinophils % 2.9 0.0 - 6.0 %    Basophils % 0.3 0.0 - 2.0 %    Neutrophils Absolute 6.42 (H) 1.60 - 5.50 x10*3/uL    Immature Granulocytes Absolute, Automated 0.07 0.00 - 0.50 x10*3/uL    Lymphocytes Absolute 1.67 0.80 - 3.00 x10*3/uL    Monocytes Absolute 0.87 (H) 0.05 - 0.80 x10*3/uL    Eosinophils Absolute 0.27 0.00 - 0.40 x10*3/uL    Basophils Absolute 0.03 0.00 - 0.10 x10*3/uL   Comprehensive Metabolic Panel   Result Value Ref Range    Glucose 168 (H) 74 - 99 mg/dL    Sodium 137 136 - 145 mmol/L    Potassium 3.6 3.5 - 5.3 mmol/L    Chloride 109 (H) 98 - 107 mmol/L    Bicarbonate 23 21 - 32 mmol/L    Anion Gap 9 (L) 10 - 20 mmol/L    Urea Nitrogen 18 6 -  23 mg/dL    Creatinine 1.12 (H) 0.50 - 1.05 mg/dL    eGFR 50 (L) >60 mL/min/1.73m*2    Calcium 7.8 (L) 8.6 - 10.3 mg/dL    Albumin 2.8 (L) 3.4 - 5.0 g/dL    Alkaline Phosphatase 63 33 - 136 U/L    Total Protein 5.1 (L) 6.4 - 8.2 g/dL    AST 21 9 - 39 U/L    Bilirubin, Total 0.5 0.0 - 1.2 mg/dL    ALT 13 7 - 45 U/L   POCT GLUCOSE   Result Value Ref Range    POCT Glucose 148 (H) 74 - 99 mg/dL           Assessment & Plan  Generalized abdominal pain    Small bowel obstruction (Multi)    Dependence on nicotine from cigarettes    Advanced care planning/counseling discussion    Leukocytosis      79 year old female with PMH significant for HTN, HLD, CAD, PAD (follows with Dr. Oliver), carotid artery disease (Right occlusion of ICA, left ICA with > 70% stenosis; follows with Dr. Parish), CKD, Left breast cancer s/p lumpectomy, kidney stones, and IDDM Type 2 (follows with PCP Dr. Bonilla) who presented to [facility] [place] ED on 4/12 with 2 day history of abdominal pain. Admitted to medicine with consult to general surgery with SBO.      Impression:  #pSBO    > Ongoing. KUB worse today despite tiny amount of air in colon and flatus   > CT suggestion of mesenteric edema and ascites concerning for ischemia is not supported by exam or lab findings   #Leukocytosis - resolved    > Reactive vs infectious  UTI?    > UC with no growth, but was collected after patient already received antibiotics      Recommendations:  - No acute surgical intervention is indicated at this time    > Pt previously refused surgery, now is more open to it if needed    > Patient willing to allow NG placement today  - NPO except ice chips   > Lantus currently held; this is appropriate    > continue hypoglycemia order set   - Monitor for ROBF  - PRN IV Zofran  - PRN IV Morphine for severe pain (limit use as able, but unable to give NSAIDs or PO meds)  - Continue IV Cefepime/Flagyl   - IVF: change to D51/2NS with 20 mEq KCL @ 100  - Encourage OOB and ambulation  - IS  -  Repeat BMP in AM. NO Need for CMP or CBC     PT/OT      PPX: SCDs, SQH     The patient will be seen and discussed with the attending surgeon Dr. Martinez     I spent 25 minutes in the professional and overall care of this patient.      Sarah Michaels, APRN-CNP

## 2025-04-17 ENCOUNTER — APPOINTMENT (OUTPATIENT)
Dept: RADIOLOGY | Facility: HOSPITAL | Age: 80
DRG: 336 | End: 2025-04-17
Payer: MEDICARE

## 2025-04-17 ENCOUNTER — ANESTHESIA EVENT (OUTPATIENT)
Dept: OPERATING ROOM | Facility: HOSPITAL | Age: 80
End: 2025-04-17
Payer: MEDICARE

## 2025-04-17 ENCOUNTER — ANESTHESIA (OUTPATIENT)
Dept: OPERATING ROOM | Facility: HOSPITAL | Age: 80
End: 2025-04-17
Payer: MEDICARE

## 2025-04-17 PROBLEM — N28.9 RENAL INSUFFICIENCY: Status: ACTIVE | Noted: 2025-02-21

## 2025-04-17 PROBLEM — K56.609 SBO (SMALL BOWEL OBSTRUCTION) (MULTI): Status: ACTIVE | Noted: 2025-04-12

## 2025-04-17 PROBLEM — F17.200 CURRENT SMOKER: Status: ACTIVE | Noted: 2025-04-17

## 2025-04-17 LAB
ALBUMIN SERPL BCP-MCNC: 2.8 G/DL (ref 3.4–5)
ALP SERPL-CCNC: 59 U/L (ref 33–136)
ALT SERPL W P-5'-P-CCNC: 10 U/L (ref 7–45)
ANION GAP SERPL CALC-SCNC: 10 MMOL/L (ref 10–20)
AST SERPL W P-5'-P-CCNC: 15 U/L (ref 9–39)
BASOPHILS # BLD AUTO: 0.04 X10*3/UL (ref 0–0.1)
BASOPHILS NFR BLD AUTO: 0.4 %
BILIRUB SERPL-MCNC: 0.5 MG/DL (ref 0–1.2)
BUN SERPL-MCNC: 18 MG/DL (ref 6–23)
CALCIUM SERPL-MCNC: 7.9 MG/DL (ref 8.6–10.3)
CHLORIDE SERPL-SCNC: 107 MMOL/L (ref 98–107)
CO2 SERPL-SCNC: 23 MMOL/L (ref 21–32)
CREAT SERPL-MCNC: 1.22 MG/DL (ref 0.5–1.05)
EGFRCR SERPLBLD CKD-EPI 2021: 45 ML/MIN/1.73M*2
EOSINOPHIL # BLD AUTO: 0.35 X10*3/UL (ref 0–0.4)
EOSINOPHIL NFR BLD AUTO: 3.3 %
ERYTHROCYTE [DISTWIDTH] IN BLOOD BY AUTOMATED COUNT: 13.3 % (ref 11.5–14.5)
GLUCOSE BLD MANUAL STRIP-MCNC: 126 MG/DL (ref 74–99)
GLUCOSE BLD MANUAL STRIP-MCNC: 143 MG/DL (ref 74–99)
GLUCOSE BLD MANUAL STRIP-MCNC: 163 MG/DL (ref 74–99)
GLUCOSE BLD MANUAL STRIP-MCNC: 166 MG/DL (ref 74–99)
GLUCOSE BLD MANUAL STRIP-MCNC: 202 MG/DL (ref 74–99)
GLUCOSE SERPL-MCNC: 129 MG/DL (ref 74–99)
HCT VFR BLD AUTO: 33.3 % (ref 36–46)
HGB BLD-MCNC: 10.3 G/DL (ref 12–16)
HOLD SPECIMEN: NORMAL
IMM GRANULOCYTES # BLD AUTO: 0.08 X10*3/UL (ref 0–0.5)
IMM GRANULOCYTES NFR BLD AUTO: 0.8 % (ref 0–0.9)
LYMPHOCYTES # BLD AUTO: 1.74 X10*3/UL (ref 0.8–3)
LYMPHOCYTES NFR BLD AUTO: 16.5 %
MCH RBC QN AUTO: 29.3 PG (ref 26–34)
MCHC RBC AUTO-ENTMCNC: 30.9 G/DL (ref 32–36)
MCV RBC AUTO: 95 FL (ref 80–100)
MONOCYTES # BLD AUTO: 1.07 X10*3/UL (ref 0.05–0.8)
MONOCYTES NFR BLD AUTO: 10.2 %
NEUTROPHILS # BLD AUTO: 7.24 X10*3/UL (ref 1.6–5.5)
NEUTROPHILS NFR BLD AUTO: 68.8 %
NRBC BLD-RTO: 0 /100 WBCS (ref 0–0)
PLATELET # BLD AUTO: 154 X10*3/UL (ref 150–450)
POTASSIUM SERPL-SCNC: 3.9 MMOL/L (ref 3.5–5.3)
PROT SERPL-MCNC: 5.1 G/DL (ref 6.4–8.2)
RBC # BLD AUTO: 3.51 X10*6/UL (ref 4–5.2)
SODIUM SERPL-SCNC: 136 MMOL/L (ref 136–145)
WBC # BLD AUTO: 10.5 X10*3/UL (ref 4.4–11.3)

## 2025-04-17 PROCEDURE — 2500000004 HC RX 250 GENERAL PHARMACY W/ HCPCS (ALT 636 FOR OP/ED): Mod: JZ | Performed by: ANESTHESIOLOGY

## 2025-04-17 PROCEDURE — A44180 PR LAP, SURG ENTEROLYSIS: Performed by: NURSE ANESTHETIST, CERTIFIED REGISTERED

## 2025-04-17 PROCEDURE — 2500000004 HC RX 250 GENERAL PHARMACY W/ HCPCS (ALT 636 FOR OP/ED): Mod: JZ | Performed by: REGISTERED NURSE

## 2025-04-17 PROCEDURE — 2500000001 HC RX 250 WO HCPCS SELF ADMINISTERED DRUGS (ALT 637 FOR MEDICARE OP): Performed by: REGISTERED NURSE

## 2025-04-17 PROCEDURE — 3700000001 HC GENERAL ANESTHESIA TIME - INITIAL BASE CHARGE: Performed by: SURGERY

## 2025-04-17 PROCEDURE — 1100000001 HC PRIVATE ROOM DAILY

## 2025-04-17 PROCEDURE — 2500000002 HC RX 250 W HCPCS SELF ADMINISTERED DRUGS (ALT 637 FOR MEDICARE OP, ALT 636 FOR OP/ED): Performed by: NURSE PRACTITIONER

## 2025-04-17 PROCEDURE — 2500000004 HC RX 250 GENERAL PHARMACY W/ HCPCS (ALT 636 FOR OP/ED): Performed by: NURSE PRACTITIONER

## 2025-04-17 PROCEDURE — 2500000004 HC RX 250 GENERAL PHARMACY W/ HCPCS (ALT 636 FOR OP/ED): Performed by: SURGERY

## 2025-04-17 PROCEDURE — 3600000003 HC OR TIME - INITIAL BASE CHARGE - PROCEDURE LEVEL THREE: Performed by: SURGERY

## 2025-04-17 PROCEDURE — 36415 COLL VENOUS BLD VENIPUNCTURE: CPT | Performed by: INTERNAL MEDICINE

## 2025-04-17 PROCEDURE — 3700000002 HC GENERAL ANESTHESIA TIME - EACH INCREMENTAL 1 MINUTE: Performed by: SURGERY

## 2025-04-17 PROCEDURE — 2500000004 HC RX 250 GENERAL PHARMACY W/ HCPCS (ALT 636 FOR OP/ED): Mod: JZ | Performed by: NURSE PRACTITIONER

## 2025-04-17 PROCEDURE — 84075 ASSAY ALKALINE PHOSPHATASE: CPT | Performed by: INTERNAL MEDICINE

## 2025-04-17 PROCEDURE — 2500000005 HC RX 250 GENERAL PHARMACY W/O HCPCS: Performed by: SURGERY

## 2025-04-17 PROCEDURE — 7100000001 HC RECOVERY ROOM TIME - INITIAL BASE CHARGE: Performed by: SURGERY

## 2025-04-17 PROCEDURE — 99100 ANES PT EXTEME AGE<1 YR&>70: CPT | Performed by: ANESTHESIOLOGY

## 2025-04-17 PROCEDURE — 85025 COMPLETE CBC W/AUTO DIFF WBC: CPT | Performed by: INTERNAL MEDICINE

## 2025-04-17 PROCEDURE — 0DN84ZZ RELEASE SMALL INTESTINE, PERCUTANEOUS ENDOSCOPIC APPROACH: ICD-10-PCS | Performed by: SURGERY

## 2025-04-17 PROCEDURE — 7100000002 HC RECOVERY ROOM TIME - EACH INCREMENTAL 1 MINUTE: Performed by: SURGERY

## 2025-04-17 PROCEDURE — 2720000007 HC OR 272 NO HCPCS: Performed by: SURGERY

## 2025-04-17 PROCEDURE — 99233 SBSQ HOSP IP/OBS HIGH 50: CPT | Performed by: REGISTERED NURSE

## 2025-04-17 PROCEDURE — A44180 PR LAP, SURG ENTEROLYSIS: Performed by: ANESTHESIOLOGY

## 2025-04-17 PROCEDURE — 3600000008 HC OR TIME - EACH INCREMENTAL 1 MINUTE - PROCEDURE LEVEL THREE: Performed by: SURGERY

## 2025-04-17 PROCEDURE — 74018 RADEX ABDOMEN 1 VIEW: CPT | Performed by: RADIOLOGY

## 2025-04-17 PROCEDURE — 44180 LAP ENTEROLYSIS: CPT | Performed by: SURGERY

## 2025-04-17 PROCEDURE — 0DNB4ZZ RELEASE ILEUM, PERCUTANEOUS ENDOSCOPIC APPROACH: ICD-10-PCS | Performed by: SURGERY

## 2025-04-17 PROCEDURE — 74018 RADEX ABDOMEN 1 VIEW: CPT

## 2025-04-17 PROCEDURE — 2500000004 HC RX 250 GENERAL PHARMACY W/ HCPCS (ALT 636 FOR OP/ED): Mod: JZ | Performed by: NURSE ANESTHETIST, CERTIFIED REGISTERED

## 2025-04-17 PROCEDURE — 82947 ASSAY GLUCOSE BLOOD QUANT: CPT

## 2025-04-17 RX ORDER — BUPIVACAINE HYDROCHLORIDE 5 MG/ML
INJECTION, SOLUTION PERINEURAL AS NEEDED
Status: DISCONTINUED | OUTPATIENT
Start: 2025-04-17 | End: 2025-04-17 | Stop reason: HOSPADM

## 2025-04-17 RX ORDER — CEFAZOLIN 1 G/1
INJECTION, POWDER, FOR SOLUTION INTRAVENOUS AS NEEDED
Status: DISCONTINUED | OUTPATIENT
Start: 2025-04-17 | End: 2025-04-17

## 2025-04-17 RX ORDER — ONDANSETRON HYDROCHLORIDE 2 MG/ML
INJECTION, SOLUTION INTRAVENOUS AS NEEDED
Status: DISCONTINUED | OUTPATIENT
Start: 2025-04-17 | End: 2025-04-17

## 2025-04-17 RX ORDER — LIDOCAINE HYDROCHLORIDE 10 MG/ML
0.1 INJECTION, SOLUTION INFILTRATION; PERINEURAL ONCE
Status: DISCONTINUED | OUTPATIENT
Start: 2025-04-17 | End: 2025-04-17 | Stop reason: HOSPADM

## 2025-04-17 RX ORDER — SODIUM CHLORIDE 0.9 G/100ML
INJECTION, SOLUTION IRRIGATION AS NEEDED
Status: DISCONTINUED | OUTPATIENT
Start: 2025-04-17 | End: 2025-04-17 | Stop reason: HOSPADM

## 2025-04-17 RX ORDER — LIDOCAINE HCL/PF 100 MG/5ML
SYRINGE (ML) INTRAVENOUS AS NEEDED
Status: DISCONTINUED | OUTPATIENT
Start: 2025-04-17 | End: 2025-04-17

## 2025-04-17 RX ORDER — FENTANYL CITRATE 50 UG/ML
INJECTION, SOLUTION INTRAMUSCULAR; INTRAVENOUS AS NEEDED
Status: DISCONTINUED | OUTPATIENT
Start: 2025-04-17 | End: 2025-04-17

## 2025-04-17 RX ORDER — IPRATROPIUM BROMIDE 0.5 MG/2.5ML
500 SOLUTION RESPIRATORY (INHALATION) ONCE
Status: DISCONTINUED | OUTPATIENT
Start: 2025-04-17 | End: 2025-04-17 | Stop reason: HOSPADM

## 2025-04-17 RX ORDER — PROPOFOL 10 MG/ML
INJECTION, EMULSION INTRAVENOUS AS NEEDED
Status: DISCONTINUED | OUTPATIENT
Start: 2025-04-17 | End: 2025-04-17

## 2025-04-17 RX ORDER — ALBUTEROL SULFATE 0.83 MG/ML
2.5 SOLUTION RESPIRATORY (INHALATION) ONCE AS NEEDED
Status: DISCONTINUED | OUTPATIENT
Start: 2025-04-17 | End: 2025-04-17 | Stop reason: HOSPADM

## 2025-04-17 RX ORDER — ONDANSETRON HYDROCHLORIDE 2 MG/ML
4 INJECTION, SOLUTION INTRAVENOUS ONCE AS NEEDED
Status: DISCONTINUED | OUTPATIENT
Start: 2025-04-17 | End: 2025-04-17 | Stop reason: HOSPADM

## 2025-04-17 RX ORDER — HYDROMORPHONE HYDROCHLORIDE 0.2 MG/ML
0.2 INJECTION INTRAMUSCULAR; INTRAVENOUS; SUBCUTANEOUS EVERY 4 HOURS PRN
Status: DISCONTINUED | OUTPATIENT
Start: 2025-04-17 | End: 2025-04-21 | Stop reason: HOSPADM

## 2025-04-17 RX ORDER — SUCCINYLCHOLINE CHLORIDE 20 MG/ML INJECTION SOLUTION
SOLUTION AS NEEDED
Status: DISCONTINUED | OUTPATIENT
Start: 2025-04-17 | End: 2025-04-17

## 2025-04-17 RX ORDER — MEPERIDINE HYDROCHLORIDE 50 MG/ML
12.5 INJECTION INTRAMUSCULAR; INTRAVENOUS; SUBCUTANEOUS EVERY 10 MIN PRN
Status: DISCONTINUED | OUTPATIENT
Start: 2025-04-17 | End: 2025-04-17 | Stop reason: HOSPADM

## 2025-04-17 RX ORDER — ROCURONIUM BROMIDE 10 MG/ML
INJECTION, SOLUTION INTRAVENOUS AS NEEDED
Status: DISCONTINUED | OUTPATIENT
Start: 2025-04-17 | End: 2025-04-17

## 2025-04-17 RX ORDER — PHENYLEPHRINE HCL IN 0.9% NACL 1 MG/10 ML
SYRINGE (ML) INTRAVENOUS AS NEEDED
Status: DISCONTINUED | OUTPATIENT
Start: 2025-04-17 | End: 2025-04-17

## 2025-04-17 RX ORDER — ACETAMINOPHEN 325 MG/1
650 TABLET ORAL EVERY 4 HOURS PRN
Status: DISCONTINUED | OUTPATIENT
Start: 2025-04-17 | End: 2025-04-17 | Stop reason: HOSPADM

## 2025-04-17 RX ORDER — MIDAZOLAM HYDROCHLORIDE 1 MG/ML
INJECTION, SOLUTION INTRAMUSCULAR; INTRAVENOUS AS NEEDED
Status: DISCONTINUED | OUTPATIENT
Start: 2025-04-17 | End: 2025-04-17

## 2025-04-17 RX ORDER — ONDANSETRON HYDROCHLORIDE 2 MG/ML
4 INJECTION, SOLUTION INTRAVENOUS ONCE AS NEEDED
Status: COMPLETED | OUTPATIENT
Start: 2025-04-17 | End: 2025-04-17

## 2025-04-17 RX ORDER — PROCHLORPERAZINE EDISYLATE 5 MG/ML
5 INJECTION INTRAMUSCULAR; INTRAVENOUS ONCE AS NEEDED
Status: DISCONTINUED | OUTPATIENT
Start: 2025-04-17 | End: 2025-04-17 | Stop reason: HOSPADM

## 2025-04-17 RX ORDER — SODIUM CHLORIDE, SODIUM LACTATE, POTASSIUM CHLORIDE, CALCIUM CHLORIDE 600; 310; 30; 20 MG/100ML; MG/100ML; MG/100ML; MG/100ML
100 INJECTION, SOLUTION INTRAVENOUS CONTINUOUS
Status: DISCONTINUED | OUTPATIENT
Start: 2025-04-17 | End: 2025-04-17 | Stop reason: HOSPADM

## 2025-04-17 RX ADMIN — HEPARIN SODIUM 5000 UNITS: 5000 INJECTION INTRAVENOUS; SUBCUTANEOUS at 22:03

## 2025-04-17 RX ADMIN — DEXTROSE, SODIUM CHLORIDE, AND POTASSIUM CHLORIDE 100 ML/HR: 5; .45; .15 INJECTION INTRAVENOUS at 13:09

## 2025-04-17 RX ADMIN — PANTOPRAZOLE SODIUM 40 MG: 40 INJECTION, POWDER, FOR SOLUTION INTRAVENOUS at 09:27

## 2025-04-17 RX ADMIN — Medication 100 MCG: at 15:57

## 2025-04-17 RX ADMIN — FENTANYL CITRATE 25 MCG: 50 INJECTION, SOLUTION INTRAMUSCULAR; INTRAVENOUS at 16:34

## 2025-04-17 RX ADMIN — LIDOCAINE HYDROCHLORIDE 60 MG: 20 INJECTION INTRAVENOUS at 15:39

## 2025-04-17 RX ADMIN — SODIUM CHLORIDE, POTASSIUM CHLORIDE, SODIUM LACTATE AND CALCIUM CHLORIDE: 600; 310; 30; 20 INJECTION, SOLUTION INTRAVENOUS at 15:36

## 2025-04-17 RX ADMIN — ONDANSETRON 4 MG: 2 INJECTION INTRAMUSCULAR; INTRAVENOUS at 04:20

## 2025-04-17 RX ADMIN — ROCURONIUM BROMIDE 20 MG: 10 INJECTION INTRAVENOUS at 16:10

## 2025-04-17 RX ADMIN — ROCURONIUM BROMIDE 50 MG: 10 INJECTION INTRAVENOUS at 15:39

## 2025-04-17 RX ADMIN — METRONIDAZOLE 500 MG: 500 INJECTION, SOLUTION INTRAVENOUS at 06:17

## 2025-04-17 RX ADMIN — HYDROMORPHONE HYDROCHLORIDE 0.2 MG: 0.2 INJECTION, SOLUTION INTRAMUSCULAR; INTRAVENOUS; SUBCUTANEOUS at 21:52

## 2025-04-17 RX ADMIN — CEFEPIME 1 G: 1 INJECTION, POWDER, FOR SOLUTION INTRAMUSCULAR; INTRAVENOUS at 02:00

## 2025-04-17 RX ADMIN — HEPARIN SODIUM 5000 UNITS: 5000 INJECTION INTRAVENOUS; SUBCUTANEOUS at 13:31

## 2025-04-17 RX ADMIN — PROPOFOL 120 MG: 10 INJECTION, EMULSION INTRAVENOUS at 15:39

## 2025-04-17 RX ADMIN — SUGAMMADEX 200 MG: 100 INJECTION, SOLUTION INTRAVENOUS at 17:06

## 2025-04-17 RX ADMIN — FENTANYL CITRATE 75 MCG: 50 INJECTION, SOLUTION INTRAMUSCULAR; INTRAVENOUS at 17:13

## 2025-04-17 RX ADMIN — ONDANSETRON 4 MG: 2 INJECTION INTRAMUSCULAR; INTRAVENOUS at 13:39

## 2025-04-17 RX ADMIN — METRONIDAZOLE 500 MG: 500 INJECTION, SOLUTION INTRAVENOUS at 13:13

## 2025-04-17 RX ADMIN — ONDANSETRON 4 MG: 2 INJECTION INTRAMUSCULAR; INTRAVENOUS at 09:44

## 2025-04-17 RX ADMIN — HYDROMORPHONE HYDROCHLORIDE 0.2 MG: 0.2 INJECTION, SOLUTION INTRAMUSCULAR; INTRAVENOUS; SUBCUTANEOUS at 13:38

## 2025-04-17 RX ADMIN — ONDANSETRON 4 MG: 2 INJECTION INTRAMUSCULAR; INTRAVENOUS at 17:33

## 2025-04-17 RX ADMIN — CEFAZOLIN 2 G: 330 INJECTION, POWDER, FOR SOLUTION INTRAMUSCULAR; INTRAVENOUS at 15:55

## 2025-04-17 RX ADMIN — BENZOCAINE AND MENTHOL 1 LOZENGE: 15; 3.6 LOZENGE ORAL at 04:31

## 2025-04-17 RX ADMIN — MIDAZOLAM 1 MG: 1 INJECTION INTRAMUSCULAR; INTRAVENOUS at 15:39

## 2025-04-17 RX ADMIN — METRONIDAZOLE 500 MG: 500 INJECTION, SOLUTION INTRAVENOUS at 21:41

## 2025-04-17 RX ADMIN — INSULIN LISPRO 1 UNITS: 100 INJECTION, SOLUTION INTRAVENOUS; SUBCUTANEOUS at 01:56

## 2025-04-17 RX ADMIN — MIDAZOLAM 1 MG: 1 INJECTION INTRAMUSCULAR; INTRAVENOUS at 15:37

## 2025-04-17 RX ADMIN — METOPROLOL TARTRATE 5 MG: 5 INJECTION INTRAVENOUS at 02:12

## 2025-04-17 RX ADMIN — METOPROLOL TARTRATE 5 MG: 5 INJECTION INTRAVENOUS at 20:56

## 2025-04-17 RX ADMIN — HYDROMORPHONE HYDROCHLORIDE 0.2 MG: 0.2 INJECTION, SOLUTION INTRAMUSCULAR; INTRAVENOUS; SUBCUTANEOUS at 09:35

## 2025-04-17 RX ADMIN — METOPROLOL TARTRATE 5 MG: 5 INJECTION INTRAVENOUS at 13:41

## 2025-04-17 RX ADMIN — ONDANSETRON 4 MG: 2 INJECTION INTRAMUSCULAR; INTRAVENOUS at 16:56

## 2025-04-17 RX ADMIN — ONDANSETRON 4 MG: 2 INJECTION INTRAMUSCULAR; INTRAVENOUS at 21:53

## 2025-04-17 RX ADMIN — HYDROMORPHONE HYDROCHLORIDE 0.5 MG: 1 INJECTION, SOLUTION INTRAMUSCULAR; INTRAVENOUS; SUBCUTANEOUS at 04:20

## 2025-04-17 RX ADMIN — HEPARIN SODIUM 5000 UNITS: 5000 INJECTION INTRAVENOUS; SUBCUTANEOUS at 06:18

## 2025-04-17 RX ADMIN — FENTANYL CITRATE 100 MCG: 50 INJECTION, SOLUTION INTRAMUSCULAR; INTRAVENOUS at 15:39

## 2025-04-17 RX ADMIN — Medication 100 MG: at 15:39

## 2025-04-17 RX ADMIN — CEFEPIME 1 G: 1 INJECTION, POWDER, FOR SOLUTION INTRAMUSCULAR; INTRAVENOUS at 13:13

## 2025-04-17 RX ADMIN — METOPROLOL TARTRATE 5 MG: 5 INJECTION INTRAVENOUS at 09:27

## 2025-04-17 SDOH — HEALTH STABILITY: MENTAL HEALTH: CURRENT SMOKER: 0

## 2025-04-17 ASSESSMENT — COLUMBIA-SUICIDE SEVERITY RATING SCALE - C-SSRS
1. IN THE PAST MONTH, HAVE YOU WISHED YOU WERE DEAD OR WISHED YOU COULD GO TO SLEEP AND NOT WAKE UP?: NO
6. HAVE YOU EVER DONE ANYTHING, STARTED TO DO ANYTHING, OR PREPARED TO DO ANYTHING TO END YOUR LIFE?: NO
2. HAVE YOU ACTUALLY HAD ANY THOUGHTS OF KILLING YOURSELF?: NO
1. IN THE PAST MONTH, HAVE YOU WISHED YOU WERE DEAD OR WISHED YOU COULD GO TO SLEEP AND NOT WAKE UP?: NO
2. HAVE YOU ACTUALLY HAD ANY THOUGHTS OF KILLING YOURSELF?: NO
6. HAVE YOU EVER DONE ANYTHING, STARTED TO DO ANYTHING, OR PREPARED TO DO ANYTHING TO END YOUR LIFE?: NO

## 2025-04-17 ASSESSMENT — PAIN SCALES - GENERAL
PAINLEVEL_OUTOF10: 3
PAINLEVEL_OUTOF10: 2
PAINLEVEL_OUTOF10: 7
PAINLEVEL_OUTOF10: 9
PAINLEVEL_OUTOF10: 0 - NO PAIN
PAINLEVEL_OUTOF10: 0 - NO PAIN
PAINLEVEL_OUTOF10: 3
PAINLEVEL_OUTOF10: 2
PAINLEVEL_OUTOF10: 0 - NO PAIN
PAINLEVEL_OUTOF10: 9

## 2025-04-17 ASSESSMENT — PAIN - FUNCTIONAL ASSESSMENT
PAIN_FUNCTIONAL_ASSESSMENT: UNABLE TO SELF-REPORT
PAIN_FUNCTIONAL_ASSESSMENT: 0-10

## 2025-04-17 ASSESSMENT — COGNITIVE AND FUNCTIONAL STATUS - GENERAL
MOBILITY SCORE: 19
TOILETING: A LITTLE
MOVING TO AND FROM BED TO CHAIR: A LITTLE
HELP NEEDED FOR BATHING: A LITTLE
DAILY ACTIVITIY SCORE: 22
CLIMB 3 TO 5 STEPS WITH RAILING: A LOT
STANDING UP FROM CHAIR USING ARMS: A LITTLE
WALKING IN HOSPITAL ROOM: A LITTLE

## 2025-04-17 ASSESSMENT — PAIN DESCRIPTION - LOCATION
LOCATION: OTHER (COMMENT)
LOCATION: ABDOMEN
LOCATION: ABDOMEN

## 2025-04-17 ASSESSMENT — PAIN DESCRIPTION - DESCRIPTORS
DESCRIPTORS: ACHING
DESCRIPTORS: ACHING

## 2025-04-17 NOTE — PROGRESS NOTES
"Nancy Long is a 79 y.o. female on day 5 of admission presenting with Generalized abdominal pain.    Subjective   Patient still endorsing pain and nausea. Says she thinks she passed a little gas this morning, but is not certain. Has not had a BM.    Very upset stating \"Nothing ever goes right around here\". Upset that she did not get her ice chips refilled when she asked.  Says that after her KUB she was left \"2 hours with this thing turned off\" - referring to NG that she says was not re-connected to suction. Her NG IS currently on suction; however, the suction does not seem to be working appropriately. When increased, there was at least 150mL additional output and copious gas out from NG. Additionally, NG required flushing to facilitate drainage.    KUB with ongoing small bowel dilation and ongoing gastric distention, which coincides with NG suction malfunction.     When plans for possible PICC line/TPN and even potential surgical intervention were discussed, patient continues to be very non-committal and continually states \"I don't know. I don't even know what that all is\".    Despite multiple and repeated attempts at educating patient regarding her bowel obstruction, she still seems to have an inability to comprehend her acute illness. I finally was able to have her agree to me calling a close friend to give her the medical update.  Additionally, patient would like to have Dr. Saez give his recommendations regarding our plan.       Objective     Physical Exam  Vitals reviewed.   Constitutional:       General: She is not in acute distress.     Appearance: She is ill-appearing.   HENT:      Mouth/Throat:      Mouth: Mucous membranes are moist.   Eyes:      General: No scleral icterus.  Cardiovascular:      Rate and Rhythm: Normal rate and regular rhythm.      Pulses: Normal pulses.   Pulmonary:      Effort: Pulmonary effort is normal. No respiratory distress.      Breath sounds: Normal breath sounds.      " "Comments: Diminished, poor inspiratory effort, RA  Moist, productive cough  Abdominal:      General: Bowel sounds are decreased. There is distension (Mild).      Palpations: Abdomen is soft.      Tenderness: There is no abdominal tenderness.      Comments: There are some higher pitched tinkling bowel sounds today compared to none yesterday    Musculoskeletal:      Right lower leg: No edema.      Left lower leg: No edema.   Skin:     General: Skin is warm and dry.      Coloration: Skin is not jaundiced or pale.   Neurological:      Mental Status: She is alert and oriented to person, place, and time.   Psychiatric:         Behavior: Behavior normal.      Comments: Defeated mood         Last Recorded Vitals  Blood pressure 143/65, pulse 71, temperature 36.5 °C (97.7 °F), resp. rate 20, height 1.702 m (5' 7.01\"), weight 77.1 kg (169 lb 15.6 oz), SpO2 92%.  Intake/Output last 3 Shifts:  I/O last 3 completed shifts:  In: 5433.3 (70.5 mL/kg) [I.V.:4083.3 (53 mL/kg); IV Piggyback:1350]  Out: 325 (4.2 mL/kg) [Emesis/NG output:325]  Weight: 77.1 kg     Relevant Results  Results for orders placed or performed during the hospital encounter of 04/12/25 (from the past 24 hours)   POCT GLUCOSE   Result Value Ref Range    POCT Glucose 172 (H) 74 - 99 mg/dL   POCT GLUCOSE   Result Value Ref Range    POCT Glucose 172 (H) 74 - 99 mg/dL   POCT GLUCOSE   Result Value Ref Range    POCT Glucose 153 (H) 74 - 99 mg/dL   POCT GLUCOSE   Result Value Ref Range    POCT Glucose 142 (H) 74 - 99 mg/dL   POCT GLUCOSE   Result Value Ref Range    POCT Glucose 112 (H) 74 - 99 mg/dL   POCT GLUCOSE   Result Value Ref Range    POCT Glucose 163 (H) 74 - 99 mg/dL   POCT GLUCOSE   Result Value Ref Range    POCT Glucose 126 (H) 74 - 99 mg/dL   CBC and Auto Differential   Result Value Ref Range    WBC 10.5 4.4 - 11.3 x10*3/uL    nRBC 0.0 0.0 - 0.0 /100 WBCs    RBC 3.51 (L) 4.00 - 5.20 x10*6/uL    Hemoglobin 10.3 (L) 12.0 - 16.0 g/dL    Hematocrit 33.3 (L) " 36.0 - 46.0 %    MCV 95 80 - 100 fL    MCH 29.3 26.0 - 34.0 pg    MCHC 30.9 (L) 32.0 - 36.0 g/dL    RDW 13.3 11.5 - 14.5 %    Platelets 154 150 - 450 x10*3/uL    Neutrophils % 68.8 40.0 - 80.0 %    Immature Granulocytes %, Automated 0.8 0.0 - 0.9 %    Lymphocytes % 16.5 13.0 - 44.0 %    Monocytes % 10.2 2.0 - 10.0 %    Eosinophils % 3.3 0.0 - 6.0 %    Basophils % 0.4 0.0 - 2.0 %    Neutrophils Absolute 7.24 (H) 1.60 - 5.50 x10*3/uL    Immature Granulocytes Absolute, Automated 0.08 0.00 - 0.50 x10*3/uL    Lymphocytes Absolute 1.74 0.80 - 3.00 x10*3/uL    Monocytes Absolute 1.07 (H) 0.05 - 0.80 x10*3/uL    Eosinophils Absolute 0.35 0.00 - 0.40 x10*3/uL    Basophils Absolute 0.04 0.00 - 0.10 x10*3/uL   Comprehensive Metabolic Panel   Result Value Ref Range    Glucose 129 (H) 74 - 99 mg/dL    Sodium 136 136 - 145 mmol/L    Potassium 3.9 3.5 - 5.3 mmol/L    Chloride 107 98 - 107 mmol/L    Bicarbonate 23 21 - 32 mmol/L    Anion Gap 10 10 - 20 mmol/L    Urea Nitrogen 18 6 - 23 mg/dL    Creatinine 1.22 (H) 0.50 - 1.05 mg/dL    eGFR 45 (L) >60 mL/min/1.73m*2    Calcium 7.9 (L) 8.6 - 10.3 mg/dL    Albumin 2.8 (L) 3.4 - 5.0 g/dL    Alkaline Phosphatase 59 33 - 136 U/L    Total Protein 5.1 (L) 6.4 - 8.2 g/dL    AST 15 9 - 39 U/L    Bilirubin, Total 0.5 0.0 - 1.2 mg/dL    ALT 10 7 - 45 U/L   SST TOP   Result Value Ref Range    Extra Tube Hold for add-ons.          Assessment & Plan  Generalized abdominal pain    Small bowel obstruction (Multi)    79 year old female with PMH significant for HTN, HLD, CAD, PAD (follows with Dr. Oliver), carotid artery disease (Right occlusion of ICA, left ICA with > 70% stenosis; follows with Dr. Parish), CKD, Left breast cancer s/p lumpectomy, kidney stones, and IDDM Type 2 (follows with PCP Dr. Bonilla) who presented to [facility] ED on 4/12 with 2 day history of abdominal pain. Admitted to medicine with consult to general surgery with SBO.      Impression:  #pSBO    > Ongoing    Procedures:  4/16 -  placement of NG tube     Recommendations:  - as SBO has not been responding to conservative management, patient would likely benefit from exploratory laparoscopy for likely lysis of adhesions     > Patient would like to discuss surgery with her friend as well as the medical team    > will ask medical team to comment on patient's perioperative risk  - NPO except ice chips   > Lantus currently held; this is appropriate    > continue hypoglycemia order set   - recommend PICC line for initiation of TPN   > again, patient would like to discuss this with medical team and her friend prior to consenting   - NG to Medium continuous wall suction; RN needs to ensure tube is working properly. Tube needs flushed every shift  - Monitor for ROBF  - PRN IV Zofran  - PRN IV Morphine for severe pain (limit use as able, but unable to give NSAIDs or PO meds)  - from a surgical standpoint, okay to stop IV Abx    - IVF: D51/2NS with 20 mEq KCL @ 100  - Encourage OOB and ambulation  - IS  - Repeat BMP in AM. NO Need for CMP or CBC     PT/OT      PPX: SCDs, SQH     The patient will be seen and discussed with the attending surgeon Dr. Martinez       I spent 30 minutes in the professional and overall care of this patient.  Greater than 50% of this time was spent counseling patient, reviewing plan of care, and in coordination of care.      Sarah Michaels, APRN-CNP

## 2025-04-17 NOTE — PRE-PROCEDURE NOTE
DR Melgoza aware patient could not take off rings; Dr. Melgoza aware ok to  take ring set. Pt was taking out dentures in OR cup provided per GALE Weaver.

## 2025-04-17 NOTE — ANESTHESIA POSTPROCEDURE EVALUATION
Patient: Nancy Long    Procedure Summary       Date: 04/17/25 Room / Location: [facility] OR 06 / [facility]    Anesthesia Start: 1536 Anesthesia Stop: 1720    Procedure: LAPAROSCOPIC LYSIS OF ADHESIONS Diagnosis:       SBO (small bowel obstruction) (Multi)      (SBO (small bowel obstruction) (Multi) [K56.609])    Surgeons: Elier Martinez MD Responsible Provider: Thong Melgoza MD    Anesthesia Type: general ASA Status: 3            Anesthesia Type: general    Vitals Value Taken Time   /69 04/17/25 17:18   Temp 36.9 04/17/25 17:21   Pulse 68 04/17/25 17:19   Resp 15 04/17/25 17:21   SpO2 100 % 04/17/25 17:19   Vitals shown include unfiled device data.    Anesthesia Post Evaluation    Patient location during evaluation: bedside  Patient participation: complete - patient participated  Level of consciousness: sleepy but conscious  Pain management: adequate  Airway patency: patent  Cardiovascular status: acceptable  Respiratory status: acceptable  Hydration status: acceptable  Postoperative Nausea and Vomiting: none        There were no known notable events for this encounter.

## 2025-04-17 NOTE — ANESTHESIA PREPROCEDURE EVALUATION
Patient: Nancy Long    Procedure Information       Date: 04/17/25    Procedure: LAPAROSCOPY/ POSSIBLE LAPAROTOMY/ POSSIBLE BOWEL RESECTION    Location: [facility]    Surgeons: Elier Martinez MD            Relevant Problems   Cardiac   (+) Aortic stenosis   (+) CAD (coronary artery disease)   (+) HLD (hyperlipidemia)   (+) HTN (hypertension)      Neuro   (+) RHYS (generalized anxiety disorder)   (+) Left carotid stenosis   (+) Right carotid artery occlusion      GI   (+) Gastroesophageal reflux disease      Endocrine   (+) Type 1 diabetes mellitus      Musculoskeletal   (+) Osteoarthritis      GYN   (+) Malignant neoplasm of lower-outer quadrant of female breast       Clinical information reviewed:   Tobacco  Allergies  Meds   Med Hx  Surg Hx    Soc Hx        NPO Detail:  No data recorded     Physical Exam    Airway  Mallampati: II  TM distance: >3 FB  Neck ROM: full     Cardiovascular - normal exam  Rhythm: regular  Rate: normal     Dental - normal exam    (+) upper dentures, lower dentures     Pulmonary - normal exam   Abdominal            Anesthesia Plan    History of general anesthesia?: yes  History of complications of general anesthesia?: no    ASA 3     general     The patient is not a current smoker.    intravenous induction   Postoperative pain plan includes opioids.  Trial extubation is planned.  Anesthetic plan and risks discussed with patient.  Use of blood products discussed with patient who consented to blood products.    Plan discussed with CRNA.

## 2025-04-17 NOTE — CARE PLAN
The patient's goals for the shift include      The clinical goals for the shift include Remain hemodynamically stable    Over the shift, the patient did not make progress toward the following goals. Barriers to progression include  assist with adls .

## 2025-04-17 NOTE — OP NOTE
LAPAROSCOPIC LYSIS OF ADHESIONS Operative Note     Date: 2025 - 2025  OR Location: [facility]    Name: Nancy Long, : 1945, Age: 79 y.o., MRN: [id number], Sex: female    Diagnosis  Pre-op Diagnosis      * SBO (small bowel obstruction) (Multi) [K56.609] Post-op Diagnosis     * SBO (small bowel obstruction) (Multi) [K56.609]     Procedures  LAPAROSCOPIC LYSIS OF ADHESIONS  89797 - SC LAPS ABD PRTM&OMENTUM DX W/WO SPEC BR/WA SPX      Surgeons      * Elier Martinez - Primary    Resident/Fellow/Other Assistant:  Surgeons and Role:  * No surgeons found with a matching role *    Staff:   Circulator: Karine Golden Person: Trevon  Surgical Assistant: Jess    Anesthesia Staff: Anesthesiologist: Thong Melgoza MD  CRNA: IAN Medina-CRNA    Procedure Summary  Anesthesia: General  ASA: III  Estimated Blood Loss: 0mL  Intra-op Medications:   Administrations occurring from  to  on 25:   Medication Name Total Dose   BUPivacaine HCl (Marcaine) 0.5 % (5 mg/mL) injection 10 mL   insulin lispro injection 0-5 Units Cannot be calculated   metoprolol tartrate (Lopressor) injection 5 mg Cannot be calculated   oxyCODONE-acetaminophen (Percocet)  mg per tablet 1 tablet Cannot be calculated              Anesthesia Record               Intraprocedure I/O Totals          Output    Urine 125 mL    Est. Blood Loss 5 mL    Total Output 130 mL          Specimen: No specimens collected              Drains and/or Catheters:   Closed/Suction Drain 1 RLQ Bulb 19 Fr. (Active)       NG/OG/Feeding Tube 16 Fr Right nostril (Active)   Tube Status Other (Comment) 25 1521   Site Assessment Clean;Dry 25 1521   Tube Securement Taped to nostril center 25 0700   Output (mL) 100 mL 25 0700       External Urinary Catheter Female (Active)       Tourniquet Times:         Implants:     Findings: Small bowel obstruction secondary to adhesions    Indications: Nancy Long is an 79 y.o.  female who is having surgery for SBO (small bowel obstruction) (Multi) [K56.609].     The patient was seen in the preoperative area. The risks, benefits, complications, treatment options, non-operative alternatives, expected recovery and outcomes were discussed with the patient. The possibilities of reaction to medication, pulmonary aspiration, injury to surrounding structures, bleeding, recurrent infection, the need for additional procedures, failure to diagnose a condition, and creating a complication requiring transfusion or operation were discussed with the patient. The patient concurred with the proposed plan, giving informed consent.  The site of surgery was properly noted/marked if necessary per policy. The patient has been actively warmed in preoperative area. Preoperative antibiotics have been ordered and given within 1 hours of incision. Venous thrombosis prophylaxis have been ordered including bilateral sequential compression devices    Procedure Details: Following informed consent the patient was taken to the op room placed in supine position.  A huddle was performed.  She was given general anesthetic.  Sequential compression devices were in place and functioning.  She received Ancef IV in the op room.  Mejia catheter was placed.  Nasogastric tube is already in place.  The abdomen is prepped and draped in sterile fashion.  The patient had a right upper quadrant subcostal scar and a second scar from the umbilicus to the pubis from prior cholecystectomy, oophorectomy, appendectomy.  A timeout was performed.  A supraumbilical midline skin incision was made and extended through the subcutaneous tissue.  0 Vicryl sutures were placed laterally in the fascia and the fascia opened the midline.  Adhesions adjacent to the incision were freed with a combination of blunt and sharp dissection.  A blunt 12 mm port was inserted under direct vision.  The balloon was inflated with 10 mL of air in the port secured with  3-0 Vicryl sutures.  The abdomen was insufflated, with carbon dioxide to a pressure of 12 mmHg.  Scope was inserted.  Patient had extensive adhesions.  A window through the adhesions was noted towards the right mid abdomen.  A 5 mm port was placed into the right mid abdomen.  A second 5 mm port was placed into the suprapubic area.  Patient has noted to have multiple dilated loops of small bowel some of which had some ecchymosis along the antimesenteric border.  However, all of the visible loops of small bowel appeared viable.  She was noted to have serosanguineous ascites, 250 mL of which was aspirated.  The distal transverse colon was adherent to the anterior abdominal wall.  The omentum was adherent to the anterior abdominal wall along the left side of the abdomen.  The small bowel was run beginning at the ileocecal junction.  The distal ileum was nondilated and appeared normal.  Adhesions in the right lower quadrant were lysed with sharp dissection.  A dense adhesive band was encountered with which was attached to the retroperitoneal region and was causing an obstruction of the mid ileum.  This band was lysed with blunt dissection.  The small bowel distal to the band was nondilated and normal-appearing.  The small bowel proximal to the band was dilated and edematous, but viable.  The small bowel was followed further proximal.  I was unable to follow the small bowel completely to the ligament of Treitz.  However no additional obstructing adhesions were visible.  I felt the only way to further examine the abdomen would be to perform a laparotomy.  Because I felt that the obstructing band was most likely already lysed, I chose not to perform a laparotomy.  A 19 Cymro round fluted drain was backloaded from the umbilical port to the right mid abdominal port.  The proximal portion of the drain was placed into the pelvis and the exit site secured with a 3-0 nylon suture.  The umbilical port was removed and fascial  defect closed with a running 0 Vicryl suture obtaining an airtight closure.  The previously placed lateral 0 Vicryl's were tied to each other.  The abdomen was deflated and the remaining port removed under direct vision.  Half percent plain Marcaine was infiltrated into each of the fascial incisions.  Skin was closed with running 4-0 Vicryl subcuticular sutures.  Dressings were placed and the patient was taken to the recovery room in satisfactory condition having tolerated the procedure well.  Counts were correct.    Complications:  None; patient tolerated the procedure well.    Disposition: PACU - hemodynamically stable.  Condition: stable         Task Performed by RNFA or Surgical Assistant:  Camera direction, retraction, skin closure          Elier Martinez  Phone Number: [phone]

## 2025-04-17 NOTE — SIGNIFICANT EVENT
I had multiple discussions throughout the day with Nancy regarding our recommendations for surgical intervention for her ongoing SBO. Patient has repeatedly stated that she is fearful and is not quite sure what she should choose to do. She has relied heavily on advice from her family and friends.  Throughout the day I explained the surgical procedure along with risks/benefits of surgery. I also explained her alternatives which included ongoing conservative management (with risk of ongoing obstruction and malnutrition) or ultimately transitioning to palliative care. Patient was not interested in palliative care at all.    At Nancy's request, I called her friend Lyric who lives in [place] and we went over Nancy's current medical condition. I gave our recommendations for operative intervention.    Nancy also had the opportunity to see Dr. Saez on rounds who also was in favor of proceeding with surgery.    Nancy ultimately was agreeable to surgical intervention. I placed the case request and notified the operating room staff. Additionally, Nancy was agreeable to placement of PICC line for TPN if indicated. (She has been NPO now for 6 days and may not have immediate return of bowel function even with successful operative intervention).  Will hold off on ordering PICC line or TPN until tomorrow pending OR course.

## 2025-04-17 NOTE — PROGRESS NOTES
"Nancy Long is a 79 y.o. female on day 5 of admission presenting with Generalized abdominal pain.  Record reviewed.  NPO, NGT placed 4/16, to wall suction.  Gen Surg on consult.  SBO not responding to conservative management, would benefit from Exp. Lap.  Recommending PICC for initiation of TPN.  This TCC met with patient at bedside to discuss discharge planning.  Patient verbalized that she may need surgery and \"PICC line for nutrition.\"  TCC confirmed knowledge of plan and discussed current PT/OT recommendations for moderate intensity level therapy, potentially needed when ready for discharge.  Freedom of choice explained, SNF list left at bedside.  Care Transitions will continue to follow.    "

## 2025-04-17 NOTE — PROGRESS NOTES
Nancy Long is a 79 y.o. female on day 5 of admission presenting with Generalized abdominal pain.      Subjective   No significant events overnight. Patient seen and evaluated at bedside. Surgery NP in room during evaluation, patient tearful when discussing treatment options/surgery.        Objective     Last Recorded Vitals  /65   Pulse 71   Temp 36.5 °C (97.7 °F)   Resp 20   Wt 77.1 kg (169 lb 15.6 oz)   SpO2 92%   Intake/Output last 3 Shifts:    Intake/Output Summary (Last 24 hours) at 4/17/2025 1216  Last data filed at 4/17/2025 0910  Gross per 24 hour   Intake 2498.33 ml   Output 425 ml   Net 2073.33 ml       Admission Weight  Weight: 77.1 kg (170 lb) (04/12/25 1212)    Daily Weight  04/15/25 : 77.1 kg (169 lb 15.6 oz)    Image Results  XR abdomen 1 view  Narrative: Interpreted By:  Марина Quintero,   STUDY:  XR ABDOMEN 1 VIEW;  4/17/2025 8:15 am. 2 views.      INDICATION:  Signs/Symptoms:Follow up bowel distention.      COMPARISON:  04/16/2025, CT abdomen and pelvis 04/12/2025      ACCESSION NUMBER(S):  [id number]      ORDERING CLINICIAN:  ROZ RAMESH      FINDINGS:  There is a nasogastric tube with the tip in the distal stomach.  There is progressive moderate gastric distention this has improved  compared to the previous study from 04/16/2025 prior to nasogastric  tube placement. At that time, there was marked gastric distention.  There is moderate small bowel dilatation with loops dilated up to 4.8  cm, unchanged. Findings are consistent with small-bowel obstruction.  There      There are no pathologic calcifications.      Visualized lungs are clear.      Osseous structures demonstrate no acute bony changes.          Impression: 1. Nasogastric tube with the tip in the distal stomach. Improvement  in the degree of gastric distention.  2. Persistent small bowel obstruction.      MACRO:  None      Signed by: Марина Quintero 4/17/2025 9:11 AM  Dictation workstation:   [id number]      Physical  Exam          Gianfranco Oliveros MD   Physician  Internal Medicine     Progress Notes      Signed     Date of Service: 4/16/2025 11:48 AM     Signed       Expand All Collapse All    Nancy Long is a 79 y.o. female on day 4 of admission presenting with Generalized abdominal pain.        Subjective  No significant events overnight. Patient seen and evaluated at bedside with sister present.         Objective[]Expand by Default  Last Recorded Vitals  /68   Pulse 53   Temp 36.5 °C (97.7 °F)   Resp 18   Wt 77.1 kg (169 lb 15.6 oz)   SpO2 96%   Intake/Output last 3 Shifts:     Intake/Output Summary (Last 24 hours) at 4/16/2025 1148  Last data filed at 4/16/2025 0735      Gross per 24 hour   Intake 4483.33 ml   Output --   Net 4483.33 ml         Admission Weight  Weight: 77.1 kg (170 lb) (04/12/25 1212)     Daily Weight  04/15/25 : 77.1 kg (169 lb 15.6 oz)     Image Results  XR abdomen 1 view  Narrative: Interpreted By:  Chano Marshall,   STUDY:  XR ABDOMEN 1 VIEW;  4/16/2025 7:28 am      INDICATION:  Signs/Symptoms:sbo.      COMPARISON:  04/15/2025      ACCESSION NUMBER(S):  [id number]      ORDERING CLINICIAN:  GIANFRANCO OLIVEROS      FINDINGS:  Again there is moderate gastric distention and distention of central  small bowel segments with decompression of the colon. The pattern  would be most consistent with small-bowel obstruction fairly similar  to the prior exam. The study is of limited evaluation of  pneumoperitoneum on supine imaging, however no gross evidence of free  air is noted.      The soft tissue shadows are unremarkable.      Visualized lungs bases are clear.      Osseous structures demonstrate no acute bony changes.      Other findings: None significant      Impression: 1.  Small-bowel obstruction.      Signed by: Chano Marshall 4/16/2025 8:28 AM  Dictation workstation:   [id number]        Physical Exam        Gianfranco Oliveros MD   Physician  Internal Medicine     Progress Notes      Signed     Date of  Service: 4/15/2025  8:35 AM      Signed        Expand All Collapse All    Nancy Long is a 79 y.o. female on day 3 of admission presenting with Generalized abdominal pain.        Subjective  No significant events overnight. Patient seen and evaluated at bedside.         Objective[]Expand by Default  Last Recorded Vitals  /67   Pulse (!) 37   Temp 36.4 °C (97.5 °F)   Resp 18   Wt 77.1 kg (170 lb)   SpO2 93%   Intake/Output last 3 Shifts:     Intake/Output Summary (Last 24 hours) at 4/15/2025 0835  Last data filed at 4/15/2025 0250        Gross per 24 hour   Intake 950 ml   Output --   Net 950 ml         Admission Weight  Weight: 77.1 kg (170 lb) (04/12/25 1212)     Daily Weight  04/12/25 : 77.1 kg (170 lb)     Image Results  XR abdomen 1 view  Narrative: Interpreted By:  Arun Escamilla,   STUDY:  XR ABDOMEN 1 VIEW;  4/14/2025 7:06 am      INDICATION:  Signs/Symptoms:Follow up bowel distention.          COMPARISON:  04/13/2025      ACCESSION NUMBER(S):  [id number]      ORDERING CLINICIAN:  ROZ RAMESH      FINDINGS:  Three view abdomen      Multiple dilated gas-filled bowel loops predominantly centrally with  stacked appearance most concerning for obstruction redemonstrated  grossly similar to prior. Limited evaluation for free air on supine  films. Some gas and stool noted within the colon as previously.  Approximate 5 cm gaseous lucency overlying the pelvis could represent  focal distended bowel loop possibly distal colon within the pelvis  and new or more pronounced compared to prior. Mild gaseous distention  of the stomach.      Prominent vascular calcifications overlying abdomen and pelvis and  overlying the groin regions.      Impression: 1.  Multiple dilated gas-filled loops of small bowel redemonstrated  most suggestive of sequela of obstruction as noted previously and  probably grossly similar to prior. Focal gaseous lucency overlying  the pelvis new or more pronounced may reflect focal  distended bowel  loop possibly distal colon within the pelvis new or more pronounced.  Mild gaseous distention of the stomach. Limited evaluation for free  air on supine films. Continued clinical correlation and follow-up  advised.      MACRO:  None      Signed by: Arun Escamilla 4/14/2025 2:45 PM  Dictation workstation:   [id number]        Physical Exam     Date of Service: 4/13/2025  6:18 PM      Signed        Expand All Collapse All    History Of Present Illness  Nancy Long is a 79 y.o. female with CAD,  chronic diastolic heart failure, hypertension, hyperlipidemia, peripheral artery disease, diabetes mellitus, carotid artery disease (Right occlusion of ICA, left ICA with > 70% stenosis; follows with Dr. Parish), CKD, Left breast cancer s/p lumpectomy and radiation, kidney stones, and open cholecystectomy and open appendectomy with right ovary removal (for tumor- at age 16) who presented today with abdominal pain.  Patient reports yesterday she developed severe, sharp, continuous epigastric pain associated with nausea and diaphoresis.  She notes she took some of her prescribed Percocet that seemed to help a little however the symptoms persisted into today so she decided to come to the emergency room.  She also notes a decreased appetite and when she did try to eat the food did not taste good.  She denies a history of bowel obstruction.  She articulates she told the surgery team she would not like surgical intervention.  CODE STATUS discussed and she would like to be a DNR CCA.     In the ED lab work, EKG, chest x-ray and CTAP were performed, labs revealed glucose 200, sodium 125, chloride 94, bun 45, creatinine 1.64 (1.32 on 2/4/2020), , lactate 1.6, troponins 27 and 29 respectively, white count 21, neutrophils 17.35, monocytes 1.1. EKG Interpretation, per ER physician impression: Sinus at rate of 79, , QRS 91, QTc 407 without evidence of STEMI or ischemia. Chest x-ray without acute process.  CT  abdomen/pelvis revealed small bowel obstruction with mesenteric edema as described, small volume of ascites, diverticulosis, atherosclerosis, subcutaneous opacities and reticulation that may be due to injections although hematoma is possible.  Patient was evaluated by surgery team that noted her symptoms are likely consistent with partial adhesive small bowel obstruction and surgical intervention was offered however patient declined.  Her vital signs were acceptable with a slightly elevated blood pressure of 157/70.  She was given cefepime Flagyl morphine Zofran and started on IV fluids and admitted for further evaluation and treatment under the care of Dr. Saez.        Echo March 2025:     CONCLUSIONS:  1. Left ventricular ejection fraction is normal, by visual estimate at 70-75%.  2. There is moderate left ventricular hypertrophy.  3. The left atrium is mildly dilated.  4. There is mild mitral valve stenosis.  5. There is moderate mitral annular calcification.  6. Right ventricular systolic pressure is within normal limits.  7. Mild aortic valve stenosis.  8. Mild left ventricular outflow obstruction at rest and with Valsalva (10 mmHg).     Past Medical History  Medical History           Past Medical History:   Diagnosis Date    Personal history of other diseases of the circulatory system       History of hypertension    Personal history of other endocrine, nutritional and metabolic disease       History of diabetes mellitus            Surgical History  Surgical History             Past Surgical History:   Procedure Laterality Date    APPENDECTOMY   01/03/2014     Appendectomy    BREAST LUMPECTOMY   01/03/2014     Breast Surgery Lumpectomy    CHOLECYSTECTOMY   01/03/2014     Cholecystectomy    OOPHORECTOMY   01/03/2014     Oophorectomy            Social History  Denies alcohol or illicit drug use.  Reports she smokes 2 to 3 packs of cigarettes per week.  Lives alone.  Ambulates with cane as needed.     Family  "History  Family History   No family history on file.         Allergies  Penicillins and Sulfa (sulfonamide antibiotics)     10 point review of systems performed and is negative besides what is stated in HPI.     Physical Exam  Constitutional:       Appearance: Normal appearance.   HENT:      Head: Normocephalic and atraumatic.      Nose: Nose normal.      Mouth/Throat:      Mouth: Mucous membranes are moist.   Eyes:      Conjunctiva/sclera: Conjunctivae normal.   Cardiovascular:      Rate and Rhythm: Normal rate and regular rhythm.      Heart sounds: Murmur heard.   Pulmonary:      Effort: Pulmonary effort is normal.      Comments: Crackles left base  Abdominal:      General: Bowel sounds are normal. There is no distension.      Tenderness: There is abdominal tenderness.      Comments: Somewhat firm   Musculoskeletal:         General: Normal range of motion.      Cervical back: Neck supple.   Skin:     General: Skin is warm and dry.   Neurological:      Mental Status: She is alert. Mental status is at baseline.   Psychiatric:         Mood and Affect: Mood normal.            Last Recorded Vitals  Blood pressure (!) 193/77, pulse 74, temperature 37 °C (98.6 °F), temperature source Temporal, resp. rate 20, height 1.702 m (5' 7\"), weight 77.1 kg (170 lb), SpO2 94%.     Relevant Results     Medications Ordered Prior to Encounter   No current facility-administered medications on file prior to encounter.                  Current Outpatient Medications on File Prior to Encounter   Medication Sig Dispense Refill    acetaminophen (Tylenol) 500 mg tablet Take 1 tablet (500 mg) by mouth every 8 hours if needed for mild pain (1 - 3).        ascorbic acid (Vitamin C) 500 mg tablet Take 1 tablet (500 mg) by mouth once daily.        aspirin 325 mg tablet Take 1 tablet (325 mg) by mouth once daily at bedtime.        B12-levomefolate calcium-B6 (Foltx) 2-1.13-25 mg tablet Take 1 tablet by mouth once daily.        cilostazol (Pletal) " 50 mg tablet Take 1 tablet (50 mg) by mouth 2 times a day. 180 tablet 3    lansoprazole (Prevacid) 30 mg DR capsule Take 1 capsule (30 mg) by mouth 2 times a day as needed.        losartan (Cozaar) 100 mg tablet Take 1 tablet (100 mg) by mouth once daily. 90 tablet 3    metFORMIN XR (Glucophage-XR) 500 mg 24 hr tablet Take 1 tablet (500 mg) by mouth once daily in the evening. Take with meals. (Patient taking differently: Take 1 tablet (500 mg) by mouth once daily as needed (BG >200).)        metoprolol tartrate (Lopressor) 100 mg tablet Take 1 tablet (100 mg) by mouth once daily. (Patient taking differently: Take 1 tablet (100 mg) by mouth once daily at bedtime.) 90 tablet 3    NovoLIN 70/30 U-100 Insulin 100 unit/mL (70-30) injection Inject 43 Units under the skin 2 times a day before meals.        OneTouch Ultra Test strip          oxyCODONE-acetaminophen (Percocet)  mg tablet Take 1 tablet by mouth every 8 hours.        rosuvastatin (Crestor) 20 mg tablet Take 1 tablet (20 mg) by mouth once daily. (Patient taking differently: Take 1 tablet (20 mg) by mouth once daily at bedtime.) 90 tablet 3    zinc sulfate (Zincate) 220 (50 Zn) MG capsule Take 1 capsule (50 mg of elemental zinc) by mouth once daily.        betamethasone dipropionate 0.05 % cream Apply topically 2 times a day. (Patient not taking: Reported on 4/12/2025)        hydrOXYzine pamoate (Vistaril) 50 mg capsule Take 1 capsule (50 mg) by mouth 4 times a day as needed. (Patient not taking: Reported on 4/12/2025)        loratadine-pseudoephedrine (Claritin-D 24-hour)  mg 24 hr tablet Take 1 tablet by mouth once daily. (Patient not taking: Reported on 4/12/2025)        ondansetron ODT (Zofran-ODT) 8 mg disintegrating tablet every 8 hours if needed. (Patient not taking: Reported on 4/12/2025)        polymyxin B sulf-trimethoprim (Polytrim) ophthalmic solution APPLY 1/4 INCH TO AFFECTED EYE 4 TIMES A DAY. (Patient not taking: Reported on  4/12/2025)        solifenacin (VESIcare) 5 mg tablet  (Patient not taking: Reported on 4/12/2025)        sucralfate (Carafate) 100 mg/mL suspension TAKE 10 ML BY MOUTH 4 TIMES A DAY (Patient not taking: Reported on 4/12/2025)        torsemide (Demadex) 20 mg tablet Take 1 tablet (20 mg) by mouth once daily. (Patient not taking: Reported on 4/12/2025) 90 tablet 3                      Results for orders placed or performed during the hospital encounter of 04/12/25 (from the past 24 hours)   POCT GLUCOSE   Result Value Ref Range     POCT Glucose 183 (H) 74 - 99 mg/dL   Troponin I, High Sensitivity   Result Value Ref Range     Troponin I, High Sensitivity 23 (H) 0 - 13 ng/L   Sars-CoV-2 and Influenza A/B PCR   Result Value Ref Range     Flu A Result Not Detected Not Detected     Flu B Result Not Detected Not Detected     Coronavirus 2019, PCR Not Detected Not Detected   POCT GLUCOSE   Result Value Ref Range     POCT Glucose 171 (H) 74 - 99 mg/dL   Urinalysis with Reflex Culture and Microscopic   Result Value Ref Range     Color, Urine Light-Yellow Light-Yellow, Yellow, Dark-Yellow     Appearance, Urine Clear Clear     Specific Gravity, Urine 1.017 1.005 - 1.035     pH, Urine 5.0 5.0, 5.5, 6.0, 6.5, 7.0, 7.5, 8.0     Protein, Urine 10 (TRACE) NEGATIVE, 10 (TRACE), 20 (TRACE) mg/dL     Glucose, Urine Normal Normal mg/dL     Blood, Urine NEGATIVE NEGATIVE mg/dL     Ketones, Urine NEGATIVE NEGATIVE mg/dL     Bilirubin, Urine NEGATIVE NEGATIVE mg/dL     Urobilinogen, Urine Normal Normal mg/dL     Nitrite, Urine NEGATIVE NEGATIVE     Leukocyte Esterase, Urine 250 Triny/uL (A) NEGATIVE   Extra Urine Gray Tube   Result Value Ref Range     Extra Tube Hold for add-ons.     POCT GLUCOSE   Result Value Ref Range     POCT Glucose 131 (H) 74 - 99 mg/dL   Microscopic Only, Urine   Result Value Ref Range     WBC, Urine 11-20 (A) 1-5, NONE /HPF     RBC, Urine 1-2 NONE, 1-2, 3-5 /HPF     Bacteria, Urine 4+ (A) NONE SEEN /HPF     Mucus,  Urine FEW Reference range not established. /LPF     Hyaline Casts, Urine OCCASIONAL (A) NONE /LPF   POCT GLUCOSE   Result Value Ref Range     POCT Glucose 81 74 - 99 mg/dL   POCT GLUCOSE   Result Value Ref Range     POCT Glucose 67 (L) 74 - 99 mg/dL   POCT GLUCOSE   Result Value Ref Range     POCT Glucose 64 (L) 74 - 99 mg/dL   POCT GLUCOSE   Result Value Ref Range     POCT Glucose 147 (H) 74 - 99 mg/dL   CBC   Result Value Ref Range     WBC 15.8 (H) 4.4 - 11.3 x10*3/uL     nRBC 0.0 0.0 - 0.0 /100 WBCs     RBC 3.83 (L) 4.00 - 5.20 x10*6/uL     Hemoglobin 11.0 (L) 12.0 - 16.0 g/dL     Hematocrit 36.3 36.0 - 46.0 %     MCV 95 80 - 100 fL     MCH 28.7 26.0 - 34.0 pg     MCHC 30.3 (L) 32.0 - 36.0 g/dL     RDW 13.1 11.5 - 14.5 %     Platelets 163 150 - 450 x10*3/uL   Basic Metabolic Panel   Result Value Ref Range     Glucose 74 74 - 99 mg/dL     Sodium 132 (L) 136 - 145 mmol/L     Potassium 4.1 3.5 - 5.3 mmol/L     Chloride 103 98 - 107 mmol/L     Bicarbonate 23 21 - 32 mmol/L     Anion Gap 10 10 - 20 mmol/L     Urea Nitrogen 39 (H) 6 - 23 mg/dL     Creatinine 1.33 (H) 0.50 - 1.05 mg/dL     eGFR 40 (L) >60 mL/min/1.73m*2     Calcium 8.2 (L) 8.6 - 10.3 mg/dL   POCT GLUCOSE   Result Value Ref Range     POCT Glucose 72 (L) 74 - 99 mg/dL   POCT GLUCOSE   Result Value Ref Range     POCT Glucose 75 74 - 99 mg/dL   POCT GLUCOSE   Result Value Ref Range     POCT Glucose 84 74 - 99 mg/dL   POCT GLUCOSE   Result Value Ref Range     POCT Glucose 99 74 - 99 mg/dL         XR abdomen 1 view     Result Date: 4/13/2025  Interpreted By:  Beka Mays, STUDY: Abdominal series dated 4/13/2025.   INDICATION: Follow-up bowel dilation.   COMPARISON: Correlation is made with 04/12/2025 CT.   ACCESSION NUMBER(S): [id number]   ORDERING CLINICIAN: ROZ RAMESH   TECHNIQUE: Two AP radiographs of the abdomen.   FINDINGS: There are multiple stacked dilated loops of small bowel in the central abdomen measuring up to a proximally 4.1 cm. There is  gas and stool in the colon. Lung bases are clear. Degenerative changes seen of the spine and hips.        Multiple dilated loops of small bowel in the central abdomen most compatible with a degree of obstructive process as further discussed on prior date CT.   Signed by: Beka Mays 4/13/2025 1:01 PM Dictation workstation:   [id number]     CT abdomen pelvis wo IV contrast     Addendum Date: 4/12/2025    Interpreted By:  Chano Marshall, ADDENDUM: Also to be noted at the impression: Subcutaneous opacities and reticulation that may be due to injections although hematoma is possible.   Signed by: Chano Marshall 4/12/2025 2:38 PM   -------- ORIGINAL REPORT -------- Dictation workstation:   [id number]     Result Date: 4/12/2025  Interpreted By:  Chano Marshall, STUDY: CT ABDOMEN PELVIS WO IV CONTRAST;  4/12/2025 2:05 pm   INDICATION: Signs/Symptoms:abd pain.   COMPARISON: None.   ACCESSION NUMBER(S): [id number]   ORDERING CLINICIAN: FRANK BREWSTER   TECHNIQUE: CT of the abdomen and pelvis was performed. Contiguous axial images were obtained at 3 mm slice thickness through the abdomen and pelvis. Coronal and sagittal reconstructions at 3 mm slice thickness were performed.   FINDINGS: LOWER CHEST: Small right pleural effusion and trace left pleural effusion. Coronary artery atherosclerotic calcification and mitral annular calcification.   ABDOMEN:   LIVER: The hepatic parenchyma is homogeneous without evidence of focal liver lesions.The hepatic size is normal.   SPLEEN: The spleen is normal in size and homogeneous.   ADRENAL GLANDS: Bilateral adrenal glands appear normal.   KIDNEYS AND URETERS: Mild bilateral cortical atrophy, the renal cortices otherwise are homogeneous. Radiodensities at the renal sinuses is probably atherosclerotic calcification, although superimposed nonobstructing calculi are possible.   The ureters throughout their distal course are not well identified due to overlying crowded bowel loops, but  the tracts otherwise are unremarkable without identified ureteral dilatation or additional radiodense calculi.   PANCREAS: The pancreas appears unremarkable, there is no ductal dilatation or masses.   GALLBLADDER: Not visualized, presumably with cholecystectomy.   BILE DUCTS: Dilatation of the extrahepatic ducts, the common bile duct measuring 1.4 cm in diameter, most likely from post cholecystectomy state. However as the no prior exams for comparison MRCP would be recommended if there is symptomatology compatible with biliary colic.     VESSELS: Fairly extensive atherosclerotic calcifications are noted predominantly at the aorta and iliac system. There is also moderate atherosclerotic calcification at the mid segment of the superior mesenteric artery, and fairly marked of the inferior mesenteric artery.   PERITONEUM AND RETROPERITONEUM: There is a small volume of ascites best seen in the right upper quadrant along the liver. No free intraperitoneal air.   The retroperitoneum appears unremarkable, and without significant adenopathy.   No enlarged mesenteric lymph nodes.   BOWEL: There is mild distention of multiple small bowel segments with air-fluid levels, associated with reticulation of the mesentery indicating edema. There is decompression of distal small bowel segments, and the pattern would be most compatible with mid to early or partial distal small bowel obstruction. There is also moderate diverticulosis of the distal colon.   PELVIS:   BLADDER: The urinary bladder contour is smooth.   REPRODUCTIVE ORGANS: No pelvic masses.     BONE, ABDOMINAL WALL AND OTHER FINDINGS: No suspicious osseous lesions are identified.   There is a small non incarcerated umbilical hernia containing intra-abdominal fat. There is also attenuation within the subcutaneous fat of the mid/lower abdomen anteriorly that may be from subcutaneous injections. However, hematomas are also possible. There is also reticulation of the  "subcutaneous fat at the right lateral abdominal wall indicating additional edema.        1.  Small-bowel obstruction with mesenteric edema as described. 2. Small volume of ascites. 3. Diverticulosis. 4. Atherosclerosis as described.   MACRO: 1. None   Signed by: Chano Marshall 4/12/2025 2:28 PM Dictation workstation:   [id number]     XR chest 2 views     Result Date: 4/12/2025  Interpreted By:  Beka Mays, STUDY: Chest dated  4/12/2025.   INDICATION: Signs/Symptoms:sob with ambulation   COMPARISON: Chest dated 11/17/2015.   ACCESSION NUMBER(S): [id number]   ORDERING CLINICIAN: FRANK BREWSTER   TECHNIQUE: One view of the chest.   FINDINGS: The lungs are clear.  No pneumothorax or effusion is evident. The cardiomediastinal silhouette is  not enlarged.Degenerative change is seen of the spine and shoulders.        No acute cardiopulmonary process is evident.   MACRO: None   Signed by: Beka Mays 4/12/2025 1:26 PM Dictation workstation:   [id number]             Assessment/Plan     Assessment & Plan  Generalized abdominal pain     Small bowel obstruction (Multi)     Dependence on nicotine from cigarettes     Advanced care planning/counseling discussion     Leukocytosis        Per surgery:  \"Impression:  #SBO   > small bowel dilation; however, other concerning CT findings particularly mesenteric edema and ascites. CT imaging worrisome for possible ischemic/low flow state to bowel; however, serum lactate normal and patient's clinical presentation/symptoms/pain/bowel sounds are consistent with an adhesive small bowel obstruction (partial)  #Leukocytosis (possibly 2/2 above, but may have other infectious etiology, possibly UTI?)   > UA ordered; however, not yet collected and already received antibiotics  #Hyponatremia and hypochloremia  #CKD   Recommendations:  - no acute surgical intervention at this time   > patient offered surgical intervention if she were to worsen clinically; however, she was adamant about " "not wanting surgery   > would recommend placing NG tube for decompression; however, patient again declined NG tube. She said she may be agreeable if she begins to feel worse/nauseated  - PRN IV Zofran  - PRN IV Morphine for severe pain (limit use as able, but unable to give NSAIDs or PO meds)  - IVF: D5NS @ 100  - repeat CBC and BMP in AM\"     Additionally:  Change morphine to Dilaudid given CKD  Check flu and COVID  KUB in a.m.        #Advance care planning     Patient would like to be a DNR CCA     #Nicotine dependence     Reports she lets most of her cigarettes to burn out without smoking them so we will order a nicotine patch as needed     #Chronic conditions:     CAD,  chronic diastolic heart failure, hypertension, hyperlipidemia, peripheral artery disease, diabetes mellitus, carotid artery disease (Right occlusion of ICA, left ICA with > 70% stenosis; follows with Dr. Parish), CKD, Left breast cancer s/p lumpectomy and radiation, kidney stones, and open cholecystectomy and open appendectomy with right ovary removal (for tumor- at age 16)      Hold all p.o. meds  Give IV Protonix   Metoprolol IV around-the-clock with parameters  Patient takes 70/30 at home will conservatively give 30 units of Lantus daily which would be a little less than half of the equivalent of 70/30/day with sliding scale insulin and hypoglycemic protocol     #DVT prophylaxis     SCDs  Heparin                             Chadwick Medel, DO     Review of Systems                     Relevant Results                    Assessment/Plan                       Assessment & Plan  Generalized abdominal pain     Small bowel obstruction (Multi)     Dependence on nicotine from cigarettes     Advanced care planning/counseling discussion     Leukocytosis     Patient fully evaluated on 4/14. CT showing SBO with mesenteric edema, small volume ascites, diverticulosis. Evaluated by general surgery today, recommending NG tube for decompression, ordered " zofran, morphine, continuing IV cefepime/flagyl. Repeat KUB ordered, results pending. If KUB looks okay, plan to advance diet. Leukocytosis downtrending, continue to monitor. Hypertension noted this morning, cardiac PO meds held d/t NPO status. Continue to monitor and restart PO meds when diet advanced. Recheck labs in AM.      Patient still requiring frequent cardiac and SPO2 monitoring. Continue aggressive pulmonary hygiene and oral hygiene. Off loading as tolerated for skin integrity. Medications and labs reviewed.     I spent a total of 60 minutes on the date of the service which included preparing to see the patient, face-to-face patient care, completing clinical documentation, obtaining and/or reviewing separately obtained history, performing a medically appropriate examination, counseling and educating the patient/family/caregiver, ordering medications, tests, or procedures, communicating with other HCPs (not separately reported), independently interpreting results (not separately reported), communicating results to the patient/family/caregiver, and care coordination (not separately reported).   Patient fully evaluated  04/15  for    Problem List Items Addressed This Visit         * (Principal) Generalized abdominal pain - Primary      Other Visit Diagnoses         SBO (small bowel obstruction) (Multi)                 Patient seen resting in bed with head of bed elevated, no s/s or c/o acute difficulties at this time.  Vital signs for last 24 hours Temp:  [36.2 °C (97.2 °F)-36.9 °C (98.4 °F)] 36.4 °C (97.5 °F)  Heart Rate:  [37-76] 37  Resp:  [18-20] 18  BP: (136-186)/(63-82) 152/67    No intake/output data recorded.  Patient still requiring frequent cardiac and SPO2 monitoring. Continue aggressive pulmonary hygiene and oral hygiene. Off loading as tolerated for skin integrity. Medications and labs reviewed-             Results for orders placed or performed during the hospital encounter of 04/12/25 (from the  past 24 hours)   POCT GLUCOSE   Result Value Ref Range     POCT Glucose 111 (H) 74 - 99 mg/dL   POCT GLUCOSE   Result Value Ref Range     POCT Glucose 115 (H) 74 - 99 mg/dL   POCT GLUCOSE   Result Value Ref Range     POCT Glucose 129 (H) 74 - 99 mg/dL   POCT GLUCOSE   Result Value Ref Range     POCT Glucose 114 (H) 74 - 99 mg/dL   POCT GLUCOSE   Result Value Ref Range     POCT Glucose 117 (H) 74 - 99 mg/dL   CBC and Auto Differential   Result Value Ref Range     WBC 12.1 (H) 4.4 - 11.3 x10*3/uL     nRBC 0.0 0.0 - 0.0 /100 WBCs     RBC 3.89 (L) 4.00 - 5.20 x10*6/uL     Hemoglobin 11.4 (L) 12.0 - 16.0 g/dL     Hematocrit 36.5 36.0 - 46.0 %     MCV 94 80 - 100 fL     MCH 29.3 26.0 - 34.0 pg     MCHC 31.2 (L) 32.0 - 36.0 g/dL     RDW 13.0 11.5 - 14.5 %     Platelets 161 150 - 450 x10*3/uL     Neutrophils % 74.4 40.0 - 80.0 %     Immature Granulocytes %, Automated 0.5 0.0 - 0.9 %     Lymphocytes % 14.3 13.0 - 44.0 %     Monocytes % 8.0 2.0 - 10.0 %     Eosinophils % 2.4 0.0 - 6.0 %     Basophils % 0.4 0.0 - 2.0 %     Neutrophils Absolute 8.99 (H) 1.60 - 5.50 x10*3/uL     Immature Granulocytes Absolute, Automated 0.06 0.00 - 0.50 x10*3/uL     Lymphocytes Absolute 1.73 0.80 - 3.00 x10*3/uL     Monocytes Absolute 0.97 (H) 0.05 - 0.80 x10*3/uL     Eosinophils Absolute 0.29 0.00 - 0.40 x10*3/uL     Basophils Absolute 0.05 0.00 - 0.10 x10*3/uL   Comprehensive Metabolic Panel   Result Value Ref Range     Glucose 109 (H) 74 - 99 mg/dL     Sodium 135 (L) 136 - 145 mmol/L     Potassium 3.7 3.5 - 5.3 mmol/L     Chloride 106 98 - 107 mmol/L     Bicarbonate 22 21 - 32 mmol/L     Anion Gap 11 10 - 20 mmol/L     Urea Nitrogen 20 6 - 23 mg/dL     Creatinine 1.14 (H) 0.50 - 1.05 mg/dL     eGFR 48 (L) >60 mL/min/1.73m*2     Calcium 8.0 (L) 8.6 - 10.3 mg/dL     Albumin 3.1 (L) 3.4 - 5.0 g/dL     Alkaline Phosphatase 72 33 - 136 U/L     Total Protein 5.7 (L) 6.4 - 8.2 g/dL     AST 27 9 - 39 U/L     Bilirubin, Total 0.6 0.0 - 1.2 mg/dL      ALT 15 7 - 45 U/L   POCT GLUCOSE   Result Value Ref Range     POCT Glucose 111 (H) 74 - 99 mg/dL      Patient recently received an antibiotic (last 12 hours)         Date/Time Action Medication Dose     04/15/25 0649 New Bag    metroNIDAZOLE (Flagyl) 500 mg in sodium chloride (iso)  mL 500 mg     04/15/25 0218 New Bag    cefepime (Maxipime) 1 g in dextrose 5% IV 50 mL 1 g     04/14/25 2235 New Bag    metroNIDAZOLE (Flagyl) 500 mg in sodium chloride (iso)  mL 500 mg             Plan discussed with interdisciplinary team,  NG tube for decompression, PRN IV zofran, IV morphine, will continue on IV Antibiotics-continuing IV cefepime/flagyl, WBCs downtrending. Repeat KUB on 4/14 unchanged, will repeat KUB today. Unable to advance diet at this time, will continue IV fluids, NPO. Ice chips ok.  PO meds held d/t NPO status. Continue to monitor and restart PO meds when diet advanced. continue current and repeat labs in the AM.      Discharge planning discussed with patient and care team. Therapy evaluations ordered. Anticipate HHC/SNF at discharge. Patient aware and agreeable to current plan, continue plan as above.      I spent a total of 50 minutes on the date of the service which included preparing to see the patient, face-to-face patient care, completing clinical documentation, obtaining and/or reviewing separately obtained history, performing a medically appropriate examination, counseling and educating the patient/family/caregiver, ordering medications, tests, or procedures, communicating with other HCPs (not separately reported), independently interpreting results (not separately reported), communicating results to the patient/family/caregiver, and care coordination (not separately reported).   Angela Fatima                        Revision History     Scheduled medications  [Scheduled Medications]    [Scheduled Medications]    [Held by provider] aspirin, 325 mg, oral, Nightly  cefepime, 1 g, intravenous,  q12h  [Held by provider] cilostazol, 50 mg, oral, BID  heparin (porcine), 5,000 Units, subcutaneous, q8h  [Held by provider] insulin glargine, 15 Units, subcutaneous, q24h  insulin lispro, 0-5 Units, subcutaneous, q4h  [Held by provider] losartan, 100 mg, oral, Daily  [Held by provider] metFORMIN XR, 500 mg, oral, Daily before evening meal  metoprolol, 5 mg, intravenous, q6h  [Held by provider] metoprolol tartrate, 100 mg, oral, Nightly  metroNIDAZOLE, 500 mg, intravenous, q8h  [Held by provider] oxyCODONE-acetaminophen, 1 tablet, oral, q8h  pantoprazole, 40 mg, intravenous, Daily    Continuous medications  [Continuous Medications]    [Continuous Medications]    potassium gmflcbd-L7-4.45%NaCl, 100 mL/hr, Last Rate: 100 mL/hr (04/16/25 0922)    PRN medications  [PRN Medications]    [PRN Medications]    PRN medications: dextrose, dextrose, glucagon, glucagon, HYDROmorphone, nicotine, ondansetron             Results for orders placed or performed during the hospital encounter of 04/12/25 (from the past 24 hours)   POCT GLUCOSE   Result Value Ref Range     POCT Glucose 146 (H) 74 - 99 mg/dL   POCT GLUCOSE   Result Value Ref Range     POCT Glucose 172 (H) 74 - 99 mg/dL   POCT GLUCOSE   Result Value Ref Range     POCT Glucose 183 (H) 74 - 99 mg/dL   POCT GLUCOSE   Result Value Ref Range     POCT Glucose 173 (H) 74 - 99 mg/dL   POCT GLUCOSE   Result Value Ref Range     POCT Glucose 205 (H) 74 - 99 mg/dL   POCT GLUCOSE   Result Value Ref Range     POCT Glucose 163 (H) 74 - 99 mg/dL   CBC and Auto Differential   Result Value Ref Range     WBC 9.3 4.4 - 11.3 x10*3/uL     nRBC 0.0 0.0 - 0.0 /100 WBCs     RBC 3.51 (L) 4.00 - 5.20 x10*6/uL     Hemoglobin 10.0 (L) 12.0 - 16.0 g/dL     Hematocrit 33.8 (L) 36.0 - 46.0 %     MCV 96 80 - 100 fL     MCH 28.5 26.0 - 34.0 pg     MCHC 29.6 (L) 32.0 - 36.0 g/dL     RDW 13.1 11.5 - 14.5 %     Platelets 142 (L) 150 - 450 x10*3/uL     Neutrophils % 68.8 40.0 - 80.0 %     Immature  Granulocytes %, Automated 0.8 0.0 - 0.9 %     Lymphocytes % 17.9 13.0 - 44.0 %     Monocytes % 9.3 2.0 - 10.0 %     Eosinophils % 2.9 0.0 - 6.0 %     Basophils % 0.3 0.0 - 2.0 %     Neutrophils Absolute 6.42 (H) 1.60 - 5.50 x10*3/uL     Immature Granulocytes Absolute, Automated 0.07 0.00 - 0.50 x10*3/uL     Lymphocytes Absolute 1.67 0.80 - 3.00 x10*3/uL     Monocytes Absolute 0.87 (H) 0.05 - 0.80 x10*3/uL     Eosinophils Absolute 0.27 0.00 - 0.40 x10*3/uL     Basophils Absolute 0.03 0.00 - 0.10 x10*3/uL   Comprehensive Metabolic Panel   Result Value Ref Range     Glucose 168 (H) 74 - 99 mg/dL     Sodium 137 136 - 145 mmol/L     Potassium 3.6 3.5 - 5.3 mmol/L     Chloride 109 (H) 98 - 107 mmol/L     Bicarbonate 23 21 - 32 mmol/L     Anion Gap 9 (L) 10 - 20 mmol/L     Urea Nitrogen 18 6 - 23 mg/dL     Creatinine 1.12 (H) 0.50 - 1.05 mg/dL     eGFR 50 (L) >60 mL/min/1.73m*2     Calcium 7.8 (L) 8.6 - 10.3 mg/dL     Albumin 2.8 (L) 3.4 - 5.0 g/dL     Alkaline Phosphatase 63 33 - 136 U/L     Total Protein 5.1 (L) 6.4 - 8.2 g/dL     AST 21 9 - 39 U/L     Bilirubin, Total 0.5 0.0 - 1.2 mg/dL     ALT 13 7 - 45 U/L   POCT GLUCOSE   Result Value Ref Range     POCT Glucose 148 (H) 74 - 99 mg/dL   POCT GLUCOSE   Result Value Ref Range     POCT Glucose 172 (H) 74 - 99 mg/dL      XR abdomen 1 view  Result Date: 4/16/2025  Interpreted By:  Chano Marshall, STUDY: XR ABDOMEN 1 VIEW;  4/16/2025 7:28 am   INDICATION: Signs/Symptoms:sbo.   COMPARISON: 04/15/2025   ACCESSION NUMBER(S): [id number]   ORDERING CLINICIAN: OLIVIA OLIVEROS   FINDINGS: Again there is moderate gastric distention and distention of central small bowel segments with decompression of the colon. The pattern would be most consistent with small-bowel obstruction fairly similar to the prior exam. The study is of limited evaluation of pneumoperitoneum on supine imaging, however no gross evidence of free air is noted.   The soft tissue shadows are unremarkable.   Visualized  lungs bases are clear.   Osseous structures demonstrate no acute bony changes.   Other findings: None significant        1.  Small-bowel obstruction.   Signed by: Chano Marshall 4/16/2025 8:28 AM Dictation workstation:   [id number]     XR abdomen 1 view  Result Date: 4/15/2025  Interpreted By:  Chano Marshall, STUDY: XR ABDOMEN 1 VIEW;  4/15/2025 11:35 am   INDICATION: Signs/Symptoms:SBO.   COMPARISON: 04/14/2025   ACCESSION NUMBER(S): [id number]   ORDERING CLINICIAN: OLIVIA OLIVEROS   FINDINGS: There is persistent moderate-to-marked gastric distention as well as central small bowel segments. There is relative decompression of the colon in the pattern would be most consistent with small-bowel obstruction. The study is of limited evaluation of pneumoperitoneum on supine imaging, however no gross evidence of free air is noted.   The soft tissue shadows are unremarkable.   Visualized lungs bases are clear.   Osseous structures demonstrate no acute bony changes.   Other findings: None significant        1.  Persistent small-bowel obstruction.   Signed by: Chano Marshall 4/15/2025 5:50 PM Dictation workstation:   [id number]                   Assessment & Plan  Generalized abdominal pain     Small bowel obstruction (Multi)     Dependence on nicotine from cigarettes     Advanced care planning/counseling discussion     Leukocytosis     Patient fully evaluated on 4/16. Underwent successful NG tube placement this morning with good suction output so far. Repeat KUB ordered, pending results. Labs and medications reviewed. Mild anemia noted, continue to monitor. Continue NPO status with IVF. Plan to stay another day or two with NG in, then trial clear liquids after removal. Recheck labs in AM.          I spent a total of 60 minutes on the date of the service which included preparing to see the patient, face-to-face patient care, completing clinical documentation, obtaining and/or reviewing separately obtained history, performing a  medically appropriate examination, counseling and educating the patient/family/caregiver, ordering medications, tests, or procedures, communicating with other HCPs (not separately reported), independently interpreting results (not separately reported), communicating results to the patient/family/caregiver, and care coordination (not separately reported).         ISABELL ERICKSON                        Revision History                           Assessment & Plan  Generalized abdominal pain    Small bowel obstruction (Multi)    Dependence on nicotine from cigarettes    Advanced care planning/counseling discussion    Leukocytosis    Patient fully evaluated on 4/17. Labs and medications reviewed. Repeat KUB showing unchanged ongoing small bowel dilation with gastric distention. Per GS note, NG suction not reconnected properly after KUB this morning, NP flushed and increased suction with successful return of output. Had discussion with patient re: necessity of surgery with Dr. Martinez, patient is anxious, but understands risk vs benefits of surgical intervention and agreeable to proceed. Per GS, patient also agreeable to PICC line placement for TPN if needed. Recheck labs in AM. Continue plan as above.       I spent a total of 60 minutes on the date of the service which included preparing to see the patient, face-to-face patient care, completing clinical documentation, obtaining and/or reviewing separately obtained history, performing a medically appropriate examination, counseling and educating the patient/family/caregiver, ordering medications, tests, or procedures, communicating with other HCPs (not separately reported), independently interpreting results (not separately reported), communicating results to the patient/family/caregiver, and care coordination (not separately reported).       ISABELL ERICKSON

## 2025-04-18 LAB
ALBUMIN SERPL BCP-MCNC: 2.6 G/DL (ref 3.4–5)
ALP SERPL-CCNC: 61 U/L (ref 33–136)
ALT SERPL W P-5'-P-CCNC: 8 U/L (ref 7–45)
ANION GAP SERPL CALC-SCNC: 8 MMOL/L (ref 10–20)
AST SERPL W P-5'-P-CCNC: 12 U/L (ref 9–39)
BASOPHILS # BLD AUTO: 0.02 X10*3/UL (ref 0–0.1)
BASOPHILS NFR BLD AUTO: 0.2 %
BILIRUB SERPL-MCNC: 0.4 MG/DL (ref 0–1.2)
BUN SERPL-MCNC: 15 MG/DL (ref 6–23)
CALCIUM SERPL-MCNC: 7.6 MG/DL (ref 8.6–10.3)
CHLORIDE SERPL-SCNC: 106 MMOL/L (ref 98–107)
CO2 SERPL-SCNC: 25 MMOL/L (ref 21–32)
CREAT SERPL-MCNC: 1.2 MG/DL (ref 0.5–1.05)
EGFRCR SERPLBLD CKD-EPI 2021: 46 ML/MIN/1.73M*2
EOSINOPHIL # BLD AUTO: 0.24 X10*3/UL (ref 0–0.4)
EOSINOPHIL NFR BLD AUTO: 2.3 %
ERYTHROCYTE [DISTWIDTH] IN BLOOD BY AUTOMATED COUNT: 13.2 % (ref 11.5–14.5)
GLUCOSE BLD MANUAL STRIP-MCNC: 147 MG/DL (ref 74–99)
GLUCOSE BLD MANUAL STRIP-MCNC: 158 MG/DL (ref 74–99)
GLUCOSE BLD MANUAL STRIP-MCNC: 162 MG/DL (ref 74–99)
GLUCOSE BLD MANUAL STRIP-MCNC: 181 MG/DL (ref 74–99)
GLUCOSE BLD MANUAL STRIP-MCNC: 189 MG/DL (ref 74–99)
GLUCOSE BLD MANUAL STRIP-MCNC: 242 MG/DL (ref 74–99)
GLUCOSE SERPL-MCNC: 185 MG/DL (ref 74–99)
HCT VFR BLD AUTO: 32.9 % (ref 36–46)
HGB BLD-MCNC: 10.1 G/DL (ref 12–16)
IMM GRANULOCYTES # BLD AUTO: 0.08 X10*3/UL (ref 0–0.5)
IMM GRANULOCYTES NFR BLD AUTO: 0.8 % (ref 0–0.9)
LYMPHOCYTES # BLD AUTO: 1.52 X10*3/UL (ref 0.8–3)
LYMPHOCYTES NFR BLD AUTO: 14.8 %
MAGNESIUM SERPL-MCNC: 1.6 MG/DL (ref 1.6–2.4)
MCH RBC QN AUTO: 28.7 PG (ref 26–34)
MCHC RBC AUTO-ENTMCNC: 30.7 G/DL (ref 32–36)
MCV RBC AUTO: 94 FL (ref 80–100)
MONOCYTES # BLD AUTO: 0.95 X10*3/UL (ref 0.05–0.8)
MONOCYTES NFR BLD AUTO: 9.3 %
NEUTROPHILS # BLD AUTO: 7.46 X10*3/UL (ref 1.6–5.5)
NEUTROPHILS NFR BLD AUTO: 72.6 %
NRBC BLD-RTO: 0 /100 WBCS (ref 0–0)
PLATELET # BLD AUTO: 147 X10*3/UL (ref 150–450)
POTASSIUM SERPL-SCNC: 4 MMOL/L (ref 3.5–5.3)
PROT SERPL-MCNC: 5 G/DL (ref 6.4–8.2)
RBC # BLD AUTO: 3.52 X10*6/UL (ref 4–5.2)
SODIUM SERPL-SCNC: 135 MMOL/L (ref 136–145)
WBC # BLD AUTO: 10.3 X10*3/UL (ref 4.4–11.3)

## 2025-04-18 PROCEDURE — 2500000001 HC RX 250 WO HCPCS SELF ADMINISTERED DRUGS (ALT 637 FOR MEDICARE OP)

## 2025-04-18 PROCEDURE — 82947 ASSAY GLUCOSE BLOOD QUANT: CPT

## 2025-04-18 PROCEDURE — 2500000004 HC RX 250 GENERAL PHARMACY W/ HCPCS (ALT 636 FOR OP/ED): Mod: JZ | Performed by: INTERNAL MEDICINE

## 2025-04-18 PROCEDURE — 36415 COLL VENOUS BLD VENIPUNCTURE: CPT | Performed by: NURSE PRACTITIONER

## 2025-04-18 PROCEDURE — 2500000004 HC RX 250 GENERAL PHARMACY W/ HCPCS (ALT 636 FOR OP/ED): Mod: JZ | Performed by: NURSE PRACTITIONER

## 2025-04-18 PROCEDURE — 2500000004 HC RX 250 GENERAL PHARMACY W/ HCPCS (ALT 636 FOR OP/ED): Mod: JZ | Performed by: REGISTERED NURSE

## 2025-04-18 PROCEDURE — 80053 COMPREHEN METABOLIC PANEL: CPT | Performed by: NURSE PRACTITIONER

## 2025-04-18 PROCEDURE — 2500000001 HC RX 250 WO HCPCS SELF ADMINISTERED DRUGS (ALT 637 FOR MEDICARE OP): Performed by: NURSE PRACTITIONER

## 2025-04-18 PROCEDURE — 83735 ASSAY OF MAGNESIUM: CPT | Performed by: INTERNAL MEDICINE

## 2025-04-18 PROCEDURE — 99231 SBSQ HOSP IP/OBS SF/LOW 25: CPT

## 2025-04-18 PROCEDURE — 1200000002 HC GENERAL ROOM WITH TELEMETRY DAILY

## 2025-04-18 PROCEDURE — 85025 COMPLETE CBC W/AUTO DIFF WBC: CPT | Performed by: NURSE PRACTITIONER

## 2025-04-18 PROCEDURE — 2500000002 HC RX 250 W HCPCS SELF ADMINISTERED DRUGS (ALT 637 FOR MEDICARE OP, ALT 636 FOR OP/ED): Performed by: NURSE PRACTITIONER

## 2025-04-18 RX ORDER — ACETAMINOPHEN 325 MG/1
975 TABLET ORAL 3 TIMES DAILY
Status: DISCONTINUED | OUTPATIENT
Start: 2025-04-18 | End: 2025-04-21 | Stop reason: HOSPADM

## 2025-04-18 RX ORDER — GABAPENTIN 300 MG/1
300 CAPSULE ORAL 3 TIMES DAILY
Status: DISCONTINUED | OUTPATIENT
Start: 2025-04-18 | End: 2025-04-21 | Stop reason: HOSPADM

## 2025-04-18 RX ORDER — DEXTROSE MONOHYDRATE, SODIUM CHLORIDE, AND POTASSIUM CHLORIDE 50; 1.49; 4.5 G/1000ML; G/1000ML; G/1000ML
50 INJECTION, SOLUTION INTRAVENOUS CONTINUOUS
Status: DISCONTINUED | OUTPATIENT
Start: 2025-04-18 | End: 2025-04-20

## 2025-04-18 RX ADMIN — GABAPENTIN 300 MG: 300 CAPSULE ORAL at 22:13

## 2025-04-18 RX ADMIN — INSULIN LISPRO 1 UNITS: 100 INJECTION, SOLUTION INTRAVENOUS; SUBCUTANEOUS at 13:09

## 2025-04-18 RX ADMIN — ONDANSETRON 4 MG: 2 INJECTION INTRAMUSCULAR; INTRAVENOUS at 14:51

## 2025-04-18 RX ADMIN — HYDROMORPHONE HYDROCHLORIDE 0.2 MG: 0.2 INJECTION, SOLUTION INTRAMUSCULAR; INTRAVENOUS; SUBCUTANEOUS at 10:44

## 2025-04-18 RX ADMIN — HEPARIN SODIUM 5000 UNITS: 5000 INJECTION INTRAVENOUS; SUBCUTANEOUS at 23:59

## 2025-04-18 RX ADMIN — METOPROLOL TARTRATE 100 MG: 100 TABLET, FILM COATED ORAL at 22:13

## 2025-04-18 RX ADMIN — GABAPENTIN 300 MG: 300 CAPSULE ORAL at 17:19

## 2025-04-18 RX ADMIN — HEPARIN SODIUM 5000 UNITS: 5000 INJECTION INTRAVENOUS; SUBCUTANEOUS at 14:32

## 2025-04-18 RX ADMIN — METOPROLOL TARTRATE 5 MG: 5 INJECTION INTRAVENOUS at 14:28

## 2025-04-18 RX ADMIN — HYDROMORPHONE HYDROCHLORIDE 0.2 MG: 0.2 INJECTION, SOLUTION INTRAMUSCULAR; INTRAVENOUS; SUBCUTANEOUS at 05:51

## 2025-04-18 RX ADMIN — METRONIDAZOLE 500 MG: 500 INJECTION, SOLUTION INTRAVENOUS at 05:36

## 2025-04-18 RX ADMIN — ACETAMINOPHEN 975 MG: 325 TABLET, FILM COATED ORAL at 17:19

## 2025-04-18 RX ADMIN — DEXTROSE, SODIUM CHLORIDE, AND POTASSIUM CHLORIDE 100 ML/HR: 5; .45; .15 INJECTION INTRAVENOUS at 11:01

## 2025-04-18 RX ADMIN — ONDANSETRON 4 MG: 2 INJECTION INTRAMUSCULAR; INTRAVENOUS at 05:51

## 2025-04-18 RX ADMIN — ONDANSETRON 4 MG: 2 INJECTION INTRAMUSCULAR; INTRAVENOUS at 01:55

## 2025-04-18 RX ADMIN — DEXTROSE, SODIUM CHLORIDE, AND POTASSIUM CHLORIDE 100 ML/HR: 5; .45; .15 INJECTION INTRAVENOUS at 00:27

## 2025-04-18 RX ADMIN — HEPARIN SODIUM 5000 UNITS: 5000 INJECTION INTRAVENOUS; SUBCUTANEOUS at 05:37

## 2025-04-18 RX ADMIN — HYDROMORPHONE HYDROCHLORIDE 0.2 MG: 0.2 INJECTION, SOLUTION INTRAMUSCULAR; INTRAVENOUS; SUBCUTANEOUS at 01:55

## 2025-04-18 RX ADMIN — HYDROMORPHONE HYDROCHLORIDE 0.2 MG: 0.2 INJECTION, SOLUTION INTRAMUSCULAR; INTRAVENOUS; SUBCUTANEOUS at 14:51

## 2025-04-18 RX ADMIN — ACETAMINOPHEN 975 MG: 325 TABLET, FILM COATED ORAL at 22:13

## 2025-04-18 RX ADMIN — METOPROLOL TARTRATE 5 MG: 5 INJECTION INTRAVENOUS at 09:14

## 2025-04-18 RX ADMIN — CEFEPIME 1 G: 1 INJECTION, POWDER, FOR SOLUTION INTRAMUSCULAR; INTRAVENOUS at 02:29

## 2025-04-18 RX ADMIN — INSULIN LISPRO 1 UNITS: 100 INJECTION, SOLUTION INTRAVENOUS; SUBCUTANEOUS at 17:15

## 2025-04-18 RX ADMIN — METOPROLOL TARTRATE 5 MG: 5 INJECTION INTRAVENOUS at 02:29

## 2025-04-18 RX ADMIN — PANTOPRAZOLE SODIUM 40 MG: 40 INJECTION, POWDER, FOR SOLUTION INTRAVENOUS at 09:18

## 2025-04-18 RX ADMIN — ONDANSETRON 4 MG: 2 INJECTION INTRAMUSCULAR; INTRAVENOUS at 10:48

## 2025-04-18 RX ADMIN — DEXTROSE, SODIUM CHLORIDE, AND POTASSIUM CHLORIDE 100 ML/HR: 5; .45; .15 INJECTION INTRAVENOUS at 12:27

## 2025-04-18 RX ADMIN — BENZOCAINE AND MENTHOL 1 LOZENGE: 15; 3.6 LOZENGE ORAL at 11:53

## 2025-04-18 ASSESSMENT — PAIN SCALES - GENERAL
PAINLEVEL_OUTOF10: 0 - NO PAIN
PAINLEVEL_OUTOF10: 7
PAINLEVEL_OUTOF10: 0 - NO PAIN
PAINLEVEL_OUTOF10: 0 - NO PAIN
PAINLEVEL_OUTOF10: 7
PAINLEVEL_OUTOF10: 9

## 2025-04-18 ASSESSMENT — COGNITIVE AND FUNCTIONAL STATUS - GENERAL
HELP NEEDED FOR BATHING: A LITTLE
MOBILITY SCORE: 19
WALKING IN HOSPITAL ROOM: A LITTLE
MOVING TO AND FROM BED TO CHAIR: A LITTLE
TOILETING: A LITTLE
STANDING UP FROM CHAIR USING ARMS: A LITTLE
CLIMB 3 TO 5 STEPS WITH RAILING: A LOT
DAILY ACTIVITIY SCORE: 22

## 2025-04-18 ASSESSMENT — PAIN DESCRIPTION - ORIENTATION: ORIENTATION: RIGHT

## 2025-04-18 ASSESSMENT — PAIN - FUNCTIONAL ASSESSMENT
PAIN_FUNCTIONAL_ASSESSMENT: 0-10

## 2025-04-18 ASSESSMENT — PAIN DESCRIPTION - LOCATION: LOCATION: ABDOMEN

## 2025-04-18 ASSESSMENT — PAIN SCALES - PAIN ASSESSMENT IN ADVANCED DEMENTIA (PAINAD): TOTALSCORE: MEDICATION (SEE MAR)

## 2025-04-18 NOTE — PROGRESS NOTES
"Nancy Long is a 79 y.o. female on day 6 of admission presenting with Generalized abdominal pain.    Subjective   POD1 laparoscopic RIKI. Pt endorses appropriate pain. She states she is a little nauseous. There is no NGT output recorded overnight and only ~100 ml of yellow gastric fluid in canister. Pt states she passed a lot of gas overnight. Pt endorses productive cough. Encouraged IS use.        Objective     Physical Exam  Vitals reviewed.   Constitutional:       General: She is not in acute distress.     Appearance: She is ill-appearing.   HENT:      Mouth/Throat:      Mouth: Mucous membranes are moist.   Eyes:      General: No scleral icterus.  Cardiovascular:      Rate and Rhythm: Normal rate and regular rhythm.      Pulses: Normal pulses.   Pulmonary:      Effort: Pulmonary effort is normal. No respiratory distress.      Breath sounds: Normal breath sounds.      Comments: Diminished, poor inspiratory effort, 2L NC  Abdominal:      General: Bowel sounds are absent. There is no distension (Mild).      Palpations: Abdomen is soft.      Tenderness: There is abdominal tenderness (incisional, mild).      Comments: Lap sites covered with clear plastic dressing, c/d/I  JOVANY with serosang fluid, removed at bedside.   Musculoskeletal:      Right lower leg: No edema.      Left lower leg: No edema.   Skin:     General: Skin is warm and dry.      Coloration: Skin is not jaundiced or pale.   Neurological:      Mental Status: She is alert and oriented to person, place, and time.   Psychiatric:         Behavior: Behavior normal.      Comments: Defeated mood         Last Recorded Vitals  Blood pressure (!) 192/79, pulse 76, temperature 37 °C (98.6 °F), resp. rate 18, height 1.702 m (5' 7\"), weight 76.7 kg (169 lb), SpO2 98%.  Intake/Output last 3 Shifts:  I/O last 3 completed shifts:  In: 4198.3 (54.8 mL/kg) [P.O.:250; IV Piggyback:3948.3]  Out: 1730 (22.6 mL/kg) [Urine:125 (0 mL/kg/hr); Emesis/NG output:1350; Drains:250; " Blood:5]  Weight: 76.7 kg     Relevant Results  Results for orders placed or performed during the hospital encounter of 04/12/25 (from the past 24 hours)   POCT GLUCOSE   Result Value Ref Range    POCT Glucose 143 (H) 74 - 99 mg/dL   POCT GLUCOSE   Result Value Ref Range    POCT Glucose 166 (H) 74 - 99 mg/dL   POCT GLUCOSE   Result Value Ref Range    POCT Glucose 202 (H) 74 - 99 mg/dL   POCT GLUCOSE   Result Value Ref Range    POCT Glucose 242 (H) 74 - 99 mg/dL   POCT GLUCOSE   Result Value Ref Range    POCT Glucose 181 (H) 74 - 99 mg/dL   CBC and Auto Differential   Result Value Ref Range    WBC 10.3 4.4 - 11.3 x10*3/uL    nRBC 0.0 0.0 - 0.0 /100 WBCs    RBC 3.52 (L) 4.00 - 5.20 x10*6/uL    Hemoglobin 10.1 (L) 12.0 - 16.0 g/dL    Hematocrit 32.9 (L) 36.0 - 46.0 %    MCV 94 80 - 100 fL    MCH 28.7 26.0 - 34.0 pg    MCHC 30.7 (L) 32.0 - 36.0 g/dL    RDW 13.2 11.5 - 14.5 %    Platelets 147 (L) 150 - 450 x10*3/uL    Neutrophils % 72.6 40.0 - 80.0 %    Immature Granulocytes %, Automated 0.8 0.0 - 0.9 %    Lymphocytes % 14.8 13.0 - 44.0 %    Monocytes % 9.3 2.0 - 10.0 %    Eosinophils % 2.3 0.0 - 6.0 %    Basophils % 0.2 0.0 - 2.0 %    Neutrophils Absolute 7.46 (H) 1.60 - 5.50 x10*3/uL    Immature Granulocytes Absolute, Automated 0.08 0.00 - 0.50 x10*3/uL    Lymphocytes Absolute 1.52 0.80 - 3.00 x10*3/uL    Monocytes Absolute 0.95 (H) 0.05 - 0.80 x10*3/uL    Eosinophils Absolute 0.24 0.00 - 0.40 x10*3/uL    Basophils Absolute 0.02 0.00 - 0.10 x10*3/uL   Comprehensive Metabolic Panel   Result Value Ref Range    Glucose 185 (H) 74 - 99 mg/dL    Sodium 135 (L) 136 - 145 mmol/L    Potassium 4.0 3.5 - 5.3 mmol/L    Chloride 106 98 - 107 mmol/L    Bicarbonate 25 21 - 32 mmol/L    Anion Gap 8 (L) 10 - 20 mmol/L    Urea Nitrogen 15 6 - 23 mg/dL    Creatinine 1.20 (H) 0.50 - 1.05 mg/dL    eGFR 46 (L) >60 mL/min/1.73m*2    Calcium 7.6 (L) 8.6 - 10.3 mg/dL    Albumin 2.6 (L) 3.4 - 5.0 g/dL    Alkaline Phosphatase 61 33 - 136 U/L     Total Protein 5.0 (L) 6.4 - 8.2 g/dL    AST 12 9 - 39 U/L    Bilirubin, Total 0.4 0.0 - 1.2 mg/dL    ALT 8 7 - 45 U/L   POCT GLUCOSE   Result Value Ref Range    POCT Glucose 147 (H) 74 - 99 mg/dL       Assessment/Plan   79 year old female with PMH significant for HTN, HLD, CAD, PAD (follows with Dr. Oliver), carotid artery disease (Right occlusion of ICA, left ICA with > 70% stenosis; follows with Dr. Parish), CKD, Left breast cancer s/p lumpectomy, kidney stones, and IDDM Type 2 (follows with PCP Dr. Bonilla) who presented to [facility] ED on 4/12 with 2 day history of abdominal pain. Admitted to medicine with consult to general surgery with SBO.      Impression:  #pSBO - resolved    Procedures:  4/16 - Placement of NG tube  4/17 - Laparoscopic lysis of adhesions with Dr. Martinez     Recommendations:  - Continue NGT and NPO except ice chips until nausea resolves - hopefully will removed NGT later today   > Lantus currently held; this is appropriate    > continue hypoglycemia order set   - Will hold off on PICC/TPN as pt will hopefully be bale to start a diet later today  - NG to Medium continuous wall suction; RN needs to ensure tube is working properly. Tube needs flushed every shift  - PRN IV Zofran  - PRN IV dilaudid for severe pain (limit use as able, but unable to give NSAIDs or PO meds)    > Will start multimodal pain management once NGT is removed  - Continue mIVF: D51/2NS with 20 mEq KCL @ 100  - Encourage OOB and ambulation  - IS  - Repeat BMP in AM. NO Need for CMP or CBC     PT/OT: [facility] 16/16     DVT ppx: SCDs, SQH    Dispo: Continue care on RNF, not appropriate for hospital discharge. Will likely need SNF at discharge.     The patient was seen and discussed with the attending surgeon Dr. Michelle Diaz PA-C

## 2025-04-18 NOTE — PROGRESS NOTES
"Nutrition Follow Up Assessment:     Nutrition History:  Energy Intake: Poor < 50 %  Pain affecting nutrition status: N/A  Food and Nutrient History: Pt was sleeping soundly at time of attempted visit today.  Pt is NPO x6 days now.  POD#1 laparoscopic lysis of adhesions.  NGT to suction (placed 4/16).  Per review of chart, PICC may need to be placed for TPN.  Currently has only peripheral line, so could initiate PPN  Vitamin/Herbal Supplement Use: none noted       Anthropometrics:  Height: 170.2 cm (5' 7\")   Weight: 76.7 kg (169 lb)   BMI (Calculated): 26.46  IBW/kg (Dietitian Calculated): 61.4 kg  Percent of IBW: 125 %     Weight History:     Weight Change %:  Weight History / % Weight Change: 4/17 76.7kg, 4/12 77.1kg    Nutrition Focused Physical Exam Findings:    Subcutaneous Fat Loss:   Defer Subcutaneous Fat Loss Assessment: Defer all  Defer All Reason: sleeping  Muscle Wasting:  Defer Muscle Wasting Assessment: Defer all  Defer All Reason: sleeping  Edema:  Edema: none  Physical Findings:  Skin: Positive (abdomen incisions x3)  Respiratory : Negative  Digestive System Findings:  (NGT to suction)    Nutrition Significant Labs:  CBC Trend:   Results from last 7 days   Lab Units 04/18/25  0706 04/17/25  0656 04/16/25  0635 04/15/25  0641   WBC AUTO x10*3/uL 10.3 10.5 9.3 12.1*   RBC AUTO x10*6/uL 3.52* 3.51* 3.51* 3.89*   HEMOGLOBIN g/dL 10.1* 10.3* 10.0* 11.4*   HEMATOCRIT % 32.9* 33.3* 33.8* 36.5   MCV fL 94 95 96 94   PLATELETS AUTO x10*3/uL 147* 154 142* 161    , BMP Trend:   Results from last 7 days   Lab Units 04/18/25  0706 04/17/25  0656 04/16/25  0635 04/15/25  0641   GLUCOSE mg/dL 185* 129* 168* 109*   CALCIUM mg/dL 7.6* 7.9* 7.8* 8.0*   SODIUM mmol/L 135* 136 137 135*   POTASSIUM mmol/L 4.0 3.9 3.6 3.7   CO2 mmol/L 25 23 23 22   CHLORIDE mmol/L 106 107 109* 106   BUN mg/dL 15 18 18 20   CREATININE mg/dL 1.20* 1.22* 1.12* 1.14*    , BG POCT trend:   Results from last 7 days   Lab Units 04/18/25  0737 " 04/18/25  0430 04/18/25  0005 04/17/25  1955 04/17/25  1517   POCT GLUCOSE mg/dL 147* 181* 242* 202* 166*    , TPN/PPN Labs:   Results from last 7 days   Lab Units 04/18/25  0706 04/17/25  0656 04/16/25  0635 04/15/25  0641 04/13/25  0640 04/12/25  1222   GLUCOSE mg/dL 185* 129* 168* 109*   < > 220*   POTASSIUM mmol/L 4.0 3.9 3.6 3.7   < > 5.0   MAGNESIUM mg/dL  --   --   --   --   --  2.05   SODIUM mmol/L 135* 136 137 135*   < > 125*   CHLORIDE mmol/L 106 107 109* 106   < > 94*   ALT U/L 8 10 13 15  --  8   AST U/L 12 15 21 27  --  16   ALK PHOS U/L 61 59 63 72  --  96   BILIRUBIN TOTAL mg/dL 0.4 0.5 0.5 0.6  --  0.7    < > = values in this interval not displayed.        Nutrition Specific Medications:  Reviewed     I/O:   Last BM Date: 04/10/25;      Dietary Orders (From admission, onward)       Start     Ordered    04/18/25 0737  NPO Diet Except: Ice chips; Effective now  Diet effective now        Question:  Except:  Answer:  Ice chips    04/18/25 0736    04/12/25 2138  May Participate in Room Service  ( ROOM SERVICE MAY PARTICIPATE)  Once        Question:  .  Answer:  Yes    04/12/25 2137                     Estimated Needs:      Method for Estimating Needs: 1540-1700kcals (20-22kcals/kg ABW)     Method for Estimating 24 Hour Protein Needs: 61-77g (0.8-1.0g/kg ABW)     Method for Estimating 24 Hour Fluid Needs: 1 mL/kcal or as per MD  Patient on Order Fluid Restriction: No        Nutrition Diagnosis   Malnutrition Diagnosis  Patient has Malnutrition Diagnosis: No    Nutrition Diagnosis  Patient has Nutrition Diagnosis: Yes  Diagnosis Status (1): Active  Nutrition Diagnosis 1: Inadequate protein energy intake  Related to (1): SBO  As Evidenced by (1): NPO  Additional Assessment Information (1): NPO x6 days       Nutrition Interventions/Recommendations   Nutrition prescription for parenteral nutrition    Nutrition Recommendations:  Individualized Nutrition Prescription Provided for : Initiate Clinimix E 4.25/5 @  75ml/hr to provide 612kcals, 77gm protein, 1800ml fluid per day.  If PICC placed suggest Clinimix E 5/20 @ 62.5ml/hr plus 250ml 20% intralipids twice weekly to provide 1320kcals, 75gm protein plus an additional 1000kcals per week from lipids.   Advance diet as able    Nutrition Interventions/Goals:   Parenteral Nutrition: Management of delivery rate of parenteral nutrition, Management of volume of parenteral nutrition  Goal: initiate PN if unable to advance diet      Education Documentation  No documentation found.     N/A     Nutrition Monitoring and Evaluation   Food/Nutrient Related History Monitoring  Monitoring and Evaluation Plan: Enteral and parenteral nutrition intake determination  Enteral and Parenteral Nutrition Intake Determination: Parenteral nutrition formula/solution, Parenteral nutrition intake - Tolerate TPN at goal rate, Parenteral nutrition intake - Titrate to goal without metabolic complications  Additional Plans: monitor ability to advance diet    Anthropometric Measurements  Monitoring and Evaluation Plan: Body weight  Body Weight: Body weight - Maintain stable weight    Biochemical Data, Medical Tests and Procedures  Monitoring and Evaluation Plan: Electrolyte/renal panel, Glucose/endocrine profile  Electrolyte and Renal Panel: Electrolytes within normal limits, Sodium, Creatinine  Glucose/Endocrine Profile: Glucose within normal limits ( mg/dL)    Physical Exam Findings  Monitoring and Evaluation Plan: Digestive System  Criteria: resolution of GI symptoms; formed BM at least every 2-3 days    Goal Status: New goal(s) identified    Time Spent (min): 30 minutes

## 2025-04-18 NOTE — PROGRESS NOTES
04/18/25 1201   Discharge Planning   Home or Post Acute Services Post acute facilities (Rehab/SNF/etc)   Type of Post Acute Facility Services Skilled nursing   Expected Discharge Disposition SNF   Does the patient need discharge transport arranged? Yes   RoundTrip coordination needed? Yes   Has discharge transport been arranged? No   Intensity of Service   Intensity of Service 0-30 min     Care transitions met with patient at bedside to discuss SNF choices.  Patient stated that she glanced at the list but does not have choices yet at this time.  Care transitions to follow.

## 2025-04-18 NOTE — PROGRESS NOTES
Nancy Long is a 79 y.o. female on day 6 of admission presenting with Generalized abdominal pain.      Subjective   No significant events overnight. Patient seen and evaluated at bedside with sister present.        Objective     Last Recorded Vitals  /70   Pulse 78   Temp 36.8 °C (98.2 °F)   Resp 18   Wt 76.7 kg (169 lb)   SpO2 98%   Intake/Output last 3 Shifts:    Intake/Output Summary (Last 24 hours) at 4/18/2025 1424  Last data filed at 4/18/2025 0736  Gross per 24 hour   Intake 2600 ml   Output 1030 ml   Net 1570 ml       Admission Weight  Weight: 77.1 kg (170 lb) (04/12/25 1212)    Daily Weight  04/17/25 : 76.7 kg (169 lb)    Image Results  XR abdomen 1 view  Narrative: Interpreted By:  Марина Quintero,   STUDY:  XR ABDOMEN 1 VIEW;  4/17/2025 8:15 am. 2 views.      INDICATION:  Signs/Symptoms:Follow up bowel distention.      COMPARISON:  04/16/2025, CT abdomen and pelvis 04/12/2025      ACCESSION NUMBER(S):  [id number]      ORDERING CLINICIAN:  ROZ RAMESH      FINDINGS:  There is a nasogastric tube with the tip in the distal stomach.  There is progressive moderate gastric distention this has improved  compared to the previous study from 04/16/2025 prior to nasogastric  tube placement. At that time, there was marked gastric distention.  There is moderate small bowel dilatation with loops dilated up to 4.8  cm, unchanged. Findings are consistent with small-bowel obstruction.  There      There are no pathologic calcifications.      Visualized lungs are clear.      Osseous structures demonstrate no acute bony changes.          Impression: 1. Nasogastric tube with the tip in the distal stomach. Improvement  in the degree of gastric distention.  2. Persistent small bowel obstruction.      MACRO:  None      Signed by: Марина Quintero 4/17/2025 9:11 AM  Dictation workstation:   [id number]      Physical Exam        Gianfranco Saez MD   Physician  Internal Medicine     Progress Notes      Signed      Date of Service: 4/15/2025  8:35 AM     Signed       Expand All Collapse All    Nancy Long is a 79 y.o. female on day 3 of admission presenting with Generalized abdominal pain.        Subjective  No significant events overnight. Patient seen and evaluated at bedside.         Objective[]Expand by Default  Last Recorded Vitals  /67   Pulse (!) 37   Temp 36.4 °C (97.5 °F)   Resp 18   Wt 77.1 kg (170 lb)   SpO2 93%   Intake/Output last 3 Shifts:     Intake/Output Summary (Last 24 hours) at 4/15/2025 0835  Last data filed at 4/15/2025 0250      Gross per 24 hour   Intake 950 ml   Output --   Net 950 ml         Admission Weight  Weight: 77.1 kg (170 lb) (04/12/25 1212)     Daily Weight  04/12/25 : 77.1 kg (170 lb)     Image Results  XR abdomen 1 view  Narrative: Interpreted By:  Arun Escamilla,   STUDY:  XR ABDOMEN 1 VIEW;  4/14/2025 7:06 am      INDICATION:  Signs/Symptoms:Follow up bowel distention.          COMPARISON:  04/13/2025      ACCESSION NUMBER(S):  [id number]      ORDERING CLINICIAN:  ROZ RAMESH      FINDINGS:  Three view abdomen      Multiple dilated gas-filled bowel loops predominantly centrally with  stacked appearance most concerning for obstruction redemonstrated  grossly similar to prior. Limited evaluation for free air on supine  films. Some gas and stool noted within the colon as previously.  Approximate 5 cm gaseous lucency overlying the pelvis could represent  focal distended bowel loop possibly distal colon within the pelvis  and new or more pronounced compared to prior. Mild gaseous distention  of the stomach.      Prominent vascular calcifications overlying abdomen and pelvis and  overlying the groin regions.      Impression: 1.  Multiple dilated gas-filled loops of small bowel redemonstrated  most suggestive of sequela of obstruction as noted previously and  probably grossly similar to prior. Focal gaseous lucency overlying  the pelvis new or more pronounced may reflect focal  distended bowel  loop possibly distal colon within the pelvis new or more pronounced.  Mild gaseous distention of the stomach. Limited evaluation for free  air on supine films. Continued clinical correlation and follow-up  advised.      MACRO:  None      Signed by: Arun Escamilla 4/14/2025 2:45 PM  Dictation workstation:   [id number]        Physical Exam     Date of Service: 4/13/2025  6:18 PM      Signed        Expand All Collapse All    History Of Present Illness  Nancy Long is a 79 y.o. female with CAD,  chronic diastolic heart failure, hypertension, hyperlipidemia, peripheral artery disease, diabetes mellitus, carotid artery disease (Right occlusion of ICA, left ICA with > 70% stenosis; follows with Dr. Parish), CKD, Left breast cancer s/p lumpectomy and radiation, kidney stones, and open cholecystectomy and open appendectomy with right ovary removal (for tumor- at age 16) who presented today with abdominal pain.  Patient reports yesterday she developed severe, sharp, continuous epigastric pain associated with nausea and diaphoresis.  She notes she took some of her prescribed Percocet that seemed to help a little however the symptoms persisted into today so she decided to come to the emergency room.  She also notes a decreased appetite and when she did try to eat the food did not taste good.  She denies a history of bowel obstruction.  She articulates she told the surgery team she would not like surgical intervention.  CODE STATUS discussed and she would like to be a DNR CCA.     In the ED lab work, EKG, chest x-ray and CTAP were performed, labs revealed glucose 200, sodium 125, chloride 94, bun 45, creatinine 1.64 (1.32 on 2/4/2020), , lactate 1.6, troponins 27 and 29 respectively, white count 21, neutrophils 17.35, monocytes 1.1. EKG Interpretation, per ER physician impression: Sinus at rate of 79, , QRS 91, QTc 407 without evidence of STEMI or ischemia. Chest x-ray without acute process.  CT  abdomen/pelvis revealed small bowel obstruction with mesenteric edema as described, small volume of ascites, diverticulosis, atherosclerosis, subcutaneous opacities and reticulation that may be due to injections although hematoma is possible.  Patient was evaluated by surgery team that noted her symptoms are likely consistent with partial adhesive small bowel obstruction and surgical intervention was offered however patient declined.  Her vital signs were acceptable with a slightly elevated blood pressure of 157/70.  She was given cefepime Flagyl morphine Zofran and started on IV fluids and admitted for further evaluation and treatment under the care of Dr. Saez.        Echo March 2025:     CONCLUSIONS:  1. Left ventricular ejection fraction is normal, by visual estimate at 70-75%.  2. There is moderate left ventricular hypertrophy.  3. The left atrium is mildly dilated.  4. There is mild mitral valve stenosis.  5. There is moderate mitral annular calcification.  6. Right ventricular systolic pressure is within normal limits.  7. Mild aortic valve stenosis.  8. Mild left ventricular outflow obstruction at rest and with Valsalva (10 mmHg).     Past Medical History  Medical History           Past Medical History:   Diagnosis Date    Personal history of other diseases of the circulatory system       History of hypertension    Personal history of other endocrine, nutritional and metabolic disease       History of diabetes mellitus            Surgical History  Surgical History             Past Surgical History:   Procedure Laterality Date    APPENDECTOMY   01/03/2014     Appendectomy    BREAST LUMPECTOMY   01/03/2014     Breast Surgery Lumpectomy    CHOLECYSTECTOMY   01/03/2014     Cholecystectomy    OOPHORECTOMY   01/03/2014     Oophorectomy            Social History  Denies alcohol or illicit drug use.  Reports she smokes 2 to 3 packs of cigarettes per week.  Lives alone.  Ambulates with cane as needed.     Family  "History  Family History   No family history on file.         Allergies  Penicillins and Sulfa (sulfonamide antibiotics)     10 point review of systems performed and is negative besides what is stated in HPI.     Physical Exam  Constitutional:       Appearance: Normal appearance.   HENT:      Head: Normocephalic and atraumatic.      Nose: Nose normal.      Mouth/Throat:      Mouth: Mucous membranes are moist.   Eyes:      Conjunctiva/sclera: Conjunctivae normal.   Cardiovascular:      Rate and Rhythm: Normal rate and regular rhythm.      Heart sounds: Murmur heard.   Pulmonary:      Effort: Pulmonary effort is normal.      Comments: Crackles left base  Abdominal:      General: Bowel sounds are normal. There is no distension.      Tenderness: There is abdominal tenderness.      Comments: Somewhat firm   Musculoskeletal:         General: Normal range of motion.      Cervical back: Neck supple.   Skin:     General: Skin is warm and dry.   Neurological:      Mental Status: She is alert. Mental status is at baseline.   Psychiatric:         Mood and Affect: Mood normal.            Last Recorded Vitals  Blood pressure (!) 193/77, pulse 74, temperature 37 °C (98.6 °F), temperature source Temporal, resp. rate 20, height 1.702 m (5' 7\"), weight 77.1 kg (170 lb), SpO2 94%.     Relevant Results     Medications Ordered Prior to Encounter   No current facility-administered medications on file prior to encounter.                  Current Outpatient Medications on File Prior to Encounter   Medication Sig Dispense Refill    acetaminophen (Tylenol) 500 mg tablet Take 1 tablet (500 mg) by mouth every 8 hours if needed for mild pain (1 - 3).        ascorbic acid (Vitamin C) 500 mg tablet Take 1 tablet (500 mg) by mouth once daily.        aspirin 325 mg tablet Take 1 tablet (325 mg) by mouth once daily at bedtime.        B12-levomefolate calcium-B6 (Foltx) 2-1.13-25 mg tablet Take 1 tablet by mouth once daily.        cilostazol (Pletal) " 50 mg tablet Take 1 tablet (50 mg) by mouth 2 times a day. 180 tablet 3    lansoprazole (Prevacid) 30 mg DR capsule Take 1 capsule (30 mg) by mouth 2 times a day as needed.        losartan (Cozaar) 100 mg tablet Take 1 tablet (100 mg) by mouth once daily. 90 tablet 3    metFORMIN XR (Glucophage-XR) 500 mg 24 hr tablet Take 1 tablet (500 mg) by mouth once daily in the evening. Take with meals. (Patient taking differently: Take 1 tablet (500 mg) by mouth once daily as needed (BG >200).)        metoprolol tartrate (Lopressor) 100 mg tablet Take 1 tablet (100 mg) by mouth once daily. (Patient taking differently: Take 1 tablet (100 mg) by mouth once daily at bedtime.) 90 tablet 3    NovoLIN 70/30 U-100 Insulin 100 unit/mL (70-30) injection Inject 43 Units under the skin 2 times a day before meals.        OneTouch Ultra Test strip          oxyCODONE-acetaminophen (Percocet)  mg tablet Take 1 tablet by mouth every 8 hours.        rosuvastatin (Crestor) 20 mg tablet Take 1 tablet (20 mg) by mouth once daily. (Patient taking differently: Take 1 tablet (20 mg) by mouth once daily at bedtime.) 90 tablet 3    zinc sulfate (Zincate) 220 (50 Zn) MG capsule Take 1 capsule (50 mg of elemental zinc) by mouth once daily.        betamethasone dipropionate 0.05 % cream Apply topically 2 times a day. (Patient not taking: Reported on 4/12/2025)        hydrOXYzine pamoate (Vistaril) 50 mg capsule Take 1 capsule (50 mg) by mouth 4 times a day as needed. (Patient not taking: Reported on 4/12/2025)        loratadine-pseudoephedrine (Claritin-D 24-hour)  mg 24 hr tablet Take 1 tablet by mouth once daily. (Patient not taking: Reported on 4/12/2025)        ondansetron ODT (Zofran-ODT) 8 mg disintegrating tablet every 8 hours if needed. (Patient not taking: Reported on 4/12/2025)        polymyxin B sulf-trimethoprim (Polytrim) ophthalmic solution APPLY 1/4 INCH TO AFFECTED EYE 4 TIMES A DAY. (Patient not taking: Reported on  4/12/2025)        solifenacin (VESIcare) 5 mg tablet  (Patient not taking: Reported on 4/12/2025)        sucralfate (Carafate) 100 mg/mL suspension TAKE 10 ML BY MOUTH 4 TIMES A DAY (Patient not taking: Reported on 4/12/2025)        torsemide (Demadex) 20 mg tablet Take 1 tablet (20 mg) by mouth once daily. (Patient not taking: Reported on 4/12/2025) 90 tablet 3                      Results for orders placed or performed during the hospital encounter of 04/12/25 (from the past 24 hours)   POCT GLUCOSE   Result Value Ref Range     POCT Glucose 183 (H) 74 - 99 mg/dL   Troponin I, High Sensitivity   Result Value Ref Range     Troponin I, High Sensitivity 23 (H) 0 - 13 ng/L   Sars-CoV-2 and Influenza A/B PCR   Result Value Ref Range     Flu A Result Not Detected Not Detected     Flu B Result Not Detected Not Detected     Coronavirus 2019, PCR Not Detected Not Detected   POCT GLUCOSE   Result Value Ref Range     POCT Glucose 171 (H) 74 - 99 mg/dL   Urinalysis with Reflex Culture and Microscopic   Result Value Ref Range     Color, Urine Light-Yellow Light-Yellow, Yellow, Dark-Yellow     Appearance, Urine Clear Clear     Specific Gravity, Urine 1.017 1.005 - 1.035     pH, Urine 5.0 5.0, 5.5, 6.0, 6.5, 7.0, 7.5, 8.0     Protein, Urine 10 (TRACE) NEGATIVE, 10 (TRACE), 20 (TRACE) mg/dL     Glucose, Urine Normal Normal mg/dL     Blood, Urine NEGATIVE NEGATIVE mg/dL     Ketones, Urine NEGATIVE NEGATIVE mg/dL     Bilirubin, Urine NEGATIVE NEGATIVE mg/dL     Urobilinogen, Urine Normal Normal mg/dL     Nitrite, Urine NEGATIVE NEGATIVE     Leukocyte Esterase, Urine 250 Triny/uL (A) NEGATIVE   Extra Urine Gray Tube   Result Value Ref Range     Extra Tube Hold for add-ons.     POCT GLUCOSE   Result Value Ref Range     POCT Glucose 131 (H) 74 - 99 mg/dL   Microscopic Only, Urine   Result Value Ref Range     WBC, Urine 11-20 (A) 1-5, NONE /HPF     RBC, Urine 1-2 NONE, 1-2, 3-5 /HPF     Bacteria, Urine 4+ (A) NONE SEEN /HPF     Mucus,  Urine FEW Reference range not established. /LPF     Hyaline Casts, Urine OCCASIONAL (A) NONE /LPF   POCT GLUCOSE   Result Value Ref Range     POCT Glucose 81 74 - 99 mg/dL   POCT GLUCOSE   Result Value Ref Range     POCT Glucose 67 (L) 74 - 99 mg/dL   POCT GLUCOSE   Result Value Ref Range     POCT Glucose 64 (L) 74 - 99 mg/dL   POCT GLUCOSE   Result Value Ref Range     POCT Glucose 147 (H) 74 - 99 mg/dL   CBC   Result Value Ref Range     WBC 15.8 (H) 4.4 - 11.3 x10*3/uL     nRBC 0.0 0.0 - 0.0 /100 WBCs     RBC 3.83 (L) 4.00 - 5.20 x10*6/uL     Hemoglobin 11.0 (L) 12.0 - 16.0 g/dL     Hematocrit 36.3 36.0 - 46.0 %     MCV 95 80 - 100 fL     MCH 28.7 26.0 - 34.0 pg     MCHC 30.3 (L) 32.0 - 36.0 g/dL     RDW 13.1 11.5 - 14.5 %     Platelets 163 150 - 450 x10*3/uL   Basic Metabolic Panel   Result Value Ref Range     Glucose 74 74 - 99 mg/dL     Sodium 132 (L) 136 - 145 mmol/L     Potassium 4.1 3.5 - 5.3 mmol/L     Chloride 103 98 - 107 mmol/L     Bicarbonate 23 21 - 32 mmol/L     Anion Gap 10 10 - 20 mmol/L     Urea Nitrogen 39 (H) 6 - 23 mg/dL     Creatinine 1.33 (H) 0.50 - 1.05 mg/dL     eGFR 40 (L) >60 mL/min/1.73m*2     Calcium 8.2 (L) 8.6 - 10.3 mg/dL   POCT GLUCOSE   Result Value Ref Range     POCT Glucose 72 (L) 74 - 99 mg/dL   POCT GLUCOSE   Result Value Ref Range     POCT Glucose 75 74 - 99 mg/dL   POCT GLUCOSE   Result Value Ref Range     POCT Glucose 84 74 - 99 mg/dL   POCT GLUCOSE   Result Value Ref Range     POCT Glucose 99 74 - 99 mg/dL         XR abdomen 1 view     Result Date: 4/13/2025  Interpreted By:  Beka Mays, STUDY: Abdominal series dated 4/13/2025.   INDICATION: Follow-up bowel dilation.   COMPARISON: Correlation is made with 04/12/2025 CT.   ACCESSION NUMBER(S): [id number]   ORDERING CLINICIAN: ROZ RAMESH   TECHNIQUE: Two AP radiographs of the abdomen.   FINDINGS: There are multiple stacked dilated loops of small bowel in the central abdomen measuring up to a proximally 4.1 cm. There is  gas and stool in the colon. Lung bases are clear. Degenerative changes seen of the spine and hips.        Multiple dilated loops of small bowel in the central abdomen most compatible with a degree of obstructive process as further discussed on prior date CT.   Signed by: Beka Mays 4/13/2025 1:01 PM Dictation workstation:   [id number]     CT abdomen pelvis wo IV contrast     Addendum Date: 4/12/2025    Interpreted By:  Chano Marshall, ADDENDUM: Also to be noted at the impression: Subcutaneous opacities and reticulation that may be due to injections although hematoma is possible.   Signed by: Chano Marshall 4/12/2025 2:38 PM   -------- ORIGINAL REPORT -------- Dictation workstation:   [id number]     Result Date: 4/12/2025  Interpreted By:  Chano Marshall, STUDY: CT ABDOMEN PELVIS WO IV CONTRAST;  4/12/2025 2:05 pm   INDICATION: Signs/Symptoms:abd pain.   COMPARISON: None.   ACCESSION NUMBER(S): [id number]   ORDERING CLINICIAN: FRANK BREWSTER   TECHNIQUE: CT of the abdomen and pelvis was performed. Contiguous axial images were obtained at 3 mm slice thickness through the abdomen and pelvis. Coronal and sagittal reconstructions at 3 mm slice thickness were performed.   FINDINGS: LOWER CHEST: Small right pleural effusion and trace left pleural effusion. Coronary artery atherosclerotic calcification and mitral annular calcification.   ABDOMEN:   LIVER: The hepatic parenchyma is homogeneous without evidence of focal liver lesions.The hepatic size is normal.   SPLEEN: The spleen is normal in size and homogeneous.   ADRENAL GLANDS: Bilateral adrenal glands appear normal.   KIDNEYS AND URETERS: Mild bilateral cortical atrophy, the renal cortices otherwise are homogeneous. Radiodensities at the renal sinuses is probably atherosclerotic calcification, although superimposed nonobstructing calculi are possible.   The ureters throughout their distal course are not well identified due to overlying crowded bowel loops, but  the tracts otherwise are unremarkable without identified ureteral dilatation or additional radiodense calculi.   PANCREAS: The pancreas appears unremarkable, there is no ductal dilatation or masses.   GALLBLADDER: Not visualized, presumably with cholecystectomy.   BILE DUCTS: Dilatation of the extrahepatic ducts, the common bile duct measuring 1.4 cm in diameter, most likely from post cholecystectomy state. However as the no prior exams for comparison MRCP would be recommended if there is symptomatology compatible with biliary colic.     VESSELS: Fairly extensive atherosclerotic calcifications are noted predominantly at the aorta and iliac system. There is also moderate atherosclerotic calcification at the mid segment of the superior mesenteric artery, and fairly marked of the inferior mesenteric artery.   PERITONEUM AND RETROPERITONEUM: There is a small volume of ascites best seen in the right upper quadrant along the liver. No free intraperitoneal air.   The retroperitoneum appears unremarkable, and without significant adenopathy.   No enlarged mesenteric lymph nodes.   BOWEL: There is mild distention of multiple small bowel segments with air-fluid levels, associated with reticulation of the mesentery indicating edema. There is decompression of distal small bowel segments, and the pattern would be most compatible with mid to early or partial distal small bowel obstruction. There is also moderate diverticulosis of the distal colon.   PELVIS:   BLADDER: The urinary bladder contour is smooth.   REPRODUCTIVE ORGANS: No pelvic masses.     BONE, ABDOMINAL WALL AND OTHER FINDINGS: No suspicious osseous lesions are identified.   There is a small non incarcerated umbilical hernia containing intra-abdominal fat. There is also attenuation within the subcutaneous fat of the mid/lower abdomen anteriorly that may be from subcutaneous injections. However, hematomas are also possible. There is also reticulation of the  "subcutaneous fat at the right lateral abdominal wall indicating additional edema.        1.  Small-bowel obstruction with mesenteric edema as described. 2. Small volume of ascites. 3. Diverticulosis. 4. Atherosclerosis as described.   MACRO: 1. None   Signed by: Chano Marshall 4/12/2025 2:28 PM Dictation workstation:   [id number]     XR chest 2 views     Result Date: 4/12/2025  Interpreted By:  Beka Mays, STUDY: Chest dated  4/12/2025.   INDICATION: Signs/Symptoms:sob with ambulation   COMPARISON: Chest dated 11/17/2015.   ACCESSION NUMBER(S): [id number]   ORDERING CLINICIAN: FRANK BREWSTER   TECHNIQUE: One view of the chest.   FINDINGS: The lungs are clear.  No pneumothorax or effusion is evident. The cardiomediastinal silhouette is  not enlarged.Degenerative change is seen of the spine and shoulders.        No acute cardiopulmonary process is evident.   MACRO: None   Signed by: Beka Mays 4/12/2025 1:26 PM Dictation workstation:   [id number]             Assessment/Plan     Assessment & Plan  Generalized abdominal pain     Small bowel obstruction (Multi)     Dependence on nicotine from cigarettes     Advanced care planning/counseling discussion     Leukocytosis        Per surgery:  \"Impression:  #SBO   > small bowel dilation; however, other concerning CT findings particularly mesenteric edema and ascites. CT imaging worrisome for possible ischemic/low flow state to bowel; however, serum lactate normal and patient's clinical presentation/symptoms/pain/bowel sounds are consistent with an adhesive small bowel obstruction (partial)  #Leukocytosis (possibly 2/2 above, but may have other infectious etiology, possibly UTI?)   > UA ordered; however, not yet collected and already received antibiotics  #Hyponatremia and hypochloremia  #CKD   Recommendations:  - no acute surgical intervention at this time   > patient offered surgical intervention if she were to worsen clinically; however, she was adamant about " "not wanting surgery   > would recommend placing NG tube for decompression; however, patient again declined NG tube. She said she may be agreeable if she begins to feel worse/nauseated  - PRN IV Zofran  - PRN IV Morphine for severe pain (limit use as able, but unable to give NSAIDs or PO meds)  - IVF: D5NS @ 100  - repeat CBC and BMP in AM\"     Additionally:  Change morphine to Dilaudid given CKD  Check flu and COVID  KUB in a.m.        #Advance care planning     Patient would like to be a DNR CCA     #Nicotine dependence     Reports she lets most of her cigarettes to burn out without smoking them so we will order a nicotine patch as needed     #Chronic conditions:     CAD,  chronic diastolic heart failure, hypertension, hyperlipidemia, peripheral artery disease, diabetes mellitus, carotid artery disease (Right occlusion of ICA, left ICA with > 70% stenosis; follows with Dr. Parish), CKD, Left breast cancer s/p lumpectomy and radiation, kidney stones, and open cholecystectomy and open appendectomy with right ovary removal (for tumor- at age 16)      Hold all p.o. meds  Give IV Protonix   Metoprolol IV around-the-clock with parameters  Patient takes 70/30 at home will conservatively give 30 units of Lantus daily which would be a little less than half of the equivalent of 70/30/day with sliding scale insulin and hypoglycemic protocol     #DVT prophylaxis     SCDs  Heparin                             Chadwick Medel, DO     Review of Systems                     Relevant Results                   Assessment/Plan                      Assessment & Plan  Generalized abdominal pain     Small bowel obstruction (Multi)     Dependence on nicotine from cigarettes     Advanced care planning/counseling discussion     Leukocytosis     Patient fully evaluated on 4/14. CT showing SBO with mesenteric edema, small volume ascites, diverticulosis. Evaluated by general surgery today, recommending NG tube for decompression, ordered " zofran, morphine, continuing IV cefepime/flagyl. Repeat KUB ordered, results pending. If KUB looks okay, plan to advance diet. Leukocytosis downtrending, continue to monitor. Hypertension noted this morning, cardiac PO meds held d/t NPO status. Continue to monitor and restart PO meds when diet advanced. Recheck labs in AM.      Patient still requiring frequent cardiac and SPO2 monitoring. Continue aggressive pulmonary hygiene and oral hygiene. Off loading as tolerated for skin integrity. Medications and labs reviewed.     I spent a total of 60 minutes on the date of the service which included preparing to see the patient, face-to-face patient care, completing clinical documentation, obtaining and/or reviewing separately obtained history, performing a medically appropriate examination, counseling and educating the patient/family/caregiver, ordering medications, tests, or procedures, communicating with other HCPs (not separately reported), independently interpreting results (not separately reported), communicating results to the patient/family/caregiver, and care coordination (not separately reported).   Patient fully evaluated  04/15  for    Problem List Items Addressed This Visit         * (Principal) Generalized abdominal pain - Primary      Other Visit Diagnoses         SBO (small bowel obstruction) (Multi)                 Patient seen resting in bed with head of bed elevated, no s/s or c/o acute difficulties at this time.  Vital signs for last 24 hours Temp:  [36.2 °C (97.2 °F)-36.9 °C (98.4 °F)] 36.4 °C (97.5 °F)  Heart Rate:  [37-76] 37  Resp:  [18-20] 18  BP: (136-186)/(63-82) 152/67    No intake/output data recorded.  Patient still requiring frequent cardiac and SPO2 monitoring. Continue aggressive pulmonary hygiene and oral hygiene. Off loading as tolerated for skin integrity. Medications and labs reviewed-         Results for orders placed or performed during the hospital encounter of 04/12/25 (from the  past 24 hours)   POCT GLUCOSE   Result Value Ref Range     POCT Glucose 111 (H) 74 - 99 mg/dL   POCT GLUCOSE   Result Value Ref Range     POCT Glucose 115 (H) 74 - 99 mg/dL   POCT GLUCOSE   Result Value Ref Range     POCT Glucose 129 (H) 74 - 99 mg/dL   POCT GLUCOSE   Result Value Ref Range     POCT Glucose 114 (H) 74 - 99 mg/dL   POCT GLUCOSE   Result Value Ref Range     POCT Glucose 117 (H) 74 - 99 mg/dL   CBC and Auto Differential   Result Value Ref Range     WBC 12.1 (H) 4.4 - 11.3 x10*3/uL     nRBC 0.0 0.0 - 0.0 /100 WBCs     RBC 3.89 (L) 4.00 - 5.20 x10*6/uL     Hemoglobin 11.4 (L) 12.0 - 16.0 g/dL     Hematocrit 36.5 36.0 - 46.0 %     MCV 94 80 - 100 fL     MCH 29.3 26.0 - 34.0 pg     MCHC 31.2 (L) 32.0 - 36.0 g/dL     RDW 13.0 11.5 - 14.5 %     Platelets 161 150 - 450 x10*3/uL     Neutrophils % 74.4 40.0 - 80.0 %     Immature Granulocytes %, Automated 0.5 0.0 - 0.9 %     Lymphocytes % 14.3 13.0 - 44.0 %     Monocytes % 8.0 2.0 - 10.0 %     Eosinophils % 2.4 0.0 - 6.0 %     Basophils % 0.4 0.0 - 2.0 %     Neutrophils Absolute 8.99 (H) 1.60 - 5.50 x10*3/uL     Immature Granulocytes Absolute, Automated 0.06 0.00 - 0.50 x10*3/uL     Lymphocytes Absolute 1.73 0.80 - 3.00 x10*3/uL     Monocytes Absolute 0.97 (H) 0.05 - 0.80 x10*3/uL     Eosinophils Absolute 0.29 0.00 - 0.40 x10*3/uL     Basophils Absolute 0.05 0.00 - 0.10 x10*3/uL   Comprehensive Metabolic Panel   Result Value Ref Range     Glucose 109 (H) 74 - 99 mg/dL     Sodium 135 (L) 136 - 145 mmol/L     Potassium 3.7 3.5 - 5.3 mmol/L     Chloride 106 98 - 107 mmol/L     Bicarbonate 22 21 - 32 mmol/L     Anion Gap 11 10 - 20 mmol/L     Urea Nitrogen 20 6 - 23 mg/dL     Creatinine 1.14 (H) 0.50 - 1.05 mg/dL     eGFR 48 (L) >60 mL/min/1.73m*2     Calcium 8.0 (L) 8.6 - 10.3 mg/dL     Albumin 3.1 (L) 3.4 - 5.0 g/dL     Alkaline Phosphatase 72 33 - 136 U/L     Total Protein 5.7 (L) 6.4 - 8.2 g/dL     AST 27 9 - 39 U/L     Bilirubin, Total 0.6 0.0 - 1.2 mg/dL      ALT 15 7 - 45 U/L   POCT GLUCOSE   Result Value Ref Range     POCT Glucose 111 (H) 74 - 99 mg/dL      Patient recently received an antibiotic (last 12 hours)         Date/Time Action Medication Dose     04/15/25 0649 New Bag    metroNIDAZOLE (Flagyl) 500 mg in sodium chloride (iso)  mL 500 mg     04/15/25 0218 New Bag    cefepime (Maxipime) 1 g in dextrose 5% IV 50 mL 1 g     04/14/25 2235 New Bag    metroNIDAZOLE (Flagyl) 500 mg in sodium chloride (iso)  mL 500 mg             Plan discussed with interdisciplinary team,  NG tube for decompression, PRN IV zofran, IV morphine, will continue on IV Antibiotics-continuing IV cefepime/flagyl, WBCs downtrending. Repeat KUB on 4/14 unchanged, will repeat KUB today. Unable to advance diet at this time, will continue IV fluids, NPO. Ice chips ok.  PO meds held d/t NPO status. Continue to monitor and restart PO meds when diet advanced. continue current and repeat labs in the AM.      Discharge planning discussed with patient and care team. Therapy evaluations ordered. Anticipate HHC/SNF at discharge. Patient aware and agreeable to current plan, continue plan as above.      I spent a total of 50 minutes on the date of the service which included preparing to see the patient, face-to-face patient care, completing clinical documentation, obtaining and/or reviewing separately obtained history, performing a medically appropriate examination, counseling and educating the patient/family/caregiver, ordering medications, tests, or procedures, communicating with other HCPs (not separately reported), independently interpreting results (not separately reported), communicating results to the patient/family/caregiver, and care coordination (not separately reported).   Angela Fatima                      Revision History     Scheduled medications  Scheduled Medications[1]  Continuous medications  Continuous Medications[2]  PRN medications  PRN Medications[3]    Results for orders  placed or performed during the hospital encounter of 04/12/25 (from the past 24 hours)   POCT GLUCOSE   Result Value Ref Range    POCT Glucose 166 (H) 74 - 99 mg/dL   POCT GLUCOSE   Result Value Ref Range    POCT Glucose 202 (H) 74 - 99 mg/dL   POCT GLUCOSE   Result Value Ref Range    POCT Glucose 242 (H) 74 - 99 mg/dL   POCT GLUCOSE   Result Value Ref Range    POCT Glucose 181 (H) 74 - 99 mg/dL   CBC and Auto Differential   Result Value Ref Range    WBC 10.3 4.4 - 11.3 x10*3/uL    nRBC 0.0 0.0 - 0.0 /100 WBCs    RBC 3.52 (L) 4.00 - 5.20 x10*6/uL    Hemoglobin 10.1 (L) 12.0 - 16.0 g/dL    Hematocrit 32.9 (L) 36.0 - 46.0 %    MCV 94 80 - 100 fL    MCH 28.7 26.0 - 34.0 pg    MCHC 30.7 (L) 32.0 - 36.0 g/dL    RDW 13.2 11.5 - 14.5 %    Platelets 147 (L) 150 - 450 x10*3/uL    Neutrophils % 72.6 40.0 - 80.0 %    Immature Granulocytes %, Automated 0.8 0.0 - 0.9 %    Lymphocytes % 14.8 13.0 - 44.0 %    Monocytes % 9.3 2.0 - 10.0 %    Eosinophils % 2.3 0.0 - 6.0 %    Basophils % 0.2 0.0 - 2.0 %    Neutrophils Absolute 7.46 (H) 1.60 - 5.50 x10*3/uL    Immature Granulocytes Absolute, Automated 0.08 0.00 - 0.50 x10*3/uL    Lymphocytes Absolute 1.52 0.80 - 3.00 x10*3/uL    Monocytes Absolute 0.95 (H) 0.05 - 0.80 x10*3/uL    Eosinophils Absolute 0.24 0.00 - 0.40 x10*3/uL    Basophils Absolute 0.02 0.00 - 0.10 x10*3/uL   Comprehensive Metabolic Panel   Result Value Ref Range    Glucose 185 (H) 74 - 99 mg/dL    Sodium 135 (L) 136 - 145 mmol/L    Potassium 4.0 3.5 - 5.3 mmol/L    Chloride 106 98 - 107 mmol/L    Bicarbonate 25 21 - 32 mmol/L    Anion Gap 8 (L) 10 - 20 mmol/L    Urea Nitrogen 15 6 - 23 mg/dL    Creatinine 1.20 (H) 0.50 - 1.05 mg/dL    eGFR 46 (L) >60 mL/min/1.73m*2    Calcium 7.6 (L) 8.6 - 10.3 mg/dL    Albumin 2.6 (L) 3.4 - 5.0 g/dL    Alkaline Phosphatase 61 33 - 136 U/L    Total Protein 5.0 (L) 6.4 - 8.2 g/dL    AST 12 9 - 39 U/L    Bilirubin, Total 0.4 0.0 - 1.2 mg/dL    ALT 8 7 - 45 U/L   Magnesium   Result Value  Ref Range    Magnesium 1.60 1.60 - 2.40 mg/dL   POCT GLUCOSE   Result Value Ref Range    POCT Glucose 147 (H) 74 - 99 mg/dL   POCT GLUCOSE   Result Value Ref Range    POCT Glucose 162 (H) 74 - 99 mg/dL     XR abdomen 1 view  Result Date: 4/17/2025  Interpreted By:  Марина Quintero, STUDY: XR ABDOMEN 1 VIEW;  4/17/2025 8:15 am. 2 views.   INDICATION: Signs/Symptoms:Follow up bowel distention.   COMPARISON: 04/16/2025, CT abdomen and pelvis 04/12/2025   ACCESSION NUMBER(S): [id number]   ORDERING CLINICIAN: ROZ RAMESH   FINDINGS: There is a nasogastric tube with the tip in the distal stomach. There is progressive moderate gastric distention this has improved compared to the previous study from 04/16/2025 prior to nasogastric tube placement. At that time, there was marked gastric distention. There is moderate small bowel dilatation with loops dilated up to 4.8 cm, unchanged. Findings are consistent with small-bowel obstruction. There   There are no pathologic calcifications.   Visualized lungs are clear.   Osseous structures demonstrate no acute bony changes.         1. Nasogastric tube with the tip in the distal stomach. Improvement in the degree of gastric distention. 2. Persistent small bowel obstruction.   MACRO: None   Signed by: Марина Quintero 4/17/2025 9:11 AM Dictation workstation:   [id number]                Assessment & Plan  Generalized abdominal pain    Small bowel obstruction (Multi)    Dependence on nicotine from cigarettes    Advanced care planning/counseling discussion    Leukocytosis    SBO (small bowel obstruction) (Multi)    Patient fully evaluated on 4/16. Underwent successful NG tube placement this morning with good suction output so far. Repeat KUB ordered, pending results. Labs and medications reviewed. Mild anemia noted, continue to monitor. Continue NPO status with IVF. Plan to stay another day or two with NG in, then trial clear liquids after removal. Recheck labs in AM.        I spent a  total of 60 minutes on the date of the service which included preparing to see the patient, face-to-face patient care, completing clinical documentation, obtaining and/or reviewing separately obtained history, performing a medically appropriate examination, counseling and educating the patient/family/caregiver, ordering medications, tests, or procedures, communicating with other HCPs (not separately reported), independently interpreting results (not separately reported), communicating results to the patient/family/caregiver, and care coordination (not separately reported).       Patient fully evaluated  04/18  for    Problem List Items Addressed This Visit       * (Principal) Generalized abdominal pain - Primary    Small bowel obstruction (Multi)    Relevant Orders    Case Request Operating Room: Laparoscopy with lysis of adhesions.  Possible laparotomy.  Possible bowel resection. (Completed)    SBO (small bowel obstruction) (Multi)    Relevant Orders    Case Request Operating Room: Laparoscopy, possible laparotomy, possible bowel resection (Completed)     Patient seen resting in bed with head of bed elevated, no s/s or c/o acute difficulties at this time.  Vital signs for last 24 hours Temp:  [36.5 °C (97.7 °F)-37 °C (98.6 °F)] 36.8 °C (98.2 °F)  Heart Rate:  [70-88] 78  Resp:  [16-19] 18  BP: (158-193)/(64-97) 161/70    I/O this shift:  In: -   Out: 50 [Drains:50]  Patient still requiring frequent cardiac and SPO2 monitoring. Continue aggressive pulmonary hygiene and oral hygiene. Off loading as tolerated for skin integrity. Medications and labs reviewed-   Results for orders placed or performed during the hospital encounter of 04/12/25 (from the past 24 hours)   POCT GLUCOSE   Result Value Ref Range    POCT Glucose 166 (H) 74 - 99 mg/dL   POCT GLUCOSE   Result Value Ref Range    POCT Glucose 202 (H) 74 - 99 mg/dL   POCT GLUCOSE   Result Value Ref Range    POCT Glucose 242 (H) 74 - 99 mg/dL   POCT GLUCOSE   Result  Value Ref Range    POCT Glucose 181 (H) 74 - 99 mg/dL   CBC and Auto Differential   Result Value Ref Range    WBC 10.3 4.4 - 11.3 x10*3/uL    nRBC 0.0 0.0 - 0.0 /100 WBCs    RBC 3.52 (L) 4.00 - 5.20 x10*6/uL    Hemoglobin 10.1 (L) 12.0 - 16.0 g/dL    Hematocrit 32.9 (L) 36.0 - 46.0 %    MCV 94 80 - 100 fL    MCH 28.7 26.0 - 34.0 pg    MCHC 30.7 (L) 32.0 - 36.0 g/dL    RDW 13.2 11.5 - 14.5 %    Platelets 147 (L) 150 - 450 x10*3/uL    Neutrophils % 72.6 40.0 - 80.0 %    Immature Granulocytes %, Automated 0.8 0.0 - 0.9 %    Lymphocytes % 14.8 13.0 - 44.0 %    Monocytes % 9.3 2.0 - 10.0 %    Eosinophils % 2.3 0.0 - 6.0 %    Basophils % 0.2 0.0 - 2.0 %    Neutrophils Absolute 7.46 (H) 1.60 - 5.50 x10*3/uL    Immature Granulocytes Absolute, Automated 0.08 0.00 - 0.50 x10*3/uL    Lymphocytes Absolute 1.52 0.80 - 3.00 x10*3/uL    Monocytes Absolute 0.95 (H) 0.05 - 0.80 x10*3/uL    Eosinophils Absolute 0.24 0.00 - 0.40 x10*3/uL    Basophils Absolute 0.02 0.00 - 0.10 x10*3/uL   Comprehensive Metabolic Panel   Result Value Ref Range    Glucose 185 (H) 74 - 99 mg/dL    Sodium 135 (L) 136 - 145 mmol/L    Potassium 4.0 3.5 - 5.3 mmol/L    Chloride 106 98 - 107 mmol/L    Bicarbonate 25 21 - 32 mmol/L    Anion Gap 8 (L) 10 - 20 mmol/L    Urea Nitrogen 15 6 - 23 mg/dL    Creatinine 1.20 (H) 0.50 - 1.05 mg/dL    eGFR 46 (L) >60 mL/min/1.73m*2    Calcium 7.6 (L) 8.6 - 10.3 mg/dL    Albumin 2.6 (L) 3.4 - 5.0 g/dL    Alkaline Phosphatase 61 33 - 136 U/L    Total Protein 5.0 (L) 6.4 - 8.2 g/dL    AST 12 9 - 39 U/L    Bilirubin, Total 0.4 0.0 - 1.2 mg/dL    ALT 8 7 - 45 U/L   Magnesium   Result Value Ref Range    Magnesium 1.60 1.60 - 2.40 mg/dL   POCT GLUCOSE   Result Value Ref Range    POCT Glucose 147 (H) 74 - 99 mg/dL   POCT GLUCOSE   Result Value Ref Range    POCT Glucose 162 (H) 74 - 99 mg/dL      Patient recently received an antibiotic (last 12 hours)       Date/Time Action Medication Dose    04/18/25 0536 New Bag    metroNIDAZOLE  (Flagyl) 500 mg in sodium chloride (iso)  mL 500 mg    04/18/25 0229 New Bag    cefepime (Maxipime) 1 g in dextrose 5% IV 50 mL 1 g           Plan discussed with interdisciplinary team, post op and passing gas, less distention noted, patient more animated today. No acute events overnight, patient denies chest pain, worsening SOB at this time. continue current and repeat labs in the AM. Seen by general surgery today - Impression/plan: Postop day #1 following laparoscopic enterolysis for small bowel obstruction.Recovering well. Remove nasogastric tube when nausea resolves.  Advance diet as tolerated.    Discharge planning discussed with patient and care team. Therapy evaluations ordered. Anticipate C/SNF at discharge. Patient aware and agreeable to current plan, continue plan as above.     I spent a total of 50 minutes on the date of the service which included preparing to see the patient, face-to-face patient care, completing clinical documentation, obtaining and/or reviewing separately obtained history, performing a medically appropriate examination, counseling and educating the patient/family/caregiver, ordering medications, tests, or procedures, communicating with other HCPs (not separately reported), independently interpreting results (not separately reported), communicating results to the patient/family/caregiver, and care coordination (not separately reported).     Angela Fatima           [1]   [Held by provider] aspirin, 325 mg, oral, Nightly  [Held by provider] cilostazol, 50 mg, oral, BID  heparin (porcine), 5,000 Units, subcutaneous, q8h  [Held by provider] insulin glargine, 15 Units, subcutaneous, q24h  insulin lispro, 0-5 Units, subcutaneous, q4h  [Held by provider] losartan, 100 mg, oral, Daily  [Held by provider] metFORMIN XR, 500 mg, oral, Daily before evening meal  metoprolol, 5 mg, intravenous, q6h  [Held by provider] metoprolol tartrate, 100 mg, oral, Nightly  [Held by provider]  oxyCODONE-acetaminophen, 1 tablet, oral, q8h  pantoprazole, 40 mg, intravenous, Daily     [2]   potassium rqufolm-R2-4.45%NaCl, 100 mL/hr, Last Rate: 100 mL/hr (04/18/25 1227)     [3]   PRN medications: benzocaine-menthol, dextrose, dextrose, glucagon, glucagon, HYDROmorphone, nicotine, ondansetron

## 2025-04-18 NOTE — PROGRESS NOTES
Nancy Long is a 79 y.o. female on day 6 of admission presenting with Generalized abdominal pain.      Subjective   No significant events overnight. Patient seen and evaluated at bedside with sister present.        Objective     Last Recorded Vitals  /70   Pulse 78   Temp 36.8 °C (98.2 °F)   Resp 18   Wt 76.7 kg (169 lb)   SpO2 98%   Intake/Output last 3 Shifts:    Intake/Output Summary (Last 24 hours) at 4/18/2025 1457  Last data filed at 4/18/2025 0736  Gross per 24 hour   Intake 2500 ml   Output 1030 ml   Net 1470 ml       Admission Weight  Weight: 77.1 kg (170 lb) (04/12/25 1212)    Daily Weight  04/17/25 : 76.7 kg (169 lb)    Image Results  XR abdomen 1 view  Narrative: Interpreted By:  Марина Quintero,   STUDY:  XR ABDOMEN 1 VIEW;  4/17/2025 8:15 am. 2 views.      INDICATION:  Signs/Symptoms:Follow up bowel distention.      COMPARISON:  04/16/2025, CT abdomen and pelvis 04/12/2025      ACCESSION NUMBER(S):  [id number]      ORDERING CLINICIAN:  ROZ RAMESH      FINDINGS:  There is a nasogastric tube with the tip in the distal stomach.  There is progressive moderate gastric distention this has improved  compared to the previous study from 04/16/2025 prior to nasogastric  tube placement. At that time, there was marked gastric distention.  There is moderate small bowel dilatation with loops dilated up to 4.8  cm, unchanged. Findings are consistent with small-bowel obstruction.  There      There are no pathologic calcifications.      Visualized lungs are clear.      Osseous structures demonstrate no acute bony changes.          Impression: 1. Nasogastric tube with the tip in the distal stomach. Improvement  in the degree of gastric distention.  2. Persistent small bowel obstruction.      MACRO:  None      Signed by: Марина Quintero 4/17/2025 9:11 AM  Dictation workstation:   [id number]      Physical Exam        Gianfranco Saez MD   Physician  Internal Medicine     Progress Notes      Signed      Date of Service: 4/15/2025  8:35 AM     Signed       Expand All Collapse All    Nancy Long is a 79 y.o. female on day 3 of admission presenting with Generalized abdominal pain.        Subjective  No significant events overnight. Patient seen and evaluated at bedside.         Objective[]Expand by Default  Last Recorded Vitals  /67   Pulse (!) 37   Temp 36.4 °C (97.5 °F)   Resp 18   Wt 77.1 kg (170 lb)   SpO2 93%   Intake/Output last 3 Shifts:     Intake/Output Summary (Last 24 hours) at 4/15/2025 0835  Last data filed at 4/15/2025 0250      Gross per 24 hour   Intake 950 ml   Output --   Net 950 ml         Admission Weight  Weight: 77.1 kg (170 lb) (04/12/25 1212)     Daily Weight  04/12/25 : 77.1 kg (170 lb)     Image Results  XR abdomen 1 view  Narrative: Interpreted By:  Arun Escamilla,   STUDY:  XR ABDOMEN 1 VIEW;  4/14/2025 7:06 am      INDICATION:  Signs/Symptoms:Follow up bowel distention.          COMPARISON:  04/13/2025      ACCESSION NUMBER(S):  [id number]      ORDERING CLINICIAN:  ROZ RAMESH      FINDINGS:  Three view abdomen      Multiple dilated gas-filled bowel loops predominantly centrally with  stacked appearance most concerning for obstruction redemonstrated  grossly similar to prior. Limited evaluation for free air on supine  films. Some gas and stool noted within the colon as previously.  Approximate 5 cm gaseous lucency overlying the pelvis could represent  focal distended bowel loop possibly distal colon within the pelvis  and new or more pronounced compared to prior. Mild gaseous distention  of the stomach.      Prominent vascular calcifications overlying abdomen and pelvis and  overlying the groin regions.      Impression: 1.  Multiple dilated gas-filled loops of small bowel redemonstrated  most suggestive of sequela of obstruction as noted previously and  probably grossly similar to prior. Focal gaseous lucency overlying  the pelvis new or more pronounced may reflect focal  distended bowel  loop possibly distal colon within the pelvis new or more pronounced.  Mild gaseous distention of the stomach. Limited evaluation for free  air on supine films. Continued clinical correlation and follow-up  advised.      MACRO:  None      Signed by: Arun Escamilla 4/14/2025 2:45 PM  Dictation workstation:   [id number]        Physical Exam     Date of Service: 4/13/2025  6:18 PM      Signed        Expand All Collapse All    History Of Present Illness  Nancy Long is a 79 y.o. female with CAD,  chronic diastolic heart failure, hypertension, hyperlipidemia, peripheral artery disease, diabetes mellitus, carotid artery disease (Right occlusion of ICA, left ICA with > 70% stenosis; follows with Dr. Parish), CKD, Left breast cancer s/p lumpectomy and radiation, kidney stones, and open cholecystectomy and open appendectomy with right ovary removal (for tumor- at age 16) who presented today with abdominal pain.  Patient reports yesterday she developed severe, sharp, continuous epigastric pain associated with nausea and diaphoresis.  She notes she took some of her prescribed Percocet that seemed to help a little however the symptoms persisted into today so she decided to come to the emergency room.  She also notes a decreased appetite and when she did try to eat the food did not taste good.  She denies a history of bowel obstruction.  She articulates she told the surgery team she would not like surgical intervention.  CODE STATUS discussed and she would like to be a DNR CCA.     In the ED lab work, EKG, chest x-ray and CTAP were performed, labs revealed glucose 200, sodium 125, chloride 94, bun 45, creatinine 1.64 (1.32 on 2/4/2020), , lactate 1.6, troponins 27 and 29 respectively, white count 21, neutrophils 17.35, monocytes 1.1. EKG Interpretation, per ER physician impression: Sinus at rate of 79, , QRS 91, QTc 407 without evidence of STEMI or ischemia. Chest x-ray without acute process.  CT  abdomen/pelvis revealed small bowel obstruction with mesenteric edema as described, small volume of ascites, diverticulosis, atherosclerosis, subcutaneous opacities and reticulation that may be due to injections although hematoma is possible.  Patient was evaluated by surgery team that noted her symptoms are likely consistent with partial adhesive small bowel obstruction and surgical intervention was offered however patient declined.  Her vital signs were acceptable with a slightly elevated blood pressure of 157/70.  She was given cefepime Flagyl morphine Zofran and started on IV fluids and admitted for further evaluation and treatment under the care of Dr. Saez.        Echo March 2025:     CONCLUSIONS:  1. Left ventricular ejection fraction is normal, by visual estimate at 70-75%.  2. There is moderate left ventricular hypertrophy.  3. The left atrium is mildly dilated.  4. There is mild mitral valve stenosis.  5. There is moderate mitral annular calcification.  6. Right ventricular systolic pressure is within normal limits.  7. Mild aortic valve stenosis.  8. Mild left ventricular outflow obstruction at rest and with Valsalva (10 mmHg).     Past Medical History  Medical History           Past Medical History:   Diagnosis Date    Personal history of other diseases of the circulatory system       History of hypertension    Personal history of other endocrine, nutritional and metabolic disease       History of diabetes mellitus            Surgical History  Surgical History             Past Surgical History:   Procedure Laterality Date    APPENDECTOMY   01/03/2014     Appendectomy    BREAST LUMPECTOMY   01/03/2014     Breast Surgery Lumpectomy    CHOLECYSTECTOMY   01/03/2014     Cholecystectomy    OOPHORECTOMY   01/03/2014     Oophorectomy            Social History  Denies alcohol or illicit drug use.  Reports she smokes 2 to 3 packs of cigarettes per week.  Lives alone.  Ambulates with cane as needed.     Family  "History  Family History   No family history on file.         Allergies  Penicillins and Sulfa (sulfonamide antibiotics)     10 point review of systems performed and is negative besides what is stated in HPI.     Physical Exam  Constitutional:       Appearance: Normal appearance.   HENT:      Head: Normocephalic and atraumatic.      Nose: Nose normal.      Mouth/Throat:      Mouth: Mucous membranes are moist.   Eyes:      Conjunctiva/sclera: Conjunctivae normal.   Cardiovascular:      Rate and Rhythm: Normal rate and regular rhythm.      Heart sounds: Murmur heard.   Pulmonary:      Effort: Pulmonary effort is normal.      Comments: Crackles left base  Abdominal:      General: Bowel sounds are normal. There is no distension.      Tenderness: There is abdominal tenderness.      Comments: Somewhat firm   Musculoskeletal:         General: Normal range of motion.      Cervical back: Neck supple.   Skin:     General: Skin is warm and dry.   Neurological:      Mental Status: She is alert. Mental status is at baseline.   Psychiatric:         Mood and Affect: Mood normal.            Last Recorded Vitals  Blood pressure (!) 193/77, pulse 74, temperature 37 °C (98.6 °F), temperature source Temporal, resp. rate 20, height 1.702 m (5' 7\"), weight 77.1 kg (170 lb), SpO2 94%.     Relevant Results     Medications Ordered Prior to Encounter   No current facility-administered medications on file prior to encounter.                  Current Outpatient Medications on File Prior to Encounter   Medication Sig Dispense Refill    acetaminophen (Tylenol) 500 mg tablet Take 1 tablet (500 mg) by mouth every 8 hours if needed for mild pain (1 - 3).        ascorbic acid (Vitamin C) 500 mg tablet Take 1 tablet (500 mg) by mouth once daily.        aspirin 325 mg tablet Take 1 tablet (325 mg) by mouth once daily at bedtime.        B12-levomefolate calcium-B6 (Foltx) 2-1.13-25 mg tablet Take 1 tablet by mouth once daily.        cilostazol (Pletal) " 50 mg tablet Take 1 tablet (50 mg) by mouth 2 times a day. 180 tablet 3    lansoprazole (Prevacid) 30 mg DR capsule Take 1 capsule (30 mg) by mouth 2 times a day as needed.        losartan (Cozaar) 100 mg tablet Take 1 tablet (100 mg) by mouth once daily. 90 tablet 3    metFORMIN XR (Glucophage-XR) 500 mg 24 hr tablet Take 1 tablet (500 mg) by mouth once daily in the evening. Take with meals. (Patient taking differently: Take 1 tablet (500 mg) by mouth once daily as needed (BG >200).)        metoprolol tartrate (Lopressor) 100 mg tablet Take 1 tablet (100 mg) by mouth once daily. (Patient taking differently: Take 1 tablet (100 mg) by mouth once daily at bedtime.) 90 tablet 3    NovoLIN 70/30 U-100 Insulin 100 unit/mL (70-30) injection Inject 43 Units under the skin 2 times a day before meals.        OneTouch Ultra Test strip          oxyCODONE-acetaminophen (Percocet)  mg tablet Take 1 tablet by mouth every 8 hours.        rosuvastatin (Crestor) 20 mg tablet Take 1 tablet (20 mg) by mouth once daily. (Patient taking differently: Take 1 tablet (20 mg) by mouth once daily at bedtime.) 90 tablet 3    zinc sulfate (Zincate) 220 (50 Zn) MG capsule Take 1 capsule (50 mg of elemental zinc) by mouth once daily.        betamethasone dipropionate 0.05 % cream Apply topically 2 times a day. (Patient not taking: Reported on 4/12/2025)        hydrOXYzine pamoate (Vistaril) 50 mg capsule Take 1 capsule (50 mg) by mouth 4 times a day as needed. (Patient not taking: Reported on 4/12/2025)        loratadine-pseudoephedrine (Claritin-D 24-hour)  mg 24 hr tablet Take 1 tablet by mouth once daily. (Patient not taking: Reported on 4/12/2025)        ondansetron ODT (Zofran-ODT) 8 mg disintegrating tablet every 8 hours if needed. (Patient not taking: Reported on 4/12/2025)        polymyxin B sulf-trimethoprim (Polytrim) ophthalmic solution APPLY 1/4 INCH TO AFFECTED EYE 4 TIMES A DAY. (Patient not taking: Reported on  4/12/2025)        solifenacin (VESIcare) 5 mg tablet  (Patient not taking: Reported on 4/12/2025)        sucralfate (Carafate) 100 mg/mL suspension TAKE 10 ML BY MOUTH 4 TIMES A DAY (Patient not taking: Reported on 4/12/2025)        torsemide (Demadex) 20 mg tablet Take 1 tablet (20 mg) by mouth once daily. (Patient not taking: Reported on 4/12/2025) 90 tablet 3                      Results for orders placed or performed during the hospital encounter of 04/12/25 (from the past 24 hours)   POCT GLUCOSE   Result Value Ref Range     POCT Glucose 183 (H) 74 - 99 mg/dL   Troponin I, High Sensitivity   Result Value Ref Range     Troponin I, High Sensitivity 23 (H) 0 - 13 ng/L   Sars-CoV-2 and Influenza A/B PCR   Result Value Ref Range     Flu A Result Not Detected Not Detected     Flu B Result Not Detected Not Detected     Coronavirus 2019, PCR Not Detected Not Detected   POCT GLUCOSE   Result Value Ref Range     POCT Glucose 171 (H) 74 - 99 mg/dL   Urinalysis with Reflex Culture and Microscopic   Result Value Ref Range     Color, Urine Light-Yellow Light-Yellow, Yellow, Dark-Yellow     Appearance, Urine Clear Clear     Specific Gravity, Urine 1.017 1.005 - 1.035     pH, Urine 5.0 5.0, 5.5, 6.0, 6.5, 7.0, 7.5, 8.0     Protein, Urine 10 (TRACE) NEGATIVE, 10 (TRACE), 20 (TRACE) mg/dL     Glucose, Urine Normal Normal mg/dL     Blood, Urine NEGATIVE NEGATIVE mg/dL     Ketones, Urine NEGATIVE NEGATIVE mg/dL     Bilirubin, Urine NEGATIVE NEGATIVE mg/dL     Urobilinogen, Urine Normal Normal mg/dL     Nitrite, Urine NEGATIVE NEGATIVE     Leukocyte Esterase, Urine 250 Triny/uL (A) NEGATIVE   Extra Urine Gray Tube   Result Value Ref Range     Extra Tube Hold for add-ons.     POCT GLUCOSE   Result Value Ref Range     POCT Glucose 131 (H) 74 - 99 mg/dL   Microscopic Only, Urine   Result Value Ref Range     WBC, Urine 11-20 (A) 1-5, NONE /HPF     RBC, Urine 1-2 NONE, 1-2, 3-5 /HPF     Bacteria, Urine 4+ (A) NONE SEEN /HPF     Mucus,  Urine FEW Reference range not established. /LPF     Hyaline Casts, Urine OCCASIONAL (A) NONE /LPF   POCT GLUCOSE   Result Value Ref Range     POCT Glucose 81 74 - 99 mg/dL   POCT GLUCOSE   Result Value Ref Range     POCT Glucose 67 (L) 74 - 99 mg/dL   POCT GLUCOSE   Result Value Ref Range     POCT Glucose 64 (L) 74 - 99 mg/dL   POCT GLUCOSE   Result Value Ref Range     POCT Glucose 147 (H) 74 - 99 mg/dL   CBC   Result Value Ref Range     WBC 15.8 (H) 4.4 - 11.3 x10*3/uL     nRBC 0.0 0.0 - 0.0 /100 WBCs     RBC 3.83 (L) 4.00 - 5.20 x10*6/uL     Hemoglobin 11.0 (L) 12.0 - 16.0 g/dL     Hematocrit 36.3 36.0 - 46.0 %     MCV 95 80 - 100 fL     MCH 28.7 26.0 - 34.0 pg     MCHC 30.3 (L) 32.0 - 36.0 g/dL     RDW 13.1 11.5 - 14.5 %     Platelets 163 150 - 450 x10*3/uL   Basic Metabolic Panel   Result Value Ref Range     Glucose 74 74 - 99 mg/dL     Sodium 132 (L) 136 - 145 mmol/L     Potassium 4.1 3.5 - 5.3 mmol/L     Chloride 103 98 - 107 mmol/L     Bicarbonate 23 21 - 32 mmol/L     Anion Gap 10 10 - 20 mmol/L     Urea Nitrogen 39 (H) 6 - 23 mg/dL     Creatinine 1.33 (H) 0.50 - 1.05 mg/dL     eGFR 40 (L) >60 mL/min/1.73m*2     Calcium 8.2 (L) 8.6 - 10.3 mg/dL   POCT GLUCOSE   Result Value Ref Range     POCT Glucose 72 (L) 74 - 99 mg/dL   POCT GLUCOSE   Result Value Ref Range     POCT Glucose 75 74 - 99 mg/dL   POCT GLUCOSE   Result Value Ref Range     POCT Glucose 84 74 - 99 mg/dL   POCT GLUCOSE   Result Value Ref Range     POCT Glucose 99 74 - 99 mg/dL         XR abdomen 1 view     Result Date: 4/13/2025  Interpreted By:  Beka Mays, STUDY: Abdominal series dated 4/13/2025.   INDICATION: Follow-up bowel dilation.   COMPARISON: Correlation is made with 04/12/2025 CT.   ACCESSION NUMBER(S): [id number]   ORDERING CLINICIAN: ROZ RAMESH   TECHNIQUE: Two AP radiographs of the abdomen.   FINDINGS: There are multiple stacked dilated loops of small bowel in the central abdomen measuring up to a proximally 4.1 cm. There is  gas and stool in the colon. Lung bases are clear. Degenerative changes seen of the spine and hips.        Multiple dilated loops of small bowel in the central abdomen most compatible with a degree of obstructive process as further discussed on prior date CT.   Signed by: Beka Mays 4/13/2025 1:01 PM Dictation workstation:   [id number]     CT abdomen pelvis wo IV contrast     Addendum Date: 4/12/2025    Interpreted By:  Chano Marshall, ADDENDUM: Also to be noted at the impression: Subcutaneous opacities and reticulation that may be due to injections although hematoma is possible.   Signed by: Chano Marshall 4/12/2025 2:38 PM   -------- ORIGINAL REPORT -------- Dictation workstation:   [id number]     Result Date: 4/12/2025  Interpreted By:  Chano Marshall, STUDY: CT ABDOMEN PELVIS WO IV CONTRAST;  4/12/2025 2:05 pm   INDICATION: Signs/Symptoms:abd pain.   COMPARISON: None.   ACCESSION NUMBER(S): [id number]   ORDERING CLINICIAN: FRANK BREWSTER   TECHNIQUE: CT of the abdomen and pelvis was performed. Contiguous axial images were obtained at 3 mm slice thickness through the abdomen and pelvis. Coronal and sagittal reconstructions at 3 mm slice thickness were performed.   FINDINGS: LOWER CHEST: Small right pleural effusion and trace left pleural effusion. Coronary artery atherosclerotic calcification and mitral annular calcification.   ABDOMEN:   LIVER: The hepatic parenchyma is homogeneous without evidence of focal liver lesions.The hepatic size is normal.   SPLEEN: The spleen is normal in size and homogeneous.   ADRENAL GLANDS: Bilateral adrenal glands appear normal.   KIDNEYS AND URETERS: Mild bilateral cortical atrophy, the renal cortices otherwise are homogeneous. Radiodensities at the renal sinuses is probably atherosclerotic calcification, although superimposed nonobstructing calculi are possible.   The ureters throughout their distal course are not well identified due to overlying crowded bowel loops, but  the tracts otherwise are unremarkable without identified ureteral dilatation or additional radiodense calculi.   PANCREAS: The pancreas appears unremarkable, there is no ductal dilatation or masses.   GALLBLADDER: Not visualized, presumably with cholecystectomy.   BILE DUCTS: Dilatation of the extrahepatic ducts, the common bile duct measuring 1.4 cm in diameter, most likely from post cholecystectomy state. However as the no prior exams for comparison MRCP would be recommended if there is symptomatology compatible with biliary colic.     VESSELS: Fairly extensive atherosclerotic calcifications are noted predominantly at the aorta and iliac system. There is also moderate atherosclerotic calcification at the mid segment of the superior mesenteric artery, and fairly marked of the inferior mesenteric artery.   PERITONEUM AND RETROPERITONEUM: There is a small volume of ascites best seen in the right upper quadrant along the liver. No free intraperitoneal air.   The retroperitoneum appears unremarkable, and without significant adenopathy.   No enlarged mesenteric lymph nodes.   BOWEL: There is mild distention of multiple small bowel segments with air-fluid levels, associated with reticulation of the mesentery indicating edema. There is decompression of distal small bowel segments, and the pattern would be most compatible with mid to early or partial distal small bowel obstruction. There is also moderate diverticulosis of the distal colon.   PELVIS:   BLADDER: The urinary bladder contour is smooth.   REPRODUCTIVE ORGANS: No pelvic masses.     BONE, ABDOMINAL WALL AND OTHER FINDINGS: No suspicious osseous lesions are identified.   There is a small non incarcerated umbilical hernia containing intra-abdominal fat. There is also attenuation within the subcutaneous fat of the mid/lower abdomen anteriorly that may be from subcutaneous injections. However, hematomas are also possible. There is also reticulation of the  "subcutaneous fat at the right lateral abdominal wall indicating additional edema.        1.  Small-bowel obstruction with mesenteric edema as described. 2. Small volume of ascites. 3. Diverticulosis. 4. Atherosclerosis as described.   MACRO: 1. None   Signed by: Chano Marshall 4/12/2025 2:28 PM Dictation workstation:   [id number]     XR chest 2 views     Result Date: 4/12/2025  Interpreted By:  Beka Mays, STUDY: Chest dated  4/12/2025.   INDICATION: Signs/Symptoms:sob with ambulation   COMPARISON: Chest dated 11/17/2015.   ACCESSION NUMBER(S): [id number]   ORDERING CLINICIAN: FRANK BREWSTER   TECHNIQUE: One view of the chest.   FINDINGS: The lungs are clear.  No pneumothorax or effusion is evident. The cardiomediastinal silhouette is  not enlarged.Degenerative change is seen of the spine and shoulders.        No acute cardiopulmonary process is evident.   MACRO: None   Signed by: Beka Mays 4/12/2025 1:26 PM Dictation workstation:   [id number]             Assessment/Plan     Assessment & Plan  Generalized abdominal pain     Small bowel obstruction (Multi)     Dependence on nicotine from cigarettes     Advanced care planning/counseling discussion     Leukocytosis        Per surgery:  \"Impression:  #SBO   > small bowel dilation; however, other concerning CT findings particularly mesenteric edema and ascites. CT imaging worrisome for possible ischemic/low flow state to bowel; however, serum lactate normal and patient's clinical presentation/symptoms/pain/bowel sounds are consistent with an adhesive small bowel obstruction (partial)  #Leukocytosis (possibly 2/2 above, but may have other infectious etiology, possibly UTI?)   > UA ordered; however, not yet collected and already received antibiotics  #Hyponatremia and hypochloremia  #CKD   Recommendations:  - no acute surgical intervention at this time   > patient offered surgical intervention if she were to worsen clinically; however, she was adamant about " "not wanting surgery   > would recommend placing NG tube for decompression; however, patient again declined NG tube. She said she may be agreeable if she begins to feel worse/nauseated  - PRN IV Zofran  - PRN IV Morphine for severe pain (limit use as able, but unable to give NSAIDs or PO meds)  - IVF: D5NS @ 100  - repeat CBC and BMP in AM\"     Additionally:  Change morphine to Dilaudid given CKD  Check flu and COVID  KUB in a.m.        #Advance care planning     Patient would like to be a DNR CCA     #Nicotine dependence     Reports she lets most of her cigarettes to burn out without smoking them so we will order a nicotine patch as needed     #Chronic conditions:     CAD,  chronic diastolic heart failure, hypertension, hyperlipidemia, peripheral artery disease, diabetes mellitus, carotid artery disease (Right occlusion of ICA, left ICA with > 70% stenosis; follows with Dr. Parish), CKD, Left breast cancer s/p lumpectomy and radiation, kidney stones, and open cholecystectomy and open appendectomy with right ovary removal (for tumor- at age 16)      Hold all p.o. meds  Give IV Protonix   Metoprolol IV around-the-clock with parameters  Patient takes 70/30 at home will conservatively give 30 units of Lantus daily which would be a little less than half of the equivalent of 70/30/day with sliding scale insulin and hypoglycemic protocol     #DVT prophylaxis     SCDs  Heparin                             Chadwick Medel, DO     Review of Systems                     Relevant Results                   Assessment/Plan                      Assessment & Plan  Generalized abdominal pain     Small bowel obstruction (Multi)     Dependence on nicotine from cigarettes     Advanced care planning/counseling discussion     Leukocytosis     Patient fully evaluated on 4/14. CT showing SBO with mesenteric edema, small volume ascites, diverticulosis. Evaluated by general surgery today, recommending NG tube for decompression, ordered " zofran, morphine, continuing IV cefepime/flagyl. Repeat KUB ordered, results pending. If KUB looks okay, plan to advance diet. Leukocytosis downtrending, continue to monitor. Hypertension noted this morning, cardiac PO meds held d/t NPO status. Continue to monitor and restart PO meds when diet advanced. Recheck labs in AM.      Patient still requiring frequent cardiac and SPO2 monitoring. Continue aggressive pulmonary hygiene and oral hygiene. Off loading as tolerated for skin integrity. Medications and labs reviewed.     I spent a total of 60 minutes on the date of the service which included preparing to see the patient, face-to-face patient care, completing clinical documentation, obtaining and/or reviewing separately obtained history, performing a medically appropriate examination, counseling and educating the patient/family/caregiver, ordering medications, tests, or procedures, communicating with other HCPs (not separately reported), independently interpreting results (not separately reported), communicating results to the patient/family/caregiver, and care coordination (not separately reported).   Patient fully evaluated  04/15  for    Problem List Items Addressed This Visit         * (Principal) Generalized abdominal pain - Primary      Other Visit Diagnoses         SBO (small bowel obstruction) (Multi)                 Patient seen resting in bed with head of bed elevated, no s/s or c/o acute difficulties at this time.  Vital signs for last 24 hours Temp:  [36.2 °C (97.2 °F)-36.9 °C (98.4 °F)] 36.4 °C (97.5 °F)  Heart Rate:  [37-76] 37  Resp:  [18-20] 18  BP: (136-186)/(63-82) 152/67    No intake/output data recorded.  Patient still requiring frequent cardiac and SPO2 monitoring. Continue aggressive pulmonary hygiene and oral hygiene. Off loading as tolerated for skin integrity. Medications and labs reviewed-         Results for orders placed or performed during the hospital encounter of 04/12/25 (from the  past 24 hours)   POCT GLUCOSE   Result Value Ref Range     POCT Glucose 111 (H) 74 - 99 mg/dL   POCT GLUCOSE   Result Value Ref Range     POCT Glucose 115 (H) 74 - 99 mg/dL   POCT GLUCOSE   Result Value Ref Range     POCT Glucose 129 (H) 74 - 99 mg/dL   POCT GLUCOSE   Result Value Ref Range     POCT Glucose 114 (H) 74 - 99 mg/dL   POCT GLUCOSE   Result Value Ref Range     POCT Glucose 117 (H) 74 - 99 mg/dL   CBC and Auto Differential   Result Value Ref Range     WBC 12.1 (H) 4.4 - 11.3 x10*3/uL     nRBC 0.0 0.0 - 0.0 /100 WBCs     RBC 3.89 (L) 4.00 - 5.20 x10*6/uL     Hemoglobin 11.4 (L) 12.0 - 16.0 g/dL     Hematocrit 36.5 36.0 - 46.0 %     MCV 94 80 - 100 fL     MCH 29.3 26.0 - 34.0 pg     MCHC 31.2 (L) 32.0 - 36.0 g/dL     RDW 13.0 11.5 - 14.5 %     Platelets 161 150 - 450 x10*3/uL     Neutrophils % 74.4 40.0 - 80.0 %     Immature Granulocytes %, Automated 0.5 0.0 - 0.9 %     Lymphocytes % 14.3 13.0 - 44.0 %     Monocytes % 8.0 2.0 - 10.0 %     Eosinophils % 2.4 0.0 - 6.0 %     Basophils % 0.4 0.0 - 2.0 %     Neutrophils Absolute 8.99 (H) 1.60 - 5.50 x10*3/uL     Immature Granulocytes Absolute, Automated 0.06 0.00 - 0.50 x10*3/uL     Lymphocytes Absolute 1.73 0.80 - 3.00 x10*3/uL     Monocytes Absolute 0.97 (H) 0.05 - 0.80 x10*3/uL     Eosinophils Absolute 0.29 0.00 - 0.40 x10*3/uL     Basophils Absolute 0.05 0.00 - 0.10 x10*3/uL   Comprehensive Metabolic Panel   Result Value Ref Range     Glucose 109 (H) 74 - 99 mg/dL     Sodium 135 (L) 136 - 145 mmol/L     Potassium 3.7 3.5 - 5.3 mmol/L     Chloride 106 98 - 107 mmol/L     Bicarbonate 22 21 - 32 mmol/L     Anion Gap 11 10 - 20 mmol/L     Urea Nitrogen 20 6 - 23 mg/dL     Creatinine 1.14 (H) 0.50 - 1.05 mg/dL     eGFR 48 (L) >60 mL/min/1.73m*2     Calcium 8.0 (L) 8.6 - 10.3 mg/dL     Albumin 3.1 (L) 3.4 - 5.0 g/dL     Alkaline Phosphatase 72 33 - 136 U/L     Total Protein 5.7 (L) 6.4 - 8.2 g/dL     AST 27 9 - 39 U/L     Bilirubin, Total 0.6 0.0 - 1.2 mg/dL      ALT 15 7 - 45 U/L   POCT GLUCOSE   Result Value Ref Range     POCT Glucose 111 (H) 74 - 99 mg/dL      Patient recently received an antibiotic (last 12 hours)         Date/Time Action Medication Dose     04/15/25 0649 New Bag    metroNIDAZOLE (Flagyl) 500 mg in sodium chloride (iso)  mL 500 mg     04/15/25 0218 New Bag    cefepime (Maxipime) 1 g in dextrose 5% IV 50 mL 1 g     04/14/25 2235 New Bag    metroNIDAZOLE (Flagyl) 500 mg in sodium chloride (iso)  mL 500 mg             Plan discussed with interdisciplinary team,  NG tube for decompression, PRN IV zofran, IV morphine, will continue on IV Antibiotics-continuing IV cefepime/flagyl, WBCs downtrending. Repeat KUB on 4/14 unchanged, will repeat KUB today. Unable to advance diet at this time, will continue IV fluids, NPO. Ice chips ok.  PO meds held d/t NPO status. Continue to monitor and restart PO meds when diet advanced. continue current and repeat labs in the AM.      Discharge planning discussed with patient and care team. Therapy evaluations ordered. Anticipate HHC/SNF at discharge. Patient aware and agreeable to current plan, continue plan as above.      I spent a total of 50 minutes on the date of the service which included preparing to see the patient, face-to-face patient care, completing clinical documentation, obtaining and/or reviewing separately obtained history, performing a medically appropriate examination, counseling and educating the patient/family/caregiver, ordering medications, tests, or procedures, communicating with other HCPs (not separately reported), independently interpreting results (not separately reported), communicating results to the patient/family/caregiver, and care coordination (not separately reported).   Angela Fatima                      Revision History     Scheduled medications  Scheduled Medications[1]  Continuous medications  Continuous Medications[2]  PRN medications  PRN Medications[3]    Results for orders  placed or performed during the hospital encounter of 04/12/25 (from the past 24 hours)   POCT GLUCOSE   Result Value Ref Range    POCT Glucose 166 (H) 74 - 99 mg/dL   POCT GLUCOSE   Result Value Ref Range    POCT Glucose 202 (H) 74 - 99 mg/dL   POCT GLUCOSE   Result Value Ref Range    POCT Glucose 242 (H) 74 - 99 mg/dL   POCT GLUCOSE   Result Value Ref Range    POCT Glucose 181 (H) 74 - 99 mg/dL   CBC and Auto Differential   Result Value Ref Range    WBC 10.3 4.4 - 11.3 x10*3/uL    nRBC 0.0 0.0 - 0.0 /100 WBCs    RBC 3.52 (L) 4.00 - 5.20 x10*6/uL    Hemoglobin 10.1 (L) 12.0 - 16.0 g/dL    Hematocrit 32.9 (L) 36.0 - 46.0 %    MCV 94 80 - 100 fL    MCH 28.7 26.0 - 34.0 pg    MCHC 30.7 (L) 32.0 - 36.0 g/dL    RDW 13.2 11.5 - 14.5 %    Platelets 147 (L) 150 - 450 x10*3/uL    Neutrophils % 72.6 40.0 - 80.0 %    Immature Granulocytes %, Automated 0.8 0.0 - 0.9 %    Lymphocytes % 14.8 13.0 - 44.0 %    Monocytes % 9.3 2.0 - 10.0 %    Eosinophils % 2.3 0.0 - 6.0 %    Basophils % 0.2 0.0 - 2.0 %    Neutrophils Absolute 7.46 (H) 1.60 - 5.50 x10*3/uL    Immature Granulocytes Absolute, Automated 0.08 0.00 - 0.50 x10*3/uL    Lymphocytes Absolute 1.52 0.80 - 3.00 x10*3/uL    Monocytes Absolute 0.95 (H) 0.05 - 0.80 x10*3/uL    Eosinophils Absolute 0.24 0.00 - 0.40 x10*3/uL    Basophils Absolute 0.02 0.00 - 0.10 x10*3/uL   Comprehensive Metabolic Panel   Result Value Ref Range    Glucose 185 (H) 74 - 99 mg/dL    Sodium 135 (L) 136 - 145 mmol/L    Potassium 4.0 3.5 - 5.3 mmol/L    Chloride 106 98 - 107 mmol/L    Bicarbonate 25 21 - 32 mmol/L    Anion Gap 8 (L) 10 - 20 mmol/L    Urea Nitrogen 15 6 - 23 mg/dL    Creatinine 1.20 (H) 0.50 - 1.05 mg/dL    eGFR 46 (L) >60 mL/min/1.73m*2    Calcium 7.6 (L) 8.6 - 10.3 mg/dL    Albumin 2.6 (L) 3.4 - 5.0 g/dL    Alkaline Phosphatase 61 33 - 136 U/L    Total Protein 5.0 (L) 6.4 - 8.2 g/dL    AST 12 9 - 39 U/L    Bilirubin, Total 0.4 0.0 - 1.2 mg/dL    ALT 8 7 - 45 U/L   Magnesium   Result Value  Ref Range    Magnesium 1.60 1.60 - 2.40 mg/dL   POCT GLUCOSE   Result Value Ref Range    POCT Glucose 147 (H) 74 - 99 mg/dL   POCT GLUCOSE   Result Value Ref Range    POCT Glucose 162 (H) 74 - 99 mg/dL     XR abdomen 1 view  Result Date: 4/17/2025  Interpreted By:  Марина Quintero, STUDY: XR ABDOMEN 1 VIEW;  4/17/2025 8:15 am. 2 views.   INDICATION: Signs/Symptoms:Follow up bowel distention.   COMPARISON: 04/16/2025, CT abdomen and pelvis 04/12/2025   ACCESSION NUMBER(S): [id number]   ORDERING CLINICIAN: ROZ RAMESH   FINDINGS: There is a nasogastric tube with the tip in the distal stomach. There is progressive moderate gastric distention this has improved compared to the previous study from 04/16/2025 prior to nasogastric tube placement. At that time, there was marked gastric distention. There is moderate small bowel dilatation with loops dilated up to 4.8 cm, unchanged. Findings are consistent with small-bowel obstruction. There   There are no pathologic calcifications.   Visualized lungs are clear.   Osseous structures demonstrate no acute bony changes.         1. Nasogastric tube with the tip in the distal stomach. Improvement in the degree of gastric distention. 2. Persistent small bowel obstruction.   MACRO: None   Signed by: Марина Quintero 4/17/2025 9:11 AM Dictation workstation:   [id number]                Assessment & Plan  Generalized abdominal pain    Small bowel obstruction (Multi)    Dependence on nicotine from cigarettes    Advanced care planning/counseling discussion    Leukocytosis    SBO (small bowel obstruction) (Multi)    Patient fully evaluated on 4/16. Underwent successful NG tube placement this morning with good suction output so far. Repeat KUB ordered, pending results. Labs and medications reviewed. Mild anemia noted, continue to monitor. Continue NPO status with IVF. Plan to stay another day or two with NG in, then trial clear liquids after removal. Recheck labs in AM.        I spent a  total of 60 minutes on the date of the service which included preparing to see the patient, face-to-face patient care, completing clinical documentation, obtaining and/or reviewing separately obtained history, performing a medically appropriate examination, counseling and educating the patient/family/caregiver, ordering medications, tests, or procedures, communicating with other HCPs (not separately reported), independently interpreting results (not separately reported), communicating results to the patient/family/caregiver, and care coordination (not separately reported).       Patient fully evaluated  04/18  for    Problem List Items Addressed This Visit          Gastrointestinal and Abdominal    * (Principal) Generalized abdominal pain - Primary    Small bowel obstruction (Multi)    Relevant Orders    Case Request Operating Room: Laparoscopy with lysis of adhesions.  Possible laparotomy.  Possible bowel resection. (Completed)    SBO (small bowel obstruction) (Multi)    Relevant Orders    Case Request Operating Room: Laparoscopy, possible laparotomy, possible bowel resection (Completed)     Patient seen resting in bed with head of bed elevated, no s/s or c/o acute difficulties at this time.  Vital signs for last 24 hours Temp:  [36.5 °C (97.7 °F)-37 °C (98.6 °F)] 36.8 °C (98.2 °F)  Heart Rate:  [70-88] 78  Resp:  [16-19] 18  BP: (158-193)/(64-97) 161/70    I/O this shift:  In: -   Out: 50 [Drains:50]  Patient still requiring frequent cardiac and SPO2 monitoring. Continue aggressive pulmonary hygiene and oral hygiene. Off loading as tolerated for skin integrity. Medications and labs reviewed-   Results for orders placed or performed during the hospital encounter of 04/12/25 (from the past 24 hours)   POCT GLUCOSE   Result Value Ref Range    POCT Glucose 166 (H) 74 - 99 mg/dL   POCT GLUCOSE   Result Value Ref Range    POCT Glucose 202 (H) 74 - 99 mg/dL   POCT GLUCOSE   Result Value Ref Range    POCT Glucose 242 (H)  74 - 99 mg/dL   POCT GLUCOSE   Result Value Ref Range    POCT Glucose 181 (H) 74 - 99 mg/dL   CBC and Auto Differential   Result Value Ref Range    WBC 10.3 4.4 - 11.3 x10*3/uL    nRBC 0.0 0.0 - 0.0 /100 WBCs    RBC 3.52 (L) 4.00 - 5.20 x10*6/uL    Hemoglobin 10.1 (L) 12.0 - 16.0 g/dL    Hematocrit 32.9 (L) 36.0 - 46.0 %    MCV 94 80 - 100 fL    MCH 28.7 26.0 - 34.0 pg    MCHC 30.7 (L) 32.0 - 36.0 g/dL    RDW 13.2 11.5 - 14.5 %    Platelets 147 (L) 150 - 450 x10*3/uL    Neutrophils % 72.6 40.0 - 80.0 %    Immature Granulocytes %, Automated 0.8 0.0 - 0.9 %    Lymphocytes % 14.8 13.0 - 44.0 %    Monocytes % 9.3 2.0 - 10.0 %    Eosinophils % 2.3 0.0 - 6.0 %    Basophils % 0.2 0.0 - 2.0 %    Neutrophils Absolute 7.46 (H) 1.60 - 5.50 x10*3/uL    Immature Granulocytes Absolute, Automated 0.08 0.00 - 0.50 x10*3/uL    Lymphocytes Absolute 1.52 0.80 - 3.00 x10*3/uL    Monocytes Absolute 0.95 (H) 0.05 - 0.80 x10*3/uL    Eosinophils Absolute 0.24 0.00 - 0.40 x10*3/uL    Basophils Absolute 0.02 0.00 - 0.10 x10*3/uL   Comprehensive Metabolic Panel   Result Value Ref Range    Glucose 185 (H) 74 - 99 mg/dL    Sodium 135 (L) 136 - 145 mmol/L    Potassium 4.0 3.5 - 5.3 mmol/L    Chloride 106 98 - 107 mmol/L    Bicarbonate 25 21 - 32 mmol/L    Anion Gap 8 (L) 10 - 20 mmol/L    Urea Nitrogen 15 6 - 23 mg/dL    Creatinine 1.20 (H) 0.50 - 1.05 mg/dL    eGFR 46 (L) >60 mL/min/1.73m*2    Calcium 7.6 (L) 8.6 - 10.3 mg/dL    Albumin 2.6 (L) 3.4 - 5.0 g/dL    Alkaline Phosphatase 61 33 - 136 U/L    Total Protein 5.0 (L) 6.4 - 8.2 g/dL    AST 12 9 - 39 U/L    Bilirubin, Total 0.4 0.0 - 1.2 mg/dL    ALT 8 7 - 45 U/L   Magnesium   Result Value Ref Range    Magnesium 1.60 1.60 - 2.40 mg/dL   POCT GLUCOSE   Result Value Ref Range    POCT Glucose 147 (H) 74 - 99 mg/dL   POCT GLUCOSE   Result Value Ref Range    POCT Glucose 162 (H) 74 - 99 mg/dL      Patient recently received an antibiotic (last 12 hours)       Date/Time Action Medication Dose     04/18/25 0536 New Bag    metroNIDAZOLE (Flagyl) 500 mg in sodium chloride (iso)  mL 500 mg    04/18/25 0229 New Bag    cefepime (Maxipime) 1 g in dextrose 5% IV 50 mL 1 g           Plan discussed with interdisciplinary team, post op and passing gas, less distention noted, patient more animated today. No acute events overnight, patient denies chest pain, worsening SOB at this time. continue current and repeat labs in the AM. Seen by general surgery today - Impression/plan: Postop day #1 following laparoscopic enterolysis for small bowel obstruction.Recovering well. Remove nasogastric tube when nausea resolves.  Advance diet as tolerated.    Patient fully evaluated on April 18.  Patient postop with NG tube with mild drainage.  Continue to monitor.  Some bowel sounds are noted on examination.  Recheck labs in AM.    Discharge planning discussed with patient and care team. Therapy evaluations ordered. Anticipate HHC/SNF at discharge. Patient aware and agreeable to current plan, continue plan as above.     I spent a total of 50 minutes on the date of the service which included preparing to see the patient, face-to-face patient care, completing clinical documentation, obtaining and/or reviewing separately obtained history, performing a medically appropriate examination, counseling and educating the patient/family/caregiver, ordering medications, tests, or procedures, communicating with other HCPs (not separately reported), independently interpreting results (not separately reported), communicating results to the patient/family/caregiver, and care coordination (not separately reported).     Gianfranco Saez MD           [1]   [Held by provider] aspirin, 325 mg, oral, Nightly  [Held by provider] cilostazol, 50 mg, oral, BID  heparin (porcine), 5,000 Units, subcutaneous, q8h  [Held by provider] insulin glargine, 15 Units, subcutaneous, q24h  insulin lispro, 0-5 Units, subcutaneous, q4h  [Held by provider] losartan, 100  mg, oral, Daily  [Held by provider] metFORMIN XR, 500 mg, oral, Daily before evening meal  metoprolol, 5 mg, intravenous, q6h  [Held by provider] metoprolol tartrate, 100 mg, oral, Nightly  [Held by provider] oxyCODONE-acetaminophen, 1 tablet, oral, q8h  pantoprazole, 40 mg, intravenous, Daily     [2]   potassium mmkvlms-B7-1.45%NaCl, 100 mL/hr, Last Rate: 100 mL/hr (04/18/25 1227)     [3]   PRN medications: benzocaine-menthol, dextrose, dextrose, glucagon, glucagon, HYDROmorphone, nicotine, ondansetron

## 2025-04-19 LAB
ALBUMIN SERPL BCP-MCNC: 2.7 G/DL (ref 3.4–5)
ALP SERPL-CCNC: 61 U/L (ref 33–136)
ALT SERPL W P-5'-P-CCNC: 7 U/L (ref 7–45)
ANION GAP SERPL CALC-SCNC: 10 MMOL/L (ref 10–20)
AST SERPL W P-5'-P-CCNC: 15 U/L (ref 9–39)
BASOPHILS # BLD AUTO: 0.03 X10*3/UL (ref 0–0.1)
BASOPHILS NFR BLD AUTO: 0.3 %
BILIRUB SERPL-MCNC: 0.4 MG/DL (ref 0–1.2)
BUN SERPL-MCNC: 14 MG/DL (ref 6–23)
CALCIUM SERPL-MCNC: 7.7 MG/DL (ref 8.6–10.3)
CHLORIDE SERPL-SCNC: 109 MMOL/L (ref 98–107)
CO2 SERPL-SCNC: 24 MMOL/L (ref 21–32)
CREAT SERPL-MCNC: 1.32 MG/DL (ref 0.5–1.05)
EGFRCR SERPLBLD CKD-EPI 2021: 41 ML/MIN/1.73M*2
EOSINOPHIL # BLD AUTO: 0.33 X10*3/UL (ref 0–0.4)
EOSINOPHIL NFR BLD AUTO: 3.1 %
ERYTHROCYTE [DISTWIDTH] IN BLOOD BY AUTOMATED COUNT: 13.5 % (ref 11.5–14.5)
GLUCOSE BLD MANUAL STRIP-MCNC: 125 MG/DL (ref 74–99)
GLUCOSE BLD MANUAL STRIP-MCNC: 132 MG/DL (ref 74–99)
GLUCOSE BLD MANUAL STRIP-MCNC: 136 MG/DL (ref 74–99)
GLUCOSE BLD MANUAL STRIP-MCNC: 143 MG/DL (ref 74–99)
GLUCOSE BLD MANUAL STRIP-MCNC: 148 MG/DL (ref 74–99)
GLUCOSE BLD MANUAL STRIP-MCNC: 183 MG/DL (ref 74–99)
GLUCOSE SERPL-MCNC: 123 MG/DL (ref 74–99)
HCT VFR BLD AUTO: 33.1 % (ref 36–46)
HGB BLD-MCNC: 10 G/DL (ref 12–16)
IMM GRANULOCYTES # BLD AUTO: 0.13 X10*3/UL (ref 0–0.5)
IMM GRANULOCYTES NFR BLD AUTO: 1.2 % (ref 0–0.9)
LYMPHOCYTES # BLD AUTO: 1.91 X10*3/UL (ref 0.8–3)
LYMPHOCYTES NFR BLD AUTO: 18.2 %
MCH RBC QN AUTO: 28.7 PG (ref 26–34)
MCHC RBC AUTO-ENTMCNC: 30.2 G/DL (ref 32–36)
MCV RBC AUTO: 95 FL (ref 80–100)
MONOCYTES # BLD AUTO: 0.96 X10*3/UL (ref 0.05–0.8)
MONOCYTES NFR BLD AUTO: 9.1 %
NEUTROPHILS # BLD AUTO: 7.16 X10*3/UL (ref 1.6–5.5)
NEUTROPHILS NFR BLD AUTO: 68.1 %
NRBC BLD-RTO: 0 /100 WBCS (ref 0–0)
PLATELET # BLD AUTO: 150 X10*3/UL (ref 150–450)
POTASSIUM SERPL-SCNC: 4.2 MMOL/L (ref 3.5–5.3)
PROT SERPL-MCNC: 5.1 G/DL (ref 6.4–8.2)
RBC # BLD AUTO: 3.49 X10*6/UL (ref 4–5.2)
SODIUM SERPL-SCNC: 139 MMOL/L (ref 136–145)
WBC # BLD AUTO: 10.5 X10*3/UL (ref 4.4–11.3)

## 2025-04-19 PROCEDURE — 2500000001 HC RX 250 WO HCPCS SELF ADMINISTERED DRUGS (ALT 637 FOR MEDICARE OP): Performed by: INTERNAL MEDICINE

## 2025-04-19 PROCEDURE — 82947 ASSAY GLUCOSE BLOOD QUANT: CPT

## 2025-04-19 PROCEDURE — 2500000004 HC RX 250 GENERAL PHARMACY W/ HCPCS (ALT 636 FOR OP/ED): Performed by: NURSE PRACTITIONER

## 2025-04-19 PROCEDURE — 2500000004 HC RX 250 GENERAL PHARMACY W/ HCPCS (ALT 636 FOR OP/ED): Mod: JZ

## 2025-04-19 PROCEDURE — 2500000004 HC RX 250 GENERAL PHARMACY W/ HCPCS (ALT 636 FOR OP/ED): Mod: JZ | Performed by: NURSE PRACTITIONER

## 2025-04-19 PROCEDURE — 99232 SBSQ HOSP IP/OBS MODERATE 35: CPT

## 2025-04-19 PROCEDURE — 85025 COMPLETE CBC W/AUTO DIFF WBC: CPT | Performed by: NURSE PRACTITIONER

## 2025-04-19 PROCEDURE — 80053 COMPREHEN METABOLIC PANEL: CPT | Performed by: NURSE PRACTITIONER

## 2025-04-19 PROCEDURE — 36415 COLL VENOUS BLD VENIPUNCTURE: CPT | Performed by: NURSE PRACTITIONER

## 2025-04-19 PROCEDURE — 2500000001 HC RX 250 WO HCPCS SELF ADMINISTERED DRUGS (ALT 637 FOR MEDICARE OP)

## 2025-04-19 PROCEDURE — 1200000002 HC GENERAL ROOM WITH TELEMETRY DAILY

## 2025-04-19 RX ORDER — BENZONATATE 100 MG/1
100 CAPSULE ORAL 3 TIMES DAILY
Status: DISCONTINUED | OUTPATIENT
Start: 2025-04-19 | End: 2025-04-19

## 2025-04-19 RX ORDER — BENZONATATE 100 MG/1
200 CAPSULE ORAL 3 TIMES DAILY
Status: DISCONTINUED | OUTPATIENT
Start: 2025-04-19 | End: 2025-04-21 | Stop reason: HOSPADM

## 2025-04-19 RX ADMIN — ACETAMINOPHEN 975 MG: 325 TABLET, FILM COATED ORAL at 21:34

## 2025-04-19 RX ADMIN — HEPARIN SODIUM 5000 UNITS: 5000 INJECTION INTRAVENOUS; SUBCUTANEOUS at 14:54

## 2025-04-19 RX ADMIN — ACETAMINOPHEN 975 MG: 325 TABLET, FILM COATED ORAL at 14:55

## 2025-04-19 RX ADMIN — ONDANSETRON 4 MG: 2 INJECTION INTRAMUSCULAR; INTRAVENOUS at 12:15

## 2025-04-19 RX ADMIN — BENZONATATE 200 MG: 100 CAPSULE ORAL at 21:34

## 2025-04-19 RX ADMIN — GABAPENTIN 300 MG: 300 CAPSULE ORAL at 14:55

## 2025-04-19 RX ADMIN — GABAPENTIN 300 MG: 300 CAPSULE ORAL at 21:34

## 2025-04-19 RX ADMIN — HEPARIN SODIUM 5000 UNITS: 5000 INJECTION INTRAVENOUS; SUBCUTANEOUS at 06:18

## 2025-04-19 RX ADMIN — GABAPENTIN 300 MG: 300 CAPSULE ORAL at 09:03

## 2025-04-19 RX ADMIN — ACETAMINOPHEN 975 MG: 325 TABLET, FILM COATED ORAL at 09:02

## 2025-04-19 RX ADMIN — DEXTROSE, SODIUM CHLORIDE, AND POTASSIUM CHLORIDE 75 ML/HR: 5; .45; .15 INJECTION INTRAVENOUS at 00:53

## 2025-04-19 RX ADMIN — LOSARTAN POTASSIUM 100 MG: 50 TABLET, FILM COATED ORAL at 09:03

## 2025-04-19 RX ADMIN — DEXTROSE, SODIUM CHLORIDE, AND POTASSIUM CHLORIDE 50 ML/HR: 5; .45; .15 INJECTION INTRAVENOUS at 17:17

## 2025-04-19 RX ADMIN — PANTOPRAZOLE SODIUM 40 MG: 40 INJECTION, POWDER, FOR SOLUTION INTRAVENOUS at 09:03

## 2025-04-19 RX ADMIN — HEPARIN SODIUM 5000 UNITS: 5000 INJECTION INTRAVENOUS; SUBCUTANEOUS at 21:35

## 2025-04-19 RX ADMIN — METOPROLOL TARTRATE 100 MG: 100 TABLET, FILM COATED ORAL at 21:46

## 2025-04-19 ASSESSMENT — COGNITIVE AND FUNCTIONAL STATUS - GENERAL
TOILETING: A LITTLE
MOVING TO AND FROM BED TO CHAIR: A LITTLE
MOBILITY SCORE: 18
STANDING UP FROM CHAIR USING ARMS: A LITTLE
HELP NEEDED FOR BATHING: A LITTLE
CLIMB 3 TO 5 STEPS WITH RAILING: A LOT
DAILY ACTIVITIY SCORE: 22
WALKING IN HOSPITAL ROOM: A LITTLE
MOBILITY SCORE: 19
CLIMB 3 TO 5 STEPS WITH RAILING: A LOT
WALKING IN HOSPITAL ROOM: A LITTLE
DAILY ACTIVITIY SCORE: 22
MOBILITY SCORE: 18
WALKING IN HOSPITAL ROOM: A LITTLE
TOILETING: A LITTLE
TURNING FROM BACK TO SIDE WHILE IN FLAT BAD: A LITTLE
TURNING FROM BACK TO SIDE WHILE IN FLAT BAD: A LITTLE
STANDING UP FROM CHAIR USING ARMS: A LITTLE
STANDING UP FROM CHAIR USING ARMS: A LITTLE
DAILY ACTIVITIY SCORE: 22
CLIMB 3 TO 5 STEPS WITH RAILING: A LOT
HELP NEEDED FOR BATHING: A LITTLE
MOVING TO AND FROM BED TO CHAIR: A LITTLE
MOVING TO AND FROM BED TO CHAIR: A LITTLE
TOILETING: A LITTLE
HELP NEEDED FOR BATHING: A LITTLE

## 2025-04-19 ASSESSMENT — PAIN SCALES - GENERAL
PAINLEVEL_OUTOF10: 0 - NO PAIN
PAINLEVEL_OUTOF10: 0 - NO PAIN

## 2025-04-19 NOTE — PROGRESS NOTES
"Nancy Long is a 79 y.o. female on day 7 of admission presenting with Generalized abdominal pain.    Subjective   POD2. NGT removed yesterday, patient doing well. States she feels much better today. No N/V. Tolerating CLD. Passing flatus and has had multiple BMs.        Objective     Physical Exam  Vitals reviewed.   Constitutional:       General: She is not in acute distress.     Comments: Patient is sitting up in bed and appears to be doing much better today   HENT:      Mouth/Throat:      Mouth: Mucous membranes are moist.   Eyes:      General: No scleral icterus.  Cardiovascular:      Rate and Rhythm: Normal rate and regular rhythm.      Pulses: Normal pulses.      Comments: 70s on tele with PVC  Pulmonary:      Effort: Pulmonary effort is normal. No respiratory distress.      Breath sounds: Normal breath sounds.   Abdominal:      General: Bowel sounds are normal. There is no distension.      Palpations: Abdomen is soft.      Tenderness: There is abdominal tenderness (incisional, mild).      Comments: Lap sites covered with clear plastic dressing, c/d/I     Musculoskeletal:      Right lower leg: No edema.      Left lower leg: No edema.   Skin:     General: Skin is warm and dry.      Coloration: Skin is not jaundiced or pale.   Neurological:      Mental Status: She is alert and oriented to person, place, and time.   Psychiatric:         Behavior: Behavior normal.         Last Recorded Vitals  Blood pressure 137/63, pulse 58, temperature 36.2 °C (97.2 °F), resp. rate 18, height 1.702 m (5' 7\"), weight 76.7 kg (169 lb), SpO2 99%.  Intake/Output last 3 Shifts:  I/O last 3 completed shifts:  In: 1550 (20.2 mL/kg) [P.O.:250; IV Piggyback:1300]  Out: 380 (5 mL/kg) [Emesis/NG output:150; Drains:230]  Weight: 76.7 kg     Relevant Results  Results for orders placed or performed during the hospital encounter of 04/12/25 (from the past 24 hours)   CBC and Auto Differential   Result Value Ref Range    WBC 10.3 4.4 - 11.3 " x10*3/uL    nRBC 0.0 0.0 - 0.0 /100 WBCs    RBC 3.52 (L) 4.00 - 5.20 x10*6/uL    Hemoglobin 10.1 (L) 12.0 - 16.0 g/dL    Hematocrit 32.9 (L) 36.0 - 46.0 %    MCV 94 80 - 100 fL    MCH 28.7 26.0 - 34.0 pg    MCHC 30.7 (L) 32.0 - 36.0 g/dL    RDW 13.2 11.5 - 14.5 %    Platelets 147 (L) 150 - 450 x10*3/uL    Neutrophils % 72.6 40.0 - 80.0 %    Immature Granulocytes %, Automated 0.8 0.0 - 0.9 %    Lymphocytes % 14.8 13.0 - 44.0 %    Monocytes % 9.3 2.0 - 10.0 %    Eosinophils % 2.3 0.0 - 6.0 %    Basophils % 0.2 0.0 - 2.0 %    Neutrophils Absolute 7.46 (H) 1.60 - 5.50 x10*3/uL    Immature Granulocytes Absolute, Automated 0.08 0.00 - 0.50 x10*3/uL    Lymphocytes Absolute 1.52 0.80 - 3.00 x10*3/uL    Monocytes Absolute 0.95 (H) 0.05 - 0.80 x10*3/uL    Eosinophils Absolute 0.24 0.00 - 0.40 x10*3/uL    Basophils Absolute 0.02 0.00 - 0.10 x10*3/uL   Comprehensive Metabolic Panel   Result Value Ref Range    Glucose 185 (H) 74 - 99 mg/dL    Sodium 135 (L) 136 - 145 mmol/L    Potassium 4.0 3.5 - 5.3 mmol/L    Chloride 106 98 - 107 mmol/L    Bicarbonate 25 21 - 32 mmol/L    Anion Gap 8 (L) 10 - 20 mmol/L    Urea Nitrogen 15 6 - 23 mg/dL    Creatinine 1.20 (H) 0.50 - 1.05 mg/dL    eGFR 46 (L) >60 mL/min/1.73m*2    Calcium 7.6 (L) 8.6 - 10.3 mg/dL    Albumin 2.6 (L) 3.4 - 5.0 g/dL    Alkaline Phosphatase 61 33 - 136 U/L    Total Protein 5.0 (L) 6.4 - 8.2 g/dL    AST 12 9 - 39 U/L    Bilirubin, Total 0.4 0.0 - 1.2 mg/dL    ALT 8 7 - 45 U/L   Magnesium   Result Value Ref Range    Magnesium 1.60 1.60 - 2.40 mg/dL   POCT GLUCOSE   Result Value Ref Range    POCT Glucose 147 (H) 74 - 99 mg/dL   POCT GLUCOSE   Result Value Ref Range    POCT Glucose 162 (H) 74 - 99 mg/dL   POCT GLUCOSE   Result Value Ref Range    POCT Glucose 189 (H) 74 - 99 mg/dL   POCT GLUCOSE   Result Value Ref Range    POCT Glucose 158 (H) 74 - 99 mg/dL   POCT GLUCOSE   Result Value Ref Range    POCT Glucose 132 (H) 74 - 99 mg/dL   POCT GLUCOSE   Result Value Ref  Range    POCT Glucose 125 (H) 74 - 99 mg/dL       Assessment/Plan   79 year old female with PMH significant for HTN, HLD, CAD, PAD (follows with Dr. Oliver), carotid artery disease (Right occlusion of ICA, left ICA with > 70% stenosis; follows with Dr. Parish), CKD, Left breast cancer s/p lumpectomy, kidney stones, and IDDM Type 2 (follows with PCP Dr. Bonilla) who presented to [facility] ED on 4/12 with 2 day history of abdominal pain. Admitted to medicine with consult to general surgery with SBO.      Impression:  #pSBO - resolved    Procedures:  4/16 - Placement of NG tube  4/17 - Laparoscopic lysis of adhesions with Dr. Martinez     Recommendations:  - FLD      > Lantus currently held, managed by primary team     > continue hypoglycemia order set   - PRN IV Zofran  - Multimodal pain management     > PRN IV dilaudid for severe pain, limit use  - Continue mIVF: D51/2NS with 20 mEq KCL @ 50  - Encourage OOB and ambulation  - IS  - Repeat BMP in AM. NO Need for CMP or CBC     [facility]     DVT ppx: SCDs, SQH    Dispo: Continue care on RNF, not appropriate for hospital discharge. Will likely need SNF at discharge.     The patient will be seen and discussed with the attending surgeon on call, Dr. Paul Barrett PA-C

## 2025-04-19 NOTE — PROGRESS NOTES
Nancy Long is a 79 y.o. female on day 7 of admission presenting with Generalized abdominal pain.      Subjective   No significant events overnight. Patient seen and evaluated at bedside with sister present.        Objective     Last Recorded Vitals  /65   Pulse 51   Temp 36.4 °C (97.5 °F)   Resp 20   Wt 76.7 kg (169 lb)   SpO2 95%   Intake/Output last 3 Shifts:    Intake/Output Summary (Last 24 hours) at 4/19/2025 1440  Last data filed at 4/19/2025 1300  Gross per 24 hour   Intake 1510 ml   Output 150 ml   Net 1360 ml       Admission Weight  Weight: 77.1 kg (170 lb) (04/12/25 1212)    Daily Weight  04/17/25 : 76.7 kg (169 lb)    Image Results  XR abdomen 1 view  Narrative: Interpreted By:  Марина Quintero,   STUDY:  XR ABDOMEN 1 VIEW;  4/17/2025 8:15 am. 2 views.      INDICATION:  Signs/Symptoms:Follow up bowel distention.      COMPARISON:  04/16/2025, CT abdomen and pelvis 04/12/2025      ACCESSION NUMBER(S):  [id number]      ORDERING CLINICIAN:  ROZ RAMESH      FINDINGS:  There is a nasogastric tube with the tip in the distal stomach.  There is progressive moderate gastric distention this has improved  compared to the previous study from 04/16/2025 prior to nasogastric  tube placement. At that time, there was marked gastric distention.  There is moderate small bowel dilatation with loops dilated up to 4.8  cm, unchanged. Findings are consistent with small-bowel obstruction.  There      There are no pathologic calcifications.      Visualized lungs are clear.      Osseous structures demonstrate no acute bony changes.          Impression: 1. Nasogastric tube with the tip in the distal stomach. Improvement  in the degree of gastric distention.  2. Persistent small bowel obstruction.      MACRO:  None      Signed by: Марина Quintero 4/17/2025 9:11 AM  Dictation workstation:   [id number]      Physical Exam        Gianfranco Saez MD   Physician  Internal Medicine     Progress Notes      Signed      Date of Service: 4/15/2025  8:35 AM     Signed       Expand All Collapse All    Nancy Long is a 79 y.o. female on day 3 of admission presenting with Generalized abdominal pain.        Subjective  No significant events overnight. Patient seen and evaluated at bedside.         Objective[]Expand by Default  Last Recorded Vitals  /67   Pulse (!) 37   Temp 36.4 °C (97.5 °F)   Resp 18   Wt 77.1 kg (170 lb)   SpO2 93%   Intake/Output last 3 Shifts:     Intake/Output Summary (Last 24 hours) at 4/15/2025 0835  Last data filed at 4/15/2025 0250      Gross per 24 hour   Intake 950 ml   Output --   Net 950 ml         Admission Weight  Weight: 77.1 kg (170 lb) (04/12/25 1212)     Daily Weight  04/12/25 : 77.1 kg (170 lb)     Image Results  XR abdomen 1 view  Narrative: Interpreted By:  Arun Escamilla,   STUDY:  XR ABDOMEN 1 VIEW;  4/14/2025 7:06 am      INDICATION:  Signs/Symptoms:Follow up bowel distention.          COMPARISON:  04/13/2025      ACCESSION NUMBER(S):  [id number]      ORDERING CLINICIAN:  ROZ RAMESH      FINDINGS:  Three view abdomen      Multiple dilated gas-filled bowel loops predominantly centrally with  stacked appearance most concerning for obstruction redemonstrated  grossly similar to prior. Limited evaluation for free air on supine  films. Some gas and stool noted within the colon as previously.  Approximate 5 cm gaseous lucency overlying the pelvis could represent  focal distended bowel loop possibly distal colon within the pelvis  and new or more pronounced compared to prior. Mild gaseous distention  of the stomach.      Prominent vascular calcifications overlying abdomen and pelvis and  overlying the groin regions.      Impression: 1.  Multiple dilated gas-filled loops of small bowel redemonstrated  most suggestive of sequela of obstruction as noted previously and  probably grossly similar to prior. Focal gaseous lucency overlying  the pelvis new or more pronounced may reflect focal  distended bowel  loop possibly distal colon within the pelvis new or more pronounced.  Mild gaseous distention of the stomach. Limited evaluation for free  air on supine films. Continued clinical correlation and follow-up  advised.      MACRO:  None      Signed by: Arun Escamilla 4/14/2025 2:45 PM  Dictation workstation:   [id number]        Physical Exam     Date of Service: 4/13/2025  6:18 PM      Signed        Expand All Collapse All    History Of Present Illness  Nancy Long is a 79 y.o. female with CAD,  chronic diastolic heart failure, hypertension, hyperlipidemia, peripheral artery disease, diabetes mellitus, carotid artery disease (Right occlusion of ICA, left ICA with > 70% stenosis; follows with Dr. Parish), CKD, Left breast cancer s/p lumpectomy and radiation, kidney stones, and open cholecystectomy and open appendectomy with right ovary removal (for tumor- at age 16) who presented today with abdominal pain.  Patient reports yesterday she developed severe, sharp, continuous epigastric pain associated with nausea and diaphoresis.  She notes she took some of her prescribed Percocet that seemed to help a little however the symptoms persisted into today so she decided to come to the emergency room.  She also notes a decreased appetite and when she did try to eat the food did not taste good.  She denies a history of bowel obstruction.  She articulates she told the surgery team she would not like surgical intervention.  CODE STATUS discussed and she would like to be a DNR CCA.     In the ED lab work, EKG, chest x-ray and CTAP were performed, labs revealed glucose 200, sodium 125, chloride 94, bun 45, creatinine 1.64 (1.32 on 2/4/2020), , lactate 1.6, troponins 27 and 29 respectively, white count 21, neutrophils 17.35, monocytes 1.1. EKG Interpretation, per ER physician impression: Sinus at rate of 79, , QRS 91, QTc 407 without evidence of STEMI or ischemia. Chest x-ray without acute process.  CT  abdomen/pelvis revealed small bowel obstruction with mesenteric edema as described, small volume of ascites, diverticulosis, atherosclerosis, subcutaneous opacities and reticulation that may be due to injections although hematoma is possible.  Patient was evaluated by surgery team that noted her symptoms are likely consistent with partial adhesive small bowel obstruction and surgical intervention was offered however patient declined.  Her vital signs were acceptable with a slightly elevated blood pressure of 157/70.  She was given cefepime Flagyl morphine Zofran and started on IV fluids and admitted for further evaluation and treatment under the care of Dr. Saez.        Echo March 2025:     CONCLUSIONS:  1. Left ventricular ejection fraction is normal, by visual estimate at 70-75%.  2. There is moderate left ventricular hypertrophy.  3. The left atrium is mildly dilated.  4. There is mild mitral valve stenosis.  5. There is moderate mitral annular calcification.  6. Right ventricular systolic pressure is within normal limits.  7. Mild aortic valve stenosis.  8. Mild left ventricular outflow obstruction at rest and with Valsalva (10 mmHg).     Past Medical History  Medical History           Past Medical History:   Diagnosis Date    Personal history of other diseases of the circulatory system       History of hypertension    Personal history of other endocrine, nutritional and metabolic disease       History of diabetes mellitus            Surgical History  Surgical History             Past Surgical History:   Procedure Laterality Date    APPENDECTOMY   01/03/2014     Appendectomy    BREAST LUMPECTOMY   01/03/2014     Breast Surgery Lumpectomy    CHOLECYSTECTOMY   01/03/2014     Cholecystectomy    OOPHORECTOMY   01/03/2014     Oophorectomy            Social History  Denies alcohol or illicit drug use.  Reports she smokes 2 to 3 packs of cigarettes per week.  Lives alone.  Ambulates with cane as needed.     Family  "History  Family History   No family history on file.         Allergies  Penicillins and Sulfa (sulfonamide antibiotics)     10 point review of systems performed and is negative besides what is stated in HPI.     Physical Exam  Constitutional:       Appearance: Normal appearance.   HENT:      Head: Normocephalic and atraumatic.      Nose: Nose normal.      Mouth/Throat:      Mouth: Mucous membranes are moist.   Eyes:      Conjunctiva/sclera: Conjunctivae normal.   Cardiovascular:      Rate and Rhythm: Normal rate and regular rhythm.      Heart sounds: Murmur heard.   Pulmonary:      Effort: Pulmonary effort is normal.      Comments: Crackles left base  Abdominal:      General: Bowel sounds are normal. There is no distension.      Tenderness: There is abdominal tenderness.      Comments: Somewhat firm   Musculoskeletal:         General: Normal range of motion.      Cervical back: Neck supple.   Skin:     General: Skin is warm and dry.   Neurological:      Mental Status: She is alert. Mental status is at baseline.   Psychiatric:         Mood and Affect: Mood normal.            Last Recorded Vitals  Blood pressure (!) 193/77, pulse 74, temperature 37 °C (98.6 °F), temperature source Temporal, resp. rate 20, height 1.702 m (5' 7\"), weight 77.1 kg (170 lb), SpO2 94%.     Relevant Results     Medications Ordered Prior to Encounter   No current facility-administered medications on file prior to encounter.                  Current Outpatient Medications on File Prior to Encounter   Medication Sig Dispense Refill    acetaminophen (Tylenol) 500 mg tablet Take 1 tablet (500 mg) by mouth every 8 hours if needed for mild pain (1 - 3).        ascorbic acid (Vitamin C) 500 mg tablet Take 1 tablet (500 mg) by mouth once daily.        aspirin 325 mg tablet Take 1 tablet (325 mg) by mouth once daily at bedtime.        B12-levomefolate calcium-B6 (Foltx) 2-1.13-25 mg tablet Take 1 tablet by mouth once daily.        cilostazol (Pletal) " 50 mg tablet Take 1 tablet (50 mg) by mouth 2 times a day. 180 tablet 3    lansoprazole (Prevacid) 30 mg DR capsule Take 1 capsule (30 mg) by mouth 2 times a day as needed.        losartan (Cozaar) 100 mg tablet Take 1 tablet (100 mg) by mouth once daily. 90 tablet 3    metFORMIN XR (Glucophage-XR) 500 mg 24 hr tablet Take 1 tablet (500 mg) by mouth once daily in the evening. Take with meals. (Patient taking differently: Take 1 tablet (500 mg) by mouth once daily as needed (BG >200).)        metoprolol tartrate (Lopressor) 100 mg tablet Take 1 tablet (100 mg) by mouth once daily. (Patient taking differently: Take 1 tablet (100 mg) by mouth once daily at bedtime.) 90 tablet 3    NovoLIN 70/30 U-100 Insulin 100 unit/mL (70-30) injection Inject 43 Units under the skin 2 times a day before meals.        OneTouch Ultra Test strip          oxyCODONE-acetaminophen (Percocet)  mg tablet Take 1 tablet by mouth every 8 hours.        rosuvastatin (Crestor) 20 mg tablet Take 1 tablet (20 mg) by mouth once daily. (Patient taking differently: Take 1 tablet (20 mg) by mouth once daily at bedtime.) 90 tablet 3    zinc sulfate (Zincate) 220 (50 Zn) MG capsule Take 1 capsule (50 mg of elemental zinc) by mouth once daily.        betamethasone dipropionate 0.05 % cream Apply topically 2 times a day. (Patient not taking: Reported on 4/12/2025)        hydrOXYzine pamoate (Vistaril) 50 mg capsule Take 1 capsule (50 mg) by mouth 4 times a day as needed. (Patient not taking: Reported on 4/12/2025)        loratadine-pseudoephedrine (Claritin-D 24-hour)  mg 24 hr tablet Take 1 tablet by mouth once daily. (Patient not taking: Reported on 4/12/2025)        ondansetron ODT (Zofran-ODT) 8 mg disintegrating tablet every 8 hours if needed. (Patient not taking: Reported on 4/12/2025)        polymyxin B sulf-trimethoprim (Polytrim) ophthalmic solution APPLY 1/4 INCH TO AFFECTED EYE 4 TIMES A DAY. (Patient not taking: Reported on  4/12/2025)        solifenacin (VESIcare) 5 mg tablet  (Patient not taking: Reported on 4/12/2025)        sucralfate (Carafate) 100 mg/mL suspension TAKE 10 ML BY MOUTH 4 TIMES A DAY (Patient not taking: Reported on 4/12/2025)        torsemide (Demadex) 20 mg tablet Take 1 tablet (20 mg) by mouth once daily. (Patient not taking: Reported on 4/12/2025) 90 tablet 3                      Results for orders placed or performed during the hospital encounter of 04/12/25 (from the past 24 hours)   POCT GLUCOSE   Result Value Ref Range     POCT Glucose 183 (H) 74 - 99 mg/dL   Troponin I, High Sensitivity   Result Value Ref Range     Troponin I, High Sensitivity 23 (H) 0 - 13 ng/L   Sars-CoV-2 and Influenza A/B PCR   Result Value Ref Range     Flu A Result Not Detected Not Detected     Flu B Result Not Detected Not Detected     Coronavirus 2019, PCR Not Detected Not Detected   POCT GLUCOSE   Result Value Ref Range     POCT Glucose 171 (H) 74 - 99 mg/dL   Urinalysis with Reflex Culture and Microscopic   Result Value Ref Range     Color, Urine Light-Yellow Light-Yellow, Yellow, Dark-Yellow     Appearance, Urine Clear Clear     Specific Gravity, Urine 1.017 1.005 - 1.035     pH, Urine 5.0 5.0, 5.5, 6.0, 6.5, 7.0, 7.5, 8.0     Protein, Urine 10 (TRACE) NEGATIVE, 10 (TRACE), 20 (TRACE) mg/dL     Glucose, Urine Normal Normal mg/dL     Blood, Urine NEGATIVE NEGATIVE mg/dL     Ketones, Urine NEGATIVE NEGATIVE mg/dL     Bilirubin, Urine NEGATIVE NEGATIVE mg/dL     Urobilinogen, Urine Normal Normal mg/dL     Nitrite, Urine NEGATIVE NEGATIVE     Leukocyte Esterase, Urine 250 Triny/uL (A) NEGATIVE   Extra Urine Gray Tube   Result Value Ref Range     Extra Tube Hold for add-ons.     POCT GLUCOSE   Result Value Ref Range     POCT Glucose 131 (H) 74 - 99 mg/dL   Microscopic Only, Urine   Result Value Ref Range     WBC, Urine 11-20 (A) 1-5, NONE /HPF     RBC, Urine 1-2 NONE, 1-2, 3-5 /HPF     Bacteria, Urine 4+ (A) NONE SEEN /HPF     Mucus,  Urine FEW Reference range not established. /LPF     Hyaline Casts, Urine OCCASIONAL (A) NONE /LPF   POCT GLUCOSE   Result Value Ref Range     POCT Glucose 81 74 - 99 mg/dL   POCT GLUCOSE   Result Value Ref Range     POCT Glucose 67 (L) 74 - 99 mg/dL   POCT GLUCOSE   Result Value Ref Range     POCT Glucose 64 (L) 74 - 99 mg/dL   POCT GLUCOSE   Result Value Ref Range     POCT Glucose 147 (H) 74 - 99 mg/dL   CBC   Result Value Ref Range     WBC 15.8 (H) 4.4 - 11.3 x10*3/uL     nRBC 0.0 0.0 - 0.0 /100 WBCs     RBC 3.83 (L) 4.00 - 5.20 x10*6/uL     Hemoglobin 11.0 (L) 12.0 - 16.0 g/dL     Hematocrit 36.3 36.0 - 46.0 %     MCV 95 80 - 100 fL     MCH 28.7 26.0 - 34.0 pg     MCHC 30.3 (L) 32.0 - 36.0 g/dL     RDW 13.1 11.5 - 14.5 %     Platelets 163 150 - 450 x10*3/uL   Basic Metabolic Panel   Result Value Ref Range     Glucose 74 74 - 99 mg/dL     Sodium 132 (L) 136 - 145 mmol/L     Potassium 4.1 3.5 - 5.3 mmol/L     Chloride 103 98 - 107 mmol/L     Bicarbonate 23 21 - 32 mmol/L     Anion Gap 10 10 - 20 mmol/L     Urea Nitrogen 39 (H) 6 - 23 mg/dL     Creatinine 1.33 (H) 0.50 - 1.05 mg/dL     eGFR 40 (L) >60 mL/min/1.73m*2     Calcium 8.2 (L) 8.6 - 10.3 mg/dL   POCT GLUCOSE   Result Value Ref Range     POCT Glucose 72 (L) 74 - 99 mg/dL   POCT GLUCOSE   Result Value Ref Range     POCT Glucose 75 74 - 99 mg/dL   POCT GLUCOSE   Result Value Ref Range     POCT Glucose 84 74 - 99 mg/dL   POCT GLUCOSE   Result Value Ref Range     POCT Glucose 99 74 - 99 mg/dL         XR abdomen 1 view     Result Date: 4/13/2025  Interpreted By:  Beka Mays, STUDY: Abdominal series dated 4/13/2025.   INDICATION: Follow-up bowel dilation.   COMPARISON: Correlation is made with 04/12/2025 CT.   ACCESSION NUMBER(S): [id number]   ORDERING CLINICIAN: ROZ RAMESH   TECHNIQUE: Two AP radiographs of the abdomen.   FINDINGS: There are multiple stacked dilated loops of small bowel in the central abdomen measuring up to a proximally 4.1 cm. There is  gas and stool in the colon. Lung bases are clear. Degenerative changes seen of the spine and hips.        Multiple dilated loops of small bowel in the central abdomen most compatible with a degree of obstructive process as further discussed on prior date CT.   Signed by: Beka Mays 4/13/2025 1:01 PM Dictation workstation:   [id number]     CT abdomen pelvis wo IV contrast     Addendum Date: 4/12/2025    Interpreted By:  Chano Marshall, ADDENDUM: Also to be noted at the impression: Subcutaneous opacities and reticulation that may be due to injections although hematoma is possible.   Signed by: Chano Marshall 4/12/2025 2:38 PM   -------- ORIGINAL REPORT -------- Dictation workstation:   [id number]     Result Date: 4/12/2025  Interpreted By:  Chano Marshall, STUDY: CT ABDOMEN PELVIS WO IV CONTRAST;  4/12/2025 2:05 pm   INDICATION: Signs/Symptoms:abd pain.   COMPARISON: None.   ACCESSION NUMBER(S): [id number]   ORDERING CLINICIAN: FRANK BREWSTER   TECHNIQUE: CT of the abdomen and pelvis was performed. Contiguous axial images were obtained at 3 mm slice thickness through the abdomen and pelvis. Coronal and sagittal reconstructions at 3 mm slice thickness were performed.   FINDINGS: LOWER CHEST: Small right pleural effusion and trace left pleural effusion. Coronary artery atherosclerotic calcification and mitral annular calcification.   ABDOMEN:   LIVER: The hepatic parenchyma is homogeneous without evidence of focal liver lesions.The hepatic size is normal.   SPLEEN: The spleen is normal in size and homogeneous.   ADRENAL GLANDS: Bilateral adrenal glands appear normal.   KIDNEYS AND URETERS: Mild bilateral cortical atrophy, the renal cortices otherwise are homogeneous. Radiodensities at the renal sinuses is probably atherosclerotic calcification, although superimposed nonobstructing calculi are possible.   The ureters throughout their distal course are not well identified due to overlying crowded bowel loops, but  the tracts otherwise are unremarkable without identified ureteral dilatation or additional radiodense calculi.   PANCREAS: The pancreas appears unremarkable, there is no ductal dilatation or masses.   GALLBLADDER: Not visualized, presumably with cholecystectomy.   BILE DUCTS: Dilatation of the extrahepatic ducts, the common bile duct measuring 1.4 cm in diameter, most likely from post cholecystectomy state. However as the no prior exams for comparison MRCP would be recommended if there is symptomatology compatible with biliary colic.     VESSELS: Fairly extensive atherosclerotic calcifications are noted predominantly at the aorta and iliac system. There is also moderate atherosclerotic calcification at the mid segment of the superior mesenteric artery, and fairly marked of the inferior mesenteric artery.   PERITONEUM AND RETROPERITONEUM: There is a small volume of ascites best seen in the right upper quadrant along the liver. No free intraperitoneal air.   The retroperitoneum appears unremarkable, and without significant adenopathy.   No enlarged mesenteric lymph nodes.   BOWEL: There is mild distention of multiple small bowel segments with air-fluid levels, associated with reticulation of the mesentery indicating edema. There is decompression of distal small bowel segments, and the pattern would be most compatible with mid to early or partial distal small bowel obstruction. There is also moderate diverticulosis of the distal colon.   PELVIS:   BLADDER: The urinary bladder contour is smooth.   REPRODUCTIVE ORGANS: No pelvic masses.     BONE, ABDOMINAL WALL AND OTHER FINDINGS: No suspicious osseous lesions are identified.   There is a small non incarcerated umbilical hernia containing intra-abdominal fat. There is also attenuation within the subcutaneous fat of the mid/lower abdomen anteriorly that may be from subcutaneous injections. However, hematomas are also possible. There is also reticulation of the  "subcutaneous fat at the right lateral abdominal wall indicating additional edema.        1.  Small-bowel obstruction with mesenteric edema as described. 2. Small volume of ascites. 3. Diverticulosis. 4. Atherosclerosis as described.   MACRO: 1. None   Signed by: Chano Marshall 4/12/2025 2:28 PM Dictation workstation:   [id number]     XR chest 2 views     Result Date: 4/12/2025  Interpreted By:  Beka Mays, STUDY: Chest dated  4/12/2025.   INDICATION: Signs/Symptoms:sob with ambulation   COMPARISON: Chest dated 11/17/2015.   ACCESSION NUMBER(S): [id number]   ORDERING CLINICIAN: FRANK BREWSTER   TECHNIQUE: One view of the chest.   FINDINGS: The lungs are clear.  No pneumothorax or effusion is evident. The cardiomediastinal silhouette is  not enlarged.Degenerative change is seen of the spine and shoulders.        No acute cardiopulmonary process is evident.   MACRO: None   Signed by: Beka Mays 4/12/2025 1:26 PM Dictation workstation:   [id number]             Assessment/Plan     Assessment & Plan  Generalized abdominal pain     Small bowel obstruction (Multi)     Dependence on nicotine from cigarettes     Advanced care planning/counseling discussion     Leukocytosis        Per surgery:  \"Impression:  #SBO   > small bowel dilation; however, other concerning CT findings particularly mesenteric edema and ascites. CT imaging worrisome for possible ischemic/low flow state to bowel; however, serum lactate normal and patient's clinical presentation/symptoms/pain/bowel sounds are consistent with an adhesive small bowel obstruction (partial)  #Leukocytosis (possibly 2/2 above, but may have other infectious etiology, possibly UTI?)   > UA ordered; however, not yet collected and already received antibiotics  #Hyponatremia and hypochloremia  #CKD   Recommendations:  - no acute surgical intervention at this time   > patient offered surgical intervention if she were to worsen clinically; however, she was adamant about " "not wanting surgery   > would recommend placing NG tube for decompression; however, patient again declined NG tube. She said she may be agreeable if she begins to feel worse/nauseated  - PRN IV Zofran  - PRN IV Morphine for severe pain (limit use as able, but unable to give NSAIDs or PO meds)  - IVF: D5NS @ 100  - repeat CBC and BMP in AM\"     Additionally:  Change morphine to Dilaudid given CKD  Check flu and COVID  KUB in a.m.        #Advance care planning     Patient would like to be a DNR CCA     #Nicotine dependence     Reports she lets most of her cigarettes to burn out without smoking them so we will order a nicotine patch as needed     #Chronic conditions:     CAD,  chronic diastolic heart failure, hypertension, hyperlipidemia, peripheral artery disease, diabetes mellitus, carotid artery disease (Right occlusion of ICA, left ICA with > 70% stenosis; follows with Dr. Parish), CKD, Left breast cancer s/p lumpectomy and radiation, kidney stones, and open cholecystectomy and open appendectomy with right ovary removal (for tumor- at age 16)      Hold all p.o. meds  Give IV Protonix   Metoprolol IV around-the-clock with parameters  Patient takes 70/30 at home will conservatively give 30 units of Lantus daily which would be a little less than half of the equivalent of 70/30/day with sliding scale insulin and hypoglycemic protocol     #DVT prophylaxis     SCDs  Heparin                             Chadwick Medel, DO     Review of Systems                     Relevant Results                   Assessment/Plan                      Assessment & Plan  Generalized abdominal pain     Small bowel obstruction (Multi)     Dependence on nicotine from cigarettes     Advanced care planning/counseling discussion     Leukocytosis     Patient fully evaluated on 4/14. CT showing SBO with mesenteric edema, small volume ascites, diverticulosis. Evaluated by general surgery today, recommending NG tube for decompression, ordered " zofran, morphine, continuing IV cefepime/flagyl. Repeat KUB ordered, results pending. If KUB looks okay, plan to advance diet. Leukocytosis downtrending, continue to monitor. Hypertension noted this morning, cardiac PO meds held d/t NPO status. Continue to monitor and restart PO meds when diet advanced. Recheck labs in AM.      Patient still requiring frequent cardiac and SPO2 monitoring. Continue aggressive pulmonary hygiene and oral hygiene. Off loading as tolerated for skin integrity. Medications and labs reviewed.     I spent a total of 60 minutes on the date of the service which included preparing to see the patient, face-to-face patient care, completing clinical documentation, obtaining and/or reviewing separately obtained history, performing a medically appropriate examination, counseling and educating the patient/family/caregiver, ordering medications, tests, or procedures, communicating with other HCPs (not separately reported), independently interpreting results (not separately reported), communicating results to the patient/family/caregiver, and care coordination (not separately reported).   Patient fully evaluated  04/15  for    Problem List Items Addressed This Visit         * (Principal) Generalized abdominal pain - Primary      Other Visit Diagnoses         SBO (small bowel obstruction) (Multi)                 Patient seen resting in bed with head of bed elevated, no s/s or c/o acute difficulties at this time.  Vital signs for last 24 hours Temp:  [36.2 °C (97.2 °F)-36.9 °C (98.4 °F)] 36.4 °C (97.5 °F)  Heart Rate:  [37-76] 37  Resp:  [18-20] 18  BP: (136-186)/(63-82) 152/67    No intake/output data recorded.  Patient still requiring frequent cardiac and SPO2 monitoring. Continue aggressive pulmonary hygiene and oral hygiene. Off loading as tolerated for skin integrity. Medications and labs reviewed-         Results for orders placed or performed during the hospital encounter of 04/12/25 (from the  past 24 hours)   POCT GLUCOSE   Result Value Ref Range     POCT Glucose 111 (H) 74 - 99 mg/dL   POCT GLUCOSE   Result Value Ref Range     POCT Glucose 115 (H) 74 - 99 mg/dL   POCT GLUCOSE   Result Value Ref Range     POCT Glucose 129 (H) 74 - 99 mg/dL   POCT GLUCOSE   Result Value Ref Range     POCT Glucose 114 (H) 74 - 99 mg/dL   POCT GLUCOSE   Result Value Ref Range     POCT Glucose 117 (H) 74 - 99 mg/dL   CBC and Auto Differential   Result Value Ref Range     WBC 12.1 (H) 4.4 - 11.3 x10*3/uL     nRBC 0.0 0.0 - 0.0 /100 WBCs     RBC 3.89 (L) 4.00 - 5.20 x10*6/uL     Hemoglobin 11.4 (L) 12.0 - 16.0 g/dL     Hematocrit 36.5 36.0 - 46.0 %     MCV 94 80 - 100 fL     MCH 29.3 26.0 - 34.0 pg     MCHC 31.2 (L) 32.0 - 36.0 g/dL     RDW 13.0 11.5 - 14.5 %     Platelets 161 150 - 450 x10*3/uL     Neutrophils % 74.4 40.0 - 80.0 %     Immature Granulocytes %, Automated 0.5 0.0 - 0.9 %     Lymphocytes % 14.3 13.0 - 44.0 %     Monocytes % 8.0 2.0 - 10.0 %     Eosinophils % 2.4 0.0 - 6.0 %     Basophils % 0.4 0.0 - 2.0 %     Neutrophils Absolute 8.99 (H) 1.60 - 5.50 x10*3/uL     Immature Granulocytes Absolute, Automated 0.06 0.00 - 0.50 x10*3/uL     Lymphocytes Absolute 1.73 0.80 - 3.00 x10*3/uL     Monocytes Absolute 0.97 (H) 0.05 - 0.80 x10*3/uL     Eosinophils Absolute 0.29 0.00 - 0.40 x10*3/uL     Basophils Absolute 0.05 0.00 - 0.10 x10*3/uL   Comprehensive Metabolic Panel   Result Value Ref Range     Glucose 109 (H) 74 - 99 mg/dL     Sodium 135 (L) 136 - 145 mmol/L     Potassium 3.7 3.5 - 5.3 mmol/L     Chloride 106 98 - 107 mmol/L     Bicarbonate 22 21 - 32 mmol/L     Anion Gap 11 10 - 20 mmol/L     Urea Nitrogen 20 6 - 23 mg/dL     Creatinine 1.14 (H) 0.50 - 1.05 mg/dL     eGFR 48 (L) >60 mL/min/1.73m*2     Calcium 8.0 (L) 8.6 - 10.3 mg/dL     Albumin 3.1 (L) 3.4 - 5.0 g/dL     Alkaline Phosphatase 72 33 - 136 U/L     Total Protein 5.7 (L) 6.4 - 8.2 g/dL     AST 27 9 - 39 U/L     Bilirubin, Total 0.6 0.0 - 1.2 mg/dL      ALT 15 7 - 45 U/L   POCT GLUCOSE   Result Value Ref Range     POCT Glucose 111 (H) 74 - 99 mg/dL      Patient recently received an antibiotic (last 12 hours)         Date/Time Action Medication Dose     04/15/25 0649 New Bag    metroNIDAZOLE (Flagyl) 500 mg in sodium chloride (iso)  mL 500 mg     04/15/25 0218 New Bag    cefepime (Maxipime) 1 g in dextrose 5% IV 50 mL 1 g     04/14/25 2235 New Bag    metroNIDAZOLE (Flagyl) 500 mg in sodium chloride (iso)  mL 500 mg             Plan discussed with interdisciplinary team,  NG tube for decompression, PRN IV zofran, IV morphine, will continue on IV Antibiotics-continuing IV cefepime/flagyl, WBCs downtrending. Repeat KUB on 4/14 unchanged, will repeat KUB today. Unable to advance diet at this time, will continue IV fluids, NPO. Ice chips ok.  PO meds held d/t NPO status. Continue to monitor and restart PO meds when diet advanced. continue current and repeat labs in the AM.      Discharge planning discussed with patient and care team. Therapy evaluations ordered. Anticipate HHC/SNF at discharge. Patient aware and agreeable to current plan, continue plan as above.      I spent a total of 50 minutes on the date of the service which included preparing to see the patient, face-to-face patient care, completing clinical documentation, obtaining and/or reviewing separately obtained history, performing a medically appropriate examination, counseling and educating the patient/family/caregiver, ordering medications, tests, or procedures, communicating with other HCPs (not separately reported), independently interpreting results (not separately reported), communicating results to the patient/family/caregiver, and care coordination (not separately reported).   Angela Faitma                      Revision History     Scheduled medications  Scheduled Medications[1]  Continuous medications  Continuous Medications[2]  PRN medications  PRN Medications[3]    Results for orders  placed or performed during the hospital encounter of 04/12/25 (from the past 24 hours)   POCT GLUCOSE   Result Value Ref Range    POCT Glucose 189 (H) 74 - 99 mg/dL   POCT GLUCOSE   Result Value Ref Range    POCT Glucose 158 (H) 74 - 99 mg/dL   POCT GLUCOSE   Result Value Ref Range    POCT Glucose 132 (H) 74 - 99 mg/dL   POCT GLUCOSE   Result Value Ref Range    POCT Glucose 125 (H) 74 - 99 mg/dL   CBC and Auto Differential   Result Value Ref Range    WBC 10.5 4.4 - 11.3 x10*3/uL    nRBC 0.0 0.0 - 0.0 /100 WBCs    RBC 3.49 (L) 4.00 - 5.20 x10*6/uL    Hemoglobin 10.0 (L) 12.0 - 16.0 g/dL    Hematocrit 33.1 (L) 36.0 - 46.0 %    MCV 95 80 - 100 fL    MCH 28.7 26.0 - 34.0 pg    MCHC 30.2 (L) 32.0 - 36.0 g/dL    RDW 13.5 11.5 - 14.5 %    Platelets 150 150 - 450 x10*3/uL    Neutrophils % 68.1 40.0 - 80.0 %    Immature Granulocytes %, Automated 1.2 (H) 0.0 - 0.9 %    Lymphocytes % 18.2 13.0 - 44.0 %    Monocytes % 9.1 2.0 - 10.0 %    Eosinophils % 3.1 0.0 - 6.0 %    Basophils % 0.3 0.0 - 2.0 %    Neutrophils Absolute 7.16 (H) 1.60 - 5.50 x10*3/uL    Immature Granulocytes Absolute, Automated 0.13 0.00 - 0.50 x10*3/uL    Lymphocytes Absolute 1.91 0.80 - 3.00 x10*3/uL    Monocytes Absolute 0.96 (H) 0.05 - 0.80 x10*3/uL    Eosinophils Absolute 0.33 0.00 - 0.40 x10*3/uL    Basophils Absolute 0.03 0.00 - 0.10 x10*3/uL   Comprehensive Metabolic Panel   Result Value Ref Range    Glucose 123 (H) 74 - 99 mg/dL    Sodium 139 136 - 145 mmol/L    Potassium 4.2 3.5 - 5.3 mmol/L    Chloride 109 (H) 98 - 107 mmol/L    Bicarbonate 24 21 - 32 mmol/L    Anion Gap 10 10 - 20 mmol/L    Urea Nitrogen 14 6 - 23 mg/dL    Creatinine 1.32 (H) 0.50 - 1.05 mg/dL    eGFR 41 (L) >60 mL/min/1.73m*2    Calcium 7.7 (L) 8.6 - 10.3 mg/dL    Albumin 2.7 (L) 3.4 - 5.0 g/dL    Alkaline Phosphatase 61 33 - 136 U/L    Total Protein 5.1 (L) 6.4 - 8.2 g/dL    AST 15 9 - 39 U/L    Bilirubin, Total 0.4 0.0 - 1.2 mg/dL    ALT 7 7 - 45 U/L   POCT GLUCOSE   Result Value  Ref Range    POCT Glucose 148 (H) 74 - 99 mg/dL   POCT GLUCOSE   Result Value Ref Range    POCT Glucose 143 (H) 74 - 99 mg/dL     No results found.                Assessment & Plan  Generalized abdominal pain    Small bowel obstruction (Multi)    Dependence on nicotine from cigarettes    Advanced care planning/counseling discussion    Leukocytosis    SBO (small bowel obstruction) (Multi)    Patient fully evaluated on 4/16. Underwent successful NG tube placement this morning with good suction output so far. Repeat KUB ordered, pending results. Labs and medications reviewed. Mild anemia noted, continue to monitor. Continue NPO status with IVF. Plan to stay another day or two with NG in, then trial clear liquids after removal. Recheck labs in AM.        I spent a total of 60 minutes on the date of the service which included preparing to see the patient, face-to-face patient care, completing clinical documentation, obtaining and/or reviewing separately obtained history, performing a medically appropriate examination, counseling and educating the patient/family/caregiver, ordering medications, tests, or procedures, communicating with other HCPs (not separately reported), independently interpreting results (not separately reported), communicating results to the patient/family/caregiver, and care coordination (not separately reported).       Patient fully evaluated  04/18  for    Problem List Items Addressed This Visit       * (Principal) Generalized abdominal pain - Primary    Small bowel obstruction (Multi)    Relevant Orders    Case Request Operating Room: Laparoscopy with lysis of adhesions.  Possible laparotomy.  Possible bowel resection. (Completed)    SBO (small bowel obstruction) (Multi)    Relevant Orders    Case Request Operating Room: Laparoscopy, possible laparotomy, possible bowel resection (Completed)     Patient seen resting in bed with head of bed elevated, no s/s or c/o acute difficulties at this time.   Vital signs for last 24 hours Temp:  [36.2 °C (97.2 °F)-36.7 °C (98.1 °F)] 36.4 °C (97.5 °F)  Heart Rate:  [41-95] 51  Resp:  [18-20] 20  BP: (121-147)/(58-66) 136/65    I/O this shift:  In: 360 [P.O.:360]  Out: -   Patient still requiring frequent cardiac and SPO2 monitoring. Continue aggressive pulmonary hygiene and oral hygiene. Off loading as tolerated for skin integrity. Medications and labs reviewed-   Results for orders placed or performed during the hospital encounter of 04/12/25 (from the past 24 hours)   POCT GLUCOSE   Result Value Ref Range    POCT Glucose 189 (H) 74 - 99 mg/dL   POCT GLUCOSE   Result Value Ref Range    POCT Glucose 158 (H) 74 - 99 mg/dL   POCT GLUCOSE   Result Value Ref Range    POCT Glucose 132 (H) 74 - 99 mg/dL   POCT GLUCOSE   Result Value Ref Range    POCT Glucose 125 (H) 74 - 99 mg/dL   CBC and Auto Differential   Result Value Ref Range    WBC 10.5 4.4 - 11.3 x10*3/uL    nRBC 0.0 0.0 - 0.0 /100 WBCs    RBC 3.49 (L) 4.00 - 5.20 x10*6/uL    Hemoglobin 10.0 (L) 12.0 - 16.0 g/dL    Hematocrit 33.1 (L) 36.0 - 46.0 %    MCV 95 80 - 100 fL    MCH 28.7 26.0 - 34.0 pg    MCHC 30.2 (L) 32.0 - 36.0 g/dL    RDW 13.5 11.5 - 14.5 %    Platelets 150 150 - 450 x10*3/uL    Neutrophils % 68.1 40.0 - 80.0 %    Immature Granulocytes %, Automated 1.2 (H) 0.0 - 0.9 %    Lymphocytes % 18.2 13.0 - 44.0 %    Monocytes % 9.1 2.0 - 10.0 %    Eosinophils % 3.1 0.0 - 6.0 %    Basophils % 0.3 0.0 - 2.0 %    Neutrophils Absolute 7.16 (H) 1.60 - 5.50 x10*3/uL    Immature Granulocytes Absolute, Automated 0.13 0.00 - 0.50 x10*3/uL    Lymphocytes Absolute 1.91 0.80 - 3.00 x10*3/uL    Monocytes Absolute 0.96 (H) 0.05 - 0.80 x10*3/uL    Eosinophils Absolute 0.33 0.00 - 0.40 x10*3/uL    Basophils Absolute 0.03 0.00 - 0.10 x10*3/uL   Comprehensive Metabolic Panel   Result Value Ref Range    Glucose 123 (H) 74 - 99 mg/dL    Sodium 139 136 - 145 mmol/L    Potassium 4.2 3.5 - 5.3 mmol/L    Chloride 109 (H) 98 - 107 mmol/L     Bicarbonate 24 21 - 32 mmol/L    Anion Gap 10 10 - 20 mmol/L    Urea Nitrogen 14 6 - 23 mg/dL    Creatinine 1.32 (H) 0.50 - 1.05 mg/dL    eGFR 41 (L) >60 mL/min/1.73m*2    Calcium 7.7 (L) 8.6 - 10.3 mg/dL    Albumin 2.7 (L) 3.4 - 5.0 g/dL    Alkaline Phosphatase 61 33 - 136 U/L    Total Protein 5.1 (L) 6.4 - 8.2 g/dL    AST 15 9 - 39 U/L    Bilirubin, Total 0.4 0.0 - 1.2 mg/dL    ALT 7 7 - 45 U/L   POCT GLUCOSE   Result Value Ref Range    POCT Glucose 148 (H) 74 - 99 mg/dL   POCT GLUCOSE   Result Value Ref Range    POCT Glucose 143 (H) 74 - 99 mg/dL      Patient recently received an antibiotic (last 12 hours)       Date/Time Action Medication Dose    04/18/25 0536 New Bag    metroNIDAZOLE (Flagyl) 500 mg in sodium chloride (iso)  mL 500 mg    04/18/25 0229 New Bag    cefepime (Maxipime) 1 g in dextrose 5% IV 50 mL 1 g           Plan discussed with interdisciplinary team, post op and passing gas, less distention noted, patient more animated today. No acute events overnight, patient denies chest pain, worsening SOB at this time. continue current and repeat labs in the AM. Seen by general surgery today - Impression/plan: Postop day #1 following laparoscopic enterolysis for small bowel obstruction.Recovering well. Remove nasogastric tube when nausea resolves.  Advance diet as tolerated.    Patient fully evaluated on April 18.  Patient postop with NG tube with mild drainage.  Continue to monitor.  Some bowel sounds are noted on examination.  Recheck labs in AM.    Discharge planning discussed with patient and care team. Therapy evaluations ordered. Anticipate HHC/SNF at discharge. Patient aware and agreeable to current plan, continue plan as above.     I spent a total of 50 minutes on the date of the service which included preparing to see the patient, face-to-face patient care, completing clinical documentation, obtaining and/or reviewing separately obtained history, performing a medically appropriate  examination, counseling and educating the patient/family/caregiver, ordering medications, tests, or procedures, communicating with other HCPs (not separately reported), independently interpreting results (not separately reported), communicating results to the patient/family/caregiver, and care coordination (not separately reported).     Patient fully evaluated  04/19  for    Problem List Items Addressed This Visit       * (Principal) Generalized abdominal pain - Primary    Small bowel obstruction (Multi)    Relevant Orders    Case Request Operating Room: Laparoscopy with lysis of adhesions.  Possible laparotomy.  Possible bowel resection. (Completed)    SBO (small bowel obstruction) (Multi)    Relevant Orders    Case Request Operating Room: Laparoscopy, possible laparotomy, possible bowel resection (Completed)     Patient seen resting in bed with head of bed elevated, no s/s or c/o acute difficulties at this time.  Vital signs for last 24 hours Temp:  [36.2 °C (97.2 °F)-36.7 °C (98.1 °F)] 36.4 °C (97.5 °F)  Heart Rate:  [41-95] 51  Resp:  [18-20] 20  BP: (121-147)/(58-66) 136/65    I/O this shift:  In: 360 [P.O.:360]  Out: -   Patient still requiring frequent cardiac and SPO2 monitoring. Continue aggressive pulmonary hygiene and oral hygiene. Off loading as tolerated for skin integrity. Medications and labs reviewed-   Results for orders placed or performed during the hospital encounter of 04/12/25 (from the past 24 hours)   POCT GLUCOSE   Result Value Ref Range    POCT Glucose 189 (H) 74 - 99 mg/dL   POCT GLUCOSE   Result Value Ref Range    POCT Glucose 158 (H) 74 - 99 mg/dL   POCT GLUCOSE   Result Value Ref Range    POCT Glucose 132 (H) 74 - 99 mg/dL   POCT GLUCOSE   Result Value Ref Range    POCT Glucose 125 (H) 74 - 99 mg/dL   CBC and Auto Differential   Result Value Ref Range    WBC 10.5 4.4 - 11.3 x10*3/uL    nRBC 0.0 0.0 - 0.0 /100 WBCs    RBC 3.49 (L) 4.00 - 5.20 x10*6/uL    Hemoglobin 10.0 (L) 12.0 - 16.0  g/dL    Hematocrit 33.1 (L) 36.0 - 46.0 %    MCV 95 80 - 100 fL    MCH 28.7 26.0 - 34.0 pg    MCHC 30.2 (L) 32.0 - 36.0 g/dL    RDW 13.5 11.5 - 14.5 %    Platelets 150 150 - 450 x10*3/uL    Neutrophils % 68.1 40.0 - 80.0 %    Immature Granulocytes %, Automated 1.2 (H) 0.0 - 0.9 %    Lymphocytes % 18.2 13.0 - 44.0 %    Monocytes % 9.1 2.0 - 10.0 %    Eosinophils % 3.1 0.0 - 6.0 %    Basophils % 0.3 0.0 - 2.0 %    Neutrophils Absolute 7.16 (H) 1.60 - 5.50 x10*3/uL    Immature Granulocytes Absolute, Automated 0.13 0.00 - 0.50 x10*3/uL    Lymphocytes Absolute 1.91 0.80 - 3.00 x10*3/uL    Monocytes Absolute 0.96 (H) 0.05 - 0.80 x10*3/uL    Eosinophils Absolute 0.33 0.00 - 0.40 x10*3/uL    Basophils Absolute 0.03 0.00 - 0.10 x10*3/uL   Comprehensive Metabolic Panel   Result Value Ref Range    Glucose 123 (H) 74 - 99 mg/dL    Sodium 139 136 - 145 mmol/L    Potassium 4.2 3.5 - 5.3 mmol/L    Chloride 109 (H) 98 - 107 mmol/L    Bicarbonate 24 21 - 32 mmol/L    Anion Gap 10 10 - 20 mmol/L    Urea Nitrogen 14 6 - 23 mg/dL    Creatinine 1.32 (H) 0.50 - 1.05 mg/dL    eGFR 41 (L) >60 mL/min/1.73m*2    Calcium 7.7 (L) 8.6 - 10.3 mg/dL    Albumin 2.7 (L) 3.4 - 5.0 g/dL    Alkaline Phosphatase 61 33 - 136 U/L    Total Protein 5.1 (L) 6.4 - 8.2 g/dL    AST 15 9 - 39 U/L    Bilirubin, Total 0.4 0.0 - 1.2 mg/dL    ALT 7 7 - 45 U/L   POCT GLUCOSE   Result Value Ref Range    POCT Glucose 148 (H) 74 - 99 mg/dL   POCT GLUCOSE   Result Value Ref Range    POCT Glucose 143 (H) 74 - 99 mg/dL      Patient recently received an antibiotic (last 12 hours)       None           Plan discussed with interdisciplinary team, diet advanced to full liquids today, will monitor overnight, advance as tolerated. No acute distress noted, no new complaints at time of exam. Will continue current and repeat labs in the AM.     Discharge planning discussed with patient and care team. Therapy evaluations ordered. Anticipate HHC at discharge. Patient aware and  agreeable to current plan, continue plan as above.     I spent a total of 50 minutes on the date of the service which included preparing to see the patient, face-to-face patient care, completing clinical documentation, obtaining and/or reviewing separately obtained history, performing a medically appropriate examination, counseling and educating the patient/family/caregiver, ordering medications, tests, or procedures, communicating with other HCPs (not separately reported), independently interpreting results (not separately reported), communicating results to the patient/family/caregiver, and care coordination (not separately reported).     Angela Fatima           [1]   acetaminophen, 975 mg, oral, TID  [Held by provider] aspirin, 325 mg, oral, Nightly  [Held by provider] cilostazol, 50 mg, oral, BID  gabapentin, 300 mg, oral, TID  heparin (porcine), 5,000 Units, subcutaneous, q8h  [Held by provider] insulin glargine, 15 Units, subcutaneous, q24h  insulin lispro, 0-5 Units, subcutaneous, q4h  losartan, 100 mg, oral, Daily  [Held by provider] metFORMIN XR, 500 mg, oral, Daily before evening meal  metoprolol tartrate, 100 mg, oral, Nightly  pantoprazole, 40 mg, intravenous, Daily     [2]   potassium [id number]-N3-6.45%NaCl, 50 mL/hr, Last Rate: 50 mL/hr (04/19/25 1211)     [3]   PRN medications: benzocaine-menthol, dextrose, dextrose, glucagon, glucagon, HYDROmorphone, nicotine, ondansetron

## 2025-04-19 NOTE — PROGRESS NOTES
04/19/25 1133   Discharge Planning   Living Arrangements Alone   Support Systems Family members;Friends/neighbors   Number of Stairs to Enter Residence 3   Expected Discharge Disposition Home H   Does the patient need discharge transport arranged? No     I met with patient to follow up on SNF choices and patient stated she never agreed to go to SNF.  She acknowledged receiving SNF list but reported she will going home when medically cleared for discharge.  Per patient, her sister in law will transport her home and stay with her while she recovers at home.  Patient is agreeable to home health care; I delivered [facility] agency list.  Care Coordination team to follow up with patient for [facility] agency choices.  Carlos BEASLEY TCC

## 2025-04-19 NOTE — CARE PLAN
The patient's goals for the shift include      The clinical goals for the shift include remain hemodynamically stable    Over the shift, the patient did not make progress toward the following goals. Barriers to progression include to remain hemodynamically stable.

## 2025-04-20 VITALS
SYSTOLIC BLOOD PRESSURE: 131 MMHG | TEMPERATURE: 97.5 F | WEIGHT: 169 LBS | RESPIRATION RATE: 18 BRPM | OXYGEN SATURATION: 99 % | HEIGHT: 67 IN | BODY MASS INDEX: 26.53 KG/M2 | HEART RATE: 63 BPM | DIASTOLIC BLOOD PRESSURE: 63 MMHG

## 2025-04-20 LAB
ALBUMIN SERPL BCP-MCNC: 2.8 G/DL (ref 3.4–5)
ALP SERPL-CCNC: 54 U/L (ref 33–136)
ALT SERPL W P-5'-P-CCNC: 6 U/L (ref 7–45)
ANION GAP SERPL CALC-SCNC: 10 MMOL/L (ref 10–20)
AST SERPL W P-5'-P-CCNC: 12 U/L (ref 9–39)
BASOPHILS # BLD AUTO: 0.04 X10*3/UL (ref 0–0.1)
BASOPHILS NFR BLD AUTO: 0.4 %
BILIRUB SERPL-MCNC: 0.3 MG/DL (ref 0–1.2)
BUN SERPL-MCNC: 14 MG/DL (ref 6–23)
CALCIUM SERPL-MCNC: 7.8 MG/DL (ref 8.6–10.3)
CHLORIDE SERPL-SCNC: 108 MMOL/L (ref 98–107)
CO2 SERPL-SCNC: 23 MMOL/L (ref 21–32)
CREAT SERPL-MCNC: 1.12 MG/DL (ref 0.5–1.05)
EGFRCR SERPLBLD CKD-EPI 2021: 50 ML/MIN/1.73M*2
EOSINOPHIL # BLD AUTO: 0.36 X10*3/UL (ref 0–0.4)
EOSINOPHIL NFR BLD AUTO: 3.7 %
ERYTHROCYTE [DISTWIDTH] IN BLOOD BY AUTOMATED COUNT: 13.5 % (ref 11.5–14.5)
GLUCOSE BLD MANUAL STRIP-MCNC: 110 MG/DL (ref 74–99)
GLUCOSE BLD MANUAL STRIP-MCNC: 133 MG/DL (ref 74–99)
GLUCOSE BLD MANUAL STRIP-MCNC: 142 MG/DL (ref 74–99)
GLUCOSE BLD MANUAL STRIP-MCNC: 144 MG/DL (ref 74–99)
GLUCOSE SERPL-MCNC: 107 MG/DL (ref 74–99)
HCT VFR BLD AUTO: 34.7 % (ref 36–46)
HGB BLD-MCNC: 10.3 G/DL (ref 12–16)
IMM GRANULOCYTES # BLD AUTO: 0.11 X10*3/UL (ref 0–0.5)
IMM GRANULOCYTES NFR BLD AUTO: 1.1 % (ref 0–0.9)
LYMPHOCYTES # BLD AUTO: 1.85 X10*3/UL (ref 0.8–3)
LYMPHOCYTES NFR BLD AUTO: 18.9 %
MCH RBC QN AUTO: 28.7 PG (ref 26–34)
MCHC RBC AUTO-ENTMCNC: 29.7 G/DL (ref 32–36)
MCV RBC AUTO: 97 FL (ref 80–100)
MONOCYTES # BLD AUTO: 0.72 X10*3/UL (ref 0.05–0.8)
MONOCYTES NFR BLD AUTO: 7.3 %
NEUTROPHILS # BLD AUTO: 6.73 X10*3/UL (ref 1.6–5.5)
NEUTROPHILS NFR BLD AUTO: 68.6 %
NRBC BLD-RTO: 0 /100 WBCS (ref 0–0)
PLATELET # BLD AUTO: 161 X10*3/UL (ref 150–450)
POTASSIUM SERPL-SCNC: 3.9 MMOL/L (ref 3.5–5.3)
PROT SERPL-MCNC: 5.3 G/DL (ref 6.4–8.2)
RBC # BLD AUTO: 3.59 X10*6/UL (ref 4–5.2)
SODIUM SERPL-SCNC: 137 MMOL/L (ref 136–145)
WBC # BLD AUTO: 9.8 X10*3/UL (ref 4.4–11.3)

## 2025-04-20 PROCEDURE — 2500000004 HC RX 250 GENERAL PHARMACY W/ HCPCS (ALT 636 FOR OP/ED): Performed by: NURSE PRACTITIONER

## 2025-04-20 PROCEDURE — 2500000004 HC RX 250 GENERAL PHARMACY W/ HCPCS (ALT 636 FOR OP/ED): Mod: JZ | Performed by: NURSE PRACTITIONER

## 2025-04-20 PROCEDURE — 2500000001 HC RX 250 WO HCPCS SELF ADMINISTERED DRUGS (ALT 637 FOR MEDICARE OP)

## 2025-04-20 PROCEDURE — 85025 COMPLETE CBC W/AUTO DIFF WBC: CPT | Performed by: NURSE PRACTITIONER

## 2025-04-20 PROCEDURE — 1200000002 HC GENERAL ROOM WITH TELEMETRY DAILY

## 2025-04-20 PROCEDURE — 82947 ASSAY GLUCOSE BLOOD QUANT: CPT

## 2025-04-20 PROCEDURE — 2500000002 HC RX 250 W HCPCS SELF ADMINISTERED DRUGS (ALT 637 FOR MEDICARE OP, ALT 636 FOR OP/ED): Performed by: NURSE PRACTITIONER

## 2025-04-20 PROCEDURE — 80053 COMPREHEN METABOLIC PANEL: CPT | Performed by: NURSE PRACTITIONER

## 2025-04-20 PROCEDURE — 2500000001 HC RX 250 WO HCPCS SELF ADMINISTERED DRUGS (ALT 637 FOR MEDICARE OP): Performed by: INTERNAL MEDICINE

## 2025-04-20 PROCEDURE — 36415 COLL VENOUS BLD VENIPUNCTURE: CPT | Performed by: NURSE PRACTITIONER

## 2025-04-20 RX ORDER — INSULIN LISPRO 100 [IU]/ML
0-5 INJECTION, SOLUTION INTRAVENOUS; SUBCUTANEOUS
Status: DISCONTINUED | OUTPATIENT
Start: 2025-04-20 | End: 2025-04-21 | Stop reason: HOSPADM

## 2025-04-20 RX ADMIN — GABAPENTIN 300 MG: 300 CAPSULE ORAL at 08:30

## 2025-04-20 RX ADMIN — INSULIN LISPRO 1 UNITS: 100 INJECTION, SOLUTION INTRAVENOUS; SUBCUTANEOUS at 00:15

## 2025-04-20 RX ADMIN — HEPARIN SODIUM 5000 UNITS: 5000 INJECTION INTRAVENOUS; SUBCUTANEOUS at 06:00

## 2025-04-20 RX ADMIN — LOSARTAN POTASSIUM 100 MG: 50 TABLET, FILM COATED ORAL at 08:30

## 2025-04-20 RX ADMIN — HEPARIN SODIUM 5000 UNITS: 5000 INJECTION INTRAVENOUS; SUBCUTANEOUS at 21:49

## 2025-04-20 RX ADMIN — GABAPENTIN 300 MG: 300 CAPSULE ORAL at 21:49

## 2025-04-20 RX ADMIN — PANTOPRAZOLE SODIUM 40 MG: 40 INJECTION, POWDER, FOR SOLUTION INTRAVENOUS at 08:31

## 2025-04-20 RX ADMIN — BENZONATATE 200 MG: 100 CAPSULE ORAL at 21:49

## 2025-04-20 RX ADMIN — GABAPENTIN 300 MG: 300 CAPSULE ORAL at 14:43

## 2025-04-20 RX ADMIN — ACETAMINOPHEN 975 MG: 325 TABLET, FILM COATED ORAL at 14:43

## 2025-04-20 RX ADMIN — HEPARIN SODIUM 5000 UNITS: 5000 INJECTION INTRAVENOUS; SUBCUTANEOUS at 13:17

## 2025-04-20 RX ADMIN — ONDANSETRON 4 MG: 2 INJECTION INTRAMUSCULAR; INTRAVENOUS at 06:29

## 2025-04-20 RX ADMIN — BENZONATATE 200 MG: 100 CAPSULE ORAL at 08:30

## 2025-04-20 RX ADMIN — BENZONATATE 200 MG: 100 CAPSULE ORAL at 14:43

## 2025-04-20 RX ADMIN — ACETAMINOPHEN 975 MG: 325 TABLET, FILM COATED ORAL at 21:49

## 2025-04-20 RX ADMIN — METOPROLOL TARTRATE 100 MG: 100 TABLET, FILM COATED ORAL at 21:49

## 2025-04-20 RX ADMIN — ACETAMINOPHEN 975 MG: 325 TABLET, FILM COATED ORAL at 08:30

## 2025-04-20 ASSESSMENT — COGNITIVE AND FUNCTIONAL STATUS - GENERAL
STANDING UP FROM CHAIR USING ARMS: A LITTLE
TURNING FROM BACK TO SIDE WHILE IN FLAT BAD: A LITTLE
WALKING IN HOSPITAL ROOM: A LITTLE
MOVING TO AND FROM BED TO CHAIR: A LITTLE
MOBILITY SCORE: 18
HELP NEEDED FOR BATHING: A LITTLE
TOILETING: A LITTLE
CLIMB 3 TO 5 STEPS WITH RAILING: A LOT
DAILY ACTIVITIY SCORE: 22

## 2025-04-20 ASSESSMENT — PAIN SCALES - GENERAL: PAINLEVEL_OUTOF10: 0 - NO PAIN

## 2025-04-20 NOTE — CARE PLAN
The patient's goals for the shift include      The clinical goals for the shift include Decrease in GI discomfort and maintain safety    Over the shift, the patient did not make progress toward the following goals. Barriers to progression includeassist to bsc having loose stools

## 2025-04-20 NOTE — DISCHARGE SUMMARY
Discharge Diagnosis  Generalized abdominal pain    Issues Requiring Follow-Up  Patient fully evaluated  04/20  for    Problem List Items Addressed This Visit       * (Principal) Generalized abdominal pain - Primary    Small bowel obstruction (Multi)    Relevant Orders    Case Request Operating Room: Laparoscopy with lysis of adhesions.  Possible laparotomy.  Possible bowel resection. (Completed)    SBO (small bowel obstruction) (Multi)    Relevant Orders    Case Request Operating Room: Laparoscopy, possible laparotomy, possible bowel resection (Completed)     Patient seen resting in bed with head of bed elevated, no s/s or c/o acute difficulties at this time.  Vital signs for last 24 hours Temp:  [36.1 °C (97 °F)-36.6 °C (97.9 °F)] 36.4 °C (97.5 °F)  Heart Rate:  [51-67] 59  Resp:  [18-20] 18  BP: (118-160)/(60-73) 159/67    No intake/output data recorded.  Patient still requiring frequent cardiac and SPO2 monitoring. Continue aggressive pulmonary hygiene and oral hygiene. Off loading as tolerated for skin integrity. Medications and labs reviewed-   Results for orders placed or performed during the hospital encounter of 04/12/25 (from the past 24 hours)   POCT GLUCOSE   Result Value Ref Range    POCT Glucose 143 (H) 74 - 99 mg/dL   POCT GLUCOSE   Result Value Ref Range    POCT Glucose 136 (H) 74 - 99 mg/dL   POCT GLUCOSE   Result Value Ref Range    POCT Glucose 183 (H) 74 - 99 mg/dL   CBC and Auto Differential   Result Value Ref Range    WBC 9.8 4.4 - 11.3 x10*3/uL    nRBC 0.0 0.0 - 0.0 /100 WBCs    RBC 3.59 (L) 4.00 - 5.20 x10*6/uL    Hemoglobin 10.3 (L) 12.0 - 16.0 g/dL    Hematocrit 34.7 (L) 36.0 - 46.0 %    MCV 97 80 - 100 fL    MCH 28.7 26.0 - 34.0 pg    MCHC 29.7 (L) 32.0 - 36.0 g/dL    RDW 13.5 11.5 - 14.5 %    Platelets 161 150 - 450 x10*3/uL    Neutrophils % 68.6 40.0 - 80.0 %    Immature Granulocytes %, Automated 1.1 (H) 0.0 - 0.9 %    Lymphocytes % 18.9 13.0 - 44.0 %    Monocytes % 7.3 2.0 - 10.0 %     Eosinophils % 3.7 0.0 - 6.0 %    Basophils % 0.4 0.0 - 2.0 %    Neutrophils Absolute 6.73 (H) 1.60 - 5.50 x10*3/uL    Immature Granulocytes Absolute, Automated 0.11 0.00 - 0.50 x10*3/uL    Lymphocytes Absolute 1.85 0.80 - 3.00 x10*3/uL    Monocytes Absolute 0.72 0.05 - 0.80 x10*3/uL    Eosinophils Absolute 0.36 0.00 - 0.40 x10*3/uL    Basophils Absolute 0.04 0.00 - 0.10 x10*3/uL   Comprehensive Metabolic Panel   Result Value Ref Range    Glucose 107 (H) 74 - 99 mg/dL    Sodium 137 136 - 145 mmol/L    Potassium 3.9 3.5 - 5.3 mmol/L    Chloride 108 (H) 98 - 107 mmol/L    Bicarbonate 23 21 - 32 mmol/L    Anion Gap 10 10 - 20 mmol/L    Urea Nitrogen 14 6 - 23 mg/dL    Creatinine 1.12 (H) 0.50 - 1.05 mg/dL    eGFR 50 (L) >60 mL/min/1.73m*2    Calcium 7.8 (L) 8.6 - 10.3 mg/dL    Albumin 2.8 (L) 3.4 - 5.0 g/dL    Alkaline Phosphatase 54 33 - 136 U/L    Total Protein 5.3 (L) 6.4 - 8.2 g/dL    AST 12 9 - 39 U/L    Bilirubin, Total 0.3 0.0 - 1.2 mg/dL    ALT 6 (L) 7 - 45 U/L   POCT GLUCOSE   Result Value Ref Range    POCT Glucose 110 (H) 74 - 99 mg/dL      Patient recently received an antibiotic (last 12 hours)       None           Plan discussed with interdisciplinary team, continue current and repeat labs in the AM.     Discharge planning discussed with patient and care team. Therapy evaluations ordered. [facility]-  , anticipate HHC/SNF at discharge. Patient aware and agreeable to current plan, continue plan as above.     I spent a total of 50 minutes on the date of the service which included preparing to see the patient, face-to-face patient care, completing clinical documentation, obtaining and/or reviewing separately obtained history, performing a medically appropriate examination, counseling and educating the patient/family/caregiver, ordering medications, tests, or procedures, communicating with other HCPs (not separately reported), independently interpreting results (not separately reported), communicating results to the  patient/family/caregiver, and care coordination (not separately reported).   Discarge Meds     Medication List      ASK your doctor about these medications     acetaminophen 500 mg tablet; Commonly known as: Tylenol   ascorbic acid 500 mg tablet; Commonly known as: Vitamin C   aspirin 325 mg tablet   betamethasone dipropionate 0.05 % cream   cilostazol 50 mg tablet; Commonly known as: Pletal; Take 1 tablet (50   mg) by mouth 2 times a day.   Foltx 2-1.13-25 mg tablet; Generic drug: B12-levomefolate calcium-B6   hydrOXYzine pamoate 50 mg capsule; Commonly known as: Vistaril   lansoprazole 30 mg DR capsule; Commonly known as: Prevacid   loratadine-pseudoephedrine  mg 24 hr tablet; Commonly known as:   Claritin-D 24-hour   losartan 100 mg tablet; Commonly known as: Cozaar; Take 1 tablet (100   mg) by mouth once daily.   metFORMIN  mg 24 hr tablet; Commonly known as: Glucophage-XR   metoprolol tartrate 100 mg tablet; Commonly known as: Lopressor; Take 1   tablet (100 mg) by mouth once daily.   NovoLIN 70/30 U-100 Insulin 100 unit/mL (70-30) injection; Generic drug:   insulin NPH and regular human   ondansetron ODT 8 mg disintegrating tablet; Commonly known as:   Zofran-ODT   OneTouch Ultra Test; Generic drug: blood sugar diagnostic   oxyCODONE-acetaminophen  mg tablet; Commonly known as: Percocet   polymyxin B sulf-trimethoprim ophthalmic solution; Commonly known as:   Polytrim   rosuvastatin 20 mg tablet; Commonly known as: Crestor; Take 1 tablet (20   mg) by mouth once daily.   solifenacin 5 mg tablet; Commonly known as: VESIcare   sucralfate 100 mg/mL suspension; Commonly known as: Carafate   torsemide 20 mg tablet; Commonly known as: Demadex; Take 1 tablet (20   mg) by mouth once daily.   zinc sulfate 220 (50 Zn) MG capsule; Commonly known as: Zincate       Test Results Pending At Discharge  Pending Labs       No current pending labs.            Hospital Course         Gianfranco Saez MD    Physician  Internal Medicine     Progress Notes      Signed     Date of Service: 4/19/2025  2:40 PM     Signed       Expand All Collapse All    Nancy Long is a 79 y.o. female on day 7 of admission presenting with Generalized abdominal pain.        Subjective  No significant events overnight. Patient seen and evaluated at bedside with sister present.         Objective  Last Recorded Vitals  /65   Pulse 51   Temp 36.4 °C (97.5 °F)   Resp 20   Wt 76.7 kg (169 lb)   SpO2 95%   Intake/Output last 3 Shifts:     Intake/Output Summary (Last 24 hours) at 4/19/2025 1440  Last data filed at 4/19/2025 1300      Gross per 24 hour   Intake 1510 ml   Output 150 ml   Net 1360 ml         Admission Weight  Weight: 77.1 kg (170 lb) (04/12/25 1212)     Daily Weight  04/17/25 : 76.7 kg (169 lb)     Image Results  XR abdomen 1 view  Narrative: Interpreted By:  Марина Quintero,   STUDY:  XR ABDOMEN 1 VIEW;  4/17/2025 8:15 am. 2 views.      INDICATION:  Signs/Symptoms:Follow up bowel distention.      COMPARISON:  04/16/2025, CT abdomen and pelvis 04/12/2025      ACCESSION NUMBER(S):  [id number]      ORDERING CLINICIAN:  ROZ RAMESH      FINDINGS:  There is a nasogastric tube with the tip in the distal stomach.  There is progressive moderate gastric distention this has improved  compared to the previous study from 04/16/2025 prior to nasogastric  tube placement. At that time, there was marked gastric distention.  There is moderate small bowel dilatation with loops dilated up to 4.8  cm, unchanged. Findings are consistent with small-bowel obstruction.  There      There are no pathologic calcifications.      Visualized lungs are clear.      Osseous structures demonstrate no acute bony changes.          Impression: 1. Nasogastric tube with the tip in the distal stomach. Improvement  in the degree of gastric distention.  2. Persistent small bowel obstruction.      MACRO:  None      Signed by: Марина Quintero 4/17/2025 9:11  AM  Dictation workstation:   [id number]        Physical Exam        Gianfranco Saez MD   Physician  Internal Medicine     Progress Notes      Signed     Date of Service: 4/15/2025  8:35 AM      Signed        Expand All Collapse All    Nancy Long is a 79 y.o. female on day 3 of admission presenting with Generalized abdominal pain.        Subjective  No significant events overnight. Patient seen and evaluated at bedside.         Objective[]Expand by Default  Last Recorded Vitals  /67   Pulse (!) 37   Temp 36.4 °C (97.5 °F)   Resp 18   Wt 77.1 kg (170 lb)   SpO2 93%   Intake/Output last 3 Shifts:     Intake/Output Summary (Last 24 hours) at 4/15/2025 0835  Last data filed at 4/15/2025 0250        Gross per 24 hour   Intake 950 ml   Output --   Net 950 ml         Admission Weight  Weight: 77.1 kg (170 lb) (04/12/25 1212)     Daily Weight  04/12/25 : 77.1 kg (170 lb)     Image Results  XR abdomen 1 view  Narrative: Interpreted By:  Arun Escamilla,   STUDY:  XR ABDOMEN 1 VIEW;  4/14/2025 7:06 am      INDICATION:  Signs/Symptoms:Follow up bowel distention.          COMPARISON:  04/13/2025      ACCESSION NUMBER(S):  [id number]      ORDERING CLINICIAN:  ROZ RAMESH      FINDINGS:  Three view abdomen      Multiple dilated gas-filled bowel loops predominantly centrally with  stacked appearance most concerning for obstruction redemonstrated  grossly similar to prior. Limited evaluation for free air on supine  films. Some gas and stool noted within the colon as previously.  Approximate 5 cm gaseous lucency overlying the pelvis could represent  focal distended bowel loop possibly distal colon within the pelvis  and new or more pronounced compared to prior. Mild gaseous distention  of the stomach.      Prominent vascular calcifications overlying abdomen and pelvis and  overlying the groin regions.      Impression: 1.  Multiple dilated gas-filled loops of small bowel redemonstrated  most suggestive of sequela of  obstruction as noted previously and  probably grossly similar to prior. Focal gaseous lucency overlying  the pelvis new or more pronounced may reflect focal distended bowel  loop possibly distal colon within the pelvis new or more pronounced.  Mild gaseous distention of the stomach. Limited evaluation for free  air on supine films. Continued clinical correlation and follow-up  advised.      MACRO:  None      Signed by: Arun Escamilla 4/14/2025 2:45 PM  Dictation workstation:   [id number]        Physical Exam     Date of Service: 4/13/2025  6:18 PM      Signed        Expand All Collapse All    History Of Present Illness  Nancy Long is a 79 y.o. female with CAD,  chronic diastolic heart failure, hypertension, hyperlipidemia, peripheral artery disease, diabetes mellitus, carotid artery disease (Right occlusion of ICA, left ICA with > 70% stenosis; follows with Dr. Parish), CKD, Left breast cancer s/p lumpectomy and radiation, kidney stones, and open cholecystectomy and open appendectomy with right ovary removal (for tumor- at age 16) who presented today with abdominal pain.  Patient reports yesterday she developed severe, sharp, continuous epigastric pain associated with nausea and diaphoresis.  She notes she took some of her prescribed Percocet that seemed to help a little however the symptoms persisted into today so she decided to come to the emergency room.  She also notes a decreased appetite and when she did try to eat the food did not taste good.  She denies a history of bowel obstruction.  She articulates she told the surgery team she would not like surgical intervention.  CODE STATUS discussed and she would like to be a DNR CCA.     In the ED lab work, EKG, chest x-ray and CTAP were performed, labs revealed glucose 200, sodium 125, chloride 94, bun 45, creatinine 1.64 (1.32 on 2/4/2020), , lactate 1.6, troponins 27 and 29 respectively, white count 21, neutrophils 17.35, monocytes 1.1. EKG Interpretation,  per ER physician impression: Sinus at rate of 79, , QRS 91, QTc 407 without evidence of STEMI or ischemia. Chest x-ray without acute process.  CT abdomen/pelvis revealed small bowel obstruction with mesenteric edema as described, small volume of ascites, diverticulosis, atherosclerosis, subcutaneous opacities and reticulation that may be due to injections although hematoma is possible.  Patient was evaluated by surgery team that noted her symptoms are likely consistent with partial adhesive small bowel obstruction and surgical intervention was offered however patient declined.  Her vital signs were acceptable with a slightly elevated blood pressure of 157/70.  She was given cefepime Flagyl morphine Zofran and started on IV fluids and admitted for further evaluation and treatment under the care of Dr. Saez.        Echo March 2025:     CONCLUSIONS:  1. Left ventricular ejection fraction is normal, by visual estimate at 70-75%.  2. There is moderate left ventricular hypertrophy.  3. The left atrium is mildly dilated.  4. There is mild mitral valve stenosis.  5. There is moderate mitral annular calcification.  6. Right ventricular systolic pressure is within normal limits.  7. Mild aortic valve stenosis.  8. Mild left ventricular outflow obstruction at rest and with Valsalva (10 mmHg).     Past Medical History  Medical History           Past Medical History:   Diagnosis Date    Personal history of other diseases of the circulatory system       History of hypertension    Personal history of other endocrine, nutritional and metabolic disease       History of diabetes mellitus            Surgical History  Surgical History             Past Surgical History:   Procedure Laterality Date    APPENDECTOMY   01/03/2014     Appendectomy    BREAST LUMPECTOMY   01/03/2014     Breast Surgery Lumpectomy    CHOLECYSTECTOMY   01/03/2014     Cholecystectomy    OOPHORECTOMY   01/03/2014     Oophorectomy            Social  "History  Denies alcohol or illicit drug use.  Reports she smokes 2 to 3 packs of cigarettes per week.  Lives alone.  Ambulates with cane as needed.     Family History  Family History   No family history on file.         Allergies  Penicillins and Sulfa (sulfonamide antibiotics)     10 point review of systems performed and is negative besides what is stated in HPI.     Physical Exam  Constitutional:       Appearance: Normal appearance.   HENT:      Head: Normocephalic and atraumatic.      Nose: Nose normal.      Mouth/Throat:      Mouth: Mucous membranes are moist.   Eyes:      Conjunctiva/sclera: Conjunctivae normal.   Cardiovascular:      Rate and Rhythm: Normal rate and regular rhythm.      Heart sounds: Murmur heard.   Pulmonary:      Effort: Pulmonary effort is normal.      Comments: Crackles left base  Abdominal:      General: Bowel sounds are normal. There is no distension.      Tenderness: There is abdominal tenderness.      Comments: Somewhat firm   Musculoskeletal:         General: Normal range of motion.      Cervical back: Neck supple.   Skin:     General: Skin is warm and dry.   Neurological:      Mental Status: She is alert. Mental status is at baseline.   Psychiatric:         Mood and Affect: Mood normal.            Last Recorded Vitals  Blood pressure (!) 193/77, pulse 74, temperature 37 °C (98.6 °F), temperature source Temporal, resp. rate 20, height 1.702 m (5' 7\"), weight 77.1 kg (170 lb), SpO2 94%.     Relevant Results     Medications Ordered Prior to Encounter   No current facility-administered medications on file prior to encounter.                  Current Outpatient Medications on File Prior to Encounter   Medication Sig Dispense Refill    acetaminophen (Tylenol) 500 mg tablet Take 1 tablet (500 mg) by mouth every 8 hours if needed for mild pain (1 - 3).        ascorbic acid (Vitamin C) 500 mg tablet Take 1 tablet (500 mg) by mouth once daily.        aspirin 325 mg tablet Take 1 tablet (325 " mg) by mouth once daily at bedtime.        B12-levomefolate calcium-B6 (Foltx) 2-1.13-25 mg tablet Take 1 tablet by mouth once daily.        cilostazol (Pletal) 50 mg tablet Take 1 tablet (50 mg) by mouth 2 times a day. 180 tablet 3    lansoprazole (Prevacid) 30 mg DR capsule Take 1 capsule (30 mg) by mouth 2 times a day as needed.        losartan (Cozaar) 100 mg tablet Take 1 tablet (100 mg) by mouth once daily. 90 tablet 3    metFORMIN XR (Glucophage-XR) 500 mg 24 hr tablet Take 1 tablet (500 mg) by mouth once daily in the evening. Take with meals. (Patient taking differently: Take 1 tablet (500 mg) by mouth once daily as needed (BG >200).)        metoprolol tartrate (Lopressor) 100 mg tablet Take 1 tablet (100 mg) by mouth once daily. (Patient taking differently: Take 1 tablet (100 mg) by mouth once daily at bedtime.) 90 tablet 3    NovoLIN 70/30 U-100 Insulin 100 unit/mL (70-30) injection Inject 43 Units under the skin 2 times a day before meals.        OneTouch Ultra Test strip          oxyCODONE-acetaminophen (Percocet)  mg tablet Take 1 tablet by mouth every 8 hours.        rosuvastatin (Crestor) 20 mg tablet Take 1 tablet (20 mg) by mouth once daily. (Patient taking differently: Take 1 tablet (20 mg) by mouth once daily at bedtime.) 90 tablet 3    zinc sulfate (Zincate) 220 (50 Zn) MG capsule Take 1 capsule (50 mg of elemental zinc) by mouth once daily.        betamethasone dipropionate 0.05 % cream Apply topically 2 times a day. (Patient not taking: Reported on 4/12/2025)        hydrOXYzine pamoate (Vistaril) 50 mg capsule Take 1 capsule (50 mg) by mouth 4 times a day as needed. (Patient not taking: Reported on 4/12/2025)        loratadine-pseudoephedrine (Claritin-D 24-hour)  mg 24 hr tablet Take 1 tablet by mouth once daily. (Patient not taking: Reported on 4/12/2025)        ondansetron ODT (Zofran-ODT) 8 mg disintegrating tablet every 8 hours if needed. (Patient not taking: Reported on  4/12/2025)        polymyxin B sulf-trimethoprim (Polytrim) ophthalmic solution APPLY 1/4 INCH TO AFFECTED EYE 4 TIMES A DAY. (Patient not taking: Reported on 4/12/2025)        solifenacin (VESIcare) 5 mg tablet  (Patient not taking: Reported on 4/12/2025)        sucralfate (Carafate) 100 mg/mL suspension TAKE 10 ML BY MOUTH 4 TIMES A DAY (Patient not taking: Reported on 4/12/2025)        torsemide (Demadex) 20 mg tablet Take 1 tablet (20 mg) by mouth once daily. (Patient not taking: Reported on 4/12/2025) 90 tablet 3                      Results for orders placed or performed during the hospital encounter of 04/12/25 (from the past 24 hours)   POCT GLUCOSE   Result Value Ref Range     POCT Glucose 183 (H) 74 - 99 mg/dL   Troponin I, High Sensitivity   Result Value Ref Range     Troponin I, High Sensitivity 23 (H) 0 - 13 ng/L   Sars-CoV-2 and Influenza A/B PCR   Result Value Ref Range     Flu A Result Not Detected Not Detected     Flu B Result Not Detected Not Detected     Coronavirus 2019, PCR Not Detected Not Detected   POCT GLUCOSE   Result Value Ref Range     POCT Glucose 171 (H) 74 - 99 mg/dL   Urinalysis with Reflex Culture and Microscopic   Result Value Ref Range     Color, Urine Light-Yellow Light-Yellow, Yellow, Dark-Yellow     Appearance, Urine Clear Clear     Specific Gravity, Urine 1.017 1.005 - 1.035     pH, Urine 5.0 5.0, 5.5, 6.0, 6.5, 7.0, 7.5, 8.0     Protein, Urine 10 (TRACE) NEGATIVE, 10 (TRACE), 20 (TRACE) mg/dL     Glucose, Urine Normal Normal mg/dL     Blood, Urine NEGATIVE NEGATIVE mg/dL     Ketones, Urine NEGATIVE NEGATIVE mg/dL     Bilirubin, Urine NEGATIVE NEGATIVE mg/dL     Urobilinogen, Urine Normal Normal mg/dL     Nitrite, Urine NEGATIVE NEGATIVE     Leukocyte Esterase, Urine 250 Triny/uL (A) NEGATIVE   Extra Urine Gray Tube   Result Value Ref Range     Extra Tube Hold for add-ons.     POCT GLUCOSE   Result Value Ref Range     POCT Glucose 131 (H) 74 - 99 mg/dL   Microscopic Only, Urine    Result Value Ref Range     WBC, Urine 11-20 (A) 1-5, NONE /HPF     RBC, Urine 1-2 NONE, 1-2, 3-5 /HPF     Bacteria, Urine 4+ (A) NONE SEEN /HPF     Mucus, Urine FEW Reference range not established. /LPF     Hyaline Casts, Urine OCCASIONAL (A) NONE /LPF   POCT GLUCOSE   Result Value Ref Range     POCT Glucose 81 74 - 99 mg/dL   POCT GLUCOSE   Result Value Ref Range     POCT Glucose 67 (L) 74 - 99 mg/dL   POCT GLUCOSE   Result Value Ref Range     POCT Glucose 64 (L) 74 - 99 mg/dL   POCT GLUCOSE   Result Value Ref Range     POCT Glucose 147 (H) 74 - 99 mg/dL   CBC   Result Value Ref Range     WBC 15.8 (H) 4.4 - 11.3 x10*3/uL     nRBC 0.0 0.0 - 0.0 /100 WBCs     RBC 3.83 (L) 4.00 - 5.20 x10*6/uL     Hemoglobin 11.0 (L) 12.0 - 16.0 g/dL     Hematocrit 36.3 36.0 - 46.0 %     MCV 95 80 - 100 fL     MCH 28.7 26.0 - 34.0 pg     MCHC 30.3 (L) 32.0 - 36.0 g/dL     RDW 13.1 11.5 - 14.5 %     Platelets 163 150 - 450 x10*3/uL   Basic Metabolic Panel   Result Value Ref Range     Glucose 74 74 - 99 mg/dL     Sodium 132 (L) 136 - 145 mmol/L     Potassium 4.1 3.5 - 5.3 mmol/L     Chloride 103 98 - 107 mmol/L     Bicarbonate 23 21 - 32 mmol/L     Anion Gap 10 10 - 20 mmol/L     Urea Nitrogen 39 (H) 6 - 23 mg/dL     Creatinine 1.33 (H) 0.50 - 1.05 mg/dL     eGFR 40 (L) >60 mL/min/1.73m*2     Calcium 8.2 (L) 8.6 - 10.3 mg/dL   POCT GLUCOSE   Result Value Ref Range     POCT Glucose 72 (L) 74 - 99 mg/dL   POCT GLUCOSE   Result Value Ref Range     POCT Glucose 75 74 - 99 mg/dL   POCT GLUCOSE   Result Value Ref Range     POCT Glucose 84 74 - 99 mg/dL   POCT GLUCOSE   Result Value Ref Range     POCT Glucose 99 74 - 99 mg/dL         XR abdomen 1 view     Result Date: 4/13/2025  Interpreted By:  Beka Mays, STUDY: Abdominal series dated 4/13/2025.   INDICATION: Follow-up bowel dilation.   COMPARISON: Correlation is made with 04/12/2025 CT.   ACCESSION NUMBER(S): [id number]   ORDERING CLINICIAN: ROZ RAMESH   TECHNIQUE: Two AP  radiographs of the abdomen.   FINDINGS: There are multiple stacked dilated loops of small bowel in the central abdomen measuring up to a proximally 4.1 cm. There is gas and stool in the colon. Lung bases are clear. Degenerative changes seen of the spine and hips.        Multiple dilated loops of small bowel in the central abdomen most compatible with a degree of obstructive process as further discussed on prior date CT.   Signed by: Beka Mays 4/13/2025 1:01 PM Dictation workstation:   [id number]     CT abdomen pelvis wo IV contrast     Addendum Date: 4/12/2025    Interpreted By:  Chano Marshall, ADDENDUM: Also to be noted at the impression: Subcutaneous opacities and reticulation that may be due to injections although hematoma is possible.   Signed by: Chano Marshall 4/12/2025 2:38 PM   -------- ORIGINAL REPORT -------- Dictation workstation:   [id number]     Result Date: 4/12/2025  Interpreted By:  Chano Marshall, STUDY: CT ABDOMEN PELVIS WO IV CONTRAST;  4/12/2025 2:05 pm   INDICATION: Signs/Symptoms:abd pain.   COMPARISON: None.   ACCESSION NUMBER(S): [id number]   ORDERING CLINICIAN: FRANK BREWSTER   TECHNIQUE: CT of the abdomen and pelvis was performed. Contiguous axial images were obtained at 3 mm slice thickness through the abdomen and pelvis. Coronal and sagittal reconstructions at 3 mm slice thickness were performed.   FINDINGS: LOWER CHEST: Small right pleural effusion and trace left pleural effusion. Coronary artery atherosclerotic calcification and mitral annular calcification.   ABDOMEN:   LIVER: The hepatic parenchyma is homogeneous without evidence of focal liver lesions.The hepatic size is normal.   SPLEEN: The spleen is normal in size and homogeneous.   ADRENAL GLANDS: Bilateral adrenal glands appear normal.   KIDNEYS AND URETERS: Mild bilateral cortical atrophy, the renal cortices otherwise are homogeneous. Radiodensities at the renal sinuses is probably atherosclerotic calcification,  although superimposed nonobstructing calculi are possible.   The ureters throughout their distal course are not well identified due to overlying crowded bowel loops, but the tracts otherwise are unremarkable without identified ureteral dilatation or additional radiodense calculi.   PANCREAS: The pancreas appears unremarkable, there is no ductal dilatation or masses.   GALLBLADDER: Not visualized, presumably with cholecystectomy.   BILE DUCTS: Dilatation of the extrahepatic ducts, the common bile duct measuring 1.4 cm in diameter, most likely from post cholecystectomy state. However as the no prior exams for comparison MRCP would be recommended if there is symptomatology compatible with biliary colic.     VESSELS: Fairly extensive atherosclerotic calcifications are noted predominantly at the aorta and iliac system. There is also moderate atherosclerotic calcification at the mid segment of the superior mesenteric artery, and fairly marked of the inferior mesenteric artery.   PERITONEUM AND RETROPERITONEUM: There is a small volume of ascites best seen in the right upper quadrant along the liver. No free intraperitoneal air.   The retroperitoneum appears unremarkable, and without significant adenopathy.   No enlarged mesenteric lymph nodes.   BOWEL: There is mild distention of multiple small bowel segments with air-fluid levels, associated with reticulation of the mesentery indicating edema. There is decompression of distal small bowel segments, and the pattern would be most compatible with mid to early or partial distal small bowel obstruction. There is also moderate diverticulosis of the distal colon.   PELVIS:   BLADDER: The urinary bladder contour is smooth.   REPRODUCTIVE ORGANS: No pelvic masses.     BONE, ABDOMINAL WALL AND OTHER FINDINGS: No suspicious osseous lesions are identified.   There is a small non incarcerated umbilical hernia containing intra-abdominal fat. There is also attenuation within the  "subcutaneous fat of the mid/lower abdomen anteriorly that may be from subcutaneous injections. However, hematomas are also possible. There is also reticulation of the subcutaneous fat at the right lateral abdominal wall indicating additional edema.        1.  Small-bowel obstruction with mesenteric edema as described. 2. Small volume of ascites. 3. Diverticulosis. 4. Atherosclerosis as described.   MACRO: 1. None   Signed by: Chano Marshall 4/12/2025 2:28 PM Dictation workstation:   [id number]     XR chest 2 views     Result Date: 4/12/2025  Interpreted By:  Beka Mays, STUDY: Chest dated  4/12/2025.   INDICATION: Signs/Symptoms:sob with ambulation   COMPARISON: Chest dated 11/17/2015.   ACCESSION NUMBER(S): [id number]   ORDERING CLINICIAN: FRANK BREWSTER   TECHNIQUE: One view of the chest.   FINDINGS: The lungs are clear.  No pneumothorax or effusion is evident. The cardiomediastinal silhouette is  not enlarged.Degenerative change is seen of the spine and shoulders.        No acute cardiopulmonary process is evident.   MACRO: None   Signed by: Beka Mays 4/12/2025 1:26 PM Dictation workstation:   [id number]             Assessment/Plan     Assessment & Plan  Generalized abdominal pain     Small bowel obstruction (Multi)     Dependence on nicotine from cigarettes     Advanced care planning/counseling discussion     Leukocytosis        Per surgery:  \"Impression:  #SBO   > small bowel dilation; however, other concerning CT findings particularly mesenteric edema and ascites. CT imaging worrisome for possible ischemic/low flow state to bowel; however, serum lactate normal and patient's clinical presentation/symptoms/pain/bowel sounds are consistent with an adhesive small bowel obstruction (partial)  #Leukocytosis (possibly 2/2 above, but may have other infectious etiology, possibly UTI?)   > UA ordered; however, not yet collected and already received antibiotics  #Hyponatremia and hypochloremia  #CKD   " "Recommendations:  - no acute surgical intervention at this time   > patient offered surgical intervention if she were to worsen clinically; however, she was adamant about not wanting surgery   > would recommend placing NG tube for decompression; however, patient again declined NG tube. She said she may be agreeable if she begins to feel worse/nauseated  - PRN IV Zofran  - PRN IV Morphine for severe pain (limit use as able, but unable to give NSAIDs or PO meds)  - IVF: D5NS @ 100  - repeat CBC and BMP in AM\"     Additionally:  Change morphine to Dilaudid given CKD  Check flu and COVID  KUB in a.m.        #Advance care planning     Patient would like to be a DNR CCA     #Nicotine dependence     Reports she lets most of her cigarettes to burn out without smoking them so we will order a nicotine patch as needed     #Chronic conditions:     CAD,  chronic diastolic heart failure, hypertension, hyperlipidemia, peripheral artery disease, diabetes mellitus, carotid artery disease (Right occlusion of ICA, left ICA with > 70% stenosis; follows with Dr. Parish), CKD, Left breast cancer s/p lumpectomy and radiation, kidney stones, and open cholecystectomy and open appendectomy with right ovary removal (for tumor- at age 16)      Hold all p.o. meds  Give IV Protonix   Metoprolol IV around-the-clock with parameters  Patient takes 70/30 at home will conservatively give 30 units of Lantus daily which would be a little less than half of the equivalent of 70/30/day with sliding scale insulin and hypoglycemic protocol     #DVT prophylaxis     SCDs  Heparin                             Chadwick Medel, DO     Review of Systems                     Relevant Results                    Assessment/Plan                       Assessment & Plan  Generalized abdominal pain     Small bowel obstruction (Multi)     Dependence on nicotine from cigarettes     Advanced care planning/counseling discussion     Leukocytosis     Patient fully evaluated on " 4/14. CT showing SBO with mesenteric edema, small volume ascites, diverticulosis. Evaluated by general surgery today, recommending NG tube for decompression, ordered zofran, morphine, continuing IV cefepime/flagyl. Repeat KUB ordered, results pending. If KUB looks okay, plan to advance diet. Leukocytosis downtrending, continue to monitor. Hypertension noted this morning, cardiac PO meds held d/t NPO status. Continue to monitor and restart PO meds when diet advanced. Recheck labs in AM.      Patient still requiring frequent cardiac and SPO2 monitoring. Continue aggressive pulmonary hygiene and oral hygiene. Off loading as tolerated for skin integrity. Medications and labs reviewed.     I spent a total of 60 minutes on the date of the service which included preparing to see the patient, face-to-face patient care, completing clinical documentation, obtaining and/or reviewing separately obtained history, performing a medically appropriate examination, counseling and educating the patient/family/caregiver, ordering medications, tests, or procedures, communicating with other HCPs (not separately reported), independently interpreting results (not separately reported), communicating results to the patient/family/caregiver, and care coordination (not separately reported).   Patient fully evaluated  04/15  for    Problem List Items Addressed This Visit         * (Principal) Generalized abdominal pain - Primary      Other Visit Diagnoses         SBO (small bowel obstruction) (Multi)                 Patient seen resting in bed with head of bed elevated, no s/s or c/o acute difficulties at this time.  Vital signs for last 24 hours Temp:  [36.2 °C (97.2 °F)-36.9 °C (98.4 °F)] 36.4 °C (97.5 °F)  Heart Rate:  [37-76] 37  Resp:  [18-20] 18  BP: (136-186)/(63-82) 152/67    No intake/output data recorded.  Patient still requiring frequent cardiac and SPO2 monitoring. Continue aggressive pulmonary hygiene and oral hygiene. Off loading  as tolerated for skin integrity. Medications and labs reviewed-             Results for orders placed or performed during the hospital encounter of 04/12/25 (from the past 24 hours)   POCT GLUCOSE   Result Value Ref Range     POCT Glucose 111 (H) 74 - 99 mg/dL   POCT GLUCOSE   Result Value Ref Range     POCT Glucose 115 (H) 74 - 99 mg/dL   POCT GLUCOSE   Result Value Ref Range     POCT Glucose 129 (H) 74 - 99 mg/dL   POCT GLUCOSE   Result Value Ref Range     POCT Glucose 114 (H) 74 - 99 mg/dL   POCT GLUCOSE   Result Value Ref Range     POCT Glucose 117 (H) 74 - 99 mg/dL   CBC and Auto Differential   Result Value Ref Range     WBC 12.1 (H) 4.4 - 11.3 x10*3/uL     nRBC 0.0 0.0 - 0.0 /100 WBCs     RBC 3.89 (L) 4.00 - 5.20 x10*6/uL     Hemoglobin 11.4 (L) 12.0 - 16.0 g/dL     Hematocrit 36.5 36.0 - 46.0 %     MCV 94 80 - 100 fL     MCH 29.3 26.0 - 34.0 pg     MCHC 31.2 (L) 32.0 - 36.0 g/dL     RDW 13.0 11.5 - 14.5 %     Platelets 161 150 - 450 x10*3/uL     Neutrophils % 74.4 40.0 - 80.0 %     Immature Granulocytes %, Automated 0.5 0.0 - 0.9 %     Lymphocytes % 14.3 13.0 - 44.0 %     Monocytes % 8.0 2.0 - 10.0 %     Eosinophils % 2.4 0.0 - 6.0 %     Basophils % 0.4 0.0 - 2.0 %     Neutrophils Absolute 8.99 (H) 1.60 - 5.50 x10*3/uL     Immature Granulocytes Absolute, Automated 0.06 0.00 - 0.50 x10*3/uL     Lymphocytes Absolute 1.73 0.80 - 3.00 x10*3/uL     Monocytes Absolute 0.97 (H) 0.05 - 0.80 x10*3/uL     Eosinophils Absolute 0.29 0.00 - 0.40 x10*3/uL     Basophils Absolute 0.05 0.00 - 0.10 x10*3/uL   Comprehensive Metabolic Panel   Result Value Ref Range     Glucose 109 (H) 74 - 99 mg/dL     Sodium 135 (L) 136 - 145 mmol/L     Potassium 3.7 3.5 - 5.3 mmol/L     Chloride 106 98 - 107 mmol/L     Bicarbonate 22 21 - 32 mmol/L     Anion Gap 11 10 - 20 mmol/L     Urea Nitrogen 20 6 - 23 mg/dL     Creatinine 1.14 (H) 0.50 - 1.05 mg/dL     eGFR 48 (L) >60 mL/min/1.73m*2     Calcium 8.0 (L) 8.6 - 10.3 mg/dL     Albumin 3.1  (L) 3.4 - 5.0 g/dL     Alkaline Phosphatase 72 33 - 136 U/L     Total Protein 5.7 (L) 6.4 - 8.2 g/dL     AST 27 9 - 39 U/L     Bilirubin, Total 0.6 0.0 - 1.2 mg/dL     ALT 15 7 - 45 U/L   POCT GLUCOSE   Result Value Ref Range     POCT Glucose 111 (H) 74 - 99 mg/dL      Patient recently received an antibiotic (last 12 hours)         Date/Time Action Medication Dose     04/15/25 0649 New Bag    metroNIDAZOLE (Flagyl) 500 mg in sodium chloride (iso)  mL 500 mg     04/15/25 0218 New Bag    cefepime (Maxipime) 1 g in dextrose 5% IV 50 mL 1 g     04/14/25 2235 New Bag    metroNIDAZOLE (Flagyl) 500 mg in sodium chloride (iso)  mL 500 mg             Plan discussed with interdisciplinary team,  NG tube for decompression, PRN IV zofran, IV morphine, will continue on IV Antibiotics-continuing IV cefepime/flagyl, WBCs downtrending. Repeat KUB on 4/14 unchanged, will repeat KUB today. Unable to advance diet at this time, will continue IV fluids, NPO. Ice chips ok.  PO meds held d/t NPO status. Continue to monitor and restart PO meds when diet advanced. continue current and repeat labs in the AM.      Discharge planning discussed with patient and care team. Therapy evaluations ordered. Anticipate HHC/SNF at discharge. Patient aware and agreeable to current plan, continue plan as above.      I spent a total of 50 minutes on the date of the service which included preparing to see the patient, face-to-face patient care, completing clinical documentation, obtaining and/or reviewing separately obtained history, performing a medically appropriate examination, counseling and educating the patient/family/caregiver, ordering medications, tests, or procedures, communicating with other HCPs (not separately reported), independently interpreting results (not separately reported), communicating results to the patient/family/caregiver, and care coordination (not separately reported).   Angela Fatima                        Revision  History     Scheduled medications  [Scheduled Medications]    [Scheduled Medications]    acetaminophen, 975 mg, oral, TID  [Held by provider] aspirin, 325 mg, oral, Nightly  [Held by provider] cilostazol, 50 mg, oral, BID  gabapentin, 300 mg, oral, TID  heparin (porcine), 5,000 Units, subcutaneous, q8h  [Held by provider] insulin glargine, 15 Units, subcutaneous, q24h  insulin lispro, 0-5 Units, subcutaneous, q4h  losartan, 100 mg, oral, Daily  [Held by provider] metFORMIN XR, 500 mg, oral, Daily before evening meal  metoprolol tartrate, 100 mg, oral, Nightly  pantoprazole, 40 mg, intravenous, Daily       Continuous medications  [Continuous Medications]    [Continuous Medications]    potassium [id number]-N7-0.45%NaCl, 50 mL/hr, Last Rate: 50 mL/hr (04/19/25 1211)       PRN medications  [PRN Medications]    [PRN Medications]    PRN medications: benzocaine-menthol, dextrose, dextrose, glucagon, glucagon, HYDROmorphone, nicotine, ondansetron             Results for orders placed or performed during the hospital encounter of 04/12/25 (from the past 24 hours)   POCT GLUCOSE   Result Value Ref Range     POCT Glucose 189 (H) 74 - 99 mg/dL   POCT GLUCOSE   Result Value Ref Range     POCT Glucose 158 (H) 74 - 99 mg/dL   POCT GLUCOSE   Result Value Ref Range     POCT Glucose 132 (H) 74 - 99 mg/dL   POCT GLUCOSE   Result Value Ref Range     POCT Glucose 125 (H) 74 - 99 mg/dL   CBC and Auto Differential   Result Value Ref Range     WBC 10.5 4.4 - 11.3 x10*3/uL     nRBC 0.0 0.0 - 0.0 /100 WBCs     RBC 3.49 (L) 4.00 - 5.20 x10*6/uL     Hemoglobin 10.0 (L) 12.0 - 16.0 g/dL     Hematocrit 33.1 (L) 36.0 - 46.0 %     MCV 95 80 - 100 fL     MCH 28.7 26.0 - 34.0 pg     MCHC 30.2 (L) 32.0 - 36.0 g/dL     RDW 13.5 11.5 - 14.5 %     Platelets 150 150 - 450 x10*3/uL     Neutrophils % 68.1 40.0 - 80.0 %     Immature Granulocytes %, Automated 1.2 (H) 0.0 - 0.9 %     Lymphocytes % 18.2 13.0 - 44.0 %     Monocytes % 9.1 2.0 - 10.0 %      Eosinophils % 3.1 0.0 - 6.0 %     Basophils % 0.3 0.0 - 2.0 %     Neutrophils Absolute 7.16 (H) 1.60 - 5.50 x10*3/uL     Immature Granulocytes Absolute, Automated 0.13 0.00 - 0.50 x10*3/uL     Lymphocytes Absolute 1.91 0.80 - 3.00 x10*3/uL     Monocytes Absolute 0.96 (H) 0.05 - 0.80 x10*3/uL     Eosinophils Absolute 0.33 0.00 - 0.40 x10*3/uL     Basophils Absolute 0.03 0.00 - 0.10 x10*3/uL   Comprehensive Metabolic Panel   Result Value Ref Range     Glucose 123 (H) 74 - 99 mg/dL     Sodium 139 136 - 145 mmol/L     Potassium 4.2 3.5 - 5.3 mmol/L     Chloride 109 (H) 98 - 107 mmol/L     Bicarbonate 24 21 - 32 mmol/L     Anion Gap 10 10 - 20 mmol/L     Urea Nitrogen 14 6 - 23 mg/dL     Creatinine 1.32 (H) 0.50 - 1.05 mg/dL     eGFR 41 (L) >60 mL/min/1.73m*2     Calcium 7.7 (L) 8.6 - 10.3 mg/dL     Albumin 2.7 (L) 3.4 - 5.0 g/dL     Alkaline Phosphatase 61 33 - 136 U/L     Total Protein 5.1 (L) 6.4 - 8.2 g/dL     AST 15 9 - 39 U/L     Bilirubin, Total 0.4 0.0 - 1.2 mg/dL     ALT 7 7 - 45 U/L   POCT GLUCOSE   Result Value Ref Range     POCT Glucose 148 (H) 74 - 99 mg/dL   POCT GLUCOSE   Result Value Ref Range     POCT Glucose 143 (H) 74 - 99 mg/dL      No results found.                   Assessment & Plan  Generalized abdominal pain     Small bowel obstruction (Multi)     Dependence on nicotine from cigarettes     Advanced care planning/counseling discussion     Leukocytosis     SBO (small bowel obstruction) (Multi)     Patient fully evaluated on 4/16. Underwent successful NG tube placement this morning with good suction output so far. Repeat KUB ordered, pending results. Labs and medications reviewed. Mild anemia noted, continue to monitor. Continue NPO status with IVF. Plan to stay another day or two with NG in, then trial clear liquids after removal. Recheck labs in AM.          I spent a total of 60 minutes on the date of the service which included preparing to see the patient, face-to-face patient care, completing  clinical documentation, obtaining and/or reviewing separately obtained history, performing a medically appropriate examination, counseling and educating the patient/family/caregiver, ordering medications, tests, or procedures, communicating with other HCPs (not separately reported), independently interpreting results (not separately reported), communicating results to the patient/family/caregiver, and care coordination (not separately reported).         Patient fully evaluated  04/18  for    Problem List Items Addressed This Visit         * (Principal) Generalized abdominal pain - Primary     Small bowel obstruction (Multi)     Relevant Orders     Case Request Operating Room: Laparoscopy with lysis of adhesions.  Possible laparotomy.  Possible bowel resection. (Completed)     SBO (small bowel obstruction) (Multi)     Relevant Orders     Case Request Operating Room: Laparoscopy, possible laparotomy, possible bowel resection (Completed)      Patient seen resting in bed with head of bed elevated, no s/s or c/o acute difficulties at this time.  Vital signs for last 24 hours Temp:  [36.2 °C (97.2 °F)-36.7 °C (98.1 °F)] 36.4 °C (97.5 °F)  Heart Rate:  [41-95] 51  Resp:  [18-20] 20  BP: (121-147)/(58-66) 136/65    I/O this shift:  In: 360 [P.O.:360]  Out: -   Patient still requiring frequent cardiac and SPO2 monitoring. Continue aggressive pulmonary hygiene and oral hygiene. Off loading as tolerated for skin integrity. Medications and labs reviewed-         Results for orders placed or performed during the hospital encounter of 04/12/25 (from the past 24 hours)   POCT GLUCOSE   Result Value Ref Range     POCT Glucose 189 (H) 74 - 99 mg/dL   POCT GLUCOSE   Result Value Ref Range     POCT Glucose 158 (H) 74 - 99 mg/dL   POCT GLUCOSE   Result Value Ref Range     POCT Glucose 132 (H) 74 - 99 mg/dL   POCT GLUCOSE   Result Value Ref Range     POCT Glucose 125 (H) 74 - 99 mg/dL   CBC and Auto Differential   Result Value Ref Range      WBC 10.5 4.4 - 11.3 x10*3/uL     nRBC 0.0 0.0 - 0.0 /100 WBCs     RBC 3.49 (L) 4.00 - 5.20 x10*6/uL     Hemoglobin 10.0 (L) 12.0 - 16.0 g/dL     Hematocrit 33.1 (L) 36.0 - 46.0 %     MCV 95 80 - 100 fL     MCH 28.7 26.0 - 34.0 pg     MCHC 30.2 (L) 32.0 - 36.0 g/dL     RDW 13.5 11.5 - 14.5 %     Platelets 150 150 - 450 x10*3/uL     Neutrophils % 68.1 40.0 - 80.0 %     Immature Granulocytes %, Automated 1.2 (H) 0.0 - 0.9 %     Lymphocytes % 18.2 13.0 - 44.0 %     Monocytes % 9.1 2.0 - 10.0 %     Eosinophils % 3.1 0.0 - 6.0 %     Basophils % 0.3 0.0 - 2.0 %     Neutrophils Absolute 7.16 (H) 1.60 - 5.50 x10*3/uL     Immature Granulocytes Absolute, Automated 0.13 0.00 - 0.50 x10*3/uL     Lymphocytes Absolute 1.91 0.80 - 3.00 x10*3/uL     Monocytes Absolute 0.96 (H) 0.05 - 0.80 x10*3/uL     Eosinophils Absolute 0.33 0.00 - 0.40 x10*3/uL     Basophils Absolute 0.03 0.00 - 0.10 x10*3/uL   Comprehensive Metabolic Panel   Result Value Ref Range     Glucose 123 (H) 74 - 99 mg/dL     Sodium 139 136 - 145 mmol/L     Potassium 4.2 3.5 - 5.3 mmol/L     Chloride 109 (H) 98 - 107 mmol/L     Bicarbonate 24 21 - 32 mmol/L     Anion Gap 10 10 - 20 mmol/L     Urea Nitrogen 14 6 - 23 mg/dL     Creatinine 1.32 (H) 0.50 - 1.05 mg/dL     eGFR 41 (L) >60 mL/min/1.73m*2     Calcium 7.7 (L) 8.6 - 10.3 mg/dL     Albumin 2.7 (L) 3.4 - 5.0 g/dL     Alkaline Phosphatase 61 33 - 136 U/L     Total Protein 5.1 (L) 6.4 - 8.2 g/dL     AST 15 9 - 39 U/L     Bilirubin, Total 0.4 0.0 - 1.2 mg/dL     ALT 7 7 - 45 U/L   POCT GLUCOSE   Result Value Ref Range     POCT Glucose 148 (H) 74 - 99 mg/dL   POCT GLUCOSE   Result Value Ref Range     POCT Glucose 143 (H) 74 - 99 mg/dL      Patient recently received an antibiotic (last 12 hours)         Date/Time Action Medication Dose     04/18/25 0536 New Bag    metroNIDAZOLE (Flagyl) 500 mg in sodium chloride (iso)  mL 500 mg     04/18/25 0229 New Bag    cefepime (Maxipime) 1 g in dextrose 5% IV 50 mL 1 g              Plan discussed with interdisciplinary team, post op and passing gas, less distention noted, patient more animated today. No acute events overnight, patient denies chest pain, worsening SOB at this time. continue current and repeat labs in the AM. Seen by general surgery today - Impression/plan: Postop day #1 following laparoscopic enterolysis for small bowel obstruction.Recovering well. Remove nasogastric tube when nausea resolves.  Advance diet as tolerated.     Patient fully evaluated on April 18.  Patient postop with NG tube with mild drainage.  Continue to monitor.  Some bowel sounds are noted on examination.  Recheck labs in AM.     Discharge planning discussed with patient and care team. Therapy evaluations ordered. Anticipate HHC/SNF at discharge. Patient aware and agreeable to current plan, continue plan as above.      I spent a total of 50 minutes on the date of the service which included preparing to see the patient, face-to-face patient care, completing clinical documentation, obtaining and/or reviewing separately obtained history, performing a medically appropriate examination, counseling and educating the patient/family/caregiver, ordering medications, tests, or procedures, communicating with other HCPs (not separately reported), independently interpreting results (not separately reported), communicating results to the patient/family/caregiver, and care coordination (not separately reported).      Patient fully evaluated  04/19  for    Problem List Items Addressed This Visit         * (Principal) Generalized abdominal pain - Primary     Small bowel obstruction (Multi)     Relevant Orders     Case Request Operating Room: Laparoscopy with lysis of adhesions.  Possible laparotomy.  Possible bowel resection. (Completed)     SBO (small bowel obstruction) (Multi)     Relevant Orders     Case Request Operating Room: Laparoscopy, possible laparotomy, possible bowel resection (Completed)      Patient  seen resting in bed with head of bed elevated, no s/s or c/o acute difficulties at this time.  Vital signs for last 24 hours Temp:  [36.2 °C (97.2 °F)-36.7 °C (98.1 °F)] 36.4 °C (97.5 °F)  Heart Rate:  [41-95] 51  Resp:  [18-20] 20  BP: (121-147)/(58-66) 136/65    I/O this shift:  In: 360 [P.O.:360]  Out: -   Patient still requiring frequent cardiac and SPO2 monitoring. Continue aggressive pulmonary hygiene and oral hygiene. Off loading as tolerated for skin integrity. Medications and labs reviewed-         Results for orders placed or performed during the hospital encounter of 04/12/25 (from the past 24 hours)   POCT GLUCOSE   Result Value Ref Range     POCT Glucose 189 (H) 74 - 99 mg/dL   POCT GLUCOSE   Result Value Ref Range     POCT Glucose 158 (H) 74 - 99 mg/dL   POCT GLUCOSE   Result Value Ref Range     POCT Glucose 132 (H) 74 - 99 mg/dL   POCT GLUCOSE   Result Value Ref Range     POCT Glucose 125 (H) 74 - 99 mg/dL   CBC and Auto Differential   Result Value Ref Range     WBC 10.5 4.4 - 11.3 x10*3/uL     nRBC 0.0 0.0 - 0.0 /100 WBCs     RBC 3.49 (L) 4.00 - 5.20 x10*6/uL     Hemoglobin 10.0 (L) 12.0 - 16.0 g/dL     Hematocrit 33.1 (L) 36.0 - 46.0 %     MCV 95 80 - 100 fL     MCH 28.7 26.0 - 34.0 pg     MCHC 30.2 (L) 32.0 - 36.0 g/dL     RDW 13.5 11.5 - 14.5 %     Platelets 150 150 - 450 x10*3/uL     Neutrophils % 68.1 40.0 - 80.0 %     Immature Granulocytes %, Automated 1.2 (H) 0.0 - 0.9 %     Lymphocytes % 18.2 13.0 - 44.0 %     Monocytes % 9.1 2.0 - 10.0 %     Eosinophils % 3.1 0.0 - 6.0 %     Basophils % 0.3 0.0 - 2.0 %     Neutrophils Absolute 7.16 (H) 1.60 - 5.50 x10*3/uL     Immature Granulocytes Absolute, Automated 0.13 0.00 - 0.50 x10*3/uL     Lymphocytes Absolute 1.91 0.80 - 3.00 x10*3/uL     Monocytes Absolute 0.96 (H) 0.05 - 0.80 x10*3/uL     Eosinophils Absolute 0.33 0.00 - 0.40 x10*3/uL     Basophils Absolute 0.03 0.00 - 0.10 x10*3/uL   Comprehensive Metabolic Panel   Result Value Ref Range      Glucose 123 (H) 74 - 99 mg/dL     Sodium 139 136 - 145 mmol/L     Potassium 4.2 3.5 - 5.3 mmol/L     Chloride 109 (H) 98 - 107 mmol/L     Bicarbonate 24 21 - 32 mmol/L     Anion Gap 10 10 - 20 mmol/L     Urea Nitrogen 14 6 - 23 mg/dL     Creatinine 1.32 (H) 0.50 - 1.05 mg/dL     eGFR 41 (L) >60 mL/min/1.73m*2     Calcium 7.7 (L) 8.6 - 10.3 mg/dL     Albumin 2.7 (L) 3.4 - 5.0 g/dL     Alkaline Phosphatase 61 33 - 136 U/L     Total Protein 5.1 (L) 6.4 - 8.2 g/dL     AST 15 9 - 39 U/L     Bilirubin, Total 0.4 0.0 - 1.2 mg/dL     ALT 7 7 - 45 U/L   POCT GLUCOSE   Result Value Ref Range     POCT Glucose 148 (H) 74 - 99 mg/dL   POCT GLUCOSE   Result Value Ref Range     POCT Glucose 143 (H) 74 - 99 mg/dL      Patient recently received an antibiotic (last 12 hours)         None             Plan discussed with interdisciplinary team, diet advanced to full liquids today, will monitor overnight, advance as tolerated. No acute distress noted, no new complaints at time of exam. Will continue current and repeat labs in the AM.      Discharge planning discussed with patient and care team. Therapy evaluations ordered. Anticipate HHC at discharge. Patient aware and agreeable to current plan, continue plan as above.      I spent a total of 50 minutes on the date of the service which included preparing to see the patient, face-to-face patient care, completing clinical documentation, obtaining and/or reviewing separately obtained history, performing a medically appropriate examination, counseling and educating the patient/family/caregiver, ordering medications, tests, or procedures, communicating with other HCPs (not separately reported), independently interpreting results (not separately reported), communicating results to the patient/family/caregiver, and care coordination (not separately reported).                 Revision History         Pertinent Physical Exam At Time of Discharge  Physical Exam  no acute events overnight, patient  denies chest pain, worsening SOB at this time.  Outpatient Follow-Up  Future Appointments   Date Time Provider Department Center   9/4/2025 10:30 AM [facility] MAC 3 VASC LAB [id number] MAC [id number]   9/4/2025 11:30 AM Tl Parish MD [id number] [place]   9/9/2025 11:15 AM Justin Oliver MD [id number] [place]         Angela Fatima

## 2025-04-20 NOTE — CARE PLAN
The patient's goals for the shift include      The clinical goals for the shift include Decrease in GI discomfort and maintain safety      Problem: Pain - Adult  Goal: Verbalizes/displays adequate comfort level or baseline comfort level  Outcome: Progressing  Flowsheets (Taken 4/20/2025 3997)  Verbalizes/displays adequate comfort level or baseline comfort level:   Encourage patient to monitor pain and request assistance   Assess pain using appropriate pain scale     Problem: Fall/Injury  Goal: Not fall by end of shift  Outcome: Progressing

## 2025-04-20 NOTE — PROGRESS NOTES
Nancy Long is a 79 y.o. female on day 8 of admission presenting with Generalized abdominal pain.      Subjective   No significant events overnight. Patient seen and evaluated at bedside with sister present.        Objective     Last Recorded Vitals  /64 (BP Location: Right arm, Patient Position: Lying)   Pulse 68   Temp 36.5 °C (97.7 °F) (Temporal)   Resp 18   Wt 76.7 kg (169 lb)   SpO2 98%   Intake/Output last 3 Shifts:    Intake/Output Summary (Last 24 hours) at 4/20/2025 1323  Last data filed at 4/20/2025 0944  Gross per 24 hour   Intake 875 ml   Output --   Net 875 ml       Admission Weight  Weight: 77.1 kg (170 lb) (04/12/25 1212)    Daily Weight  04/17/25 : 76.7 kg (169 lb)    Image Results  XR abdomen 1 view  Narrative: Interpreted By:  Марина Quintero,   STUDY:  XR ABDOMEN 1 VIEW;  4/17/2025 8:15 am. 2 views.      INDICATION:  Signs/Symptoms:Follow up bowel distention.      COMPARISON:  04/16/2025, CT abdomen and pelvis 04/12/2025      ACCESSION NUMBER(S):  [id number]      ORDERING CLINICIAN:  ROZ RMAESH      FINDINGS:  There is a nasogastric tube with the tip in the distal stomach.  There is progressive moderate gastric distention this has improved  compared to the previous study from 04/16/2025 prior to nasogastric  tube placement. At that time, there was marked gastric distention.  There is moderate small bowel dilatation with loops dilated up to 4.8  cm, unchanged. Findings are consistent with small-bowel obstruction.  There      There are no pathologic calcifications.      Visualized lungs are clear.      Osseous structures demonstrate no acute bony changes.          Impression: 1. Nasogastric tube with the tip in the distal stomach. Improvement  in the degree of gastric distention.  2. Persistent small bowel obstruction.      MACRO:  None      Signed by: Марина Quintero 4/17/2025 9:11 AM  Dictation workstation:   [id number]      Physical Exam        Gianfranco Saez MD    Physician  Internal Medicine     Progress Notes      Signed     Date of Service: 4/15/2025  8:35 AM     Signed       Expand All Collapse All    Nancy Long is a 79 y.o. female on day 3 of admission presenting with Generalized abdominal pain.        Subjective  No significant events overnight. Patient seen and evaluated at bedside.         Objective[]Expand by Default  Last Recorded Vitals  /67   Pulse (!) 37   Temp 36.4 °C (97.5 °F)   Resp 18   Wt 77.1 kg (170 lb)   SpO2 93%   Intake/Output last 3 Shifts:     Intake/Output Summary (Last 24 hours) at 4/15/2025 0835  Last data filed at 4/15/2025 0250      Gross per 24 hour   Intake 950 ml   Output --   Net 950 ml         Admission Weight  Weight: 77.1 kg (170 lb) (04/12/25 1212)     Daily Weight  04/12/25 : 77.1 kg (170 lb)     Image Results  XR abdomen 1 view  Narrative: Interpreted By:  Arun Escamilla,   STUDY:  XR ABDOMEN 1 VIEW;  4/14/2025 7:06 am      INDICATION:  Signs/Symptoms:Follow up bowel distention.          COMPARISON:  04/13/2025      ACCESSION NUMBER(S):  [id number]      ORDERING CLINICIAN:  ROZ RAMESH      FINDINGS:  Three view abdomen      Multiple dilated gas-filled bowel loops predominantly centrally with  stacked appearance most concerning for obstruction redemonstrated  grossly similar to prior. Limited evaluation for free air on supine  films. Some gas and stool noted within the colon as previously.  Approximate 5 cm gaseous lucency overlying the pelvis could represent  focal distended bowel loop possibly distal colon within the pelvis  and new or more pronounced compared to prior. Mild gaseous distention  of the stomach.      Prominent vascular calcifications overlying abdomen and pelvis and  overlying the groin regions.      Impression: 1.  Multiple dilated gas-filled loops of small bowel redemonstrated  most suggestive of sequela of obstruction as noted previously and  probably grossly similar to prior. Focal gaseous lucency  overlying  the pelvis new or more pronounced may reflect focal distended bowel  loop possibly distal colon within the pelvis new or more pronounced.  Mild gaseous distention of the stomach. Limited evaluation for free  air on supine films. Continued clinical correlation and follow-up  advised.      MACRO:  None      Signed by: Arun Escamilla 4/14/2025 2:45 PM  Dictation workstation:   [id number]        Physical Exam     Date of Service: 4/13/2025  6:18 PM      Signed        Expand All Collapse All    History Of Present Illness  Nancy Long is a 79 y.o. female with CAD,  chronic diastolic heart failure, hypertension, hyperlipidemia, peripheral artery disease, diabetes mellitus, carotid artery disease (Right occlusion of ICA, left ICA with > 70% stenosis; follows with Dr. Parish), CKD, Left breast cancer s/p lumpectomy and radiation, kidney stones, and open cholecystectomy and open appendectomy with right ovary removal (for tumor- at age 16) who presented today with abdominal pain.  Patient reports yesterday she developed severe, sharp, continuous epigastric pain associated with nausea and diaphoresis.  She notes she took some of her prescribed Percocet that seemed to help a little however the symptoms persisted into today so she decided to come to the emergency room.  She also notes a decreased appetite and when she did try to eat the food did not taste good.  She denies a history of bowel obstruction.  She articulates she told the surgery team she would not like surgical intervention.  CODE STATUS discussed and she would like to be a DNR CCA.     In the ED lab work, EKG, chest x-ray and CTAP were performed, labs revealed glucose 200, sodium 125, chloride 94, bun 45, creatinine 1.64 (1.32 on 2/4/2020), , lactate 1.6, troponins 27 and 29 respectively, white count 21, neutrophils 17.35, monocytes 1.1. EKG Interpretation, per ER physician impression: Sinus at rate of 79, , QRS 91, QTc 407 without evidence of  STEMI or ischemia. Chest x-ray without acute process.  CT abdomen/pelvis revealed small bowel obstruction with mesenteric edema as described, small volume of ascites, diverticulosis, atherosclerosis, subcutaneous opacities and reticulation that may be due to injections although hematoma is possible.  Patient was evaluated by surgery team that noted her symptoms are likely consistent with partial adhesive small bowel obstruction and surgical intervention was offered however patient declined.  Her vital signs were acceptable with a slightly elevated blood pressure of 157/70.  She was given cefepime Flagyl morphine Zofran and started on IV fluids and admitted for further evaluation and treatment under the care of Dr. Saez.        Echo March 2025:     CONCLUSIONS:  1. Left ventricular ejection fraction is normal, by visual estimate at 70-75%.  2. There is moderate left ventricular hypertrophy.  3. The left atrium is mildly dilated.  4. There is mild mitral valve stenosis.  5. There is moderate mitral annular calcification.  6. Right ventricular systolic pressure is within normal limits.  7. Mild aortic valve stenosis.  8. Mild left ventricular outflow obstruction at rest and with Valsalva (10 mmHg).     Past Medical History  Medical History           Past Medical History:   Diagnosis Date    Personal history of other diseases of the circulatory system       History of hypertension    Personal history of other endocrine, nutritional and metabolic disease       History of diabetes mellitus            Surgical History  Surgical History             Past Surgical History:   Procedure Laterality Date    APPENDECTOMY   01/03/2014     Appendectomy    BREAST LUMPECTOMY   01/03/2014     Breast Surgery Lumpectomy    CHOLECYSTECTOMY   01/03/2014     Cholecystectomy    OOPHORECTOMY   01/03/2014     Oophorectomy            Social History  Denies alcohol or illicit drug use.  Reports she smokes 2 to 3 packs of cigarettes per week.   "Lives alone.  Ambulates with cane as needed.     Family History  Family History   No family history on file.         Allergies  Penicillins and Sulfa (sulfonamide antibiotics)     10 point review of systems performed and is negative besides what is stated in HPI.     Physical Exam  Constitutional:       Appearance: Normal appearance.   HENT:      Head: Normocephalic and atraumatic.      Nose: Nose normal.      Mouth/Throat:      Mouth: Mucous membranes are moist.   Eyes:      Conjunctiva/sclera: Conjunctivae normal.   Cardiovascular:      Rate and Rhythm: Normal rate and regular rhythm.      Heart sounds: Murmur heard.   Pulmonary:      Effort: Pulmonary effort is normal.      Comments: Crackles left base  Abdominal:      General: Bowel sounds are normal. There is no distension.      Tenderness: There is abdominal tenderness.      Comments: Somewhat firm   Musculoskeletal:         General: Normal range of motion.      Cervical back: Neck supple.   Skin:     General: Skin is warm and dry.   Neurological:      Mental Status: She is alert. Mental status is at baseline.   Psychiatric:         Mood and Affect: Mood normal.            Last Recorded Vitals  Blood pressure (!) 193/77, pulse 74, temperature 37 °C (98.6 °F), temperature source Temporal, resp. rate 20, height 1.702 m (5' 7\"), weight 77.1 kg (170 lb), SpO2 94%.     Relevant Results     Medications Ordered Prior to Encounter   No current facility-administered medications on file prior to encounter.                  Current Outpatient Medications on File Prior to Encounter   Medication Sig Dispense Refill    acetaminophen (Tylenol) 500 mg tablet Take 1 tablet (500 mg) by mouth every 8 hours if needed for mild pain (1 - 3).        ascorbic acid (Vitamin C) 500 mg tablet Take 1 tablet (500 mg) by mouth once daily.        aspirin 325 mg tablet Take 1 tablet (325 mg) by mouth once daily at bedtime.        B12-levomefolate calcium-B6 (Foltx) 2-1.13-25 mg tablet Take 1 " tablet by mouth once daily.        cilostazol (Pletal) 50 mg tablet Take 1 tablet (50 mg) by mouth 2 times a day. 180 tablet 3    lansoprazole (Prevacid) 30 mg DR capsule Take 1 capsule (30 mg) by mouth 2 times a day as needed.        losartan (Cozaar) 100 mg tablet Take 1 tablet (100 mg) by mouth once daily. 90 tablet 3    metFORMIN XR (Glucophage-XR) 500 mg 24 hr tablet Take 1 tablet (500 mg) by mouth once daily in the evening. Take with meals. (Patient taking differently: Take 1 tablet (500 mg) by mouth once daily as needed (BG >200).)        metoprolol tartrate (Lopressor) 100 mg tablet Take 1 tablet (100 mg) by mouth once daily. (Patient taking differently: Take 1 tablet (100 mg) by mouth once daily at bedtime.) 90 tablet 3    NovoLIN 70/30 U-100 Insulin 100 unit/mL (70-30) injection Inject 43 Units under the skin 2 times a day before meals.        OneTouch Ultra Test strip          oxyCODONE-acetaminophen (Percocet)  mg tablet Take 1 tablet by mouth every 8 hours.        rosuvastatin (Crestor) 20 mg tablet Take 1 tablet (20 mg) by mouth once daily. (Patient taking differently: Take 1 tablet (20 mg) by mouth once daily at bedtime.) 90 tablet 3    zinc sulfate (Zincate) 220 (50 Zn) MG capsule Take 1 capsule (50 mg of elemental zinc) by mouth once daily.        betamethasone dipropionate 0.05 % cream Apply topically 2 times a day. (Patient not taking: Reported on 4/12/2025)        hydrOXYzine pamoate (Vistaril) 50 mg capsule Take 1 capsule (50 mg) by mouth 4 times a day as needed. (Patient not taking: Reported on 4/12/2025)        loratadine-pseudoephedrine (Claritin-D 24-hour)  mg 24 hr tablet Take 1 tablet by mouth once daily. (Patient not taking: Reported on 4/12/2025)        ondansetron ODT (Zofran-ODT) 8 mg disintegrating tablet every 8 hours if needed. (Patient not taking: Reported on 4/12/2025)        polymyxin B sulf-trimethoprim (Polytrim) ophthalmic solution APPLY 1/4 INCH TO AFFECTED EYE 4  TIMES A DAY. (Patient not taking: Reported on 4/12/2025)        solifenacin (VESIcare) 5 mg tablet  (Patient not taking: Reported on 4/12/2025)        sucralfate (Carafate) 100 mg/mL suspension TAKE 10 ML BY MOUTH 4 TIMES A DAY (Patient not taking: Reported on 4/12/2025)        torsemide (Demadex) 20 mg tablet Take 1 tablet (20 mg) by mouth once daily. (Patient not taking: Reported on 4/12/2025) 90 tablet 3                      Results for orders placed or performed during the hospital encounter of 04/12/25 (from the past 24 hours)   POCT GLUCOSE   Result Value Ref Range     POCT Glucose 183 (H) 74 - 99 mg/dL   Troponin I, High Sensitivity   Result Value Ref Range     Troponin I, High Sensitivity 23 (H) 0 - 13 ng/L   Sars-CoV-2 and Influenza A/B PCR   Result Value Ref Range     Flu A Result Not Detected Not Detected     Flu B Result Not Detected Not Detected     Coronavirus 2019, PCR Not Detected Not Detected   POCT GLUCOSE   Result Value Ref Range     POCT Glucose 171 (H) 74 - 99 mg/dL   Urinalysis with Reflex Culture and Microscopic   Result Value Ref Range     Color, Urine Light-Yellow Light-Yellow, Yellow, Dark-Yellow     Appearance, Urine Clear Clear     Specific Gravity, Urine 1.017 1.005 - 1.035     pH, Urine 5.0 5.0, 5.5, 6.0, 6.5, 7.0, 7.5, 8.0     Protein, Urine 10 (TRACE) NEGATIVE, 10 (TRACE), 20 (TRACE) mg/dL     Glucose, Urine Normal Normal mg/dL     Blood, Urine NEGATIVE NEGATIVE mg/dL     Ketones, Urine NEGATIVE NEGATIVE mg/dL     Bilirubin, Urine NEGATIVE NEGATIVE mg/dL     Urobilinogen, Urine Normal Normal mg/dL     Nitrite, Urine NEGATIVE NEGATIVE     Leukocyte Esterase, Urine 250 Triny/uL (A) NEGATIVE   Extra Urine Gray Tube   Result Value Ref Range     Extra Tube Hold for add-ons.     POCT GLUCOSE   Result Value Ref Range     POCT Glucose 131 (H) 74 - 99 mg/dL   Microscopic Only, Urine   Result Value Ref Range     WBC, Urine 11-20 (A) 1-5, NONE /HPF     RBC, Urine 1-2 NONE, 1-2, 3-5 /HPF      Bacteria, Urine 4+ (A) NONE SEEN /HPF     Mucus, Urine FEW Reference range not established. /LPF     Hyaline Casts, Urine OCCASIONAL (A) NONE /LPF   POCT GLUCOSE   Result Value Ref Range     POCT Glucose 81 74 - 99 mg/dL   POCT GLUCOSE   Result Value Ref Range     POCT Glucose 67 (L) 74 - 99 mg/dL   POCT GLUCOSE   Result Value Ref Range     POCT Glucose 64 (L) 74 - 99 mg/dL   POCT GLUCOSE   Result Value Ref Range     POCT Glucose 147 (H) 74 - 99 mg/dL   CBC   Result Value Ref Range     WBC 15.8 (H) 4.4 - 11.3 x10*3/uL     nRBC 0.0 0.0 - 0.0 /100 WBCs     RBC 3.83 (L) 4.00 - 5.20 x10*6/uL     Hemoglobin 11.0 (L) 12.0 - 16.0 g/dL     Hematocrit 36.3 36.0 - 46.0 %     MCV 95 80 - 100 fL     MCH 28.7 26.0 - 34.0 pg     MCHC 30.3 (L) 32.0 - 36.0 g/dL     RDW 13.1 11.5 - 14.5 %     Platelets 163 150 - 450 x10*3/uL   Basic Metabolic Panel   Result Value Ref Range     Glucose 74 74 - 99 mg/dL     Sodium 132 (L) 136 - 145 mmol/L     Potassium 4.1 3.5 - 5.3 mmol/L     Chloride 103 98 - 107 mmol/L     Bicarbonate 23 21 - 32 mmol/L     Anion Gap 10 10 - 20 mmol/L     Urea Nitrogen 39 (H) 6 - 23 mg/dL     Creatinine 1.33 (H) 0.50 - 1.05 mg/dL     eGFR 40 (L) >60 mL/min/1.73m*2     Calcium 8.2 (L) 8.6 - 10.3 mg/dL   POCT GLUCOSE   Result Value Ref Range     POCT Glucose 72 (L) 74 - 99 mg/dL   POCT GLUCOSE   Result Value Ref Range     POCT Glucose 75 74 - 99 mg/dL   POCT GLUCOSE   Result Value Ref Range     POCT Glucose 84 74 - 99 mg/dL   POCT GLUCOSE   Result Value Ref Range     POCT Glucose 99 74 - 99 mg/dL         XR abdomen 1 view     Result Date: 4/13/2025  Interpreted By:  Beka Mays, STUDY: Abdominal series dated 4/13/2025.   INDICATION: Follow-up bowel dilation.   COMPARISON: Correlation is made with 04/12/2025 CT.   ACCESSION NUMBER(S): [id number]   ORDERING CLINICIAN: ROZ RAMESH   TECHNIQUE: Two AP radiographs of the abdomen.   FINDINGS: There are multiple stacked dilated loops of small bowel in the central  abdomen measuring up to a proximally 4.1 cm. There is gas and stool in the colon. Lung bases are clear. Degenerative changes seen of the spine and hips.        Multiple dilated loops of small bowel in the central abdomen most compatible with a degree of obstructive process as further discussed on prior date CT.   Signed by: Beka Mays 4/13/2025 1:01 PM Dictation workstation:   [id number]     CT abdomen pelvis wo IV contrast     Addendum Date: 4/12/2025    Interpreted By:  Chano Marshall, ADDENDUM: Also to be noted at the impression: Subcutaneous opacities and reticulation that may be due to injections although hematoma is possible.   Signed by: Chano Marshall 4/12/2025 2:38 PM   -------- ORIGINAL REPORT -------- Dictation workstation:   [id number]     Result Date: 4/12/2025  Interpreted By:  Chano Marshall, STUDY: CT ABDOMEN PELVIS WO IV CONTRAST;  4/12/2025 2:05 pm   INDICATION: Signs/Symptoms:abd pain.   COMPARISON: None.   ACCESSION NUMBER(S): [id number]   ORDERING CLINICIAN: FRANK BREWSTER   TECHNIQUE: CT of the abdomen and pelvis was performed. Contiguous axial images were obtained at 3 mm slice thickness through the abdomen and pelvis. Coronal and sagittal reconstructions at 3 mm slice thickness were performed.   FINDINGS: LOWER CHEST: Small right pleural effusion and trace left pleural effusion. Coronary artery atherosclerotic calcification and mitral annular calcification.   ABDOMEN:   LIVER: The hepatic parenchyma is homogeneous without evidence of focal liver lesions.The hepatic size is normal.   SPLEEN: The spleen is normal in size and homogeneous.   ADRENAL GLANDS: Bilateral adrenal glands appear normal.   KIDNEYS AND URETERS: Mild bilateral cortical atrophy, the renal cortices otherwise are homogeneous. Radiodensities at the renal sinuses is probably atherosclerotic calcification, although superimposed nonobstructing calculi are possible.   The ureters throughout their distal course are not well  identified due to overlying crowded bowel loops, but the tracts otherwise are unremarkable without identified ureteral dilatation or additional radiodense calculi.   PANCREAS: The pancreas appears unremarkable, there is no ductal dilatation or masses.   GALLBLADDER: Not visualized, presumably with cholecystectomy.   BILE DUCTS: Dilatation of the extrahepatic ducts, the common bile duct measuring 1.4 cm in diameter, most likely from post cholecystectomy state. However as the no prior exams for comparison MRCP would be recommended if there is symptomatology compatible with biliary colic.     VESSELS: Fairly extensive atherosclerotic calcifications are noted predominantly at the aorta and iliac system. There is also moderate atherosclerotic calcification at the mid segment of the superior mesenteric artery, and fairly marked of the inferior mesenteric artery.   PERITONEUM AND RETROPERITONEUM: There is a small volume of ascites best seen in the right upper quadrant along the liver. No free intraperitoneal air.   The retroperitoneum appears unremarkable, and without significant adenopathy.   No enlarged mesenteric lymph nodes.   BOWEL: There is mild distention of multiple small bowel segments with air-fluid levels, associated with reticulation of the mesentery indicating edema. There is decompression of distal small bowel segments, and the pattern would be most compatible with mid to early or partial distal small bowel obstruction. There is also moderate diverticulosis of the distal colon.   PELVIS:   BLADDER: The urinary bladder contour is smooth.   REPRODUCTIVE ORGANS: No pelvic masses.     BONE, ABDOMINAL WALL AND OTHER FINDINGS: No suspicious osseous lesions are identified.   There is a small non incarcerated umbilical hernia containing intra-abdominal fat. There is also attenuation within the subcutaneous fat of the mid/lower abdomen anteriorly that may be from subcutaneous injections. However, hematomas are also  "possible. There is also reticulation of the subcutaneous fat at the right lateral abdominal wall indicating additional edema.        1.  Small-bowel obstruction with mesenteric edema as described. 2. Small volume of ascites. 3. Diverticulosis. 4. Atherosclerosis as described.   MACRO: 1. None   Signed by: Chano Marshall 4/12/2025 2:28 PM Dictation workstation:   [id number]     XR chest 2 views     Result Date: 4/12/2025  Interpreted By:  Beka Mays, STUDY: Chest dated  4/12/2025.   INDICATION: Signs/Symptoms:sob with ambulation   COMPARISON: Chest dated 11/17/2015.   ACCESSION NUMBER(S): [id number]   ORDERING CLINICIAN: FRANK BREWSTER   TECHNIQUE: One view of the chest.   FINDINGS: The lungs are clear.  No pneumothorax or effusion is evident. The cardiomediastinal silhouette is  not enlarged.Degenerative change is seen of the spine and shoulders.        No acute cardiopulmonary process is evident.   MACRO: None   Signed by: Beka Mays 4/12/2025 1:26 PM Dictation workstation:   [id number]             Assessment/Plan     Assessment & Plan  Generalized abdominal pain     Small bowel obstruction (Multi)     Dependence on nicotine from cigarettes     Advanced care planning/counseling discussion     Leukocytosis        Per surgery:  \"Impression:  #SBO   > small bowel dilation; however, other concerning CT findings particularly mesenteric edema and ascites. CT imaging worrisome for possible ischemic/low flow state to bowel; however, serum lactate normal and patient's clinical presentation/symptoms/pain/bowel sounds are consistent with an adhesive small bowel obstruction (partial)  #Leukocytosis (possibly 2/2 above, but may have other infectious etiology, possibly UTI?)   > UA ordered; however, not yet collected and already received antibiotics  #Hyponatremia and hypochloremia  #CKD   Recommendations:  - no acute surgical intervention at this time   > patient offered surgical intervention if she were to " "worsen clinically; however, she was adamant about not wanting surgery   > would recommend placing NG tube for decompression; however, patient again declined NG tube. She said she may be agreeable if she begins to feel worse/nauseated  - PRN IV Zofran  - PRN IV Morphine for severe pain (limit use as able, but unable to give NSAIDs or PO meds)  - IVF: D5NS @ 100  - repeat CBC and BMP in AM\"     Additionally:  Change morphine to Dilaudid given CKD  Check flu and COVID  KUB in a.m.        #Advance care planning     Patient would like to be a DNR CCA     #Nicotine dependence     Reports she lets most of her cigarettes to burn out without smoking them so we will order a nicotine patch as needed     #Chronic conditions:     CAD,  chronic diastolic heart failure, hypertension, hyperlipidemia, peripheral artery disease, diabetes mellitus, carotid artery disease (Right occlusion of ICA, left ICA with > 70% stenosis; follows with Dr. Parish), CKD, Left breast cancer s/p lumpectomy and radiation, kidney stones, and open cholecystectomy and open appendectomy with right ovary removal (for tumor- at age 16)      Hold all p.o. meds  Give IV Protonix   Metoprolol IV around-the-clock with parameters  Patient takes 70/30 at home will conservatively give 30 units of Lantus daily which would be a little less than half of the equivalent of 70/30/day with sliding scale insulin and hypoglycemic protocol     #DVT prophylaxis     SCDs  Heparin                             Chadwick Medel, DO     Review of Systems                     Relevant Results                   Assessment/Plan                      Assessment & Plan  Generalized abdominal pain     Small bowel obstruction (Multi)     Dependence on nicotine from cigarettes     Advanced care planning/counseling discussion     Leukocytosis     Patient fully evaluated on 4/14. CT showing SBO with mesenteric edema, small volume ascites, diverticulosis. Evaluated by general surgery today, " recommending NG tube for decompression, ordered zofran, morphine, continuing IV cefepime/flagyl. Repeat KUB ordered, results pending. If KUB looks okay, plan to advance diet. Leukocytosis downtrending, continue to monitor. Hypertension noted this morning, cardiac PO meds held d/t NPO status. Continue to monitor and restart PO meds when diet advanced. Recheck labs in AM.      Patient still requiring frequent cardiac and SPO2 monitoring. Continue aggressive pulmonary hygiene and oral hygiene. Off loading as tolerated for skin integrity. Medications and labs reviewed.     I spent a total of 60 minutes on the date of the service which included preparing to see the patient, face-to-face patient care, completing clinical documentation, obtaining and/or reviewing separately obtained history, performing a medically appropriate examination, counseling and educating the patient/family/caregiver, ordering medications, tests, or procedures, communicating with other HCPs (not separately reported), independently interpreting results (not separately reported), communicating results to the patient/family/caregiver, and care coordination (not separately reported).   Patient fully evaluated  04/15  for    Problem List Items Addressed This Visit         * (Principal) Generalized abdominal pain - Primary      Other Visit Diagnoses         SBO (small bowel obstruction) (Multi)                 Patient seen resting in bed with head of bed elevated, no s/s or c/o acute difficulties at this time.  Vital signs for last 24 hours Temp:  [36.2 °C (97.2 °F)-36.9 °C (98.4 °F)] 36.4 °C (97.5 °F)  Heart Rate:  [37-76] 37  Resp:  [18-20] 18  BP: (136-186)/(63-82) 152/67    No intake/output data recorded.  Patient still requiring frequent cardiac and SPO2 monitoring. Continue aggressive pulmonary hygiene and oral hygiene. Off loading as tolerated for skin integrity. Medications and labs reviewed-         Results for orders placed or performed during  the hospital encounter of 04/12/25 (from the past 24 hours)   POCT GLUCOSE   Result Value Ref Range     POCT Glucose 111 (H) 74 - 99 mg/dL   POCT GLUCOSE   Result Value Ref Range     POCT Glucose 115 (H) 74 - 99 mg/dL   POCT GLUCOSE   Result Value Ref Range     POCT Glucose 129 (H) 74 - 99 mg/dL   POCT GLUCOSE   Result Value Ref Range     POCT Glucose 114 (H) 74 - 99 mg/dL   POCT GLUCOSE   Result Value Ref Range     POCT Glucose 117 (H) 74 - 99 mg/dL   CBC and Auto Differential   Result Value Ref Range     WBC 12.1 (H) 4.4 - 11.3 x10*3/uL     nRBC 0.0 0.0 - 0.0 /100 WBCs     RBC 3.89 (L) 4.00 - 5.20 x10*6/uL     Hemoglobin 11.4 (L) 12.0 - 16.0 g/dL     Hematocrit 36.5 36.0 - 46.0 %     MCV 94 80 - 100 fL     MCH 29.3 26.0 - 34.0 pg     MCHC 31.2 (L) 32.0 - 36.0 g/dL     RDW 13.0 11.5 - 14.5 %     Platelets 161 150 - 450 x10*3/uL     Neutrophils % 74.4 40.0 - 80.0 %     Immature Granulocytes %, Automated 0.5 0.0 - 0.9 %     Lymphocytes % 14.3 13.0 - 44.0 %     Monocytes % 8.0 2.0 - 10.0 %     Eosinophils % 2.4 0.0 - 6.0 %     Basophils % 0.4 0.0 - 2.0 %     Neutrophils Absolute 8.99 (H) 1.60 - 5.50 x10*3/uL     Immature Granulocytes Absolute, Automated 0.06 0.00 - 0.50 x10*3/uL     Lymphocytes Absolute 1.73 0.80 - 3.00 x10*3/uL     Monocytes Absolute 0.97 (H) 0.05 - 0.80 x10*3/uL     Eosinophils Absolute 0.29 0.00 - 0.40 x10*3/uL     Basophils Absolute 0.05 0.00 - 0.10 x10*3/uL   Comprehensive Metabolic Panel   Result Value Ref Range     Glucose 109 (H) 74 - 99 mg/dL     Sodium 135 (L) 136 - 145 mmol/L     Potassium 3.7 3.5 - 5.3 mmol/L     Chloride 106 98 - 107 mmol/L     Bicarbonate 22 21 - 32 mmol/L     Anion Gap 11 10 - 20 mmol/L     Urea Nitrogen 20 6 - 23 mg/dL     Creatinine 1.14 (H) 0.50 - 1.05 mg/dL     eGFR 48 (L) >60 mL/min/1.73m*2     Calcium 8.0 (L) 8.6 - 10.3 mg/dL     Albumin 3.1 (L) 3.4 - 5.0 g/dL     Alkaline Phosphatase 72 33 - 136 U/L     Total Protein 5.7 (L) 6.4 - 8.2 g/dL     AST 27 9 - 39 U/L      Bilirubin, Total 0.6 0.0 - 1.2 mg/dL     ALT 15 7 - 45 U/L   POCT GLUCOSE   Result Value Ref Range     POCT Glucose 111 (H) 74 - 99 mg/dL      Patient recently received an antibiotic (last 12 hours)         Date/Time Action Medication Dose     04/15/25 0649 New Bag    metroNIDAZOLE (Flagyl) 500 mg in sodium chloride (iso)  mL 500 mg     04/15/25 0218 New Bag    cefepime (Maxipime) 1 g in dextrose 5% IV 50 mL 1 g     04/14/25 2235 New Bag    metroNIDAZOLE (Flagyl) 500 mg in sodium chloride (iso)  mL 500 mg             Plan discussed with interdisciplinary team,  NG tube for decompression, PRN IV zofran, IV morphine, will continue on IV Antibiotics-continuing IV cefepime/flagyl, WBCs downtrending. Repeat KUB on 4/14 unchanged, will repeat KUB today. Unable to advance diet at this time, will continue IV fluids, NPO. Ice chips ok.  PO meds held d/t NPO status. Continue to monitor and restart PO meds when diet advanced. continue current and repeat labs in the AM.      Discharge planning discussed with patient and care team. Therapy evaluations ordered. Anticipate HHC/SNF at discharge. Patient aware and agreeable to current plan, continue plan as above.      I spent a total of 50 minutes on the date of the service which included preparing to see the patient, face-to-face patient care, completing clinical documentation, obtaining and/or reviewing separately obtained history, performing a medically appropriate examination, counseling and educating the patient/family/caregiver, ordering medications, tests, or procedures, communicating with other HCPs (not separately reported), independently interpreting results (not separately reported), communicating results to the patient/family/caregiver, and care coordination (not separately reported).   Angela Fatima                      Revision History     Scheduled medications  Scheduled Medications[1]  Continuous medications  Continuous Medications[2]  PRN  medications  PRN Medications[3]    Results for orders placed or performed during the hospital encounter of 04/12/25 (from the past 24 hours)   POCT GLUCOSE   Result Value Ref Range    POCT Glucose 136 (H) 74 - 99 mg/dL   POCT GLUCOSE   Result Value Ref Range    POCT Glucose 183 (H) 74 - 99 mg/dL   CBC and Auto Differential   Result Value Ref Range    WBC 9.8 4.4 - 11.3 x10*3/uL    nRBC 0.0 0.0 - 0.0 /100 WBCs    RBC 3.59 (L) 4.00 - 5.20 x10*6/uL    Hemoglobin 10.3 (L) 12.0 - 16.0 g/dL    Hematocrit 34.7 (L) 36.0 - 46.0 %    MCV 97 80 - 100 fL    MCH 28.7 26.0 - 34.0 pg    MCHC 29.7 (L) 32.0 - 36.0 g/dL    RDW 13.5 11.5 - 14.5 %    Platelets 161 150 - 450 x10*3/uL    Neutrophils % 68.6 40.0 - 80.0 %    Immature Granulocytes %, Automated 1.1 (H) 0.0 - 0.9 %    Lymphocytes % 18.9 13.0 - 44.0 %    Monocytes % 7.3 2.0 - 10.0 %    Eosinophils % 3.7 0.0 - 6.0 %    Basophils % 0.4 0.0 - 2.0 %    Neutrophils Absolute 6.73 (H) 1.60 - 5.50 x10*3/uL    Immature Granulocytes Absolute, Automated 0.11 0.00 - 0.50 x10*3/uL    Lymphocytes Absolute 1.85 0.80 - 3.00 x10*3/uL    Monocytes Absolute 0.72 0.05 - 0.80 x10*3/uL    Eosinophils Absolute 0.36 0.00 - 0.40 x10*3/uL    Basophils Absolute 0.04 0.00 - 0.10 x10*3/uL   Comprehensive Metabolic Panel   Result Value Ref Range    Glucose 107 (H) 74 - 99 mg/dL    Sodium 137 136 - 145 mmol/L    Potassium 3.9 3.5 - 5.3 mmol/L    Chloride 108 (H) 98 - 107 mmol/L    Bicarbonate 23 21 - 32 mmol/L    Anion Gap 10 10 - 20 mmol/L    Urea Nitrogen 14 6 - 23 mg/dL    Creatinine 1.12 (H) 0.50 - 1.05 mg/dL    eGFR 50 (L) >60 mL/min/1.73m*2    Calcium 7.8 (L) 8.6 - 10.3 mg/dL    Albumin 2.8 (L) 3.4 - 5.0 g/dL    Alkaline Phosphatase 54 33 - 136 U/L    Total Protein 5.3 (L) 6.4 - 8.2 g/dL    AST 12 9 - 39 U/L    Bilirubin, Total 0.3 0.0 - 1.2 mg/dL    ALT 6 (L) 7 - 45 U/L   POCT GLUCOSE   Result Value Ref Range    POCT Glucose 110 (H) 74 - 99 mg/dL   POCT GLUCOSE   Result Value Ref Range    POCT  Glucose 142 (H) 74 - 99 mg/dL     No results found.                Assessment & Plan  Generalized abdominal pain    Small bowel obstruction (Multi)    Dependence on nicotine from cigarettes    Advanced care planning/counseling discussion    Leukocytosis    SBO (small bowel obstruction) (Multi)    Patient fully evaluated on 4/16. Underwent successful NG tube placement this morning with good suction output so far. Repeat KUB ordered, pending results. Labs and medications reviewed. Mild anemia noted, continue to monitor. Continue NPO status with IVF. Plan to stay another day or two with NG in, then trial clear liquids after removal. Recheck labs in AM.        I spent a total of 60 minutes on the date of the service which included preparing to see the patient, face-to-face patient care, completing clinical documentation, obtaining and/or reviewing separately obtained history, performing a medically appropriate examination, counseling and educating the patient/family/caregiver, ordering medications, tests, or procedures, communicating with other HCPs (not separately reported), independently interpreting results (not separately reported), communicating results to the patient/family/caregiver, and care coordination (not separately reported).       Patient fully evaluated  04/18  for    Problem List Items Addressed This Visit          Gastrointestinal and Abdominal    * (Principal) Generalized abdominal pain - Primary    Small bowel obstruction (Multi)    Relevant Orders    Case Request Operating Room: Laparoscopy with lysis of adhesions.  Possible laparotomy.  Possible bowel resection. (Completed)    SBO (small bowel obstruction) (Multi)    Relevant Orders    Case Request Operating Room: Laparoscopy, possible laparotomy, possible bowel resection (Completed)     Patient seen resting in bed with head of bed elevated, no s/s or c/o acute difficulties at this time.  Vital signs for last 24 hours Temp:  [36.1 °C (97 °F)-36.6 °C  (97.9 °F)] 36.5 °C (97.7 °F)  Heart Rate:  [52-68] 68  Resp:  [18] 18  BP: (118-160)/(60-73) 139/64    I/O this shift:  In: 200 [P.O.:200]  Out: -   Patient still requiring frequent cardiac and SPO2 monitoring. Continue aggressive pulmonary hygiene and oral hygiene. Off loading as tolerated for skin integrity. Medications and labs reviewed-   Results for orders placed or performed during the hospital encounter of 04/12/25 (from the past 24 hours)   POCT GLUCOSE   Result Value Ref Range    POCT Glucose 136 (H) 74 - 99 mg/dL   POCT GLUCOSE   Result Value Ref Range    POCT Glucose 183 (H) 74 - 99 mg/dL   CBC and Auto Differential   Result Value Ref Range    WBC 9.8 4.4 - 11.3 x10*3/uL    nRBC 0.0 0.0 - 0.0 /100 WBCs    RBC 3.59 (L) 4.00 - 5.20 x10*6/uL    Hemoglobin 10.3 (L) 12.0 - 16.0 g/dL    Hematocrit 34.7 (L) 36.0 - 46.0 %    MCV 97 80 - 100 fL    MCH 28.7 26.0 - 34.0 pg    MCHC 29.7 (L) 32.0 - 36.0 g/dL    RDW 13.5 11.5 - 14.5 %    Platelets 161 150 - 450 x10*3/uL    Neutrophils % 68.6 40.0 - 80.0 %    Immature Granulocytes %, Automated 1.1 (H) 0.0 - 0.9 %    Lymphocytes % 18.9 13.0 - 44.0 %    Monocytes % 7.3 2.0 - 10.0 %    Eosinophils % 3.7 0.0 - 6.0 %    Basophils % 0.4 0.0 - 2.0 %    Neutrophils Absolute 6.73 (H) 1.60 - 5.50 x10*3/uL    Immature Granulocytes Absolute, Automated 0.11 0.00 - 0.50 x10*3/uL    Lymphocytes Absolute 1.85 0.80 - 3.00 x10*3/uL    Monocytes Absolute 0.72 0.05 - 0.80 x10*3/uL    Eosinophils Absolute 0.36 0.00 - 0.40 x10*3/uL    Basophils Absolute 0.04 0.00 - 0.10 x10*3/uL   Comprehensive Metabolic Panel   Result Value Ref Range    Glucose 107 (H) 74 - 99 mg/dL    Sodium 137 136 - 145 mmol/L    Potassium 3.9 3.5 - 5.3 mmol/L    Chloride 108 (H) 98 - 107 mmol/L    Bicarbonate 23 21 - 32 mmol/L    Anion Gap 10 10 - 20 mmol/L    Urea Nitrogen 14 6 - 23 mg/dL    Creatinine 1.12 (H) 0.50 - 1.05 mg/dL    eGFR 50 (L) >60 mL/min/1.73m*2    Calcium 7.8 (L) 8.6 - 10.3 mg/dL    Albumin 2.8 (L)  3.4 - 5.0 g/dL    Alkaline Phosphatase 54 33 - 136 U/L    Total Protein 5.3 (L) 6.4 - 8.2 g/dL    AST 12 9 - 39 U/L    Bilirubin, Total 0.3 0.0 - 1.2 mg/dL    ALT 6 (L) 7 - 45 U/L   POCT GLUCOSE   Result Value Ref Range    POCT Glucose 110 (H) 74 - 99 mg/dL   POCT GLUCOSE   Result Value Ref Range    POCT Glucose 142 (H) 74 - 99 mg/dL      Patient recently received an antibiotic (last 12 hours)       Date/Time Action Medication Dose    04/18/25 0536 New Bag    metroNIDAZOLE (Flagyl) 500 mg in sodium chloride (iso)  mL 500 mg    04/18/25 0229 New Bag    cefepime (Maxipime) 1 g in dextrose 5% IV 50 mL 1 g           Plan discussed with interdisciplinary team, post op and passing gas, less distention noted, patient more animated today. No acute events overnight, patient denies chest pain, worsening SOB at this time. continue current and repeat labs in the AM. Seen by general surgery today - Impression/plan: Postop day #1 following laparoscopic enterolysis for small bowel obstruction.Recovering well. Remove nasogastric tube when nausea resolves.  Advance diet as tolerated.    Patient fully evaluated on April 18.  Patient postop with NG tube with mild drainage.  Continue to monitor.  Some bowel sounds are noted on examination.  Recheck labs in AM.    Discharge planning discussed with patient and care team. Therapy evaluations ordered. Anticipate HHC/SNF at discharge. Patient aware and agreeable to current plan, continue plan as above.     I spent a total of 50 minutes on the date of the service which included preparing to see the patient, face-to-face patient care, completing clinical documentation, obtaining and/or reviewing separately obtained history, performing a medically appropriate examination, counseling and educating the patient/family/caregiver, ordering medications, tests, or procedures, communicating with other HCPs (not separately reported), independently interpreting results (not separately reported),  communicating results to the patient/family/caregiver, and care coordination (not separately reported).     Patient fully evaluated  04/19  for    Problem List Items Addressed This Visit          Gastrointestinal and Abdominal    * (Principal) Generalized abdominal pain - Primary    Small bowel obstruction (Multi)    Relevant Orders    Case Request Operating Room: Laparoscopy with lysis of adhesions.  Possible laparotomy.  Possible bowel resection. (Completed)    SBO (small bowel obstruction) (Multi)    Relevant Orders    Case Request Operating Room: Laparoscopy, possible laparotomy, possible bowel resection (Completed)     Patient seen resting in bed with head of bed elevated, no s/s or c/o acute difficulties at this time.  Vital signs for last 24 hours Temp:  [36.1 °C (97 °F)-36.6 °C (97.9 °F)] 36.5 °C (97.7 °F)  Heart Rate:  [52-68] 68  Resp:  [18] 18  BP: (118-160)/(60-73) 139/64    I/O this shift:  In: 200 [P.O.:200]  Out: -   Patient still requiring frequent cardiac and SPO2 monitoring. Continue aggressive pulmonary hygiene and oral hygiene. Off loading as tolerated for skin integrity. Medications and labs reviewed-   Results for orders placed or performed during the hospital encounter of 04/12/25 (from the past 24 hours)   POCT GLUCOSE   Result Value Ref Range    POCT Glucose 136 (H) 74 - 99 mg/dL   POCT GLUCOSE   Result Value Ref Range    POCT Glucose 183 (H) 74 - 99 mg/dL   CBC and Auto Differential   Result Value Ref Range    WBC 9.8 4.4 - 11.3 x10*3/uL    nRBC 0.0 0.0 - 0.0 /100 WBCs    RBC 3.59 (L) 4.00 - 5.20 x10*6/uL    Hemoglobin 10.3 (L) 12.0 - 16.0 g/dL    Hematocrit 34.7 (L) 36.0 - 46.0 %    MCV 97 80 - 100 fL    MCH 28.7 26.0 - 34.0 pg    MCHC 29.7 (L) 32.0 - 36.0 g/dL    RDW 13.5 11.5 - 14.5 %    Platelets 161 150 - 450 x10*3/uL    Neutrophils % 68.6 40.0 - 80.0 %    Immature Granulocytes %, Automated 1.1 (H) 0.0 - 0.9 %    Lymphocytes % 18.9 13.0 - 44.0 %    Monocytes % 7.3 2.0 - 10.0 %     Eosinophils % 3.7 0.0 - 6.0 %    Basophils % 0.4 0.0 - 2.0 %    Neutrophils Absolute 6.73 (H) 1.60 - 5.50 x10*3/uL    Immature Granulocytes Absolute, Automated 0.11 0.00 - 0.50 x10*3/uL    Lymphocytes Absolute 1.85 0.80 - 3.00 x10*3/uL    Monocytes Absolute 0.72 0.05 - 0.80 x10*3/uL    Eosinophils Absolute 0.36 0.00 - 0.40 x10*3/uL    Basophils Absolute 0.04 0.00 - 0.10 x10*3/uL   Comprehensive Metabolic Panel   Result Value Ref Range    Glucose 107 (H) 74 - 99 mg/dL    Sodium 137 136 - 145 mmol/L    Potassium 3.9 3.5 - 5.3 mmol/L    Chloride 108 (H) 98 - 107 mmol/L    Bicarbonate 23 21 - 32 mmol/L    Anion Gap 10 10 - 20 mmol/L    Urea Nitrogen 14 6 - 23 mg/dL    Creatinine 1.12 (H) 0.50 - 1.05 mg/dL    eGFR 50 (L) >60 mL/min/1.73m*2    Calcium 7.8 (L) 8.6 - 10.3 mg/dL    Albumin 2.8 (L) 3.4 - 5.0 g/dL    Alkaline Phosphatase 54 33 - 136 U/L    Total Protein 5.3 (L) 6.4 - 8.2 g/dL    AST 12 9 - 39 U/L    Bilirubin, Total 0.3 0.0 - 1.2 mg/dL    ALT 6 (L) 7 - 45 U/L   POCT GLUCOSE   Result Value Ref Range    POCT Glucose 110 (H) 74 - 99 mg/dL   POCT GLUCOSE   Result Value Ref Range    POCT Glucose 142 (H) 74 - 99 mg/dL      Patient recently received an antibiotic (last 12 hours)       None           Plan discussed with interdisciplinary team, diet advanced to full liquids today, will monitor overnight, advance as tolerated. No acute distress noted, no new complaints at time of exam. Will continue current and repeat labs in the AM.     Discharge planning discussed with patient and care team. Therapy evaluations ordered. Anticipate HHC at discharge. Patient aware and agreeable to current plan, continue plan as above.     I spent a total of 50 minutes on the date of the service which included preparing to see the patient, face-to-face patient care, completing clinical documentation, obtaining and/or reviewing separately obtained history, performing a medically appropriate examination, counseling and educating the  patient/family/caregiver, ordering medications, tests, or procedures, communicating with other HCPs (not separately reported), independently interpreting results (not separately reported), communicating results to the patient/family/caregiver, and care coordination (not separately reported).     Gianfranco Saez MD  Patient fully evaluated on April 20.  Patient clinically unchanged.  Still having some loose stool.  Patient is weak and ambulating better today.  IV to Hep-Lock and recheck labs in AM.         [1]   acetaminophen, 975 mg, oral, TID  [Held by provider] aspirin, 325 mg, oral, Nightly  benzonatate, 200 mg, oral, TID  [Held by provider] cilostazol, 50 mg, oral, BID  gabapentin, 300 mg, oral, TID  heparin (porcine), 5,000 Units, subcutaneous, q8h  [Held by provider] insulin glargine, 15 Units, subcutaneous, q24h  insulin lispro, 0-5 Units, subcutaneous, q4h  losartan, 100 mg, oral, Daily  [Held by provider] metFORMIN XR, 500 mg, oral, Daily before evening meal  metoprolol tartrate, 100 mg, oral, Nightly  pantoprazole, 40 mg, intravenous, Daily     [2]      [3]   PRN medications: benzocaine-menthol, dextrose, dextrose, glucagon, glucagon, HYDROmorphone, nicotine, ondansetron

## 2025-04-21 ENCOUNTER — DOCUMENTATION (OUTPATIENT)
Dept: HOME HEALTH SERVICES | Facility: HOME HEALTH | Age: 80
End: 2025-04-21
Payer: MEDICARE

## 2025-04-21 ENCOUNTER — PHARMACY VISIT (OUTPATIENT)
Dept: PHARMACY | Facility: CLINIC | Age: 80
End: 2025-04-21
Payer: COMMERCIAL

## 2025-04-21 ENCOUNTER — HOME HEALTH ADMISSION (OUTPATIENT)
Dept: HOME HEALTH SERVICES | Facility: HOME HEALTH | Age: 80
End: 2025-04-21
Payer: MEDICARE

## 2025-04-21 VITALS
BODY MASS INDEX: 26.53 KG/M2 | OXYGEN SATURATION: 95 % | RESPIRATION RATE: 18 BRPM | DIASTOLIC BLOOD PRESSURE: 70 MMHG | SYSTOLIC BLOOD PRESSURE: 149 MMHG | TEMPERATURE: 97.9 F | HEIGHT: 67 IN | WEIGHT: 169 LBS | HEART RATE: 62 BPM

## 2025-04-21 LAB
ALBUMIN SERPL BCP-MCNC: 2.7 G/DL (ref 3.4–5)
ALP SERPL-CCNC: 69 U/L (ref 33–136)
ALT SERPL W P-5'-P-CCNC: 6 U/L (ref 7–45)
ANION GAP SERPL CALC-SCNC: 9 MMOL/L (ref 10–20)
AST SERPL W P-5'-P-CCNC: 14 U/L (ref 9–39)
BASOPHILS # BLD AUTO: 0.03 X10*3/UL (ref 0–0.1)
BASOPHILS NFR BLD AUTO: 0.3 %
BILIRUB SERPL-MCNC: 0.3 MG/DL (ref 0–1.2)
BUN SERPL-MCNC: 18 MG/DL (ref 6–23)
CALCIUM SERPL-MCNC: 7.7 MG/DL (ref 8.6–10.3)
CHLORIDE SERPL-SCNC: 108 MMOL/L (ref 98–107)
CO2 SERPL-SCNC: 24 MMOL/L (ref 21–32)
CREAT SERPL-MCNC: 1.26 MG/DL (ref 0.5–1.05)
EGFRCR SERPLBLD CKD-EPI 2021: 43 ML/MIN/1.73M*2
EOSINOPHIL # BLD AUTO: 0.26 X10*3/UL (ref 0–0.4)
EOSINOPHIL NFR BLD AUTO: 2.8 %
ERYTHROCYTE [DISTWIDTH] IN BLOOD BY AUTOMATED COUNT: 14 % (ref 11.5–14.5)
GLUCOSE BLD MANUAL STRIP-MCNC: 102 MG/DL (ref 74–99)
GLUCOSE BLD MANUAL STRIP-MCNC: 133 MG/DL (ref 74–99)
GLUCOSE SERPL-MCNC: 107 MG/DL (ref 74–99)
HCT VFR BLD AUTO: 32.9 % (ref 36–46)
HGB BLD-MCNC: 10.2 G/DL (ref 12–16)
IMM GRANULOCYTES # BLD AUTO: 0.11 X10*3/UL (ref 0–0.5)
IMM GRANULOCYTES NFR BLD AUTO: 1.2 % (ref 0–0.9)
LYMPHOCYTES # BLD AUTO: 2.47 X10*3/UL (ref 0.8–3)
LYMPHOCYTES NFR BLD AUTO: 26.2 %
MCH RBC QN AUTO: 29.5 PG (ref 26–34)
MCHC RBC AUTO-ENTMCNC: 31 G/DL (ref 32–36)
MCV RBC AUTO: 95 FL (ref 80–100)
MONOCYTES # BLD AUTO: 0.63 X10*3/UL (ref 0.05–0.8)
MONOCYTES NFR BLD AUTO: 6.7 %
NEUTROPHILS # BLD AUTO: 5.94 X10*3/UL (ref 1.6–5.5)
NEUTROPHILS NFR BLD AUTO: 62.8 %
NRBC BLD-RTO: 0 /100 WBCS (ref 0–0)
PLATELET # BLD AUTO: 172 X10*3/UL (ref 150–450)
POTASSIUM SERPL-SCNC: 3.8 MMOL/L (ref 3.5–5.3)
PROT SERPL-MCNC: 5.2 G/DL (ref 6.4–8.2)
RBC # BLD AUTO: 3.46 X10*6/UL (ref 4–5.2)
SODIUM SERPL-SCNC: 137 MMOL/L (ref 136–145)
WBC # BLD AUTO: 9.4 X10*3/UL (ref 4.4–11.3)

## 2025-04-21 PROCEDURE — 2500000004 HC RX 250 GENERAL PHARMACY W/ HCPCS (ALT 636 FOR OP/ED): Performed by: NURSE PRACTITIONER

## 2025-04-21 PROCEDURE — 99231 SBSQ HOSP IP/OBS SF/LOW 25: CPT | Performed by: REGISTERED NURSE

## 2025-04-21 PROCEDURE — RXMED WILLOW AMBULATORY MEDICATION CHARGE

## 2025-04-21 PROCEDURE — 82947 ASSAY GLUCOSE BLOOD QUANT: CPT

## 2025-04-21 PROCEDURE — 97116 GAIT TRAINING THERAPY: CPT | Mod: GP,CQ

## 2025-04-21 PROCEDURE — 97530 THERAPEUTIC ACTIVITIES: CPT | Mod: GO

## 2025-04-21 PROCEDURE — 80053 COMPREHEN METABOLIC PANEL: CPT | Performed by: NURSE PRACTITIONER

## 2025-04-21 PROCEDURE — 2500000001 HC RX 250 WO HCPCS SELF ADMINISTERED DRUGS (ALT 637 FOR MEDICARE OP): Performed by: INTERNAL MEDICINE

## 2025-04-21 PROCEDURE — 2500000001 HC RX 250 WO HCPCS SELF ADMINISTERED DRUGS (ALT 637 FOR MEDICARE OP): Performed by: NURSE PRACTITIONER

## 2025-04-21 PROCEDURE — 85025 COMPLETE CBC W/AUTO DIFF WBC: CPT | Performed by: NURSE PRACTITIONER

## 2025-04-21 PROCEDURE — 36415 COLL VENOUS BLD VENIPUNCTURE: CPT | Performed by: NURSE PRACTITIONER

## 2025-04-21 PROCEDURE — 2500000001 HC RX 250 WO HCPCS SELF ADMINISTERED DRUGS (ALT 637 FOR MEDICARE OP)

## 2025-04-21 RX ORDER — IBUPROFEN 200 MG
1 TABLET ORAL DAILY PRN
Qty: 28 PATCH | Refills: 0 | Status: SHIPPED | OUTPATIENT
Start: 2025-04-21 | End: 2025-05-21

## 2025-04-21 RX ORDER — GABAPENTIN 300 MG/1
300 CAPSULE ORAL 3 TIMES DAILY
Qty: 90 CAPSULE | Refills: 0 | Status: SHIPPED | OUTPATIENT
Start: 2025-04-21 | End: 2025-05-21

## 2025-04-21 RX ADMIN — LOSARTAN POTASSIUM 100 MG: 50 TABLET, FILM COATED ORAL at 09:38

## 2025-04-21 RX ADMIN — BENZONATATE 200 MG: 100 CAPSULE ORAL at 15:32

## 2025-04-21 RX ADMIN — BENZONATATE 200 MG: 100 CAPSULE ORAL at 09:38

## 2025-04-21 RX ADMIN — HEPARIN SODIUM 5000 UNITS: 5000 INJECTION INTRAVENOUS; SUBCUTANEOUS at 06:56

## 2025-04-21 RX ADMIN — PANTOPRAZOLE SODIUM 40 MG: 40 INJECTION, POWDER, FOR SOLUTION INTRAVENOUS at 09:38

## 2025-04-21 RX ADMIN — ACETAMINOPHEN 975 MG: 325 TABLET, FILM COATED ORAL at 09:38

## 2025-04-21 RX ADMIN — GABAPENTIN 300 MG: 300 CAPSULE ORAL at 09:38

## 2025-04-21 RX ADMIN — BENZOCAINE AND MENTHOL 1 LOZENGE: 15; 3.6 LOZENGE ORAL at 00:51

## 2025-04-21 RX ADMIN — GABAPENTIN 300 MG: 300 CAPSULE ORAL at 15:32

## 2025-04-21 RX ADMIN — ACETAMINOPHEN 975 MG: 325 TABLET, FILM COATED ORAL at 15:32

## 2025-04-21 ASSESSMENT — COGNITIVE AND FUNCTIONAL STATUS - GENERAL
TURNING FROM BACK TO SIDE WHILE IN FLAT BAD: A LITTLE
DAILY ACTIVITIY SCORE: 22
MOBILITY SCORE: 18
WALKING IN HOSPITAL ROOM: A LITTLE
STANDING UP FROM CHAIR USING ARMS: A LITTLE
CLIMB 3 TO 5 STEPS WITH RAILING: A LOT
MOBILITY SCORE: 17
TURNING FROM BACK TO SIDE WHILE IN FLAT BAD: A LITTLE
STANDING UP FROM CHAIR USING ARMS: A LITTLE
CLIMB 3 TO 5 STEPS WITH RAILING: A LOT
HELP NEEDED FOR BATHING: A LITTLE
STANDING UP FROM CHAIR USING ARMS: A LITTLE
TOILETING: A LITTLE
HELP NEEDED FOR BATHING: A LITTLE
HELP NEEDED FOR BATHING: A LITTLE
DRESSING REGULAR UPPER BODY CLOTHING: A LITTLE
DAILY ACTIVITIY SCORE: 20
TOILETING: A LITTLE
WALKING IN HOSPITAL ROOM: A LITTLE
WALKING IN HOSPITAL ROOM: A LITTLE
TOILETING: A LITTLE
DAILY ACTIVITIY SCORE: 22
TURNING FROM BACK TO SIDE WHILE IN FLAT BAD: A LITTLE
MOBILITY SCORE: 18
MOVING TO AND FROM BED TO CHAIR: A LITTLE
MOVING FROM LYING ON BACK TO SITTING ON SIDE OF FLAT BED WITH BEDRAILS: A LITTLE
MOVING TO AND FROM BED TO CHAIR: A LITTLE
CLIMB 3 TO 5 STEPS WITH RAILING: A LOT
DRESSING REGULAR LOWER BODY CLOTHING: A LITTLE
MOVING TO AND FROM BED TO CHAIR: A LITTLE

## 2025-04-21 ASSESSMENT — PAIN - FUNCTIONAL ASSESSMENT
PAIN_FUNCTIONAL_ASSESSMENT: 0-10
PAIN_FUNCTIONAL_ASSESSMENT: 0-10

## 2025-04-21 ASSESSMENT — PAIN SCALES - GENERAL
PAINLEVEL_OUTOF10: 0 - NO PAIN

## 2025-04-21 NOTE — HH CARE COORDINATION
Home Care received a Referral for Nursing, Physical Therapy, and Occupational Therapy. We have processed the referral for a Start of Care on 04-23-25, 24-48 hours.     If you have any questions or concerns, please feel free to contact us at [phone]. Follow the prompts, enter your five digit zip code, and you will be directed to your care team on [facility].

## 2025-04-21 NOTE — CARE PLAN
The patient's goals for the shift include      The clinical goals for the shift include safety and comfort, decreased nausea    Over the shift, the patient did not make progress toward the following goals. Barriers to progression include . Recommendations to address these barriers include .

## 2025-04-21 NOTE — PROGRESS NOTES
Physical Therapy    Physical Therapy Treatment    Patient Name: Nancy Long  MRN: [id number]  Department: [facility]  Room: [address] [place] [address]  Today's Date: 4/21/2025  Time Calculation  Start Time: 1400  Stop Time: 1424  Time Calculation (min): 24 min         Assessment/Plan   PT Assessment  End of Session Patient Position: Up in chair, Alarm off, not on at start of session     PT Plan  Treatment/Interventions: Bed mobility, Transfer training, Gait training, Balance training, Strengthening, Therapeutic exercise, Therapeutic activity  PT Plan: Ongoing PT  PT Frequency: 3 times per week  PT Discharge Recommendations: Moderate intensity level of continued care  PT Recommended Transfer Status: Assist x1  PT - OK to Discharge:  (once medically cleared)      General Visit Information:   PT  Visit  PT Received On: 04/21/25  General  Family/Caregiver Present: Yes  Caregiver Feedback:  (pt's friend at bedside)  Co-Treatment: OT  Co-Treatment Reason: to maximize patient safety & mobility  Prior to Session Communication: Bedside nurse  Patient Position Received: Bed, 2 rail up, Alarm off, not on at start of session  General Comment:  (pt pleasant and agreeable to participate in therapy.  eager to d/c from hospital)    Subjective   Precautions:  Precautions  Medical Precautions: Fall precautions  Precautions Comment: OOB with assist        Objective   Pain:  Pain Assessment  Pain Assessment: 0-10  0-10 (Numeric) Pain Score: 0 - No pain    Cognition:  Cognition  Overall Cognitive Status: Within Functional Limits     Treatments:  Bed Mobility  Bed Mobility:  (sup > sit with CGA; HOB slightly elevated and bedrail used to assist.  increased time needed to complete and scoot fully to edge.  pt leaning fairly posteriorly though no retro LOB.)    Ambulation/Gait Training  Ambulation/Gait Training Performed:  (pt ambulates >/=150 ft with FWW and CGA progressing to SBA.  slow pace; partial step-through; stooped posture.)    Transfers  Transfer:   (sit <> stand with FWW and CGA)    Outcome Measures:  [facility] Basic Mobility  Turning from your back to your side while in a flat bed without using bedrails: A little  Moving from lying on your back to sitting on the side of a flat bed without using bedrails: A little  Moving to and from bed to chair (including a wheelchair): A little  Standing up from a chair using your arms (e.g. wheelchair or bedside chair): A little  To walk in hospital room: A little  Climbing 3-5 steps with railing: A lot  Basic Mobility - Total Score: 17    Education Documentation  Precautions, taught by Teena Alejandro PTA at 4/21/2025  3:12 PM.  Learner: Patient  Readiness: Acceptance  Method: Explanation  Response: Verbalizes Understanding    Mobility Training, taught by Teena Alejandro PTA at 4/21/2025  3:12 PM.  Learner: Patient  Readiness: Acceptance  Method: Explanation  Response: Verbalizes Understanding    Education Comments  No comments found.        EDUCATION:       Encounter Problems       Encounter Problems (Active)       PT Problem       STG - Pt will perform a B LE ther ex program of 2-3 sets of 10  (Progressing)       Start:  04/15/25    Expected End:  04/29/25            STG - Pt will transfer STS with SBA (Progressing)       Start:  04/15/25    Expected End:  04/29/25            STG - Pt will amb 100' using LRAD with SBA  (Progressing)       Start:  04/15/25    Expected End:  04/29/25            STG -  Pt will navigate 4 stairs using rail with SBA  (Progressing)       Start:  04/15/25    Expected End:  04/29/25

## 2025-04-21 NOTE — PROGRESS NOTES
Occupational Therapy    OT Treatment    Patient Name: Nancy Long  MRN: [id number]  Department: [facility]  Room: [address] [place] [address]  Today's Date: 4/21/2025  Time Calculation  Start Time: 1400  Stop Time: 1425  Time Calculation (min): 25 min        Assessment:  End of Session Communication: Bedside nurse  End of Session Patient Position: Alarm off, not on at start of session (recliner)     Plan:  Treatment Interventions: ADL retraining, Functional transfer training, Endurance training, Neuromuscular reeducation, Compensatory technique education  OT Frequency: 3 times per week  OT Discharge Recommendations: Moderate intensity level of continued care  OT - OK to Discharge: Yes (from an O.T. standpoint)  Treatment Interventions: ADL retraining, Functional transfer training, Endurance training, Neuromuscular reeducation, Compensatory technique education    Subjective   Previous Visit Info:  OT Last Visit  OT Received On: 04/21/25    General:  General  Family/Caregiver Present: Yes  Caregiver Feedback: friend present during session  Prior to Session Communication: Bedside nurse  Patient Position Received: Bed, 2 rail up, Alarm off, not on at start of session  General Comment: patient asleep upon entering room however easily aroused and agreeable to therapy    Precautions:  Medical Precautions: Fall precautions          Pain:  Pain Assessment  Pain Assessment: 0-10    Objective         Activities of Daily Living: UE Dressing  UE Dressing Comments: dons robe/housecoat while seated edge of bed with min assist; patient losing balance posteriorly when attempting to don robe, requiring up to cga for sitting balance    Toileting  Toileting Comments: patient stands at walker with sba while pulling underpants up/down over hips and changing pad in underpants       Bed Mobility/Transfers: Bed Mobility  Bed Mobility:  (sba supine to sit)    Transfers  Transfer:  (cga transfers to/from bed and recliner via wheeled walker)      Functional  Mobility:  Functional Mobility  Functional Mobility Performed:  (cga for functional mobility via wheeled walker, progressing to sba as distance increased)    Sitting Balance:  Static Sitting Balance  Static Sitting-Level of Assistance:  (sba)  Dynamic Sitting Balance  Dynamic Sitting-Level of Assistance:  (up to cga, posterior lean)    Standing Balance:  Static Standing Balance  Static Standing-Level of Assistance: Contact guard (with BUE support at walker)     EDUCATION:  Education  Individual(s) Educated: Patient  Education Provided:  (safe transfers, walker safety; energy conservation)  Patient Response to Education: Patient/Caregiver Verbalized Understanding of Information, Patient/Caregiver Performed Return Demonstration of Exercises/Activities    Goals:  Encounter Problems       Encounter Problems (Active)       OT Goals       Increase LE bathing, dressing & toileting to supervision with adaptive equipment as needed.  (Progressing)       Start:  04/15/25    Expected End:  04/29/25            Increase functional transfers & mobility to/from bed, chair & commode to supervision with DME for safety  (Progressing)       Start:  04/15/25    Expected End:  04/29/25            Increase dynamic stand balance to supervision with UE support to promote increased independence with ADL & functional transfers  (Progressing)       Start:  04/15/25    Expected End:  04/29/25            Demonstrate compliance to energy conservation techs and safety techs during ADLs   (Progressing)       Start:  04/15/25    Expected End:  04/29/25

## 2025-04-21 NOTE — PROGRESS NOTES
"Nancy Long is a 79 y.o. female on day 9 of admission presenting with Generalized abdominal pain.    Subjective   Patient states she is passing flatus and a lot of stool. Still said she was a \"tiny\" bit nauseated, but she ate a majority of her regular breakfast tray.    Denies pain. Is eager to go home soon.       Objective     Physical Exam  Vitals reviewed.   Constitutional:       General: She is not in acute distress.     Comments: Patient is sitting up in bed, smiling, finishing breakfast    HENT:      Mouth/Throat:      Mouth: Mucous membranes are moist.   Eyes:      General: No scleral icterus.  Cardiovascular:      Rate and Rhythm: Normal rate and regular rhythm.      Pulses: Normal pulses.   Pulmonary:      Effort: Pulmonary effort is normal. No respiratory distress.      Breath sounds: Normal breath sounds.   Abdominal:      General: Bowel sounds are normal. There is no distension.      Palpations: Abdomen is soft.      Tenderness: There is no abdominal tenderness.      Comments: Laparoscopic incisions now NGUYEN - few steri strips present, mild ecchymosis. No drainage. No erythema    Musculoskeletal:      Right lower leg: No edema.      Left lower leg: No edema.   Skin:     General: Skin is warm and dry.      Coloration: Skin is not jaundiced or pale.   Neurological:      Mental Status: She is alert and oriented to person, place, and time.   Psychiatric:         Behavior: Behavior normal.         Last Recorded Vitals  Blood pressure 150/68, pulse 67, temperature 36.2 °C (97.2 °F), temperature source Temporal, resp. rate 18, height 1.702 m (5' 7\"), weight 76.7 kg (169 lb), SpO2 92%.  Intake/Output last 3 Shifts:  I/O last 3 completed shifts:  In: 400 (5.2 mL/kg) [P.O.:400]  Out: - (0 mL/kg)   Weight: 76.7 kg     Relevant Results  Results for orders placed or performed during the hospital encounter of 04/12/25 (from the past 24 hours)   POCT GLUCOSE   Result Value Ref Range    POCT Glucose 142 (H) 74 - 99 " mg/dL   POCT GLUCOSE   Result Value Ref Range    POCT Glucose 133 (H) 74 - 99 mg/dL   POCT GLUCOSE   Result Value Ref Range    POCT Glucose 144 (H) 74 - 99 mg/dL   POCT GLUCOSE   Result Value Ref Range    POCT Glucose 102 (H) 74 - 99 mg/dL   CBC and Auto Differential   Result Value Ref Range    WBC 9.4 4.4 - 11.3 x10*3/uL    nRBC 0.0 0.0 - 0.0 /100 WBCs    RBC 3.46 (L) 4.00 - 5.20 x10*6/uL    Hemoglobin 10.2 (L) 12.0 - 16.0 g/dL    Hematocrit 32.9 (L) 36.0 - 46.0 %    MCV 95 80 - 100 fL    MCH 29.5 26.0 - 34.0 pg    MCHC 31.0 (L) 32.0 - 36.0 g/dL    RDW 14.0 11.5 - 14.5 %    Platelets 172 150 - 450 x10*3/uL    Neutrophils % 62.8 40.0 - 80.0 %    Immature Granulocytes %, Automated 1.2 (H) 0.0 - 0.9 %    Lymphocytes % 26.2 13.0 - 44.0 %    Monocytes % 6.7 2.0 - 10.0 %    Eosinophils % 2.8 0.0 - 6.0 %    Basophils % 0.3 0.0 - 2.0 %    Neutrophils Absolute 5.94 (H) 1.60 - 5.50 x10*3/uL    Immature Granulocytes Absolute, Automated 0.11 0.00 - 0.50 x10*3/uL    Lymphocytes Absolute 2.47 0.80 - 3.00 x10*3/uL    Monocytes Absolute 0.63 0.05 - 0.80 x10*3/uL    Eosinophils Absolute 0.26 0.00 - 0.40 x10*3/uL    Basophils Absolute 0.03 0.00 - 0.10 x10*3/uL   Comprehensive Metabolic Panel   Result Value Ref Range    Glucose 107 (H) 74 - 99 mg/dL    Sodium 137 136 - 145 mmol/L    Potassium 3.8 3.5 - 5.3 mmol/L    Chloride 108 (H) 98 - 107 mmol/L    Bicarbonate 24 21 - 32 mmol/L    Anion Gap 9 (L) 10 - 20 mmol/L    Urea Nitrogen 18 6 - 23 mg/dL    Creatinine 1.26 (H) 0.50 - 1.05 mg/dL    eGFR 43 (L) >60 mL/min/1.73m*2    Calcium 7.7 (L) 8.6 - 10.3 mg/dL    Albumin 2.7 (L) 3.4 - 5.0 g/dL    Alkaline Phosphatase 69 33 - 136 U/L    Total Protein 5.2 (L) 6.4 - 8.2 g/dL    AST 14 9 - 39 U/L    Bilirubin, Total 0.3 0.0 - 1.2 mg/dL    ALT 6 (L) 7 - 45 U/L         Assessment & Plan  Generalized abdominal pain    Small bowel obstruction (Multi)    SBO (small bowel obstruction) (Multi)    79 year old female with PMH significant for HTN, HLD,  CAD, PAD (follows with Dr. Oliver), carotid artery disease (Right occlusion of ICA, left ICA with > 70% stenosis; follows with Dr. Parish), CKD, Left breast cancer s/p lumpectomy, kidney stones, and IDDM Type 2 (follows with PCP Dr. Bonilla) who presented to [facility] ED on 4/12 with 2 day history of abdominal pain. Admitted to medicine with consult to general surgery with SBO.      Impression:  #pSBO - resolved     Procedures:  4/16 - Placement of NG tube  4/17 - Laparoscopic lysis of adhesions with Dr. Martinez     Recommendations:  - Continue regular diet  - Encourage OOB and ambulation  - IS  - NO more labs       DVT ppx: SCDs, SQH     Dispo: Cleared for discharge. Follow up with Surgeon in 2 weeks    The patient was seen and discussed with the attending surgeon Dr. Martinez     I spent 15 minutes in the professional and overall care of this patient.      Sarah Michaels, APRN-CNP

## 2025-04-21 NOTE — PROGRESS NOTES
"Nutrition Follow Up Assessment:     Nutrition History:  Energy Intake: Fair 50-75 %  Pain affecting nutrition status: N/A  Food and Nutrient History: Pt was unavailable at time of attempted visit today.  Diet advanced over the weekend, now on Regular diet with Ensure clear TID.  Per review of chart, pt eating 66% of meals. POD#4 lysis of adhesions. Last BM 4/20--loose.  Na+ 137, -144  Vitamin/Herbal Supplement Use: none noted       Anthropometrics:  Height: 170.2 cm (5' 7\")   Weight: 76.7 kg (169 lb)   BMI (Calculated): 26.46  IBW/kg (Dietitian Calculated): 61.4 kg  Percent of IBW: 125 %      Weight History:     Weight Change %:  Weight History / % Weight Change: no new weight to assess    Nutrition Focused Physical Exam Findings:    Subcutaneous Fat Loss:   Defer Subcutaneous Fat Loss Assessment: Defer all  Defer All Reason: unavailable  Muscle Wasting:  Defer Muscle Wasting Assessment: Defer all  Defer All Reason: unavailable  Edema:  Edema: none  Physical Findings:  Skin: Positive (abdomen x3 incisions)  Respiratory : Negative  Digestive System Findings: Loose stool    Nutrition Significant Labs:  BMP Trend:   Results from last 7 days   Lab Units 04/21/25  0623 04/20/25  0635 04/19/25  0637 04/18/25  0706   GLUCOSE mg/dL 107* 107* 123* 185*   CALCIUM mg/dL 7.7* 7.8* 7.7* 7.6*   SODIUM mmol/L 137 137 139 135*   POTASSIUM mmol/L 3.8 3.9 4.2 4.0   CO2 mmol/L 24 23 24 25   CHLORIDE mmol/L 108* 108* 109* 106   BUN mg/dL 18 14 14 15   CREATININE mg/dL 1.26* 1.12* 1.32* 1.20*    , BG POCT trend:   Results from last 7 days   Lab Units 04/21/25  0617 04/20/25  2146 04/20/25  1658 04/20/25  1155 04/20/25  0848   POCT GLUCOSE mg/dL 102* 144* 133* 142* 110*        Nutrition Specific Medications:  Reviewed     I/O:   Last BM Date: 04/20/25; Stool Appearance: Loose, Soft, Liquid (04/20/25 1615)    Dietary Orders (From admission, onward)       Start     Ordered    04/21/25 0927  Adult diet Regular  Diet effective now      "   Question:  Diet type  Answer:  Regular    04/21/25 0926    04/18/25 1605  Oral nutritional supplements  Until discontinued        Question Answer Comment   Deliver with All meals    Select supplement: Ensure Clear        04/18/25 1604    04/12/25 2138  May Participate in Room Service  ( ROOM SERVICE MAY PARTICIPATE)  Once        Question:  .  Answer:  Yes    04/12/25 2137                     Estimated Needs:      Method for Estimating Needs: 1540-1700kcals (20-22kcals/kg ABW)     Method for Estimating 24 Hour Protein Needs: 61-77g (0.8-1.0g/kg ABW)     Method for Estimating 24 Hour Fluid Needs: 1 mL/kcal or as per MD  Patient on Order Fluid Restriction: No        Nutrition Diagnosis   Malnutrition Diagnosis  Patient has Malnutrition Diagnosis: No    Nutrition Diagnosis  Patient has Nutrition Diagnosis: Yes  Diagnosis Status (1): Active  Nutrition Diagnosis 1: Inadequate protein energy intake  Related to (1): SBO  As Evidenced by (1): NPO  Additional Assessment Information (1): diet advanced to Regular, however eating 66% of meals       Nutrition Interventions/Recommendations   Nutrition prescription for oral nutrition    Nutrition Recommendations:  Individualized Nutrition Prescription Provided for : Regular diet as ordered with Ensure Clear TID    Nutrition Interventions/Goals:   Meals and Snacks: General healthful diet  Goal: consume >75% of meals  Medical Food Supplement: Commercial beverage medical food supplement therapy  Goal: consume >75% of Ensure CLear TID (for an additional 240 kcals, 9 gm protein each)      Education Documentation  No documentation found.    Regular diet--N/A     Nutrition Monitoring and Evaluation   Food/Nutrient Related History Monitoring  Monitoring and Evaluation Plan: Estimated Energy Intake, Fluid intake, Intake / amount of food  Estimated Energy Intake: Energy intake greater or equal to 75% of estimated energy needs  Fluid Intake: Estimated fluid intake  Intake / Amount of  food: Consumes at least 75% or more of meals/snacks/supplements, Meets > 75% estimated energy needs    Anthropometric Measurements  Monitoring and Evaluation Plan: Body weight  Body Weight: Body weight - Maintain stable weight    Biochemical Data, Medical Tests and Procedures  Monitoring and Evaluation Plan: Electrolyte/renal panel, Glucose/endocrine profile  Electrolyte and Renal Panel: Electrolytes within normal limits  Glucose/Endocrine Profile: Glucose within normal limits ( mg/dL)    Physical Exam Findings  Monitoring and Evaluation Plan: Skin, Digestive System  Digestive System Finding: Loose stool  Criteria: resolution of GI symptoms; formed BM at least every 2-3 days  Skin Finding: Promote intact skin - Promote skin integrity    Goal Status: New goal(s) identified    Time Spent (min): 30 minutes

## 2025-04-21 NOTE — DISCHARGE SUMMARY
Discharge Diagnosis  Generalized abdominal pain           Issues Requiring Follow-Up  Patient fully evaluated on 4/21 with family in the room. Patient eating regular diet lunch in room, states she got up and walked around today and is feeling improved. Cleared by surgery for discharge with outpatient follow-up in two weeks. Labs and medications reviewed. Patient is medically cleared for discharge to home with [facility] today.     Continue aggressive pulmonary hygiene and oral hygiene. Off loading as tolerated for skin integrity.  Plan discussed with interdisciplinary team, no acute events overnight, patient denies chest pain, worsening SOB at this time. Ok to discharge, will continue current and repeat labs in the AM if patient still hospitalized. Patient aware and agreeable to current plan, continue plan as above.     I spent 30 minutes on the date of the service which included preparing to see the patient, face-to-face patient care, completing clinical documentation, obtaining and/or reviewing separately obtained history, performing a medically appropriate examination, counseling and educating the patient/family/caregiver, ordering medications, tests, or procedures, communicating with other HCPs (not separately reported), independently interpreting results (not separately reported), communicating results to the patient/family/caregiver, and care coordination (not separately reported).  Assessment & Plan  Generalized abdominal pain    Small bowel obstruction (Multi)    Dependence on nicotine from cigarettes    Advanced care planning/counseling discussion    Leukocytosis    SBO (small bowel obstruction) (Multi)      Discharge Meds     Medication List      ASK your doctor about these medications     acetaminophen 500 mg tablet; Commonly known as: Tylenol   ascorbic acid 500 mg tablet; Commonly known as: Vitamin C   aspirin 325 mg tablet   betamethasone dipropionate 0.05 % cream   cilostazol 50 mg tablet; Commonly known as:  Pletal; Take 1 tablet (50   mg) by mouth 2 times a day.   Foltx 2-1.13-25 mg tablet; Generic drug: B12-levomefolate calcium-B6   hydrOXYzine pamoate 50 mg capsule; Commonly known as: Vistaril   lansoprazole 30 mg DR capsule; Commonly known as: Prevacid   loratadine-pseudoephedrine  mg 24 hr tablet; Commonly known as:   Claritin-D 24-hour   losartan 100 mg tablet; Commonly known as: Cozaar; Take 1 tablet (100   mg) by mouth once daily.   metFORMIN  mg 24 hr tablet; Commonly known as: Glucophage-XR   metoprolol tartrate 100 mg tablet; Commonly known as: Lopressor; Take 1   tablet (100 mg) by mouth once daily.   NovoLIN 70/30 U-100 Insulin 100 unit/mL (70-30) injection; Generic drug:   insulin NPH and regular human   ondansetron ODT 8 mg disintegrating tablet; Commonly known as:   Zofran-ODT   OneTouch Ultra Test; Generic drug: blood sugar diagnostic   oxyCODONE-acetaminophen  mg tablet; Commonly known as: Percocet   polymyxin B sulf-trimethoprim ophthalmic solution; Commonly known as:   Polytrim   rosuvastatin 20 mg tablet; Commonly known as: Crestor; Take 1 tablet (20   mg) by mouth once daily.   solifenacin 5 mg tablet; Commonly known as: VESIcare   sucralfate 100 mg/mL suspension; Commonly known as: Carafate   torsemide 20 mg tablet; Commonly known as: Demadex; Take 1 tablet (20   mg) by mouth once daily.   zinc sulfate 220 (50 Zn) MG capsule; Commonly known as: Zincate       Test Results Pending At Discharge  Pending Labs       No current pending labs.            Hospital Course         Gianfranco Saez MD   Physician  Internal Medicine     Progress Notes      Signed     Date of Service: 4/20/2025  1:22 PM     Signed       Expand All Collapse All    Nancy Long is a 79 y.o. female on day 8 of admission presenting with Generalized abdominal pain.        Subjective  No significant events overnight. Patient seen and evaluated at bedside with sister present.         Objective[]Expand by  Default  Last Recorded Vitals  /64 (BP Location: Right arm, Patient Position: Lying)   Pulse 68   Temp 36.5 °C (97.7 °F) (Temporal)   Resp 18   Wt 76.7 kg (169 lb)   SpO2 98%   Intake/Output last 3 Shifts:     Intake/Output Summary (Last 24 hours) at 4/20/2025 1323  Last data filed at 4/20/2025 0944      Gross per 24 hour   Intake 875 ml   Output --   Net 875 ml         Admission Weight  Weight: 77.1 kg (170 lb) (04/12/25 1212)     Daily Weight  04/17/25 : 76.7 kg (169 lb)     Image Results  XR abdomen 1 view  Narrative: Interpreted By:  Марина Quintero,   STUDY:  XR ABDOMEN 1 VIEW;  4/17/2025 8:15 am. 2 views.      INDICATION:  Signs/Symptoms:Follow up bowel distention.      COMPARISON:  04/16/2025, CT abdomen and pelvis 04/12/2025      ACCESSION NUMBER(S):  [id number]      ORDERING CLINICIAN:  ROZ RAMESH      FINDINGS:  There is a nasogastric tube with the tip in the distal stomach.  There is progressive moderate gastric distention this has improved  compared to the previous study from 04/16/2025 prior to nasogastric  tube placement. At that time, there was marked gastric distention.  There is moderate small bowel dilatation with loops dilated up to 4.8  cm, unchanged. Findings are consistent with small-bowel obstruction.  There      There are no pathologic calcifications.      Visualized lungs are clear.      Osseous structures demonstrate no acute bony changes.          Impression: 1. Nasogastric tube with the tip in the distal stomach. Improvement  in the degree of gastric distention.  2. Persistent small bowel obstruction.      MACRO:  None      Signed by: Марина Quintero 4/17/2025 9:11 AM  Dictation workstation:   [id number]        Physical Exam        Gianfranco Saez MD   Physician  Internal Medicine     Progress Notes      Signed     Date of Service: 4/15/2025  8:35 AM      Signed        Expand All Collapse All    Nancy Long is a 79 y.o. female on day 3 of admission presenting with  Generalized abdominal pain.        Subjective  No significant events overnight. Patient seen and evaluated at bedside.         Objective[]Expand by Default  Last Recorded Vitals  /67   Pulse (!) 37   Temp 36.4 °C (97.5 °F)   Resp 18   Wt 77.1 kg (170 lb)   SpO2 93%   Intake/Output last 3 Shifts:     Intake/Output Summary (Last 24 hours) at 4/15/2025 0835  Last data filed at 4/15/2025 0250        Gross per 24 hour   Intake 950 ml   Output --   Net 950 ml         Admission Weight  Weight: 77.1 kg (170 lb) (04/12/25 1212)     Daily Weight  04/12/25 : 77.1 kg (170 lb)     Image Results  XR abdomen 1 view  Narrative: Interpreted By:  Arun Escamilla,   STUDY:  XR ABDOMEN 1 VIEW;  4/14/2025 7:06 am      INDICATION:  Signs/Symptoms:Follow up bowel distention.          COMPARISON:  04/13/2025      ACCESSION NUMBER(S):  [id number]      ORDERING CLINICIAN:  ROZ RAMESH      FINDINGS:  Three view abdomen      Multiple dilated gas-filled bowel loops predominantly centrally with  stacked appearance most concerning for obstruction redemonstrated  grossly similar to prior. Limited evaluation for free air on supine  films. Some gas and stool noted within the colon as previously.  Approximate 5 cm gaseous lucency overlying the pelvis could represent  focal distended bowel loop possibly distal colon within the pelvis  and new or more pronounced compared to prior. Mild gaseous distention  of the stomach.      Prominent vascular calcifications overlying abdomen and pelvis and  overlying the groin regions.      Impression: 1.  Multiple dilated gas-filled loops of small bowel redemonstrated  most suggestive of sequela of obstruction as noted previously and  probably grossly similar to prior. Focal gaseous lucency overlying  the pelvis new or more pronounced may reflect focal distended bowel  loop possibly distal colon within the pelvis new or more pronounced.  Mild gaseous distention of the stomach. Limited evaluation for  free  air on supine films. Continued clinical correlation and follow-up  advised.      MACRO:  None      Signed by: Arun Escamilla 4/14/2025 2:45 PM  Dictation workstation:   [id number]        Physical Exam     Date of Service: 4/13/2025  6:18 PM      Signed        Expand All Collapse All    History Of Present Illness  Nancy Long is a 79 y.o. female with CAD,  chronic diastolic heart failure, hypertension, hyperlipidemia, peripheral artery disease, diabetes mellitus, carotid artery disease (Right occlusion of ICA, left ICA with > 70% stenosis; follows with Dr. Parish), CKD, Left breast cancer s/p lumpectomy and radiation, kidney stones, and open cholecystectomy and open appendectomy with right ovary removal (for tumor- at age 16) who presented today with abdominal pain.  Patient reports yesterday she developed severe, sharp, continuous epigastric pain associated with nausea and diaphoresis.  She notes she took some of her prescribed Percocet that seemed to help a little however the symptoms persisted into today so she decided to come to the emergency room.  She also notes a decreased appetite and when she did try to eat the food did not taste good.  She denies a history of bowel obstruction.  She articulates she told the surgery team she would not like surgical intervention.  CODE STATUS discussed and she would like to be a DNR CCA.     In the ED lab work, EKG, chest x-ray and CTAP were performed, labs revealed glucose 200, sodium 125, chloride 94, bun 45, creatinine 1.64 (1.32 on 2/4/2020), , lactate 1.6, troponins 27 and 29 respectively, white count 21, neutrophils 17.35, monocytes 1.1. EKG Interpretation, per ER physician impression: Sinus at rate of 79, , QRS 91, QTc 407 without evidence of STEMI or ischemia. Chest x-ray without acute process.  CT abdomen/pelvis revealed small bowel obstruction with mesenteric edema as described, small volume of ascites, diverticulosis, atherosclerosis, subcutaneous  opacities and reticulation that may be due to injections although hematoma is possible.  Patient was evaluated by surgery team that noted her symptoms are likely consistent with partial adhesive small bowel obstruction and surgical intervention was offered however patient declined.  Her vital signs were acceptable with a slightly elevated blood pressure of 157/70.  She was given cefepime Flagyl morphine Zofran and started on IV fluids and admitted for further evaluation and treatment under the care of Dr. Saez.        Echo March 2025:     CONCLUSIONS:  1. Left ventricular ejection fraction is normal, by visual estimate at 70-75%.  2. There is moderate left ventricular hypertrophy.  3. The left atrium is mildly dilated.  4. There is mild mitral valve stenosis.  5. There is moderate mitral annular calcification.  6. Right ventricular systolic pressure is within normal limits.  7. Mild aortic valve stenosis.  8. Mild left ventricular outflow obstruction at rest and with Valsalva (10 mmHg).     Past Medical History  Medical History           Past Medical History:   Diagnosis Date    Personal history of other diseases of the circulatory system       History of hypertension    Personal history of other endocrine, nutritional and metabolic disease       History of diabetes mellitus            Surgical History  Surgical History             Past Surgical History:   Procedure Laterality Date    APPENDECTOMY   01/03/2014     Appendectomy    BREAST LUMPECTOMY   01/03/2014     Breast Surgery Lumpectomy    CHOLECYSTECTOMY   01/03/2014     Cholecystectomy    OOPHORECTOMY   01/03/2014     Oophorectomy            Social History  Denies alcohol or illicit drug use.  Reports she smokes 2 to 3 packs of cigarettes per week.  Lives alone.  Ambulates with cane as needed.     Family History  Family History   No family history on file.         Allergies  Penicillins and Sulfa (sulfonamide antibiotics)     10 point review of systems  "performed and is negative besides what is stated in HPI.     Physical Exam  Constitutional:       Appearance: Normal appearance.   HENT:      Head: Normocephalic and atraumatic.      Nose: Nose normal.      Mouth/Throat:      Mouth: Mucous membranes are moist.   Eyes:      Conjunctiva/sclera: Conjunctivae normal.   Cardiovascular:      Rate and Rhythm: Normal rate and regular rhythm.      Heart sounds: Murmur heard.   Pulmonary:      Effort: Pulmonary effort is normal.      Comments: Crackles left base  Abdominal:      General: Bowel sounds are normal. There is no distension.      Tenderness: There is abdominal tenderness.      Comments: Somewhat firm   Musculoskeletal:         General: Normal range of motion.      Cervical back: Neck supple.   Skin:     General: Skin is warm and dry.   Neurological:      Mental Status: She is alert. Mental status is at baseline.   Psychiatric:         Mood and Affect: Mood normal.            Last Recorded Vitals  Blood pressure (!) 193/77, pulse 74, temperature 37 °C (98.6 °F), temperature source Temporal, resp. rate 20, height 1.702 m (5' 7\"), weight 77.1 kg (170 lb), SpO2 94%.     Relevant Results     Medications Ordered Prior to Encounter   No current facility-administered medications on file prior to encounter.                  Current Outpatient Medications on File Prior to Encounter   Medication Sig Dispense Refill    acetaminophen (Tylenol) 500 mg tablet Take 1 tablet (500 mg) by mouth every 8 hours if needed for mild pain (1 - 3).        ascorbic acid (Vitamin C) 500 mg tablet Take 1 tablet (500 mg) by mouth once daily.        aspirin 325 mg tablet Take 1 tablet (325 mg) by mouth once daily at bedtime.        B12-levomefolate calcium-B6 (Foltx) 2-1.13-25 mg tablet Take 1 tablet by mouth once daily.        cilostazol (Pletal) 50 mg tablet Take 1 tablet (50 mg) by mouth 2 times a day. 180 tablet 3    lansoprazole (Prevacid) 30 mg DR capsule Take 1 capsule (30 mg) by mouth 2 " times a day as needed.        losartan (Cozaar) 100 mg tablet Take 1 tablet (100 mg) by mouth once daily. 90 tablet 3    metFORMIN XR (Glucophage-XR) 500 mg 24 hr tablet Take 1 tablet (500 mg) by mouth once daily in the evening. Take with meals. (Patient taking differently: Take 1 tablet (500 mg) by mouth once daily as needed (BG >200).)        metoprolol tartrate (Lopressor) 100 mg tablet Take 1 tablet (100 mg) by mouth once daily. (Patient taking differently: Take 1 tablet (100 mg) by mouth once daily at bedtime.) 90 tablet 3    NovoLIN 70/30 U-100 Insulin 100 unit/mL (70-30) injection Inject 43 Units under the skin 2 times a day before meals.        OneTouch Ultra Test strip          oxyCODONE-acetaminophen (Percocet)  mg tablet Take 1 tablet by mouth every 8 hours.        rosuvastatin (Crestor) 20 mg tablet Take 1 tablet (20 mg) by mouth once daily. (Patient taking differently: Take 1 tablet (20 mg) by mouth once daily at bedtime.) 90 tablet 3    zinc sulfate (Zincate) 220 (50 Zn) MG capsule Take 1 capsule (50 mg of elemental zinc) by mouth once daily.        betamethasone dipropionate 0.05 % cream Apply topically 2 times a day. (Patient not taking: Reported on 4/12/2025)        hydrOXYzine pamoate (Vistaril) 50 mg capsule Take 1 capsule (50 mg) by mouth 4 times a day as needed. (Patient not taking: Reported on 4/12/2025)        loratadine-pseudoephedrine (Claritin-D 24-hour)  mg 24 hr tablet Take 1 tablet by mouth once daily. (Patient not taking: Reported on 4/12/2025)        ondansetron ODT (Zofran-ODT) 8 mg disintegrating tablet every 8 hours if needed. (Patient not taking: Reported on 4/12/2025)        polymyxin B sulf-trimethoprim (Polytrim) ophthalmic solution APPLY 1/4 INCH TO AFFECTED EYE 4 TIMES A DAY. (Patient not taking: Reported on 4/12/2025)        solifenacin (VESIcare) 5 mg tablet  (Patient not taking: Reported on 4/12/2025)        sucralfate (Carafate) 100 mg/mL suspension TAKE 10 ML  BY MOUTH 4 TIMES A DAY (Patient not taking: Reported on 4/12/2025)        torsemide (Demadex) 20 mg tablet Take 1 tablet (20 mg) by mouth once daily. (Patient not taking: Reported on 4/12/2025) 90 tablet 3                      Results for orders placed or performed during the hospital encounter of 04/12/25 (from the past 24 hours)   POCT GLUCOSE   Result Value Ref Range     POCT Glucose 183 (H) 74 - 99 mg/dL   Troponin I, High Sensitivity   Result Value Ref Range     Troponin I, High Sensitivity 23 (H) 0 - 13 ng/L   Sars-CoV-2 and Influenza A/B PCR   Result Value Ref Range     Flu A Result Not Detected Not Detected     Flu B Result Not Detected Not Detected     Coronavirus 2019, PCR Not Detected Not Detected   POCT GLUCOSE   Result Value Ref Range     POCT Glucose 171 (H) 74 - 99 mg/dL   Urinalysis with Reflex Culture and Microscopic   Result Value Ref Range     Color, Urine Light-Yellow Light-Yellow, Yellow, Dark-Yellow     Appearance, Urine Clear Clear     Specific Gravity, Urine 1.017 1.005 - 1.035     pH, Urine 5.0 5.0, 5.5, 6.0, 6.5, 7.0, 7.5, 8.0     Protein, Urine 10 (TRACE) NEGATIVE, 10 (TRACE), 20 (TRACE) mg/dL     Glucose, Urine Normal Normal mg/dL     Blood, Urine NEGATIVE NEGATIVE mg/dL     Ketones, Urine NEGATIVE NEGATIVE mg/dL     Bilirubin, Urine NEGATIVE NEGATIVE mg/dL     Urobilinogen, Urine Normal Normal mg/dL     Nitrite, Urine NEGATIVE NEGATIVE     Leukocyte Esterase, Urine 250 Triny/uL (A) NEGATIVE   Extra Urine Gray Tube   Result Value Ref Range     Extra Tube Hold for add-ons.     POCT GLUCOSE   Result Value Ref Range     POCT Glucose 131 (H) 74 - 99 mg/dL   Microscopic Only, Urine   Result Value Ref Range     WBC, Urine 11-20 (A) 1-5, NONE /HPF     RBC, Urine 1-2 NONE, 1-2, 3-5 /HPF     Bacteria, Urine 4+ (A) NONE SEEN /HPF     Mucus, Urine FEW Reference range not established. /LPF     Hyaline Casts, Urine OCCASIONAL (A) NONE /LPF   POCT GLUCOSE   Result Value Ref Range     POCT Glucose 81 74 -  99 mg/dL   POCT GLUCOSE   Result Value Ref Range     POCT Glucose 67 (L) 74 - 99 mg/dL   POCT GLUCOSE   Result Value Ref Range     POCT Glucose 64 (L) 74 - 99 mg/dL   POCT GLUCOSE   Result Value Ref Range     POCT Glucose 147 (H) 74 - 99 mg/dL   CBC   Result Value Ref Range     WBC 15.8 (H) 4.4 - 11.3 x10*3/uL     nRBC 0.0 0.0 - 0.0 /100 WBCs     RBC 3.83 (L) 4.00 - 5.20 x10*6/uL     Hemoglobin 11.0 (L) 12.0 - 16.0 g/dL     Hematocrit 36.3 36.0 - 46.0 %     MCV 95 80 - 100 fL     MCH 28.7 26.0 - 34.0 pg     MCHC 30.3 (L) 32.0 - 36.0 g/dL     RDW 13.1 11.5 - 14.5 %     Platelets 163 150 - 450 x10*3/uL   Basic Metabolic Panel   Result Value Ref Range     Glucose 74 74 - 99 mg/dL     Sodium 132 (L) 136 - 145 mmol/L     Potassium 4.1 3.5 - 5.3 mmol/L     Chloride 103 98 - 107 mmol/L     Bicarbonate 23 21 - 32 mmol/L     Anion Gap 10 10 - 20 mmol/L     Urea Nitrogen 39 (H) 6 - 23 mg/dL     Creatinine 1.33 (H) 0.50 - 1.05 mg/dL     eGFR 40 (L) >60 mL/min/1.73m*2     Calcium 8.2 (L) 8.6 - 10.3 mg/dL   POCT GLUCOSE   Result Value Ref Range     POCT Glucose 72 (L) 74 - 99 mg/dL   POCT GLUCOSE   Result Value Ref Range     POCT Glucose 75 74 - 99 mg/dL   POCT GLUCOSE   Result Value Ref Range     POCT Glucose 84 74 - 99 mg/dL   POCT GLUCOSE   Result Value Ref Range     POCT Glucose 99 74 - 99 mg/dL         XR abdomen 1 view     Result Date: 4/13/2025  Interpreted By:  Beka Mays, STUDY: Abdominal series dated 4/13/2025.   INDICATION: Follow-up bowel dilation.   COMPARISON: Correlation is made with 04/12/2025 CT.   ACCESSION NUMBER(S): [id number]   ORDERING CLINICIAN: ROZ RAMESH   TECHNIQUE: Two AP radiographs of the abdomen.   FINDINGS: There are multiple stacked dilated loops of small bowel in the central abdomen measuring up to a proximally 4.1 cm. There is gas and stool in the colon. Lung bases are clear. Degenerative changes seen of the spine and hips.        Multiple dilated loops of small bowel in the central  abdomen most compatible with a degree of obstructive process as further discussed on prior date CT.   Signed by: Beka Mays 4/13/2025 1:01 PM Dictation workstation:   [id number]     CT abdomen pelvis wo IV contrast     Addendum Date: 4/12/2025    Interpreted By:  Chano Marshall, ADDENDUM: Also to be noted at the impression: Subcutaneous opacities and reticulation that may be due to injections although hematoma is possible.   Signed by: Chano Marshall 4/12/2025 2:38 PM   -------- ORIGINAL REPORT -------- Dictation workstation:   [id number]     Result Date: 4/12/2025  Interpreted By:  Chano Marshall, STUDY: CT ABDOMEN PELVIS WO IV CONTRAST;  4/12/2025 2:05 pm   INDICATION: Signs/Symptoms:abd pain.   COMPARISON: None.   ACCESSION NUMBER(S): [id number]   ORDERING CLINICIAN: FRANK BREWSTER   TECHNIQUE: CT of the abdomen and pelvis was performed. Contiguous axial images were obtained at 3 mm slice thickness through the abdomen and pelvis. Coronal and sagittal reconstructions at 3 mm slice thickness were performed.   FINDINGS: LOWER CHEST: Small right pleural effusion and trace left pleural effusion. Coronary artery atherosclerotic calcification and mitral annular calcification.   ABDOMEN:   LIVER: The hepatic parenchyma is homogeneous without evidence of focal liver lesions.The hepatic size is normal.   SPLEEN: The spleen is normal in size and homogeneous.   ADRENAL GLANDS: Bilateral adrenal glands appear normal.   KIDNEYS AND URETERS: Mild bilateral cortical atrophy, the renal cortices otherwise are homogeneous. Radiodensities at the renal sinuses is probably atherosclerotic calcification, although superimposed nonobstructing calculi are possible.   The ureters throughout their distal course are not well identified due to overlying crowded bowel loops, but the tracts otherwise are unremarkable without identified ureteral dilatation or additional radiodense calculi.   PANCREAS: The pancreas appears unremarkable,  there is no ductal dilatation or masses.   GALLBLADDER: Not visualized, presumably with cholecystectomy.   BILE DUCTS: Dilatation of the extrahepatic ducts, the common bile duct measuring 1.4 cm in diameter, most likely from post cholecystectomy state. However as the no prior exams for comparison MRCP would be recommended if there is symptomatology compatible with biliary colic.     VESSELS: Fairly extensive atherosclerotic calcifications are noted predominantly at the aorta and iliac system. There is also moderate atherosclerotic calcification at the mid segment of the superior mesenteric artery, and fairly marked of the inferior mesenteric artery.   PERITONEUM AND RETROPERITONEUM: There is a small volume of ascites best seen in the right upper quadrant along the liver. No free intraperitoneal air.   The retroperitoneum appears unremarkable, and without significant adenopathy.   No enlarged mesenteric lymph nodes.   BOWEL: There is mild distention of multiple small bowel segments with air-fluid levels, associated with reticulation of the mesentery indicating edema. There is decompression of distal small bowel segments, and the pattern would be most compatible with mid to early or partial distal small bowel obstruction. There is also moderate diverticulosis of the distal colon.   PELVIS:   BLADDER: The urinary bladder contour is smooth.   REPRODUCTIVE ORGANS: No pelvic masses.     BONE, ABDOMINAL WALL AND OTHER FINDINGS: No suspicious osseous lesions are identified.   There is a small non incarcerated umbilical hernia containing intra-abdominal fat. There is also attenuation within the subcutaneous fat of the mid/lower abdomen anteriorly that may be from subcutaneous injections. However, hematomas are also possible. There is also reticulation of the subcutaneous fat at the right lateral abdominal wall indicating additional edema.        1.  Small-bowel obstruction with mesenteric edema as described. 2. Small volume  "of ascites. 3. Diverticulosis. 4. Atherosclerosis as described.   MACRO: 1. None   Signed by: Chano Marshall 4/12/2025 2:28 PM Dictation workstation:   [id number]     XR chest 2 views     Result Date: 4/12/2025  Interpreted By:  Beka Mays, STUDY: Chest dated  4/12/2025.   INDICATION: Signs/Symptoms:sob with ambulation   COMPARISON: Chest dated 11/17/2015.   ACCESSION NUMBER(S): [id number]   ORDERING CLINICIAN: FRANK BREWSTER   TECHNIQUE: One view of the chest.   FINDINGS: The lungs are clear.  No pneumothorax or effusion is evident. The cardiomediastinal silhouette is  not enlarged.Degenerative change is seen of the spine and shoulders.        No acute cardiopulmonary process is evident.   MACRO: None   Signed by: Beka Mays 4/12/2025 1:26 PM Dictation workstation:   [id number]             Assessment/Plan     Assessment & Plan  Generalized abdominal pain     Small bowel obstruction (Multi)     Dependence on nicotine from cigarettes     Advanced care planning/counseling discussion     Leukocytosis        Per surgery:  \"Impression:  #SBO   > small bowel dilation; however, other concerning CT findings particularly mesenteric edema and ascites. CT imaging worrisome for possible ischemic/low flow state to bowel; however, serum lactate normal and patient's clinical presentation/symptoms/pain/bowel sounds are consistent with an adhesive small bowel obstruction (partial)  #Leukocytosis (possibly 2/2 above, but may have other infectious etiology, possibly UTI?)   > UA ordered; however, not yet collected and already received antibiotics  #Hyponatremia and hypochloremia  #CKD   Recommendations:  - no acute surgical intervention at this time   > patient offered surgical intervention if she were to worsen clinically; however, she was adamant about not wanting surgery   > would recommend placing NG tube for decompression; however, patient again declined NG tube. She said she may be agreeable if she begins to feel " "worse/nauseated  - PRN IV Zofran  - PRN IV Morphine for severe pain (limit use as able, but unable to give NSAIDs or PO meds)  - IVF: D5NS @ 100  - repeat CBC and BMP in AM\"     Additionally:  Change morphine to Dilaudid given CKD  Check flu and COVID  KUB in a.m.        #Advance care planning     Patient would like to be a DNR CCA     #Nicotine dependence     Reports she lets most of her cigarettes to burn out without smoking them so we will order a nicotine patch as needed     #Chronic conditions:     CAD,  chronic diastolic heart failure, hypertension, hyperlipidemia, peripheral artery disease, diabetes mellitus, carotid artery disease (Right occlusion of ICA, left ICA with > 70% stenosis; follows with Dr. Parish), CKD, Left breast cancer s/p lumpectomy and radiation, kidney stones, and open cholecystectomy and open appendectomy with right ovary removal (for tumor- at age 16)      Hold all p.o. meds  Give IV Protonix   Metoprolol IV around-the-clock with parameters  Patient takes 70/30 at home will conservatively give 30 units of Lantus daily which would be a little less than half of the equivalent of 70/30/day with sliding scale insulin and hypoglycemic protocol     #DVT prophylaxis     SCDs  Heparin                             Chadwick Medel, DO     Review of Systems                     Relevant Results                    Assessment/Plan                       Assessment & Plan  Generalized abdominal pain     Small bowel obstruction (Multi)     Dependence on nicotine from cigarettes     Advanced care planning/counseling discussion     Leukocytosis     Patient fully evaluated on 4/14. CT showing SBO with mesenteric edema, small volume ascites, diverticulosis. Evaluated by general surgery today, recommending NG tube for decompression, ordered zofran, morphine, continuing IV cefepime/flagyl. Repeat KUB ordered, results pending. If KUB looks okay, plan to advance diet. Leukocytosis downtrending, continue to " monitor. Hypertension noted this morning, cardiac PO meds held d/t NPO status. Continue to monitor and restart PO meds when diet advanced. Recheck labs in AM.      Patient still requiring frequent cardiac and SPO2 monitoring. Continue aggressive pulmonary hygiene and oral hygiene. Off loading as tolerated for skin integrity. Medications and labs reviewed.     I spent a total of 60 minutes on the date of the service which included preparing to see the patient, face-to-face patient care, completing clinical documentation, obtaining and/or reviewing separately obtained history, performing a medically appropriate examination, counseling and educating the patient/family/caregiver, ordering medications, tests, or procedures, communicating with other HCPs (not separately reported), independently interpreting results (not separately reported), communicating results to the patient/family/caregiver, and care coordination (not separately reported).   Patient fully evaluated  04/15  for    Problem List Items Addressed This Visit         * (Principal) Generalized abdominal pain - Primary      Other Visit Diagnoses         SBO (small bowel obstruction) (Multi)                 Patient seen resting in bed with head of bed elevated, no s/s or c/o acute difficulties at this time.  Vital signs for last 24 hours Temp:  [36.2 °C (97.2 °F)-36.9 °C (98.4 °F)] 36.4 °C (97.5 °F)  Heart Rate:  [37-76] 37  Resp:  [18-20] 18  BP: (136-186)/(63-82) 152/67    No intake/output data recorded.  Patient still requiring frequent cardiac and SPO2 monitoring. Continue aggressive pulmonary hygiene and oral hygiene. Off loading as tolerated for skin integrity. Medications and labs reviewed-             Results for orders placed or performed during the hospital encounter of 04/12/25 (from the past 24 hours)   POCT GLUCOSE   Result Value Ref Range     POCT Glucose 111 (H) 74 - 99 mg/dL   POCT GLUCOSE   Result Value Ref Range     POCT Glucose 115 (H) 74 -  99 mg/dL   POCT GLUCOSE   Result Value Ref Range     POCT Glucose 129 (H) 74 - 99 mg/dL   POCT GLUCOSE   Result Value Ref Range     POCT Glucose 114 (H) 74 - 99 mg/dL   POCT GLUCOSE   Result Value Ref Range     POCT Glucose 117 (H) 74 - 99 mg/dL   CBC and Auto Differential   Result Value Ref Range     WBC 12.1 (H) 4.4 - 11.3 x10*3/uL     nRBC 0.0 0.0 - 0.0 /100 WBCs     RBC 3.89 (L) 4.00 - 5.20 x10*6/uL     Hemoglobin 11.4 (L) 12.0 - 16.0 g/dL     Hematocrit 36.5 36.0 - 46.0 %     MCV 94 80 - 100 fL     MCH 29.3 26.0 - 34.0 pg     MCHC 31.2 (L) 32.0 - 36.0 g/dL     RDW 13.0 11.5 - 14.5 %     Platelets 161 150 - 450 x10*3/uL     Neutrophils % 74.4 40.0 - 80.0 %     Immature Granulocytes %, Automated 0.5 0.0 - 0.9 %     Lymphocytes % 14.3 13.0 - 44.0 %     Monocytes % 8.0 2.0 - 10.0 %     Eosinophils % 2.4 0.0 - 6.0 %     Basophils % 0.4 0.0 - 2.0 %     Neutrophils Absolute 8.99 (H) 1.60 - 5.50 x10*3/uL     Immature Granulocytes Absolute, Automated 0.06 0.00 - 0.50 x10*3/uL     Lymphocytes Absolute 1.73 0.80 - 3.00 x10*3/uL     Monocytes Absolute 0.97 (H) 0.05 - 0.80 x10*3/uL     Eosinophils Absolute 0.29 0.00 - 0.40 x10*3/uL     Basophils Absolute 0.05 0.00 - 0.10 x10*3/uL   Comprehensive Metabolic Panel   Result Value Ref Range     Glucose 109 (H) 74 - 99 mg/dL     Sodium 135 (L) 136 - 145 mmol/L     Potassium 3.7 3.5 - 5.3 mmol/L     Chloride 106 98 - 107 mmol/L     Bicarbonate 22 21 - 32 mmol/L     Anion Gap 11 10 - 20 mmol/L     Urea Nitrogen 20 6 - 23 mg/dL     Creatinine 1.14 (H) 0.50 - 1.05 mg/dL     eGFR 48 (L) >60 mL/min/1.73m*2     Calcium 8.0 (L) 8.6 - 10.3 mg/dL     Albumin 3.1 (L) 3.4 - 5.0 g/dL     Alkaline Phosphatase 72 33 - 136 U/L     Total Protein 5.7 (L) 6.4 - 8.2 g/dL     AST 27 9 - 39 U/L     Bilirubin, Total 0.6 0.0 - 1.2 mg/dL     ALT 15 7 - 45 U/L   POCT GLUCOSE   Result Value Ref Range     POCT Glucose 111 (H) 74 - 99 mg/dL      Patient recently received an antibiotic (last 12 hours)          Date/Time Action Medication Dose     04/15/25 0649 New Bag    metroNIDAZOLE (Flagyl) 500 mg in sodium chloride (iso)  mL 500 mg     04/15/25 0218 New Bag    cefepime (Maxipime) 1 g in dextrose 5% IV 50 mL 1 g     04/14/25 2235 New Bag    metroNIDAZOLE (Flagyl) 500 mg in sodium chloride (iso)  mL 500 mg             Plan discussed with interdisciplinary team,  NG tube for decompression, PRN IV zofran, IV morphine, will continue on IV Antibiotics-continuing IV cefepime/flagyl, WBCs downtrending. Repeat KUB on 4/14 unchanged, will repeat KUB today. Unable to advance diet at this time, will continue IV fluids, NPO. Ice chips ok.  PO meds held d/t NPO status. Continue to monitor and restart PO meds when diet advanced. continue current and repeat labs in the AM.      Discharge planning discussed with patient and care team. Therapy evaluations ordered. Anticipate HHC/SNF at discharge. Patient aware and agreeable to current plan, continue plan as above.      I spent a total of 50 minutes on the date of the service which included preparing to see the patient, face-to-face patient care, completing clinical documentation, obtaining and/or reviewing separately obtained history, performing a medically appropriate examination, counseling and educating the patient/family/caregiver, ordering medications, tests, or procedures, communicating with other HCPs (not separately reported), independently interpreting results (not separately reported), communicating results to the patient/family/caregiver, and care coordination (not separately reported).   Angela Fatima                        Revision History     Scheduled medications  [Scheduled Medications]    [Scheduled Medications]    acetaminophen, 975 mg, oral, TID  [Held by provider] aspirin, 325 mg, oral, Nightly  benzonatate, 200 mg, oral, TID  [Held by provider] cilostazol, 50 mg, oral, BID  gabapentin, 300 mg, oral, TID  heparin (porcine), 5,000 Units, subcutaneous,  q8h  [Held by provider] insulin glargine, 15 Units, subcutaneous, q24h  insulin lispro, 0-5 Units, subcutaneous, q4h  losartan, 100 mg, oral, Daily  [Held by provider] metFORMIN XR, 500 mg, oral, Daily before evening meal  metoprolol tartrate, 100 mg, oral, Nightly  pantoprazole, 40 mg, intravenous, Daily       Continuous medications  [Continuous Medications]    [Continuous Medications]         PRN medications  [PRN Medications]    [PRN Medications]    PRN medications: benzocaine-menthol, dextrose, dextrose, glucagon, glucagon, HYDROmorphone, nicotine, ondansetron             Results for orders placed or performed during the hospital encounter of 04/12/25 (from the past 24 hours)   POCT GLUCOSE   Result Value Ref Range     POCT Glucose 136 (H) 74 - 99 mg/dL   POCT GLUCOSE   Result Value Ref Range     POCT Glucose 183 (H) 74 - 99 mg/dL   CBC and Auto Differential   Result Value Ref Range     WBC 9.8 4.4 - 11.3 x10*3/uL     nRBC 0.0 0.0 - 0.0 /100 WBCs     RBC 3.59 (L) 4.00 - 5.20 x10*6/uL     Hemoglobin 10.3 (L) 12.0 - 16.0 g/dL     Hematocrit 34.7 (L) 36.0 - 46.0 %     MCV 97 80 - 100 fL     MCH 28.7 26.0 - 34.0 pg     MCHC 29.7 (L) 32.0 - 36.0 g/dL     RDW 13.5 11.5 - 14.5 %     Platelets 161 150 - 450 x10*3/uL     Neutrophils % 68.6 40.0 - 80.0 %     Immature Granulocytes %, Automated 1.1 (H) 0.0 - 0.9 %     Lymphocytes % 18.9 13.0 - 44.0 %     Monocytes % 7.3 2.0 - 10.0 %     Eosinophils % 3.7 0.0 - 6.0 %     Basophils % 0.4 0.0 - 2.0 %     Neutrophils Absolute 6.73 (H) 1.60 - 5.50 x10*3/uL     Immature Granulocytes Absolute, Automated 0.11 0.00 - 0.50 x10*3/uL     Lymphocytes Absolute 1.85 0.80 - 3.00 x10*3/uL     Monocytes Absolute 0.72 0.05 - 0.80 x10*3/uL     Eosinophils Absolute 0.36 0.00 - 0.40 x10*3/uL     Basophils Absolute 0.04 0.00 - 0.10 x10*3/uL   Comprehensive Metabolic Panel   Result Value Ref Range     Glucose 107 (H) 74 - 99 mg/dL     Sodium 137 136 - 145 mmol/L     Potassium 3.9 3.5 - 5.3 mmol/L      Chloride 108 (H) 98 - 107 mmol/L     Bicarbonate 23 21 - 32 mmol/L     Anion Gap 10 10 - 20 mmol/L     Urea Nitrogen 14 6 - 23 mg/dL     Creatinine 1.12 (H) 0.50 - 1.05 mg/dL     eGFR 50 (L) >60 mL/min/1.73m*2     Calcium 7.8 (L) 8.6 - 10.3 mg/dL     Albumin 2.8 (L) 3.4 - 5.0 g/dL     Alkaline Phosphatase 54 33 - 136 U/L     Total Protein 5.3 (L) 6.4 - 8.2 g/dL     AST 12 9 - 39 U/L     Bilirubin, Total 0.3 0.0 - 1.2 mg/dL     ALT 6 (L) 7 - 45 U/L   POCT GLUCOSE   Result Value Ref Range     POCT Glucose 110 (H) 74 - 99 mg/dL   POCT GLUCOSE   Result Value Ref Range     POCT Glucose 142 (H) 74 - 99 mg/dL      No results found.                   Assessment & Plan  Generalized abdominal pain     Small bowel obstruction (Multi)     Dependence on nicotine from cigarettes     Advanced care planning/counseling discussion     Leukocytosis     SBO (small bowel obstruction) (Multi)     Patient fully evaluated on 4/16. Underwent successful NG tube placement this morning with good suction output so far. Repeat KUB ordered, pending results. Labs and medications reviewed. Mild anemia noted, continue to monitor. Continue NPO status with IVF. Plan to stay another day or two with NG in, then trial clear liquids after removal. Recheck labs in AM.          I spent a total of 60 minutes on the date of the service which included preparing to see the patient, face-to-face patient care, completing clinical documentation, obtaining and/or reviewing separately obtained history, performing a medically appropriate examination, counseling and educating the patient/family/caregiver, ordering medications, tests, or procedures, communicating with other HCPs (not separately reported), independently interpreting results (not separately reported), communicating results to the patient/family/caregiver, and care coordination (not separately reported).         Patient fully evaluated  04/18  for    Problem List Items Addressed This Visit                   Gastrointestinal and Abdominal     * (Principal) Generalized abdominal pain - Primary     Small bowel obstruction (Multi)     Relevant Orders     Case Request Operating Room: Laparoscopy with lysis of adhesions.  Possible laparotomy.  Possible bowel resection. (Completed)     SBO (small bowel obstruction) (Multi)     Relevant Orders     Case Request Operating Room: Laparoscopy, possible laparotomy, possible bowel resection (Completed)      Patient seen resting in bed with head of bed elevated, no s/s or c/o acute difficulties at this time.  Vital signs for last 24 hours Temp:  [36.1 °C (97 °F)-36.6 °C (97.9 °F)] 36.5 °C (97.7 °F)  Heart Rate:  [52-68] 68  Resp:  [18] 18  BP: (118-160)/(60-73) 139/64    I/O this shift:  In: 200 [P.O.:200]  Out: -   Patient still requiring frequent cardiac and SPO2 monitoring. Continue aggressive pulmonary hygiene and oral hygiene. Off loading as tolerated for skin integrity. Medications and labs reviewed-         Results for orders placed or performed during the hospital encounter of 04/12/25 (from the past 24 hours)   POCT GLUCOSE   Result Value Ref Range     POCT Glucose 136 (H) 74 - 99 mg/dL   POCT GLUCOSE   Result Value Ref Range     POCT Glucose 183 (H) 74 - 99 mg/dL   CBC and Auto Differential   Result Value Ref Range     WBC 9.8 4.4 - 11.3 x10*3/uL     nRBC 0.0 0.0 - 0.0 /100 WBCs     RBC 3.59 (L) 4.00 - 5.20 x10*6/uL     Hemoglobin 10.3 (L) 12.0 - 16.0 g/dL     Hematocrit 34.7 (L) 36.0 - 46.0 %     MCV 97 80 - 100 fL     MCH 28.7 26.0 - 34.0 pg     MCHC 29.7 (L) 32.0 - 36.0 g/dL     RDW 13.5 11.5 - 14.5 %     Platelets 161 150 - 450 x10*3/uL     Neutrophils % 68.6 40.0 - 80.0 %     Immature Granulocytes %, Automated 1.1 (H) 0.0 - 0.9 %     Lymphocytes % 18.9 13.0 - 44.0 %     Monocytes % 7.3 2.0 - 10.0 %     Eosinophils % 3.7 0.0 - 6.0 %     Basophils % 0.4 0.0 - 2.0 %     Neutrophils Absolute 6.73 (H) 1.60 - 5.50 x10*3/uL     Immature Granulocytes Absolute, Automated  0.11 0.00 - 0.50 x10*3/uL     Lymphocytes Absolute 1.85 0.80 - 3.00 x10*3/uL     Monocytes Absolute 0.72 0.05 - 0.80 x10*3/uL     Eosinophils Absolute 0.36 0.00 - 0.40 x10*3/uL     Basophils Absolute 0.04 0.00 - 0.10 x10*3/uL   Comprehensive Metabolic Panel   Result Value Ref Range     Glucose 107 (H) 74 - 99 mg/dL     Sodium 137 136 - 145 mmol/L     Potassium 3.9 3.5 - 5.3 mmol/L     Chloride 108 (H) 98 - 107 mmol/L     Bicarbonate 23 21 - 32 mmol/L     Anion Gap 10 10 - 20 mmol/L     Urea Nitrogen 14 6 - 23 mg/dL     Creatinine 1.12 (H) 0.50 - 1.05 mg/dL     eGFR 50 (L) >60 mL/min/1.73m*2     Calcium 7.8 (L) 8.6 - 10.3 mg/dL     Albumin 2.8 (L) 3.4 - 5.0 g/dL     Alkaline Phosphatase 54 33 - 136 U/L     Total Protein 5.3 (L) 6.4 - 8.2 g/dL     AST 12 9 - 39 U/L     Bilirubin, Total 0.3 0.0 - 1.2 mg/dL     ALT 6 (L) 7 - 45 U/L   POCT GLUCOSE   Result Value Ref Range     POCT Glucose 110 (H) 74 - 99 mg/dL   POCT GLUCOSE   Result Value Ref Range     POCT Glucose 142 (H) 74 - 99 mg/dL      Patient recently received an antibiotic (last 12 hours)         Date/Time Action Medication Dose     04/18/25 0536 New Bag    metroNIDAZOLE (Flagyl) 500 mg in sodium chloride (iso)  mL 500 mg     04/18/25 0229 New Bag    cefepime (Maxipime) 1 g in dextrose 5% IV 50 mL 1 g             Plan discussed with interdisciplinary team, post op and passing gas, less distention noted, patient more animated today. No acute events overnight, patient denies chest pain, worsening SOB at this time. continue current and repeat labs in the AM. Seen by general surgery today - Impression/plan: Postop day #1 following laparoscopic enterolysis for small bowel obstruction.Recovering well. Remove nasogastric tube when nausea resolves.  Advance diet as tolerated.     Patient fully evaluated on April 18.  Patient postop with NG tube with mild drainage.  Continue to monitor.  Some bowel sounds are noted on examination.  Recheck labs in AM.     Discharge  planning discussed with patient and care team. Therapy evaluations ordered. Anticipate HHC/SNF at discharge. Patient aware and agreeable to current plan, continue plan as above.      I spent a total of 50 minutes on the date of the service which included preparing to see the patient, face-to-face patient care, completing clinical documentation, obtaining and/or reviewing separately obtained history, performing a medically appropriate examination, counseling and educating the patient/family/caregiver, ordering medications, tests, or procedures, communicating with other HCPs (not separately reported), independently interpreting results (not separately reported), communicating results to the patient/family/caregiver, and care coordination (not separately reported).      Patient fully evaluated  04/19  for    Problem List Items Addressed This Visit                  Gastrointestinal and Abdominal     * (Principal) Generalized abdominal pain - Primary     Small bowel obstruction (Multi)     Relevant Orders     Case Request Operating Room: Laparoscopy with lysis of adhesions.  Possible laparotomy.  Possible bowel resection. (Completed)     SBO (small bowel obstruction) (Multi)     Relevant Orders     Case Request Operating Room: Laparoscopy, possible laparotomy, possible bowel resection (Completed)      Patient seen resting in bed with head of bed elevated, no s/s or c/o acute difficulties at this time.  Vital signs for last 24 hours Temp:  [36.1 °C (97 °F)-36.6 °C (97.9 °F)] 36.5 °C (97.7 °F)  Heart Rate:  [52-68] 68  Resp:  [18] 18  BP: (118-160)/(60-73) 139/64    I/O this shift:  In: 200 [P.O.:200]  Out: -   Patient still requiring frequent cardiac and SPO2 monitoring. Continue aggressive pulmonary hygiene and oral hygiene. Off loading as tolerated for skin integrity. Medications and labs reviewed-         Results for orders placed or performed during the hospital encounter of 04/12/25 (from the past 24 hours)   POCT  GLUCOSE   Result Value Ref Range     POCT Glucose 136 (H) 74 - 99 mg/dL   POCT GLUCOSE   Result Value Ref Range     POCT Glucose 183 (H) 74 - 99 mg/dL   CBC and Auto Differential   Result Value Ref Range     WBC 9.8 4.4 - 11.3 x10*3/uL     nRBC 0.0 0.0 - 0.0 /100 WBCs     RBC 3.59 (L) 4.00 - 5.20 x10*6/uL     Hemoglobin 10.3 (L) 12.0 - 16.0 g/dL     Hematocrit 34.7 (L) 36.0 - 46.0 %     MCV 97 80 - 100 fL     MCH 28.7 26.0 - 34.0 pg     MCHC 29.7 (L) 32.0 - 36.0 g/dL     RDW 13.5 11.5 - 14.5 %     Platelets 161 150 - 450 x10*3/uL     Neutrophils % 68.6 40.0 - 80.0 %     Immature Granulocytes %, Automated 1.1 (H) 0.0 - 0.9 %     Lymphocytes % 18.9 13.0 - 44.0 %     Monocytes % 7.3 2.0 - 10.0 %     Eosinophils % 3.7 0.0 - 6.0 %     Basophils % 0.4 0.0 - 2.0 %     Neutrophils Absolute 6.73 (H) 1.60 - 5.50 x10*3/uL     Immature Granulocytes Absolute, Automated 0.11 0.00 - 0.50 x10*3/uL     Lymphocytes Absolute 1.85 0.80 - 3.00 x10*3/uL     Monocytes Absolute 0.72 0.05 - 0.80 x10*3/uL     Eosinophils Absolute 0.36 0.00 - 0.40 x10*3/uL     Basophils Absolute 0.04 0.00 - 0.10 x10*3/uL   Comprehensive Metabolic Panel   Result Value Ref Range     Glucose 107 (H) 74 - 99 mg/dL     Sodium 137 136 - 145 mmol/L     Potassium 3.9 3.5 - 5.3 mmol/L     Chloride 108 (H) 98 - 107 mmol/L     Bicarbonate 23 21 - 32 mmol/L     Anion Gap 10 10 - 20 mmol/L     Urea Nitrogen 14 6 - 23 mg/dL     Creatinine 1.12 (H) 0.50 - 1.05 mg/dL     eGFR 50 (L) >60 mL/min/1.73m*2     Calcium 7.8 (L) 8.6 - 10.3 mg/dL     Albumin 2.8 (L) 3.4 - 5.0 g/dL     Alkaline Phosphatase 54 33 - 136 U/L     Total Protein 5.3 (L) 6.4 - 8.2 g/dL     AST 12 9 - 39 U/L     Bilirubin, Total 0.3 0.0 - 1.2 mg/dL     ALT 6 (L) 7 - 45 U/L   POCT GLUCOSE   Result Value Ref Range     POCT Glucose 110 (H) 74 - 99 mg/dL   POCT GLUCOSE   Result Value Ref Range     POCT Glucose 142 (H) 74 - 99 mg/dL      Patient recently received an antibiotic (last 12 hours)         None              Plan discussed with interdisciplinary team, diet advanced to full liquids today, will monitor overnight, advance as tolerated. No acute distress noted, no new complaints at time of exam. Will continue current and repeat labs in the AM.      Discharge planning discussed with patient and care team. Therapy evaluations ordered. Anticipate HHC at discharge. Patient aware and agreeable to current plan, continue plan as above.      I spent a total of 50 minutes on the date of the service which included preparing to see the patient, face-to-face patient care, completing clinical documentation, obtaining and/or reviewing separately obtained history, performing a medically appropriate examination, counseling and educating the patient/family/caregiver, ordering medications, tests, or procedures, communicating with other HCPs (not separately reported), independently interpreting results (not separately reported), communicating results to the patient/family/caregiver, and care coordination (not separately reported).      Gianfranco Saez MD  Patient fully evaluated on April 20.  Patient clinically unchanged.  Still having some loose stool.  Patient is weak and ambulating better today.  IV to Hep-Lock and recheck labs in AM.                          Pertinent Physical Exam At Time of Discharge  Physical Exam  Constitutional:       Appearance: Normal appearance.   HENT:      Head: Normocephalic and atraumatic.      Nose: Nose normal.      Mouth/Throat:      Mouth: Mucous membranes are moist.   Eyes:      Conjunctiva/sclera: Conjunctivae normal.   Cardiovascular:      Rate and Rhythm: Normal rate and regular rhythm.      Heart sounds: Murmur heard.   Pulmonary:      Effort: Pulmonary effort is normal.      Comments: Crackles left base  Abdominal:      General: Bowel sounds are normal. There is no distension.      Tenderness: Non-tender.  Musculoskeletal:         General: Normal range of motion.      Cervical back: Neck  supple.   Skin:     General: Skin is warm and dry.   Neurological:      Mental Status: She is alert. Mental status is at baseline.   Psychiatric:         Mood and Affect: Mood normal.       Outpatient Follow-Up  Future Appointments   Date Time Provider Department Center   9/4/2025 10:30 AM [facility] 3 [facility] LAB [id number] [facility] [id number]   9/4/2025 11:30 AM Tl Parish MD [id number] [place]   9/9/2025 11:15 AM Justni Oliver MD [id number] [facility]

## 2025-04-21 NOTE — CARE PLAN
The patient's goals for the shift include      The clinical goals for the shift include Decrease GI discomfort and maintain safety      Problem: Safety - Adult  Goal: Free from fall injury  Outcome: Progressing     Problem: Pain - Adult  Goal: Verbalizes/displays adequate comfort level or baseline comfort level  Outcome: Progressing

## 2025-04-23 ENCOUNTER — HOME CARE VISIT (OUTPATIENT)
Dept: HOME HEALTH SERVICES | Facility: HOME HEALTH | Age: 80
End: 2025-04-23

## 2025-04-24 ENCOUNTER — HOME CARE VISIT (OUTPATIENT)
Dept: HOME HEALTH SERVICES | Facility: HOME HEALTH | Age: 80
End: 2025-04-24

## 2025-04-25 ENCOUNTER — HOME CARE VISIT (OUTPATIENT)
Dept: HOME HEALTH SERVICES | Facility: HOME HEALTH | Age: 80
End: 2025-04-25

## 2025-04-28 ENCOUNTER — HOME CARE VISIT (OUTPATIENT)
Dept: HOME HEALTH SERVICES | Facility: HOME HEALTH | Age: 80
End: 2025-04-28

## 2025-04-29 ENCOUNTER — TELEPHONE (OUTPATIENT)
Dept: SURGERY | Facility: CLINIC | Age: 80
End: 2025-04-29

## 2025-04-29 NOTE — TELEPHONE ENCOUNTER
Pt friend called to let Dr. Martinez know that pt fell shortly after surgery and broke her hip, she was transported to a rehab, they are having a hard time getting her to walk at the moment and that is why they can't make a post op appt right now.

## 2025-05-23 ENCOUNTER — HOSPITAL ENCOUNTER (INPATIENT)
Facility: HOSPITAL | Age: 80
End: 2025-05-23
Attending: STUDENT IN AN ORGANIZED HEALTH CARE EDUCATION/TRAINING PROGRAM | Admitting: INTERNAL MEDICINE
Payer: MEDICARE

## 2025-05-23 ENCOUNTER — APPOINTMENT (OUTPATIENT)
Dept: CARDIOLOGY | Facility: HOSPITAL | Age: 80
End: 2025-05-23
Payer: MEDICARE

## 2025-05-23 ENCOUNTER — APPOINTMENT (OUTPATIENT)
Dept: RADIOLOGY | Facility: HOSPITAL | Age: 80
End: 2025-05-23
Payer: MEDICARE

## 2025-05-23 DIAGNOSIS — D72.825 BANDEMIA: ICD-10-CM

## 2025-05-23 DIAGNOSIS — U07.1 COVID-19: Primary | ICD-10-CM

## 2025-05-23 DIAGNOSIS — N28.9 ACUTE RENAL INSUFFICIENCY: ICD-10-CM

## 2025-05-23 LAB
ANION GAP SERPL CALC-SCNC: 19 MMOL/L (ref 10–20)
BASOPHILS # BLD MANUAL: 0 X10*3/UL (ref 0–0.1)
BASOPHILS NFR BLD MANUAL: 0 %
BUN SERPL-MCNC: 73 MG/DL (ref 6–23)
CALCIUM SERPL-MCNC: 8.3 MG/DL (ref 8.6–10.3)
CHLORIDE SERPL-SCNC: 97 MMOL/L (ref 98–107)
CO2 SERPL-SCNC: 25 MMOL/L (ref 21–32)
CREAT SERPL-MCNC: 2.57 MG/DL (ref 0.5–1.05)
EGFRCR SERPLBLD CKD-EPI 2021: 18 ML/MIN/1.73M*2
EOSINOPHIL # BLD MANUAL: 0 X10*3/UL (ref 0–0.4)
EOSINOPHIL NFR BLD MANUAL: 0 %
ERYTHROCYTE [DISTWIDTH] IN BLOOD BY AUTOMATED COUNT: 14.7 % (ref 11.5–14.5)
GLUCOSE SERPL-MCNC: 128 MG/DL (ref 74–99)
HCT VFR BLD AUTO: 37.7 % (ref 36–46)
HGB BLD-MCNC: 11.2 G/DL (ref 12–16)
IMM GRANULOCYTES # BLD AUTO: 0.53 X10*3/UL (ref 0–0.5)
IMM GRANULOCYTES NFR BLD AUTO: 6.2 % (ref 0–0.9)
LYMPHOCYTES # BLD MANUAL: 0.43 X10*3/UL (ref 0.8–3)
LYMPHOCYTES NFR BLD MANUAL: 5 %
MCH RBC QN AUTO: 29.1 PG (ref 26–34)
MCHC RBC AUTO-ENTMCNC: 29.7 G/DL (ref 32–36)
MCV RBC AUTO: 98 FL (ref 80–100)
METAMYELOCYTES # BLD MANUAL: 0.26 X10*3/UL
METAMYELOCYTES NFR BLD MANUAL: 3 %
MONOCYTES # BLD MANUAL: 0.43 X10*3/UL (ref 0.05–0.8)
MONOCYTES NFR BLD MANUAL: 5 %
MYELOCYTES # BLD MANUAL: 0.17 X10*3/UL
MYELOCYTES NFR BLD MANUAL: 2 %
NEUTROPHILS # BLD MANUAL: 7.23 X10*3/UL (ref 1.6–5.5)
NEUTS BAND # BLD MANUAL: 1.19 X10*3/UL (ref 0–0.5)
NEUTS BAND NFR BLD MANUAL: 14 %
NEUTS SEG # BLD MANUAL: 6.04 X10*3/UL (ref 1.6–5)
NEUTS SEG NFR BLD MANUAL: 71 %
NRBC BLD-RTO: 0 /100 WBCS (ref 0–0)
PLATELET # BLD AUTO: 168 X10*3/UL (ref 150–450)
POLYCHROMASIA BLD QL SMEAR: ABNORMAL
POTASSIUM SERPL-SCNC: 4.8 MMOL/L (ref 3.5–5.3)
RBC # BLD AUTO: 3.85 X10*6/UL (ref 4–5.2)
RBC MORPH BLD: ABNORMAL
SODIUM SERPL-SCNC: 136 MMOL/L (ref 136–145)
TOTAL CELLS COUNTED BLD: 100
WBC # BLD AUTO: 8.5 X10*3/UL (ref 4.4–11.3)

## 2025-05-23 PROCEDURE — 93005 ELECTROCARDIOGRAM TRACING: CPT

## 2025-05-23 PROCEDURE — 36415 COLL VENOUS BLD VENIPUNCTURE: CPT | Performed by: STUDENT IN AN ORGANIZED HEALTH CARE EDUCATION/TRAINING PROGRAM

## 2025-05-23 PROCEDURE — 85027 COMPLETE CBC AUTOMATED: CPT | Performed by: STUDENT IN AN ORGANIZED HEALTH CARE EDUCATION/TRAINING PROGRAM

## 2025-05-23 PROCEDURE — 99285 EMERGENCY DEPT VISIT HI MDM: CPT | Mod: 25 | Performed by: STUDENT IN AN ORGANIZED HEALTH CARE EDUCATION/TRAINING PROGRAM

## 2025-05-23 PROCEDURE — 80048 BASIC METABOLIC PNL TOTAL CA: CPT | Performed by: STUDENT IN AN ORGANIZED HEALTH CARE EDUCATION/TRAINING PROGRAM

## 2025-05-23 PROCEDURE — 70450 CT HEAD/BRAIN W/O DYE: CPT | Performed by: RADIOLOGY

## 2025-05-23 PROCEDURE — 85007 BL SMEAR W/DIFF WBC COUNT: CPT | Performed by: STUDENT IN AN ORGANIZED HEALTH CARE EDUCATION/TRAINING PROGRAM

## 2025-05-23 PROCEDURE — 70450 CT HEAD/BRAIN W/O DYE: CPT

## 2025-05-23 PROCEDURE — 84484 ASSAY OF TROPONIN QUANT: CPT | Performed by: NURSE PRACTITIONER

## 2025-05-23 ASSESSMENT — COLUMBIA-SUICIDE SEVERITY RATING SCALE - C-SSRS
1. IN THE PAST MONTH, HAVE YOU WISHED YOU WERE DEAD OR WISHED YOU COULD GO TO SLEEP AND NOT WAKE UP?: NO
6. HAVE YOU EVER DONE ANYTHING, STARTED TO DO ANYTHING, OR PREPARED TO DO ANYTHING TO END YOUR LIFE?: NO
2. HAVE YOU ACTUALLY HAD ANY THOUGHTS OF KILLING YOURSELF?: NO

## 2025-05-24 ENCOUNTER — APPOINTMENT (OUTPATIENT)
Dept: RADIOLOGY | Facility: HOSPITAL | Age: 80
End: 2025-05-24
Payer: MEDICARE

## 2025-05-24 PROBLEM — D72.825 BANDEMIA: Status: ACTIVE | Noted: 2025-05-24

## 2025-05-24 PROBLEM — N17.9 ACUTE KIDNEY INJURY SUPERIMPOSED ON CKD: Status: ACTIVE | Noted: 2025-05-24

## 2025-05-24 PROBLEM — U07.1 COVID-19: Status: ACTIVE | Noted: 2025-05-24

## 2025-05-24 PROBLEM — N18.9 ACUTE KIDNEY INJURY SUPERIMPOSED ON CKD: Status: ACTIVE | Noted: 2025-05-24

## 2025-05-24 LAB
ANION GAP SERPL CALC-SCNC: 16 MMOL/L (ref 10–20)
APPEARANCE UR: ABNORMAL
BACTERIA #/AREA URNS AUTO: ABNORMAL /HPF
BILIRUB UR STRIP.AUTO-MCNC: NEGATIVE MG/DL
BUN SERPL-MCNC: 70 MG/DL (ref 6–23)
CALCIUM SERPL-MCNC: 8.1 MG/DL (ref 8.6–10.3)
CARDIAC TROPONIN I PNL SERPL HS: 23 NG/L (ref 0–13)
CARDIAC TROPONIN I PNL SERPL HS: 24 NG/L (ref 0–13)
CHLORIDE SERPL-SCNC: 98 MMOL/L (ref 98–107)
CO2 SERPL-SCNC: 25 MMOL/L (ref 21–32)
COLOR UR: ABNORMAL
CREAT SERPL-MCNC: 2.31 MG/DL (ref 0.5–1.05)
EGFRCR SERPLBLD CKD-EPI 2021: 21 ML/MIN/1.73M*2
ERYTHROCYTE [DISTWIDTH] IN BLOOD BY AUTOMATED COUNT: 14.4 % (ref 11.5–14.5)
GLUCOSE BLD MANUAL STRIP-MCNC: 131 MG/DL (ref 74–99)
GLUCOSE BLD MANUAL STRIP-MCNC: 140 MG/DL (ref 74–99)
GLUCOSE BLD MANUAL STRIP-MCNC: 183 MG/DL (ref 74–99)
GLUCOSE BLD MANUAL STRIP-MCNC: 205 MG/DL (ref 74–99)
GLUCOSE BLD MANUAL STRIP-MCNC: 208 MG/DL (ref 74–99)
GLUCOSE SERPL-MCNC: 133 MG/DL (ref 74–99)
GLUCOSE UR STRIP.AUTO-MCNC: NORMAL MG/DL
HCT VFR BLD AUTO: 32.9 % (ref 36–46)
HGB BLD-MCNC: 10 G/DL (ref 12–16)
HOLD SPECIMEN: NORMAL
KETONES UR STRIP.AUTO-MCNC: NEGATIVE MG/DL
LACTATE SERPL-SCNC: 0.9 MMOL/L (ref 0.4–2)
LEUKOCYTE ESTERASE UR QL STRIP.AUTO: ABNORMAL
MCH RBC QN AUTO: 28.7 PG (ref 26–34)
MCHC RBC AUTO-ENTMCNC: 30.4 G/DL (ref 32–36)
MCV RBC AUTO: 94 FL (ref 80–100)
MUCOUS THREADS #/AREA URNS AUTO: ABNORMAL /LPF
NITRITE UR QL STRIP.AUTO: ABNORMAL
NRBC BLD-RTO: 0 /100 WBCS (ref 0–0)
PH UR STRIP.AUTO: 5.5 [PH]
PLATELET # BLD AUTO: 164 X10*3/UL (ref 150–450)
POTASSIUM SERPL-SCNC: 4.7 MMOL/L (ref 3.5–5.3)
PROT UR STRIP.AUTO-MCNC: ABNORMAL MG/DL
RBC # BLD AUTO: 3.49 X10*6/UL (ref 4–5.2)
RBC # UR STRIP.AUTO: ABNORMAL MG/DL
RBC #/AREA URNS AUTO: ABNORMAL /HPF
SODIUM SERPL-SCNC: 134 MMOL/L (ref 136–145)
SP GR UR STRIP.AUTO: 1.01
SQUAMOUS #/AREA URNS AUTO: ABNORMAL /HPF
UROBILINOGEN UR STRIP.AUTO-MCNC: NORMAL MG/DL
WBC # BLD AUTO: 7.7 X10*3/UL (ref 4.4–11.3)
WBC #/AREA URNS AUTO: >50 /HPF
WBC CLUMPS #/AREA URNS AUTO: ABNORMAL /HPF

## 2025-05-24 PROCEDURE — 81001 URINALYSIS AUTO W/SCOPE: CPT | Performed by: STUDENT IN AN ORGANIZED HEALTH CARE EDUCATION/TRAINING PROGRAM

## 2025-05-24 PROCEDURE — 36415 COLL VENOUS BLD VENIPUNCTURE: CPT | Performed by: NURSE PRACTITIONER

## 2025-05-24 PROCEDURE — 2500000004 HC RX 250 GENERAL PHARMACY W/ HCPCS (ALT 636 FOR OP/ED): Mod: JZ

## 2025-05-24 PROCEDURE — 85027 COMPLETE CBC AUTOMATED: CPT | Performed by: NURSE PRACTITIONER

## 2025-05-24 PROCEDURE — 83605 ASSAY OF LACTIC ACID: CPT | Performed by: NURSE PRACTITIONER

## 2025-05-24 PROCEDURE — 71045 X-RAY EXAM CHEST 1 VIEW: CPT

## 2025-05-24 PROCEDURE — 80048 BASIC METABOLIC PNL TOTAL CA: CPT | Performed by: NURSE PRACTITIONER

## 2025-05-24 PROCEDURE — 2500000004 HC RX 250 GENERAL PHARMACY W/ HCPCS (ALT 636 FOR OP/ED): Mod: JZ | Performed by: INTERNAL MEDICINE

## 2025-05-24 PROCEDURE — 2500000001 HC RX 250 WO HCPCS SELF ADMINISTERED DRUGS (ALT 637 FOR MEDICARE OP): Performed by: NURSE PRACTITIONER

## 2025-05-24 PROCEDURE — 2500000004 HC RX 250 GENERAL PHARMACY W/ HCPCS (ALT 636 FOR OP/ED): Mod: JZ | Performed by: NURSE PRACTITIONER

## 2025-05-24 PROCEDURE — 84484 ASSAY OF TROPONIN QUANT: CPT | Performed by: NURSE PRACTITIONER

## 2025-05-24 PROCEDURE — 82947 ASSAY GLUCOSE BLOOD QUANT: CPT

## 2025-05-24 PROCEDURE — 87186 SC STD MICRODIL/AGAR DIL: CPT | Mod: PARLAB | Performed by: STUDENT IN AN ORGANIZED HEALTH CARE EDUCATION/TRAINING PROGRAM

## 2025-05-24 PROCEDURE — 2500000004 HC RX 250 GENERAL PHARMACY W/ HCPCS (ALT 636 FOR OP/ED): Mod: JZ | Performed by: STUDENT IN AN ORGANIZED HEALTH CARE EDUCATION/TRAINING PROGRAM

## 2025-05-24 PROCEDURE — 2500000002 HC RX 250 W HCPCS SELF ADMINISTERED DRUGS (ALT 637 FOR MEDICARE OP, ALT 636 FOR OP/ED): Performed by: NURSE PRACTITIONER

## 2025-05-24 PROCEDURE — 2500000001 HC RX 250 WO HCPCS SELF ADMINISTERED DRUGS (ALT 637 FOR MEDICARE OP)

## 2025-05-24 PROCEDURE — 71045 X-RAY EXAM CHEST 1 VIEW: CPT | Performed by: RADIOLOGY

## 2025-05-24 PROCEDURE — 99222 1ST HOSP IP/OBS MODERATE 55: CPT | Performed by: NURSE PRACTITIONER

## 2025-05-24 PROCEDURE — 1200000002 HC GENERAL ROOM WITH TELEMETRY DAILY

## 2025-05-24 RX ORDER — SODIUM CHLORIDE, SODIUM LACTATE, POTASSIUM CHLORIDE, CALCIUM CHLORIDE 600; 310; 30; 20 MG/100ML; MG/100ML; MG/100ML; MG/100ML
75 INJECTION, SOLUTION INTRAVENOUS CONTINUOUS
Status: ACTIVE | OUTPATIENT
Start: 2025-05-24 | End: 2025-05-24

## 2025-05-24 RX ORDER — SODIUM CHLORIDE, SODIUM LACTATE, POTASSIUM CHLORIDE, CALCIUM CHLORIDE 600; 310; 30; 20 MG/100ML; MG/100ML; MG/100ML; MG/100ML
50 INJECTION, SOLUTION INTRAVENOUS CONTINUOUS
Status: DISCONTINUED | OUTPATIENT
Start: 2025-05-24 | End: 2025-05-24

## 2025-05-24 RX ORDER — METHYLPREDNISOLONE 4 MG/1
20 TABLET ORAL ONCE
Status: ACTIVE | OUTPATIENT
Start: 2025-05-25 | End: 2025-05-25

## 2025-05-24 RX ORDER — HEPARIN SODIUM 5000 [USP'U]/ML
5000 INJECTION, SOLUTION INTRAVENOUS; SUBCUTANEOUS EVERY 8 HOURS
Status: DISCONTINUED | OUTPATIENT
Start: 2025-05-24 | End: 2025-05-29 | Stop reason: HOSPADM

## 2025-05-24 RX ORDER — NAPROXEN SODIUM 220 MG/1
81 TABLET, FILM COATED ORAL 2 TIMES DAILY
Status: DISCONTINUED | OUTPATIENT
Start: 2025-05-24 | End: 2025-05-29 | Stop reason: HOSPADM

## 2025-05-24 RX ORDER — LIDOCAINE 50 MG/G
2 PATCH TOPICAL DAILY
COMMUNITY

## 2025-05-24 RX ORDER — INSULIN LISPRO 100 [IU]/ML
0-10 INJECTION, SOLUTION INTRAVENOUS; SUBCUTANEOUS
Status: DISCONTINUED | OUTPATIENT
Start: 2025-05-24 | End: 2025-05-29 | Stop reason: HOSPADM

## 2025-05-24 RX ORDER — ONDANSETRON HYDROCHLORIDE 2 MG/ML
4 INJECTION, SOLUTION INTRAVENOUS EVERY 6 HOURS PRN
Status: DISCONTINUED | OUTPATIENT
Start: 2025-05-24 | End: 2025-05-29 | Stop reason: HOSPADM

## 2025-05-24 RX ORDER — METHYLPREDNISOLONE 4 MG/1
12 TABLET ORAL ONCE
Status: COMPLETED | OUTPATIENT
Start: 2025-05-27 | End: 2025-05-27

## 2025-05-24 RX ORDER — CEFTRIAXONE 1 G/50ML
1 INJECTION, SOLUTION INTRAVENOUS EVERY 24 HOURS
Status: DISCONTINUED | OUTPATIENT
Start: 2025-05-25 | End: 2025-05-27

## 2025-05-24 RX ORDER — METHYLPREDNISOLONE 4 MG/1
24 TABLET ORAL ONCE
Status: ACTIVE | OUTPATIENT
Start: 2025-05-24 | End: 2025-05-25

## 2025-05-24 RX ORDER — DULOXETIN HYDROCHLORIDE 30 MG/1
30 CAPSULE, DELAYED RELEASE ORAL DAILY
COMMUNITY

## 2025-05-24 RX ORDER — ROSUVASTATIN CALCIUM 10 MG/1
20 TABLET, COATED ORAL NIGHTLY
Status: DISCONTINUED | OUTPATIENT
Start: 2025-05-24 | End: 2025-05-25

## 2025-05-24 RX ORDER — METHOCARBAMOL 500 MG/1
500 TABLET, FILM COATED ORAL 3 TIMES DAILY
Status: DISCONTINUED | OUTPATIENT
Start: 2025-05-24 | End: 2025-05-29 | Stop reason: HOSPADM

## 2025-05-24 RX ORDER — IPRATROPIUM BROMIDE AND ALBUTEROL SULFATE 2.5; .5 MG/3ML; MG/3ML
3 SOLUTION RESPIRATORY (INHALATION) EVERY 2 HOUR PRN
Status: DISCONTINUED | OUTPATIENT
Start: 2025-05-24 | End: 2025-05-29 | Stop reason: HOSPADM

## 2025-05-24 RX ORDER — OXYCODONE HYDROCHLORIDE 5 MG/1
5 TABLET ORAL EVERY 4 HOURS PRN
COMMUNITY
End: 2025-05-29 | Stop reason: HOSPADM

## 2025-05-24 RX ORDER — DOXYCYCLINE HYCLATE 100 MG
100 TABLET ORAL EVERY 12 HOURS SCHEDULED
Status: DISCONTINUED | OUTPATIENT
Start: 2025-05-24 | End: 2025-05-24

## 2025-05-24 RX ORDER — METHYLPREDNISOLONE 4 MG/1
4 TABLET ORAL ONCE
Status: COMPLETED | OUTPATIENT
Start: 2025-05-29 | End: 2025-05-29

## 2025-05-24 RX ORDER — IPRATROPIUM BROMIDE AND ALBUTEROL SULFATE 2.5; .5 MG/3ML; MG/3ML
3 SOLUTION RESPIRATORY (INHALATION)
Status: DISCONTINUED | OUTPATIENT
Start: 2025-05-25 | End: 2025-05-29 | Stop reason: HOSPADM

## 2025-05-24 RX ORDER — POLYETHYLENE GLYCOL 3350 17 G/17G
17 POWDER, FOR SOLUTION ORAL DAILY PRN
COMMUNITY

## 2025-05-24 RX ORDER — TORSEMIDE 20 MG/1
20 TABLET ORAL DAILY
COMMUNITY
End: 2025-05-29 | Stop reason: HOSPADM

## 2025-05-24 RX ORDER — TORSEMIDE 20 MG/1
20 TABLET ORAL DAILY
Status: DISCONTINUED | OUTPATIENT
Start: 2025-05-24 | End: 2025-05-29 | Stop reason: HOSPADM

## 2025-05-24 RX ORDER — DEXTROSE 50 % IN WATER (D50W) INTRAVENOUS SYRINGE
12.5
Status: DISCONTINUED | OUTPATIENT
Start: 2025-05-24 | End: 2025-05-29 | Stop reason: HOSPADM

## 2025-05-24 RX ORDER — METHYLPREDNISOLONE 4 MG/1
8 TABLET ORAL ONCE
Status: COMPLETED | OUTPATIENT
Start: 2025-05-28 | End: 2025-05-28

## 2025-05-24 RX ORDER — LOSARTAN POTASSIUM 50 MG/1
100 TABLET ORAL DAILY
Status: DISCONTINUED | OUTPATIENT
Start: 2025-05-24 | End: 2025-05-29 | Stop reason: HOSPADM

## 2025-05-24 RX ORDER — METOPROLOL TARTRATE 100 MG/1
100 TABLET ORAL NIGHTLY
Status: DISCONTINUED | OUTPATIENT
Start: 2025-05-24 | End: 2025-05-29 | Stop reason: HOSPADM

## 2025-05-24 RX ORDER — DEXTROSE 50 % IN WATER (D50W) INTRAVENOUS SYRINGE
25
Status: DISCONTINUED | OUTPATIENT
Start: 2025-05-24 | End: 2025-05-29 | Stop reason: HOSPADM

## 2025-05-24 RX ORDER — CILOSTAZOL 100 MG/1
50 TABLET ORAL 2 TIMES DAILY
Status: DISCONTINUED | OUTPATIENT
Start: 2025-05-24 | End: 2025-05-29 | Stop reason: HOSPADM

## 2025-05-24 RX ORDER — SODIUM CHLORIDE, SODIUM LACTATE, POTASSIUM CHLORIDE, CALCIUM CHLORIDE 600; 310; 30; 20 MG/100ML; MG/100ML; MG/100ML; MG/100ML
75 INJECTION, SOLUTION INTRAVENOUS CONTINUOUS
Status: ACTIVE | OUTPATIENT
Start: 2025-05-24 | End: 2025-05-25

## 2025-05-24 RX ORDER — ACETAMINOPHEN 325 MG/1
650 TABLET ORAL EVERY 4 HOURS PRN
Status: DISCONTINUED | OUTPATIENT
Start: 2025-05-24 | End: 2025-05-29 | Stop reason: HOSPADM

## 2025-05-24 RX ORDER — METHOCARBAMOL 500 MG/1
500 TABLET, FILM COATED ORAL 3 TIMES DAILY
COMMUNITY

## 2025-05-24 RX ORDER — GABAPENTIN 100 MG/1
200 CAPSULE ORAL 3 TIMES DAILY
Status: DISCONTINUED | OUTPATIENT
Start: 2025-05-24 | End: 2025-05-29 | Stop reason: HOSPADM

## 2025-05-24 RX ORDER — DULOXETIN HYDROCHLORIDE 30 MG/1
30 CAPSULE, DELAYED RELEASE ORAL DAILY
Status: DISCONTINUED | OUTPATIENT
Start: 2025-05-24 | End: 2025-05-29 | Stop reason: HOSPADM

## 2025-05-24 RX ORDER — METHYLPREDNISOLONE 4 MG/1
16 TABLET ORAL ONCE
Status: COMPLETED | OUTPATIENT
Start: 2025-05-26 | End: 2025-05-26

## 2025-05-24 RX ORDER — IPRATROPIUM BROMIDE AND ALBUTEROL SULFATE 2.5; .5 MG/3ML; MG/3ML
3 SOLUTION RESPIRATORY (INHALATION)
Status: DISCONTINUED | OUTPATIENT
Start: 2025-05-25 | End: 2025-05-24

## 2025-05-24 RX ADMIN — DOXYCYCLINE HYCLATE 100 MG: 100 TABLET, COATED ORAL at 09:17

## 2025-05-24 RX ADMIN — SODIUM CHLORIDE, SODIUM LACTATE, POTASSIUM CHLORIDE, AND CALCIUM CHLORIDE 75 ML/HR: .6; .31; .03; .02 INJECTION, SOLUTION INTRAVENOUS at 13:49

## 2025-05-24 RX ADMIN — HEPARIN SODIUM 5000 UNITS: 5000 INJECTION, SOLUTION INTRAVENOUS; SUBCUTANEOUS at 22:52

## 2025-05-24 RX ADMIN — HEPARIN SODIUM 5000 UNITS: 5000 INJECTION, SOLUTION INTRAVENOUS; SUBCUTANEOUS at 05:09

## 2025-05-24 RX ADMIN — SODIUM CHLORIDE, SODIUM LACTATE, POTASSIUM CHLORIDE, AND CALCIUM CHLORIDE 50 ML/HR: .6; .31; .03; .02 INJECTION, SOLUTION INTRAVENOUS at 05:10

## 2025-05-24 RX ADMIN — CILOSTAZOL 50 MG: 100 TABLET ORAL at 12:30

## 2025-05-24 RX ADMIN — INSULIN LISPRO 4 UNITS: 100 INJECTION, SOLUTION INTRAVENOUS; SUBCUTANEOUS at 12:30

## 2025-05-24 RX ADMIN — HEPARIN SODIUM 5000 UNITS: 5000 INJECTION, SOLUTION INTRAVENOUS; SUBCUTANEOUS at 13:50

## 2025-05-24 RX ADMIN — SODIUM CHLORIDE, SODIUM LACTATE, POTASSIUM CHLORIDE, AND CALCIUM CHLORIDE 75 ML/HR: .6; .31; .03; .02 INJECTION, SOLUTION INTRAVENOUS at 19:58

## 2025-05-24 RX ADMIN — DULOXETINE HYDROCHLORIDE 30 MG: 30 CAPSULE, DELAYED RELEASE ORAL at 12:30

## 2025-05-24 RX ADMIN — SODIUM CHLORIDE, SODIUM LACTATE, POTASSIUM CHLORIDE, AND CALCIUM CHLORIDE 1000 ML: .6; .31; .03; .02 INJECTION, SOLUTION INTRAVENOUS at 01:53

## 2025-05-24 SDOH — ECONOMIC STABILITY: TRANSPORTATION INSECURITY: IN THE PAST 12 MONTHS, HAS LACK OF TRANSPORTATION KEPT YOU FROM MEDICAL APPOINTMENTS OR FROM GETTING MEDICATIONS?: NO

## 2025-05-24 SDOH — ECONOMIC STABILITY: FOOD INSECURITY: HOW HARD IS IT FOR YOU TO PAY FOR THE VERY BASICS LIKE FOOD, HOUSING, MEDICAL CARE, AND HEATING?: PATIENT DECLINED

## 2025-05-24 SDOH — SOCIAL STABILITY: SOCIAL INSECURITY: WITHIN THE LAST YEAR, HAVE YOU BEEN AFRAID OF YOUR PARTNER OR EX-PARTNER?: NO

## 2025-05-24 SDOH — ECONOMIC STABILITY: HOUSING INSECURITY: IN THE LAST 12 MONTHS, WAS THERE A TIME WHEN YOU WERE NOT ABLE TO PAY THE MORTGAGE OR RENT ON TIME?: NO

## 2025-05-24 SDOH — ECONOMIC STABILITY: INCOME INSECURITY: IN THE PAST 12 MONTHS HAS THE ELECTRIC, GAS, OIL, OR WATER COMPANY THREATENED TO SHUT OFF SERVICES IN YOUR HOME?: NO

## 2025-05-24 SDOH — ECONOMIC STABILITY: HOUSING INSECURITY: AT ANY TIME IN THE PAST 12 MONTHS, WERE YOU HOMELESS OR LIVING IN A SHELTER (INCLUDING NOW)?: NO

## 2025-05-24 SDOH — ECONOMIC STABILITY: HOUSING INSECURITY: IN THE PAST 12 MONTHS, HOW MANY TIMES HAVE YOU MOVED WHERE YOU WERE LIVING?: 0

## 2025-05-24 SDOH — SOCIAL STABILITY: SOCIAL INSECURITY: ABUSE: ADULT

## 2025-05-24 SDOH — SOCIAL STABILITY: SOCIAL INSECURITY: WITHIN THE LAST YEAR, HAVE YOU BEEN HUMILIATED OR EMOTIONALLY ABUSED IN OTHER WAYS BY YOUR PARTNER OR EX-PARTNER?: NO

## 2025-05-24 SDOH — SOCIAL STABILITY: SOCIAL INSECURITY: ARE THERE ANY APPARENT SIGNS OF INJURIES/BEHAVIORS THAT COULD BE RELATED TO ABUSE/NEGLECT?: UNABLE TO ASSESS

## 2025-05-24 SDOH — SOCIAL STABILITY: SOCIAL INSECURITY: HAS ANYONE EVER THREATENED TO HURT YOUR FAMILY OR YOUR PETS?: UNABLE TO ASSESS

## 2025-05-24 SDOH — SOCIAL STABILITY: SOCIAL INSECURITY: HAVE YOU HAD ANY THOUGHTS OF HARMING ANYONE ELSE?: UNABLE TO ASSESS

## 2025-05-24 SDOH — ECONOMIC STABILITY: FOOD INSECURITY: WITHIN THE PAST 12 MONTHS, THE FOOD YOU BOUGHT JUST DIDN'T LAST AND YOU DIDN'T HAVE MONEY TO GET MORE.: NEVER TRUE

## 2025-05-24 SDOH — ECONOMIC STABILITY: FOOD INSECURITY: WITHIN THE PAST 12 MONTHS, YOU WORRIED THAT YOUR FOOD WOULD RUN OUT BEFORE YOU GOT THE MONEY TO BUY MORE.: NEVER TRUE

## 2025-05-24 SDOH — SOCIAL STABILITY: SOCIAL INSECURITY: WERE YOU ABLE TO COMPLETE ALL THE BEHAVIORAL HEALTH SCREENINGS?: NO

## 2025-05-24 SDOH — SOCIAL STABILITY: SOCIAL INSECURITY: DOES ANYONE TRY TO KEEP YOU FROM HAVING/CONTACTING OTHER FRIENDS OR DOING THINGS OUTSIDE YOUR HOME?: UNABLE TO ASSESS

## 2025-05-24 SDOH — SOCIAL STABILITY: SOCIAL INSECURITY: DO YOU FEEL UNSAFE GOING BACK TO THE PLACE WHERE YOU ARE LIVING?: UNABLE TO ASSESS

## 2025-05-24 SDOH — SOCIAL STABILITY: SOCIAL INSECURITY: DO YOU FEEL ANYONE HAS EXPLOITED OR TAKEN ADVANTAGE OF YOU FINANCIALLY OR OF YOUR PERSONAL PROPERTY?: UNABLE TO ASSESS

## 2025-05-24 SDOH — SOCIAL STABILITY: SOCIAL INSECURITY: HAVE YOU HAD THOUGHTS OF HARMING ANYONE ELSE?: NO

## 2025-05-24 SDOH — SOCIAL STABILITY: SOCIAL INSECURITY: ARE YOU OR HAVE YOU BEEN THREATENED OR ABUSED PHYSICALLY, EMOTIONALLY, OR SEXUALLY BY ANYONE?: UNABLE TO ASSESS

## 2025-05-24 ASSESSMENT — COGNITIVE AND FUNCTIONAL STATUS - GENERAL
HELP NEEDED FOR BATHING: A LOT
TOILETING: A LOT
DAILY ACTIVITIY SCORE: 12
CLIMB 3 TO 5 STEPS WITH RAILING: TOTAL
WALKING IN HOSPITAL ROOM: A LOT
TURNING FROM BACK TO SIDE WHILE IN FLAT BAD: A LOT
PATIENT BASELINE BEDBOUND: YES
DRESSING REGULAR UPPER BODY CLOTHING: A LOT
MOVING TO AND FROM BED TO CHAIR: A LOT
EATING MEALS: A LOT
MOVING FROM LYING ON BACK TO SITTING ON SIDE OF FLAT BED WITH BEDRAILS: A LOT
MOBILITY SCORE: 11
PERSONAL GROOMING: A LOT
DRESSING REGULAR LOWER BODY CLOTHING: A LOT
STANDING UP FROM CHAIR USING ARMS: A LOT

## 2025-05-24 ASSESSMENT — PAIN SCALES - GENERAL
PAINLEVEL_OUTOF10: 0 - NO PAIN

## 2025-05-24 ASSESSMENT — ACTIVITIES OF DAILY LIVING (ADL)
GROOMING: DEPENDENT
LACK_OF_TRANSPORTATION: NO
HEARING - RIGHT EAR: FUNCTIONAL
LACK_OF_TRANSPORTATION: NO
TOILETING: DEPENDENT
JUDGMENT_ADEQUATE_SAFELY_COMPLETE_DAILY_ACTIVITIES: YES
PATIENT'S MEMORY ADEQUATE TO SAFELY COMPLETE DAILY ACTIVITIES?: YES
WALKS IN HOME: DEPENDENT
FEEDING YOURSELF: DEPENDENT
BATHING: DEPENDENT
ASSISTIVE_DEVICE: CANE;WHEELCHAIR
HEARING - LEFT EAR: FUNCTIONAL
DRESSING YOURSELF: DEPENDENT
ADEQUATE_TO_COMPLETE_ADL: YES

## 2025-05-24 ASSESSMENT — LIFESTYLE VARIABLES
SKIP TO QUESTIONS 9-10: 1
HOW MANY STANDARD DRINKS CONTAINING ALCOHOL DO YOU HAVE ON A TYPICAL DAY: PATIENT DOES NOT DRINK
AUDIT-C TOTAL SCORE: 0
AUDIT-C TOTAL SCORE: 0
HOW OFTEN DO YOU HAVE A DRINK CONTAINING ALCOHOL: NEVER
HOW OFTEN DO YOU HAVE 6 OR MORE DRINKS ON ONE OCCASION: NEVER

## 2025-05-24 ASSESSMENT — PATIENT HEALTH QUESTIONNAIRE - PHQ9
2. FEELING DOWN, DEPRESSED OR HOPELESS: NOT AT ALL
SUM OF ALL RESPONSES TO PHQ9 QUESTIONS 1 & 2: 0
1. LITTLE INTEREST OR PLEASURE IN DOING THINGS: NOT AT ALL

## 2025-05-24 ASSESSMENT — PAIN - FUNCTIONAL ASSESSMENT
PAIN_FUNCTIONAL_ASSESSMENT: 0-10
PAIN_FUNCTIONAL_ASSESSMENT: 0-10
PAIN_FUNCTIONAL_ASSESSMENT: WONG-BAKER FACES
PAIN_FUNCTIONAL_ASSESSMENT: 0-10
PAIN_FUNCTIONAL_ASSESSMENT: 0-10

## 2025-05-24 NOTE — H&P
History Of Present Illness  Nancy Long is a 80 y.o. female presenting to emergency department from [facility] via EMS for evaluation of increased confusion.  Patient was diagnosed with COVID-19 1 week ago and per skilled nursing facility staff has had an increase in confusion over the last 1 week.  Patient is alert and oriented x 3 with mild confusion and unaware of her situation.  Patient denies any pain, nausea, vomiting, or difficulty breathing.  Per paperwork patient is near completion of a p.o. dose of doxycycline for right lower extremity cellulitis.    In ED, hemoglobin 11.2, bandemia present.  Glucose 128, chloride 97, BUN 73, creatinine 2.57, GFR 18, calcium 8.3.  Patient given LR x 1 L bolus.  CT of the head completed and negative for acute process per radiology review.  Chest x-ray ordered and pending.  Urinalysis ordered and pending.  Blood pressure 142/63, heart rate 80, respirations 17, temperature 36.5 °C, SpO2 94% on room air.  Lactate 0.9.  Troponin 24 with a repeat pending.     Prior medical records reviewed.    Past Medical History  Medical History[1]    Surgical History  Surgical History[2]     Social History  She reports that she has quit smoking. Her smoking use included cigarettes. She has never used smokeless tobacco. She reports that she does not currently use alcohol. She reports that she does not use drugs.    Family History  Family History[3]     Allergies  Penicillins and Sulfa (sulfonamide antibiotics)    Review of Systems   Unable to perform ROS: Mental status change        Physical Exam  HENT:      Mouth/Throat:      Mouth: Mucous membranes are dry.      Pharynx: Oropharynx is clear.   Eyes:      Pupils: Pupils are equal, round, and reactive to light.   Cardiovascular:      Rate and Rhythm: Normal rate and regular rhythm.   Abdominal:      General: Bowel sounds are normal.      Palpations: Abdomen is soft.   Musculoskeletal:         General: Normal  "range of motion.      Cervical back: Normal range of motion.   Skin:     Capillary Refill: Capillary refill takes less than 2 seconds.   Neurological:      Mental Status: She is alert. She is disoriented and confused.          Last Recorded Vitals  Blood pressure 170/68, pulse 109, temperature 36.6 °C (97.9 °F), temperature source Temporal, resp. rate 20, height 1.702 m (5' 7.01\"), weight 75.5 kg (166 lb 7.2 oz), SpO2 93%.    Relevant Results  CT head wo IV contrast  Result Date: 5/23/2025  Interpreted By:  Erik Evans, STUDY: CT HEAD WO IV CONTRAST;  5/23/2025 11:06 pm   INDICATION: Signs/Symptoms:ams.   COMPARISON: None   ACCESSION NUMBER(S): [id number]   ORDERING CLINICIAN: SAM PHOENIX   TECHNIQUE: Contiguous axial images of the head were obtained without intravenous contrast.   FINDINGS: BRAIN PARENCHYMA:  There is cerebral atrophy and chronic periventricular white matter small vessel ischemic change. The gray white matter differentiation is preserved.  No mass effect or midline shift.   HEMORRHAGE:  No evidence of acute intracranial hemorrhage. VENTRICLES AND EXTRA-AXIAL SPACES:  The ventricles are within normal limits in size for brain volume.  No evidence of abnormal extraaxial fluid collection. EXTRACRANIAL SOFT TISSUES:  Within normal limits. PARANASAL SINUSES/MASTOIDS:  The visualized paranasal sinuses and mastoid air cells are clear and well pneumatized. CALVARIUM:  No evidence of depressed calvarial fracture.   OTHER FINDINGS:  None       Cerebral atrophy and chronic periventricular white matter small vessel ischemic change.   No evidence of acute intracranial hemorrhage.   MACRO: None   Signed by: Erik Evans 5/23/2025 11:30 PM Dictation workstation:   [id number]      Assessment & Plan  COVID-19    Bandemia    Acute kidney injury superimposed on CKD      Nancy is an 80-year-old female patient presenting from skilled nursing facility for evaluation of altered mental status.  Staff states that " the patient was diagnosed with COVID-19 1 week ago and has had an increase in confusion since.  Patient is also completing a course of p.o. doxycycline for a right lower extremity cellulitis.  Patient is alert and oriented x 3 but unaware of her situation and has mild confusion.  Patient found to have a hemoglobin of 11.2, bandemia present.  Creatinine 2.57, BUN 73, GFR 18.  Patient given LR x 1 L bolus, urinalysis ordered and pending.  CT of the head negative for acute process, chest x-ray ordered and pending.  Vital signs within normal limits, patient admitted for further medical management.    Covid-19/Bandemia/KARLA on CKD  Inpatient telemetry admission to Dr. Saez  DNR CCA  See imaging results above  Chest x-ray ordered and pending  Urinalysis ordered and pending  LR x 1 L /continue IV fluids  Tylenol as needed  Oxygen as needed  Repeat labs in a.m.  Trend troponin    DM/HTN/GERD/SBO/CAD/RLS/anxiety/heart failure/breast cancer  #Chronic conditions/right lower extremity cellulitis  Cardiac/diabetic diet  Insulin sliding scale  Hypoglycemia treatment as needed  Accu-Cheks  Hold home antidiabetic medications  Continue home medications when Med rec complete  Continue course of p.o. doxycycline as ordered  Telemetry monitoring  PT/OT    DVT Ppx  SCDs  Heparin subcutaneous  Out of bed with assistance  Patient fully evaluated on May 24.  Steroids added for respiratory compromise.  Continue to monitor respiratory status.  Recheck labs in AM.  Nephrology consultation obtained    I spent 60  minutes in the professional and overall care of this patient.  Gianfranco Saez MD         [1]   Past Medical History:  Diagnosis Date    Personal history of other diseases of the circulatory system     History of hypertension    Personal history of other endocrine, nutritional and metabolic disease     History of diabetes mellitus   [2]   Past Surgical History:  Procedure Laterality Date    APPENDECTOMY  01/03/2014    Appendectomy     BREAST LUMPECTOMY  01/03/2014    Breast Surgery Lumpectomy    CHOLECYSTECTOMY  01/03/2014    Cholecystectomy    OOPHORECTOMY  01/03/2014    Oophorectomy   [3] No family history on file.     [Diarrhea: Grade 0] : Diarrhea: Grade 0 [Negative] : Allergic/Immunologic

## 2025-05-24 NOTE — ED TRIAGE NOTES
Pt BIBA from [facility] for concern of altered mental status. Per EMS pt was diagnosed with covid approximately 1 week ago and and since has increased confusion. Pt alert and oriented x3, to self, location, month/year but unaware of situation.

## 2025-05-24 NOTE — H&P
History Of Present Illness  Nancy Long is a 80 y.o. female presenting to emergency department from [facility] via EMS for evaluation of increased confusion.  Patient was diagnosed with COVID-19 1 week ago and per skilled nursing facility staff has had an increase in confusion over the last 1 week.  Patient is alert and oriented x 3 with mild confusion and unaware of her situation.  Patient denies any pain, nausea, vomiting, or difficulty breathing.  Per paperwork patient is near completion of a p.o. dose of doxycycline for right lower extremity cellulitis.    In ED, hemoglobin 11.2, bandemia present.  Glucose 128, chloride 97, BUN 73, creatinine 2.57, GFR 18, calcium 8.3.  Patient given LR x 1 L bolus.  CT of the head completed and negative for acute process per radiology review.  Chest x-ray ordered and pending.  Urinalysis ordered and pending.  Blood pressure 142/63, heart rate 80, respirations 17, temperature 36.5 °C, SpO2 94% on room air.  Lactate 0.9.  Troponin 24 with a repeat pending.  Patient will be admitted to telemetry under the care of Dr. Saez who will continue to follow.  I was asked to do H&P and place initial admission orders.    Prior medical records reviewed.    Past Medical History  Medical History[1]    Surgical History  Surgical History[2]     Social History  She reports that she has quit smoking. Her smoking use included cigarettes. She has never used smokeless tobacco. She reports that she does not currently use alcohol. She reports that she does not use drugs.    Family History  Family History[3]     Allergies  Penicillins and Sulfa (sulfonamide antibiotics)    Review of Systems   Unable to perform ROS: Mental status change        Physical Exam  HENT:      Mouth/Throat:      Mouth: Mucous membranes are dry.      Pharynx: Oropharynx is clear.   Eyes:      Pupils: Pupils are equal, round, and reactive to light.   Cardiovascular:      Rate and Rhythm: Normal  "rate and regular rhythm.   Abdominal:      General: Bowel sounds are normal.      Palpations: Abdomen is soft.   Musculoskeletal:         General: Normal range of motion.      Cervical back: Normal range of motion.   Skin:     Capillary Refill: Capillary refill takes less than 2 seconds.   Neurological:      Mental Status: She is alert. She is disoriented and confused.          Last Recorded Vitals  Blood pressure 138/63, pulse 83, temperature 36.5 °C (97.7 °F), temperature source Temporal, resp. rate 20, height 1.702 m (5' 7\"), weight 72.6 kg (160 lb), SpO2 (!) 72%.    Relevant Results  CT head wo IV contrast  Result Date: 5/23/2025  Interpreted By:  Erik Evans, STUDY: CT HEAD WO IV CONTRAST;  5/23/2025 11:06 pm   INDICATION: Signs/Symptoms:ams.   COMPARISON: None   ACCESSION NUMBER(S): [id number]   ORDERING CLINICIAN: SAM PHOENIX   TECHNIQUE: Contiguous axial images of the head were obtained without intravenous contrast.   FINDINGS: BRAIN PARENCHYMA:  There is cerebral atrophy and chronic periventricular white matter small vessel ischemic change. The gray white matter differentiation is preserved.  No mass effect or midline shift.   HEMORRHAGE:  No evidence of acute intracranial hemorrhage. VENTRICLES AND EXTRA-AXIAL SPACES:  The ventricles are within normal limits in size for brain volume.  No evidence of abnormal extraaxial fluid collection. EXTRACRANIAL SOFT TISSUES:  Within normal limits. PARANASAL SINUSES/MASTOIDS:  The visualized paranasal sinuses and mastoid air cells are clear and well pneumatized. CALVARIUM:  No evidence of depressed calvarial fracture.   OTHER FINDINGS:  None       Cerebral atrophy and chronic periventricular white matter small vessel ischemic change.   No evidence of acute intracranial hemorrhage.   MACRO: None   Signed by: Erik Evans 5/23/2025 11:30 PM Dictation workstation:   [id number]      Assessment & Plan  COVID-19    Bandemia    Acute kidney injury superimposed on " CKD      Nancy is an 80-year-old female patient presenting from [facility] nursing facility for evaluation of altered mental status.  Staff states that the patient was diagnosed with COVID-19 1 week ago and has had an increase in confusion since.  Patient is also completing a course of p.o. doxycycline for a right lower extremity cellulitis.  Patient is alert and oriented x 3 but unaware of her situation and has mild confusion.  Patient found to have a hemoglobin of 11.2, bandemia present.  Creatinine 2.57, BUN 73, GFR 18.  Patient given LR x 1 L bolus, urinalysis ordered and pending.  CT of the head negative for acute process, chest x-ray ordered and pending.  Vital signs within normal limits, patient admitted for further medical management.    Covid-19/Bandemia/KARLA on CKD  Inpatient telemetry admission to Dr. Saez  DNR CCA  See imaging results above  Chest x-ray ordered and pending  Urinalysis ordered and pending  LR x 1 L /continue IV fluids  Tylenol as needed  Oxygen as needed  Repeat labs in a.m.  Trend troponin    DM/HTN/GERD/SBO/CAD/RLS/anxiety/heart failure/breast cancer  #Chronic conditions/right lower extremity cellulitis  Cardiac/diabetic diet  Insulin sliding scale  Hypoglycemia treatment as needed  Accu-Cheks  Hold home antidiabetic medications  Continue home medications when Med rec complete  Continue course of p.o. doxycycline as ordered  Telemetry monitoring  PT/OT    DVT Ppx  SCDs  Heparin subcutaneous  Out of bed with assistance      I spent 50 minutes in the professional and overall care of this patient.  Brenna Dickerson, IAN-EDGAR         [1]   Past Medical History:  Diagnosis Date    Personal history of other diseases of the circulatory system     History of hypertension    Personal history of other endocrine, nutritional and metabolic disease     History of diabetes mellitus   [2]   Past Surgical History:  Procedure Laterality Date    APPENDECTOMY  01/03/2014    Appendectomy    BREAST  LUMPECTOMY  01/03/2014    Breast Surgery Lumpectomy    CHOLECYSTECTOMY  01/03/2014    Cholecystectomy    OOPHORECTOMY  01/03/2014    Oophorectomy   [3] No family history on file.

## 2025-05-25 ENCOUNTER — APPOINTMENT (OUTPATIENT)
Dept: RADIOLOGY | Facility: HOSPITAL | Age: 80
End: 2025-05-25
Payer: MEDICARE

## 2025-05-25 VITALS
TEMPERATURE: 99.3 F | RESPIRATION RATE: 18 BRPM | SYSTOLIC BLOOD PRESSURE: 141 MMHG | BODY MASS INDEX: 26.12 KG/M2 | HEIGHT: 67 IN | WEIGHT: 166.45 LBS | DIASTOLIC BLOOD PRESSURE: 64 MMHG | HEART RATE: 99 BPM | OXYGEN SATURATION: 94 %

## 2025-05-25 LAB
ALBUMIN SERPL BCP-MCNC: 2.9 G/DL (ref 3.4–5)
ALP SERPL-CCNC: 111 U/L (ref 33–136)
ALT SERPL W P-5'-P-CCNC: 15 U/L (ref 7–45)
ANION GAP SERPL CALC-SCNC: 12 MMOL/L (ref 10–20)
AST SERPL W P-5'-P-CCNC: 40 U/L (ref 9–39)
BACTERIA UR CULT: ABNORMAL
BASOPHILS # BLD AUTO: 0.02 X10*3/UL (ref 0–0.1)
BASOPHILS NFR BLD AUTO: 0.2 %
BILIRUB SERPL-MCNC: 0.4 MG/DL (ref 0–1.2)
BUN SERPL-MCNC: 57 MG/DL (ref 6–23)
CALCIUM SERPL-MCNC: 8.3 MG/DL (ref 8.6–10.3)
CHLORIDE SERPL-SCNC: 102 MMOL/L (ref 98–107)
CHLORIDE UR-SCNC: 42 MMOL/L
CHLORIDE/CREATININE (MMOL/G) IN URINE: 70 MMOL/G CREAT (ref 38–318)
CHOLEST SERPL-MCNC: 57 MG/DL (ref 0–199)
CHOLESTEROL/HDL RATIO: 2.6
CO2 SERPL-SCNC: 28 MMOL/L (ref 21–32)
CREAT SERPL-MCNC: 1.53 MG/DL (ref 0.5–1.05)
CREAT UR-MCNC: 60.2 MG/DL (ref 20–320)
EGFRCR SERPLBLD CKD-EPI 2021: 34 ML/MIN/1.73M*2
EOSINOPHIL # BLD AUTO: 0 X10*3/UL (ref 0–0.4)
EOSINOPHIL NFR BLD AUTO: 0 %
ERYTHROCYTE [DISTWIDTH] IN BLOOD BY AUTOMATED COUNT: 14.3 % (ref 11.5–14.5)
GLUCOSE BLD MANUAL STRIP-MCNC: 184 MG/DL (ref 74–99)
GLUCOSE BLD MANUAL STRIP-MCNC: 194 MG/DL (ref 74–99)
GLUCOSE BLD MANUAL STRIP-MCNC: 261 MG/DL (ref 74–99)
GLUCOSE BLD MANUAL STRIP-MCNC: 271 MG/DL (ref 74–99)
GLUCOSE SERPL-MCNC: 198 MG/DL (ref 74–99)
HCT VFR BLD AUTO: 32.4 % (ref 36–46)
HDLC SERPL-MCNC: 21.6 MG/DL
HGB BLD-MCNC: 10 G/DL (ref 12–16)
HOLD SPECIMEN: NORMAL
IMM GRANULOCYTES # BLD AUTO: 0.48 X10*3/UL (ref 0–0.5)
IMM GRANULOCYTES NFR BLD AUTO: 4.1 % (ref 0–0.9)
LDLC SERPL CALC-MCNC: 8 MG/DL
LYMPHOCYTES # BLD AUTO: 1.23 X10*3/UL (ref 0.8–3)
LYMPHOCYTES NFR BLD AUTO: 10.5 %
MCH RBC QN AUTO: 28.9 PG (ref 26–34)
MCHC RBC AUTO-ENTMCNC: 30.9 G/DL (ref 32–36)
MCV RBC AUTO: 94 FL (ref 80–100)
MICROALBUMIN UR-MCNC: 125.4 MG/L
MICROALBUMIN/CREAT UR: 208.3 UG/MG CREAT
MONOCYTES # BLD AUTO: 0.62 X10*3/UL (ref 0.05–0.8)
MONOCYTES NFR BLD AUTO: 5.3 %
MYOGLOBIN SERPL-MCNC: 83 UG/L
NEUTROPHILS # BLD AUTO: 9.35 X10*3/UL (ref 1.6–5.5)
NEUTROPHILS NFR BLD AUTO: 79.9 %
NON HDL CHOLESTEROL: 35 MG/DL (ref 0–149)
NRBC BLD-RTO: 0 /100 WBCS (ref 0–0)
OSMOLALITY UR: 350 MOSM/KG (ref 200–1200)
PLATELET # BLD AUTO: 167 X10*3/UL (ref 150–450)
POTASSIUM SERPL-SCNC: 4.4 MMOL/L (ref 3.5–5.3)
POTASSIUM UR-SCNC: 29 MMOL/L
POTASSIUM/CREAT UR-RTO: 48 MMOL/G CREAT
PROT SERPL-MCNC: 6.1 G/DL (ref 6.4–8.2)
RBC # BLD AUTO: 3.46 X10*6/UL (ref 4–5.2)
SODIUM SERPL-SCNC: 138 MMOL/L (ref 136–145)
SODIUM UR-SCNC: 64 MMOL/L
SODIUM/CREAT UR-RTO: 106 MMOL/G CREAT
TRIGL SERPL-MCNC: 139 MG/DL (ref 0–149)
URATE SERPL-MCNC: 7.7 MG/DL (ref 2.3–6.7)
UREA/CREAT UR-SRTO: 6.8 G/G CREAT
UUN UR-MCNC: 411 MG/DL
VLDL: 28 MG/DL (ref 0–40)
WBC # BLD AUTO: 11.7 X10*3/UL (ref 4.4–11.3)

## 2025-05-25 PROCEDURE — 76770 US EXAM ABDO BACK WALL COMP: CPT | Performed by: RADIOLOGY

## 2025-05-25 PROCEDURE — 85025 COMPLETE CBC W/AUTO DIFF WBC: CPT | Performed by: INTERNAL MEDICINE

## 2025-05-25 PROCEDURE — 80061 LIPID PANEL: CPT | Performed by: INTERNAL MEDICINE

## 2025-05-25 PROCEDURE — 2500000004 HC RX 250 GENERAL PHARMACY W/ HCPCS (ALT 636 FOR OP/ED): Mod: JZ | Performed by: NURSE PRACTITIONER

## 2025-05-25 PROCEDURE — 83874 ASSAY OF MYOGLOBIN: CPT | Mod: PARLAB | Performed by: INTERNAL MEDICINE

## 2025-05-25 PROCEDURE — 2500000002 HC RX 250 W HCPCS SELF ADMINISTERED DRUGS (ALT 637 FOR MEDICARE OP, ALT 636 FOR OP/ED): Performed by: NURSE PRACTITIONER

## 2025-05-25 PROCEDURE — 82570 ASSAY OF URINE CREATININE: CPT | Performed by: INTERNAL MEDICINE

## 2025-05-25 PROCEDURE — 83970 ASSAY OF PARATHORMONE: CPT | Mod: PARLAB | Performed by: INTERNAL MEDICINE

## 2025-05-25 PROCEDURE — 82507 ASSAY OF CITRATE: CPT | Performed by: INTERNAL MEDICINE

## 2025-05-25 PROCEDURE — 2500000001 HC RX 250 WO HCPCS SELF ADMINISTERED DRUGS (ALT 637 FOR MEDICARE OP): Performed by: NURSE PRACTITIONER

## 2025-05-25 PROCEDURE — 2500000002 HC RX 250 W HCPCS SELF ADMINISTERED DRUGS (ALT 637 FOR MEDICARE OP, ALT 636 FOR OP/ED): Mod: JZ | Performed by: INTERNAL MEDICINE

## 2025-05-25 PROCEDURE — 36415 COLL VENOUS BLD VENIPUNCTURE: CPT | Performed by: INTERNAL MEDICINE

## 2025-05-25 PROCEDURE — 94640 AIRWAY INHALATION TREATMENT: CPT

## 2025-05-25 PROCEDURE — 1200000002 HC GENERAL ROOM WITH TELEMETRY DAILY

## 2025-05-25 PROCEDURE — 76770 US EXAM ABDO BACK WALL COMP: CPT

## 2025-05-25 PROCEDURE — 82947 ASSAY GLUCOSE BLOOD QUANT: CPT

## 2025-05-25 PROCEDURE — 2500000004 HC RX 250 GENERAL PHARMACY W/ HCPCS (ALT 636 FOR OP/ED): Mod: JZ

## 2025-05-25 PROCEDURE — 51702 INSERT TEMP BLADDER CATH: CPT

## 2025-05-25 PROCEDURE — 2500000001 HC RX 250 WO HCPCS SELF ADMINISTERED DRUGS (ALT 637 FOR MEDICARE OP)

## 2025-05-25 PROCEDURE — 82043 UR ALBUMIN QUANTITATIVE: CPT | Performed by: INTERNAL MEDICINE

## 2025-05-25 PROCEDURE — 2500000005 HC RX 250 GENERAL PHARMACY W/O HCPCS: Performed by: NURSE PRACTITIONER

## 2025-05-25 PROCEDURE — 2500000002 HC RX 250 W HCPCS SELF ADMINISTERED DRUGS (ALT 637 FOR MEDICARE OP, ALT 636 FOR OP/ED)

## 2025-05-25 PROCEDURE — 84550 ASSAY OF BLOOD/URIC ACID: CPT | Performed by: INTERNAL MEDICINE

## 2025-05-25 PROCEDURE — 83935 ASSAY OF URINE OSMOLALITY: CPT | Mod: PARLAB | Performed by: INTERNAL MEDICINE

## 2025-05-25 PROCEDURE — 84075 ASSAY ALKALINE PHOSPHATASE: CPT | Performed by: INTERNAL MEDICINE

## 2025-05-25 RX ORDER — ZINC OXIDE 20 G/100G
1 OINTMENT TOPICAL
Status: DISCONTINUED | OUTPATIENT
Start: 2025-05-25 | End: 2025-05-29 | Stop reason: HOSPADM

## 2025-05-25 RX ORDER — SODIUM CHLORIDE, SODIUM LACTATE, POTASSIUM CHLORIDE, CALCIUM CHLORIDE 600; 310; 30; 20 MG/100ML; MG/100ML; MG/100ML; MG/100ML
75 INJECTION, SOLUTION INTRAVENOUS CONTINUOUS
Status: ACTIVE | OUTPATIENT
Start: 2025-05-25 | End: 2025-05-26

## 2025-05-25 RX ADMIN — METOPROLOL TARTRATE 100 MG: 100 TABLET, FILM COATED ORAL at 20:30

## 2025-05-25 RX ADMIN — ASPIRIN 81 MG CHEWABLE TABLET 81 MG: 81 TABLET CHEWABLE at 20:30

## 2025-05-25 RX ADMIN — HEPARIN SODIUM 5000 UNITS: 5000 INJECTION, SOLUTION INTRAVENOUS; SUBCUTANEOUS at 17:07

## 2025-05-25 RX ADMIN — INSULIN LISPRO 6 UNITS: 100 INJECTION, SOLUTION INTRAVENOUS; SUBCUTANEOUS at 17:07

## 2025-05-25 RX ADMIN — CEFTRIAXONE SODIUM 1 G: 1 INJECTION, SOLUTION INTRAVENOUS at 00:00

## 2025-05-25 RX ADMIN — METHYLPREDNISOLONE SODIUM SUCCINATE 40 MG: 40 INJECTION, POWDER, FOR SOLUTION INTRAMUSCULAR; INTRAVENOUS at 12:38

## 2025-05-25 RX ADMIN — AZITHROMYCIN DIHYDRATE 500 MG: 500 INJECTION, POWDER, LYOPHILIZED, FOR SOLUTION INTRAVENOUS at 23:23

## 2025-05-25 RX ADMIN — IPRATROPIUM BROMIDE AND ALBUTEROL SULFATE 3 ML: .5; 3 SOLUTION RESPIRATORY (INHALATION) at 19:11

## 2025-05-25 RX ADMIN — INSULIN LISPRO 2 UNITS: 100 INJECTION, SOLUTION INTRAVENOUS; SUBCUTANEOUS at 12:39

## 2025-05-25 RX ADMIN — IPRATROPIUM BROMIDE AND ALBUTEROL SULFATE 3 ML: .5; 3 SOLUTION RESPIRATORY (INHALATION) at 07:12

## 2025-05-25 RX ADMIN — HEPARIN SODIUM 5000 UNITS: 5000 INJECTION, SOLUTION INTRAVENOUS; SUBCUTANEOUS at 21:24

## 2025-05-25 RX ADMIN — CEFTRIAXONE SODIUM 1 G: 1 INJECTION, SOLUTION INTRAVENOUS at 23:20

## 2025-05-25 RX ADMIN — AZITHROMYCIN DIHYDRATE 500 MG: 500 INJECTION, POWDER, LYOPHILIZED, FOR SOLUTION INTRAVENOUS at 00:52

## 2025-05-25 RX ADMIN — RUGBY ZINC OXIDE 20% 1 APPLICATION: 20 OINTMENT TOPICAL at 20:31

## 2025-05-25 RX ADMIN — SODIUM CHLORIDE, SODIUM LACTATE, POTASSIUM CHLORIDE, AND CALCIUM CHLORIDE 75 ML/HR: .6; .31; .03; .02 INJECTION, SOLUTION INTRAVENOUS at 23:21

## 2025-05-25 RX ADMIN — CILOSTAZOL 50 MG: 100 TABLET ORAL at 20:37

## 2025-05-25 RX ADMIN — ACETAMINOPHEN 650 MG: 325 TABLET ORAL at 20:37

## 2025-05-25 RX ADMIN — INSULIN HUMAN 25 UNITS: 100 INJECTION, SUSPENSION SUBCUTANEOUS at 20:29

## 2025-05-25 RX ADMIN — Medication 28 PERCENT: at 07:12

## 2025-05-25 RX ADMIN — HEPARIN SODIUM 5000 UNITS: 5000 INJECTION, SOLUTION INTRAVENOUS; SUBCUTANEOUS at 05:55

## 2025-05-25 ASSESSMENT — PAIN - FUNCTIONAL ASSESSMENT
PAIN_FUNCTIONAL_ASSESSMENT: 0-10
PAIN_FUNCTIONAL_ASSESSMENT: FLACC (FACE, LEGS, ACTIVITY, CRY, CONSOLABILITY)
PAIN_FUNCTIONAL_ASSESSMENT: 0-10

## 2025-05-25 ASSESSMENT — COGNITIVE AND FUNCTIONAL STATUS - GENERAL
DRESSING REGULAR LOWER BODY CLOTHING: A LOT
MOVING TO AND FROM BED TO CHAIR: A LOT
TOILETING: A LOT
EATING MEALS: A LOT
TURNING FROM BACK TO SIDE WHILE IN FLAT BAD: A LOT
STANDING UP FROM CHAIR USING ARMS: A LOT
MOBILITY SCORE: 11
DRESSING REGULAR UPPER BODY CLOTHING: A LOT
WALKING IN HOSPITAL ROOM: A LOT
PERSONAL GROOMING: A LOT
CLIMB 3 TO 5 STEPS WITH RAILING: TOTAL
MOVING FROM LYING ON BACK TO SITTING ON SIDE OF FLAT BED WITH BEDRAILS: A LOT
DAILY ACTIVITIY SCORE: 12
HELP NEEDED FOR BATHING: A LOT

## 2025-05-25 ASSESSMENT — PAIN SCALES - GENERAL
PAINLEVEL_OUTOF10: 0 - NO PAIN
PAINLEVEL_OUTOF10: 10 - WORST POSSIBLE PAIN

## 2025-05-25 ASSESSMENT — PAIN DESCRIPTION - DESCRIPTORS: DESCRIPTORS: ACHING

## 2025-05-25 NOTE — CARE PLAN
I was contacted by RN because patient has oral methylprednisolone ordered.  Patient still currently n.p.o., and has yet to be seen by SLP.  Will order one-time dose of IV methylprednisolone.  Continue oral when able.

## 2025-05-25 NOTE — SIGNIFICANT EVENT
"Was contacted by night shift RN who was reviewing patients lab work and noticed that the Urinalysis returned at 0906 am positive for a UTI and went unaddressed all day. He contacted me at 2320 pm to place an order for IV antibiotics due to the patient being NPO. I placed an order for IV Ceftriaxone 1 gram every 24 hours. Patient is continuing to receing IV maintenance fluids for her KARLA. I then also reviewed the chest xray order that I placed for the patient upon admission that was not completed until the afternoon that showed bilateral opacities compatible with pneumonia. Azithromycin 500 mg IV every 24 hours was added.     Brenna SOSA, CNP    /74 (BP Location: Right arm, Patient Position: Lying)   Pulse 106   Temp 37 °C (98.6 °F) (Temporal)   Resp 18   Ht 1.702 m (5' 7.01\")   Wt 75.5 kg (166 lb 7.2 oz)   SpO2 94%   BMI 26.06 kg/m²     "

## 2025-05-25 NOTE — PROGRESS NOTES
Nancy Long is a 80 y.o. female on day 1 of admission presenting with COVID-19.      Subjective   05/25- patient started on pureed and nectar thick diet, will monitor overnight, advance diet as indicated, awaiting speech therapy evaluation. Patient fully evaluated.  Ambulate patient with physical therapy.  Await final cultures, continue present IV antibiotics, oral medications and recheck labs in a.m.       Objective     Last Recorded Vitals  /70 (BP Location: Right arm, Patient Position: Lying)   Pulse (!) 116   Temp 36.7 °C (98.1 °F) (Temporal)   Resp 18   Wt 75.5 kg (166 lb 7.2 oz)   SpO2 94%   Intake/Output last 3 Shifts:    Intake/Output Summary (Last 24 hours) at 5/25/2025 1347  Last data filed at 5/25/2025 0300  Gross per 24 hour   Intake 1300 ml   Output 2200 ml   Net -900 ml       Admission Weight  Weight: 72.6 kg (160 lb) (05/23/25 2049)    Daily Weight  05/24/25 : 75.5 kg (166 lb 7.2 oz)    Image Results  US renal complete  Narrative: Interpreted By:  Shayla Melissa,   STUDY:  US RENAL COMPLETE; 5/25/2025 2:42 am      INDICATION:  Signs/Symptoms:Renal Failure.      COMPARISON:  None.      ACCESSION NUMBER(S):  [id number]      ORDERING CLINICIAN:  KRISTY STILES      TECHNIQUE:  Grayscale and color Doppler imaging of the kidneys.      FINDINGS:  The right kidney measures 11.3cm in length.  The left kidney measures 11.7cm in length.  There is no shadowing calculus, hydronephrosis, or solid mass  identified. The renal cortical echogenicity is normal but there is  mild bilateral renal cortical thinning. There is duplication of the  collecting systems bilaterally.      Urinary bladder is distended without bladder wall thickening. There  is some echogenic debris layering along the dependent portion of the  urinary bladder. Neither the right nor left ureteral jet is observed.      Impression: Duplicated collecting systems bilaterally.      No renal atrophy or hydronephrosis.      Mild bilateral  renal cortical thinning.      Distended urinary bladder containing a small amount of debris along  its dependent portion. Neither the right nor left ureteral jet is  seen.      MACRO:  None.      Signed by: Shayla Melissa 5/25/2025 5:56 AM  Dictation workstation:   [id number]      Physical Exam    Relevant Results               This patient currently has cardiac telemetry ordered; if you would like to modify or discontinue the telemetry order, click here to go to the orders activity to modify/discontinue the order.      This patient has a urinary catheter   Reason for the urinary catheter remaining today?             Gianfranco Saez MD   Physician  Internal Medicine     H&P      Signed     Date of Service: 5/24/2025  2:46 PM     Signed         History Of Present Illness  Nancy Long is a 80 y.o. female presenting to emergency department from [facility] via EMS for evaluation of increased confusion.  Patient was diagnosed with COVID-19 1 week ago and per skilled nursing facility staff has had an increase in confusion over the last 1 week.  Patient is alert and oriented x 3 with mild confusion and unaware of her situation.  Patient denies any pain, nausea, vomiting, or difficulty breathing.  Per paperwork patient is near completion of a p.o. dose of doxycycline for right lower extremity cellulitis.     In ED, hemoglobin 11.2, bandemia present.  Glucose 128, chloride 97, BUN 73, creatinine 2.57, GFR 18, calcium 8.3.  Patient given LR x 1 L bolus.  CT of the head completed and negative for acute process per radiology review.  Chest x-ray ordered and pending.  Urinalysis ordered and pending.  Blood pressure 142/63, heart rate 80, respirations 17, temperature 36.5 °C, SpO2 94% on room air.  Lactate 0.9.  Troponin 24 with a repeat pending.    Prior medical records reviewed.     Past Medical History  [Medical History]    [Medical History]  Past Medical History       Diagnosis Date    Personal  "history of other diseases of the circulatory system       History of hypertension    Personal history of other endocrine, nutritional and metabolic disease       History of diabetes mellitus        Surgical History  [Surgical History]    [Surgical History]  Past Surgical History        Procedure Laterality Date    APPENDECTOMY   01/03/2014     Appendectomy    BREAST LUMPECTOMY   01/03/2014     Breast Surgery Lumpectomy    CHOLECYSTECTOMY   01/03/2014     Cholecystectomy    OOPHORECTOMY   01/03/2014     Oophorectomy        Social History  She reports that she has quit smoking. Her smoking use included cigarettes. She has never used smokeless tobacco. She reports that she does not currently use alcohol. She reports that she does not use drugs.     Family History  [Family History]    [Family History]  No family history on file.     Allergies  Penicillins and Sulfa (sulfonamide antibiotics)     Review of Systems   Unable to perform ROS: Mental status change         Physical Exam  HENT:      Mouth/Throat:      Mouth: Mucous membranes are dry.      Pharynx: Oropharynx is clear.   Eyes:      Pupils: Pupils are equal, round, and reactive to light.   Cardiovascular:      Rate and Rhythm: Normal rate and regular rhythm.   Abdominal:      General: Bowel sounds are normal.      Palpations: Abdomen is soft.   Musculoskeletal:         General: Normal range of motion.      Cervical back: Normal range of motion.   Skin:     Capillary Refill: Capillary refill takes less than 2 seconds.   Neurological:      Mental Status: She is alert. She is disoriented and confused.            Last Recorded Vitals  Blood pressure 170/68, pulse 109, temperature 36.6 °C (97.9 °F), temperature source Temporal, resp. rate 20, height 1.702 m (5' 7.01\"), weight 75.5 kg (166 lb 7.2 oz), SpO2 93%.     Relevant Results  CT head wo IV contrast  Result Date: 5/23/2025  Interpreted By:  Erik Evans, STUDY: CT HEAD WO IV CONTRAST;  5/23/2025 11:06 pm   " INDICATION: Signs/Symptoms:ams.   COMPARISON: None   ACCESSION NUMBER(S): [id number]   ORDERING CLINICIAN: SAM PHOENIX   TECHNIQUE: Contiguous axial images of the head were obtained without intravenous contrast.   FINDINGS: BRAIN PARENCHYMA:  There is cerebral atrophy and chronic periventricular white matter small vessel ischemic change. The gray white matter differentiation is preserved.  No mass effect or midline shift.   HEMORRHAGE:  No evidence of acute intracranial hemorrhage. VENTRICLES AND EXTRA-AXIAL SPACES:  The ventricles are within normal limits in size for brain volume.  No evidence of abnormal extraaxial fluid collection. EXTRACRANIAL SOFT TISSUES:  Within normal limits. PARANASAL SINUSES/MASTOIDS:  The visualized paranasal sinuses and mastoid air cells are clear and well pneumatized. CALVARIUM:  No evidence of depressed calvarial fracture.   OTHER FINDINGS:  None        Cerebral atrophy and chronic periventricular white matter small vessel ischemic change.   No evidence of acute intracranial hemorrhage.   MACRO: None   Signed by: Erik Evans 5/23/2025 11:30 PM Dictation workstation:   [id number]        Assessment & Plan  COVID-19     Bandemia     Acute kidney injury superimposed on CKD        Nancy is an 80-year-old female patient presenting from skilled nursing facility for evaluation of altered mental status.  Staff states that the patient was diagnosed with COVID-19 1 week ago and has had an increase in confusion since.  Patient is also completing a course of p.o. doxycycline for a right lower extremity cellulitis.  Patient is alert and oriented x 3 but unaware of her situation and has mild confusion.  Patient found to have a hemoglobin of 11.2, bandemia present.  Creatinine 2.57, BUN 73, GFR 18.  Patient given LR x 1 L bolus, urinalysis ordered and pending.  CT of the head negative for acute process, chest x-ray ordered and pending.  Vital signs within normal limits, patient admitted for  further medical management.     Covid-19/Bandemia/KARLA on CKD  Inpatient telemetry admission to Dr. Saez  DNR CCA  See imaging results above  Chest x-ray ordered and pending  Urinalysis ordered and pending  LR x 1 L /continue IV fluids  Tylenol as needed  Oxygen as needed  Repeat labs in a.m.  Trend troponin     DM/HTN/GERD/SBO/CAD/RLS/anxiety/heart failure/breast cancer  #Chronic conditions/right lower extremity cellulitis  Cardiac/diabetic diet  Insulin sliding scale  Hypoglycemia treatment as needed  Accu-Cheks  Hold home antidiabetic medications  Continue home medications when Med rec complete  Continue course of p.o. doxycycline as ordered  Telemetry monitoring  PT/OT     DVT Ppx  SCDs  Heparin subcutaneous  Out of bed with assistance  Patient fully evaluated on May 24.  Steroids added for respiratory compromise.  Continue to monitor respiratory status.  Recheck labs in AM.  Nephrology consultation obtained     I spent 60  minutes in the professional and overall care of this patient.  Gianfranco Saez MD                      Assessment & Plan  COVID-19    Bandemia    Acute kidney injury superimposed on CKD        Patient fully evaluated 05/25  for    Problem List Items Addressed This Visit       * (Principal) COVID-19 - Primary     Other Visit Diagnoses         Acute renal insufficiency              Patient seen resting in bed with head of bed elevated, no s/s or c/o acute difficulties at this time.  Vital signs for last 24 hours Temp:  [36.4 °C (97.5 °F)-37 °C (98.6 °F)] 36.7 °C (98.1 °F)  Heart Rate:  [] 116  Resp:  [16-18] 18  BP: (145-177)/(65-74) 165/70    No intake/output data recorded.  Patient still requiring frequent cardiac and SPO2 monitoring. Continue aggressive pulmonary hygiene and oral hygiene. Off loading as tolerated for skin integrity. Medications and labs reviewed-   Results for orders placed or performed during the hospital encounter of 05/23/25 (from the past 24 hours)   POCT GLUCOSE    Result Value Ref Range    POCT Glucose 208 (H) 74 - 99 mg/dL   POCT GLUCOSE   Result Value Ref Range    POCT Glucose 183 (H) 74 - 99 mg/dL   Urine electrolytes   Result Value Ref Range    Sodium, Urine Random 64 mmol/L    Sodium/Creatinine Ratio 106 Not established. mmol/g Creat    Potassium, Urine Random 29 mmol/L    Potassium/Creatinine Ratio 48 Not established mmol/g Creat    Chloride, Urine Random 42 mmol/L    Chloride/Creatinine Ratio 70 38 - 318 mmol/g creat    Creatinine, Urine Random 60.2 20.0 - 320.0 mg/dL   Osmolality, urine   Result Value Ref Range    Osmolality, Urine Random 350 200 - 1,200 mOsm/kg   Urea Nitrogen, Urine Random   Result Value Ref Range    Urea Nitrogen, Urine Random 411 mg/dL    Creatinine, Urine Random 60.2 20.0 - 320.0 mg/dL    Urea Nitrogen/Creatinine Ratio 6.8 Not established. g/g creat   Albumin-Creatinine Ratio, Urine Random   Result Value Ref Range    Albumin, Urine Random 125.4 Not established mg/L    Creatinine, Urine Random 60.2 20.0 - 320.0 mg/dL    Albumin/Creatinine Ratio 208.3 (H) <30.0 ug/mg Creat   CBC and Auto Differential   Result Value Ref Range    WBC 11.7 (H) 4.4 - 11.3 x10*3/uL    nRBC 0.0 0.0 - 0.0 /100 WBCs    RBC 3.46 (L) 4.00 - 5.20 x10*6/uL    Hemoglobin 10.0 (L) 12.0 - 16.0 g/dL    Hematocrit 32.4 (L) 36.0 - 46.0 %    MCV 94 80 - 100 fL    MCH 28.9 26.0 - 34.0 pg    MCHC 30.9 (L) 32.0 - 36.0 g/dL    RDW 14.3 11.5 - 14.5 %    Platelets 167 150 - 450 x10*3/uL    Neutrophils % 79.9 40.0 - 80.0 %    Immature Granulocytes %, Automated 4.1 (H) 0.0 - 0.9 %    Lymphocytes % 10.5 13.0 - 44.0 %    Monocytes % 5.3 2.0 - 10.0 %    Eosinophils % 0.0 0.0 - 6.0 %    Basophils % 0.2 0.0 - 2.0 %    Neutrophils Absolute 9.35 (H) 1.60 - 5.50 x10*3/uL    Immature Granulocytes Absolute, Automated 0.48 0.00 - 0.50 x10*3/uL    Lymphocytes Absolute 1.23 0.80 - 3.00 x10*3/uL    Monocytes Absolute 0.62 0.05 - 0.80 x10*3/uL    Eosinophils Absolute 0.00 0.00 - 0.40 x10*3/uL     Basophils Absolute 0.02 0.00 - 0.10 x10*3/uL   Comprehensive Metabolic Panel   Result Value Ref Range    Glucose 198 (H) 74 - 99 mg/dL    Sodium 138 136 - 145 mmol/L    Potassium 4.4 3.5 - 5.3 mmol/L    Chloride 102 98 - 107 mmol/L    Bicarbonate 28 21 - 32 mmol/L    Anion Gap 12 10 - 20 mmol/L    Urea Nitrogen 57 (H) 6 - 23 mg/dL    Creatinine 1.53 (H) 0.50 - 1.05 mg/dL    eGFR 34 (L) >60 mL/min/1.73m*2    Calcium 8.3 (L) 8.6 - 10.3 mg/dL    Albumin 2.9 (L) 3.4 - 5.0 g/dL    Alkaline Phosphatase 111 33 - 136 U/L    Total Protein 6.1 (L) 6.4 - 8.2 g/dL    AST 40 (H) 9 - 39 U/L    Bilirubin, Total 0.4 0.0 - 1.2 mg/dL    ALT 15 7 - 45 U/L   Uric Acid   Result Value Ref Range    Uric Acid 7.7 (H) 2.3 - 6.7 mg/dL   Lipid Panel   Result Value Ref Range    Cholesterol 57 0 - 199 mg/dL    HDL-Cholesterol 21.6 mg/dL    Cholesterol/HDL Ratio 2.6     LDL Calculated 8 <=99 mg/dL    VLDL 28 0 - 40 mg/dL    Triglycerides 139 0 - 149 mg/dL    Non HDL Cholesterol 35 0 - 149 mg/dL   POCT GLUCOSE   Result Value Ref Range    POCT Glucose 184 (H) 74 - 99 mg/dL   POCT GLUCOSE   Result Value Ref Range    POCT Glucose 194 (H) 74 - 99 mg/dL      Patient recently received an antibiotic (last 12 hours)       None           Plan discussed with interdisciplinary team, continue current and repeat labs in the AM.     Discharge planning discussed with patient and care team. Therapy evaluations ordered.    Patient aware and agreeable to current plan, continue plan as above.     I spent a total of 60 minutes on the date of the service which included preparing to see the patient, face-to-face patient care, completing clinical documentation, obtaining and/or reviewing separately obtained history, performing a medically appropriate examination, counseling and educating the patient/family/caregiver, ordering medications, tests, or procedures, communicating with other HCPs (not separately reported), independently interpreting results (not separately  reported), communicating results to the patient/family/caregiver, and care coordination (not separately reported).            Angela Fatima

## 2025-05-25 NOTE — CARE PLAN
The patient's goals for the shift include MUNDO     The clinical goals for the shift include pt will remain free from falls      Problem: Fall/Injury  Goal: Not fall by end of shift  Outcome: Progressing  Goal: Be free from injury by end of the shift  Outcome: Progressing  Goal: Verbalize understanding of personal risk factors for fall in the hospital  Outcome: Progressing     Problem: Pain - Adult  Goal: Verbalizes/displays adequate comfort level or baseline comfort level  Outcome: Progressing     Problem: Safety - Adult  Goal: Free from fall injury  Outcome: Progressing     Problem: Discharge Planning  Goal: Discharge to home or other facility with appropriate resources  Outcome: Progressing     Problem: Chronic Conditions and Co-morbidities  Goal: Patient's chronic conditions and co-morbidity symptoms are monitored and maintained or improved  Outcome: Progressing     Problem: Nutrition  Goal: Nutrient intake appropriate for maintaining nutritional needs  Outcome: Progressing     Problem: Skin  Goal: Decreased wound size/increased tissue granulation at next dressing change  Outcome: Progressing  Flowsheets (Taken 5/24/2025 2116)  Decreased wound size/increased tissue granulation at next dressing change: Protective dressings over bony prominences  Goal: Participates in plan/prevention/treatment measures  Outcome: Progressing  Flowsheets (Taken 5/24/2025 2116)  Participates in plan/prevention/treatment measures: Elevate heels  Goal: Prevent/manage excess moisture  Outcome: Progressing  Flowsheets (Taken 5/24/2025 2116)  Prevent/manage excess moisture: Cleanse incontinence/protect with barrier cream  Goal: Prevent/minimize sheer/friction injuries  Outcome: Progressing  Flowsheets (Taken 5/24/2025 2116)  Prevent/minimize sheer/friction injuries:   Complete micro-shifts as needed if patient unable. Adjust patient position to relieve pressure points, not a full turn   HOB 30 degrees or less   Turn/reposition every 2  hours/use positioning/transfer devices   Use pull sheet  Goal: Promote/optimize nutrition  Outcome: Progressing  Flowsheets (Taken 5/24/2025 2116)  Promote/optimize nutrition: Discuss with provider if NPO > 2 days  Goal: Promote skin healing  Outcome: Progressing  Flowsheets (Taken 5/24/2025 2116)  Promote skin healing:   Turn/reposition every 2 hours/use positioning/transfer devices   Rotate device position/do not position patient on device   Protective dressings over bony prominences     Problem: Diabetes  Goal: Achieve decreasing blood glucose levels by end of shift  Outcome: Progressing  Goal: Increase stability of blood glucose readings by end of shift  Outcome: Progressing  Goal: Decrease in ketones present in urine by end of shift  Outcome: Progressing  Goal: Maintain electrolyte levels within acceptable range throughout shift  Outcome: Progressing  Goal: Maintain glucose levels >70mg/dl to <250mg/dl throughout shift  Outcome: Progressing  Goal: No changes in neurological exam by end of shift  Outcome: Progressing  Goal: Learn about and adhere to nutrition recommendations by end of shift  Outcome: Progressing  Goal: Vital signs within normal range for age by end of shift  Outcome: Progressing  Goal: Increase self care and/or family involovement by end of shift  Outcome: Progressing  Goal: Receive DSME education by end of shift  Outcome: Progressing

## 2025-05-26 LAB
ALBUMIN SERPL BCP-MCNC: 2.7 G/DL (ref 3.4–5)
ALP SERPL-CCNC: 97 U/L (ref 33–136)
ALT SERPL W P-5'-P-CCNC: 12 U/L (ref 7–45)
ANION GAP SERPL CALC-SCNC: 12 MMOL/L (ref 10–20)
AST SERPL W P-5'-P-CCNC: 35 U/L (ref 9–39)
BACTERIA UR CULT: ABNORMAL
BACTERIA UR CULT: ABNORMAL
BASOPHILS # BLD AUTO: 0.03 X10*3/UL (ref 0–0.1)
BASOPHILS NFR BLD AUTO: 0.2 %
BILIRUB SERPL-MCNC: 0.3 MG/DL (ref 0–1.2)
BUN SERPL-MCNC: 46 MG/DL (ref 6–23)
CALCIUM SERPL-MCNC: 8.2 MG/DL (ref 8.6–10.3)
CHLORIDE SERPL-SCNC: 100 MMOL/L (ref 98–107)
CO2 SERPL-SCNC: 28 MMOL/L (ref 21–32)
CREAT SERPL-MCNC: 1.13 MG/DL (ref 0.5–1.05)
D DIMER PPP FEU-MCNC: 1844 NG/ML FEU
EGFRCR SERPLBLD CKD-EPI 2021: 49 ML/MIN/1.73M*2
EOSINOPHIL # BLD AUTO: 0 X10*3/UL (ref 0–0.4)
EOSINOPHIL NFR BLD AUTO: 0 %
ERYTHROCYTE [DISTWIDTH] IN BLOOD BY AUTOMATED COUNT: 14.4 % (ref 11.5–14.5)
GLUCOSE BLD MANUAL STRIP-MCNC: 139 MG/DL (ref 74–99)
GLUCOSE BLD MANUAL STRIP-MCNC: 156 MG/DL (ref 74–99)
GLUCOSE BLD MANUAL STRIP-MCNC: 195 MG/DL (ref 74–99)
GLUCOSE BLD MANUAL STRIP-MCNC: 200 MG/DL (ref 74–99)
GLUCOSE SERPL-MCNC: 147 MG/DL (ref 74–99)
HCT VFR BLD AUTO: 30 % (ref 36–46)
HGB BLD-MCNC: 9.3 G/DL (ref 12–16)
IMM GRANULOCYTES # BLD AUTO: 0.68 X10*3/UL (ref 0–0.5)
IMM GRANULOCYTES NFR BLD AUTO: 4.8 % (ref 0–0.9)
IRON SATN MFR SERPL: 9 % (ref 25–45)
IRON SERPL-MCNC: 17 UG/DL (ref 35–150)
LYMPHOCYTES # BLD AUTO: 1.29 X10*3/UL (ref 0.8–3)
LYMPHOCYTES NFR BLD AUTO: 9.2 %
MCH RBC QN AUTO: 29.2 PG (ref 26–34)
MCHC RBC AUTO-ENTMCNC: 31 G/DL (ref 32–36)
MCV RBC AUTO: 94 FL (ref 80–100)
MONOCYTES # BLD AUTO: 0.67 X10*3/UL (ref 0.05–0.8)
MONOCYTES NFR BLD AUTO: 4.8 %
NEUTROPHILS # BLD AUTO: 11.36 X10*3/UL (ref 1.6–5.5)
NEUTROPHILS NFR BLD AUTO: 81 %
NRBC BLD-RTO: 0 /100 WBCS (ref 0–0)
PLATELET # BLD AUTO: 169 X10*3/UL (ref 150–450)
POTASSIUM SERPL-SCNC: 4.1 MMOL/L (ref 3.5–5.3)
PROT SERPL-MCNC: 5.7 G/DL (ref 6.4–8.2)
PTH-INTACT SERPL-MCNC: 72.4 PG/ML (ref 18.5–88)
RBC # BLD AUTO: 3.19 X10*6/UL (ref 4–5.2)
SODIUM SERPL-SCNC: 136 MMOL/L (ref 136–145)
TIBC SERPL-MCNC: 190 UG/DL (ref 240–445)
UIBC SERPL-MCNC: 173 UG/DL (ref 110–370)
WBC # BLD AUTO: 14 X10*3/UL (ref 4.4–11.3)

## 2025-05-26 PROCEDURE — 83540 ASSAY OF IRON: CPT | Performed by: INTERNAL MEDICINE

## 2025-05-26 PROCEDURE — 36415 COLL VENOUS BLD VENIPUNCTURE: CPT | Performed by: INTERNAL MEDICINE

## 2025-05-26 PROCEDURE — 94640 AIRWAY INHALATION TREATMENT: CPT

## 2025-05-26 PROCEDURE — 92526 ORAL FUNCTION THERAPY: CPT | Mod: GN | Performed by: SPEECH-LANGUAGE PATHOLOGIST

## 2025-05-26 PROCEDURE — 97165 OT EVAL LOW COMPLEX 30 MIN: CPT | Mod: GO

## 2025-05-26 PROCEDURE — 92610 EVALUATE SWALLOWING FUNCTION: CPT | Mod: GN | Performed by: SPEECH-LANGUAGE PATHOLOGIST

## 2025-05-26 PROCEDURE — 2500000002 HC RX 250 W HCPCS SELF ADMINISTERED DRUGS (ALT 637 FOR MEDICARE OP, ALT 636 FOR OP/ED)

## 2025-05-26 PROCEDURE — 2500000001 HC RX 250 WO HCPCS SELF ADMINISTERED DRUGS (ALT 637 FOR MEDICARE OP): Performed by: INTERNAL MEDICINE

## 2025-05-26 PROCEDURE — 82947 ASSAY GLUCOSE BLOOD QUANT: CPT

## 2025-05-26 PROCEDURE — 2500000002 HC RX 250 W HCPCS SELF ADMINISTERED DRUGS (ALT 637 FOR MEDICARE OP, ALT 636 FOR OP/ED): Performed by: NURSE PRACTITIONER

## 2025-05-26 PROCEDURE — 85025 COMPLETE CBC W/AUTO DIFF WBC: CPT | Performed by: INTERNAL MEDICINE

## 2025-05-26 PROCEDURE — 1200000002 HC GENERAL ROOM WITH TELEMETRY DAILY

## 2025-05-26 PROCEDURE — 2500000001 HC RX 250 WO HCPCS SELF ADMINISTERED DRUGS (ALT 637 FOR MEDICARE OP)

## 2025-05-26 PROCEDURE — 80053 COMPREHEN METABOLIC PANEL: CPT | Performed by: INTERNAL MEDICINE

## 2025-05-26 PROCEDURE — 2500000004 HC RX 250 GENERAL PHARMACY W/ HCPCS (ALT 636 FOR OP/ED): Mod: JZ | Performed by: NURSE PRACTITIONER

## 2025-05-26 PROCEDURE — 2500000004 HC RX 250 GENERAL PHARMACY W/ HCPCS (ALT 636 FOR OP/ED): Performed by: INTERNAL MEDICINE

## 2025-05-26 PROCEDURE — 2500000004 HC RX 250 GENERAL PHARMACY W/ HCPCS (ALT 636 FOR OP/ED): Mod: JZ

## 2025-05-26 PROCEDURE — 97161 PT EVAL LOW COMPLEX 20 MIN: CPT | Mod: GP

## 2025-05-26 PROCEDURE — 2500000002 HC RX 250 W HCPCS SELF ADMINISTERED DRUGS (ALT 637 FOR MEDICARE OP, ALT 636 FOR OP/ED): Mod: JZ | Performed by: INTERNAL MEDICINE

## 2025-05-26 PROCEDURE — 85379 FIBRIN DEGRADATION QUANT: CPT | Performed by: INTERNAL MEDICINE

## 2025-05-26 RX ORDER — TRAMADOL HYDROCHLORIDE 50 MG/1
50 TABLET, FILM COATED ORAL EVERY 8 HOURS PRN
Status: DISCONTINUED | OUTPATIENT
Start: 2025-05-26 | End: 2025-05-29 | Stop reason: HOSPADM

## 2025-05-26 RX ADMIN — METHOCARBAMOL 500 MG: 500 TABLET ORAL at 20:13

## 2025-05-26 RX ADMIN — CILOSTAZOL 50 MG: 100 TABLET ORAL at 20:13

## 2025-05-26 RX ADMIN — ASPIRIN 81 MG CHEWABLE TABLET 81 MG: 81 TABLET CHEWABLE at 09:28

## 2025-05-26 RX ADMIN — CILOSTAZOL 50 MG: 100 TABLET ORAL at 09:27

## 2025-05-26 RX ADMIN — INSULIN LISPRO 2 UNITS: 100 INJECTION, SOLUTION INTRAVENOUS; SUBCUTANEOUS at 17:19

## 2025-05-26 RX ADMIN — DULOXETINE HYDROCHLORIDE 30 MG: 30 CAPSULE, DELAYED RELEASE ORAL at 09:29

## 2025-05-26 RX ADMIN — METHOCARBAMOL 500 MG: 500 TABLET ORAL at 15:56

## 2025-05-26 RX ADMIN — GABAPENTIN 200 MG: 100 CAPSULE ORAL at 15:55

## 2025-05-26 RX ADMIN — IPRATROPIUM BROMIDE AND ALBUTEROL SULFATE 3 ML: .5; 3 SOLUTION RESPIRATORY (INHALATION) at 06:48

## 2025-05-26 RX ADMIN — METHYLPREDNISOLONE 16 MG: 4 TABLET ORAL at 09:27

## 2025-05-26 RX ADMIN — HEPARIN SODIUM 5000 UNITS: 5000 INJECTION, SOLUTION INTRAVENOUS; SUBCUTANEOUS at 06:00

## 2025-05-26 RX ADMIN — ASPIRIN 81 MG CHEWABLE TABLET 81 MG: 81 TABLET CHEWABLE at 20:13

## 2025-05-26 RX ADMIN — TRAMADOL HYDROCHLORIDE 50 MG: 50 TABLET, COATED ORAL at 13:54

## 2025-05-26 RX ADMIN — AZITHROMYCIN DIHYDRATE 500 MG: 500 INJECTION, POWDER, LYOPHILIZED, FOR SOLUTION INTRAVENOUS at 23:54

## 2025-05-26 RX ADMIN — SODIUM CHLORIDE, SODIUM LACTATE, POTASSIUM CHLORIDE, AND CALCIUM CHLORIDE 75 ML/HR: .6; .31; .03; .02 INJECTION, SOLUTION INTRAVENOUS at 15:55

## 2025-05-26 RX ADMIN — GABAPENTIN 200 MG: 100 CAPSULE ORAL at 20:12

## 2025-05-26 RX ADMIN — HEPARIN SODIUM 5000 UNITS: 5000 INJECTION, SOLUTION INTRAVENOUS; SUBCUTANEOUS at 13:54

## 2025-05-26 RX ADMIN — CEFTRIAXONE SODIUM 1 G: 1 INJECTION, SOLUTION INTRAVENOUS at 23:25

## 2025-05-26 RX ADMIN — INSULIN HUMAN 25 UNITS: 100 INJECTION, SUSPENSION SUBCUTANEOUS at 20:13

## 2025-05-26 RX ADMIN — INSULIN LISPRO 2 UNITS: 100 INJECTION, SOLUTION INTRAVENOUS; SUBCUTANEOUS at 11:37

## 2025-05-26 RX ADMIN — IPRATROPIUM BROMIDE AND ALBUTEROL SULFATE 3 ML: .5; 3 SOLUTION RESPIRATORY (INHALATION) at 11:57

## 2025-05-26 RX ADMIN — HEPARIN SODIUM 5000 UNITS: 5000 INJECTION, SOLUTION INTRAVENOUS; SUBCUTANEOUS at 21:55

## 2025-05-26 RX ADMIN — INSULIN HUMAN 35 UNITS: 100 INJECTION, SUSPENSION SUBCUTANEOUS at 09:29

## 2025-05-26 RX ADMIN — METOPROLOL TARTRATE 100 MG: 100 TABLET, FILM COATED ORAL at 20:16

## 2025-05-26 RX ADMIN — IPRATROPIUM BROMIDE AND ALBUTEROL SULFATE 3 ML: .5; 3 SOLUTION RESPIRATORY (INHALATION) at 19:15

## 2025-05-26 ASSESSMENT — COGNITIVE AND FUNCTIONAL STATUS - GENERAL
MOVING TO AND FROM BED TO CHAIR: TOTAL
TOILETING: A LOT
MOBILITY SCORE: 10
DRESSING REGULAR UPPER BODY CLOTHING: A LOT
MOVING TO AND FROM BED TO CHAIR: A LOT
MOVING TO AND FROM BED TO CHAIR: A LOT
DRESSING REGULAR UPPER BODY CLOTHING: A LITTLE
MOVING FROM LYING ON BACK TO SITTING ON SIDE OF FLAT BED WITH BEDRAILS: A LITTLE
PERSONAL GROOMING: A LOT
TURNING FROM BACK TO SIDE WHILE IN FLAT BAD: A LOT
DRESSING REGULAR LOWER BODY CLOTHING: A LOT
WALKING IN HOSPITAL ROOM: TOTAL
DAILY ACTIVITIY SCORE: 12
PERSONAL GROOMING: A LITTLE
HELP NEEDED FOR BATHING: A LOT
MOBILITY SCORE: 11
MOVING FROM LYING ON BACK TO SITTING ON SIDE OF FLAT BED WITH BEDRAILS: A LOT
TURNING FROM BACK TO SIDE WHILE IN FLAT BAD: A LOT
EATING MEALS: A LITTLE
DAILY ACTIVITIY SCORE: 13
WALKING IN HOSPITAL ROOM: A LOT
MOBILITY SCORE: 11
STANDING UP FROM CHAIR USING ARMS: A LOT
WALKING IN HOSPITAL ROOM: A LOT
MOVING FROM LYING ON BACK TO SITTING ON SIDE OF FLAT BED WITH BEDRAILS: A LOT
EATING MEALS: A LOT
HELP NEEDED FOR BATHING: A LOT
EATING MEALS: A LOT
DAILY ACTIVITIY SCORE: 12
DRESSING REGULAR UPPER BODY CLOTHING: A LOT
TOILETING: TOTAL
STANDING UP FROM CHAIR USING ARMS: A LOT
STANDING UP FROM CHAIR USING ARMS: A LOT
CLIMB 3 TO 5 STEPS WITH RAILING: TOTAL
DRESSING REGULAR LOWER BODY CLOTHING: A LOT
DRESSING REGULAR LOWER BODY CLOTHING: TOTAL
PERSONAL GROOMING: A LOT
CLIMB 3 TO 5 STEPS WITH RAILING: TOTAL
TOILETING: A LOT
CLIMB 3 TO 5 STEPS WITH RAILING: TOTAL
TURNING FROM BACK TO SIDE WHILE IN FLAT BAD: A LOT
HELP NEEDED FOR BATHING: A LOT

## 2025-05-26 ASSESSMENT — PAIN - FUNCTIONAL ASSESSMENT
PAIN_FUNCTIONAL_ASSESSMENT: 0-10

## 2025-05-26 ASSESSMENT — PAIN SCALES - GENERAL
PAINLEVEL_OUTOF10: 0 - NO PAIN
PAINLEVEL_OUTOF10: 9

## 2025-05-26 NOTE — PROGRESS NOTES
Nancy Long is a 80 y.o. female on day 2 of admission presenting with COVID-19.      Subjective   Patient fully evaluated on May 26. Await final cultures, continue present IV antibiotics, oral medications and recheck labs in a.m. Feeling better, c.o rectal pain. Speech will trial advancing diet. Seen by OT with recommendation for Moderate intensity level of continued care, 24 hr supervision due to cognition.        Objective     Last Recorded Vitals  /62 (BP Location: Left arm, Patient Position: Lying)   Pulse 68   Temp 36.2 °C (97.2 °F) (Temporal)   Resp 18   Wt 75.5 kg (166 lb 7.2 oz)   SpO2 94%   Intake/Output last 3 Shifts:    Intake/Output Summary (Last 24 hours) at 5/26/2025 1354  Last data filed at 5/26/2025 0900  Gross per 24 hour   Intake 120 ml   Output 1600 ml   Net -1480 ml       Admission Weight  Weight: 72.6 kg (160 lb) (05/23/25 2049)    Daily Weight  05/24/25 : 75.5 kg (166 lb 7.2 oz)    Image Results  US renal complete  Narrative: Interpreted By:  Shayla Melissa,   STUDY:  US RENAL COMPLETE; 5/25/2025 2:42 am      INDICATION:  Signs/Symptoms:Renal Failure.      COMPARISON:  None.      ACCESSION NUMBER(S):  [id number]      ORDERING CLINICIAN:  KRISTY STILES      TECHNIQUE:  Grayscale and color Doppler imaging of the kidneys.      FINDINGS:  The right kidney measures 11.3cm in length.  The left kidney measures 11.7cm in length.  There is no shadowing calculus, hydronephrosis, or solid mass  identified. The renal cortical echogenicity is normal but there is  mild bilateral renal cortical thinning. There is duplication of the  collecting systems bilaterally.      Urinary bladder is distended without bladder wall thickening. There  is some echogenic debris layering along the dependent portion of the  urinary bladder. Neither the right nor left ureteral jet is observed.      Impression: Duplicated collecting systems bilaterally.      No renal atrophy or hydronephrosis.      Mild bilateral  renal cortical thinning.      Distended urinary bladder containing a small amount of debris along  its dependent portion. Neither the right nor left ureteral jet is  seen.      MACRO:  None.      Signed by: Shayla Melissa 5/25/2025 5:56 AM  Dictation workstation:   [id number]      Physical Exam    General: in no acute distress  Eyes: PERRLA   HENT: Normo-cephalic, atraumatic.   CV: Regular rate, regular rhythm.   Resp: Breathing non-labored,  diminished to auscultation bilaterally  GI: BS x4   : monitor intake and output  MSK/Extremities: No gross bony deformities. PT/OT on the case  Skin: Warm. Appropriate color, continue offloading  Neuro:  Face symmetric.   Psych: returning to baseline, improved     10 point ROS negative unless otherwise noted in HPI    Patient fully evaluated 5/26  for    Problem List Items Addressed This Visit          Infectious Diseases    * (Principal) COVID-19 - Primary     Other Visit Diagnoses         Acute renal insufficiency              Patient seen resting in bed with head of bed elevated, no s/s or c/o acute difficulties at this time.  Vital signs for last 24 hours Temp:  [36.2 °C (97.2 °F)-37.4 °C (99.3 °F)] 36.2 °C (97.2 °F)  Heart Rate:  [68-99] 68  Resp:  [18] 18  BP: (133-166)/(62-75) 133/62  FiO2 (%):  [28 %] 28 %    I/O this shift:  In: 120 [P.O.:120]  Out: 350 [Urine:350]  Patient still requiring frequent cardiac and SPO2 monitoring. Continue aggressive pulmonary hygiene and oral hygiene. Off loading as tolerated for skin integrity. Medications and labs reviewed-   Results for orders placed or performed during the hospital encounter of 05/23/25 (from the past 24 hours)   POCT GLUCOSE   Result Value Ref Range    POCT Glucose 261 (H) 74 - 99 mg/dL   Sterile Cup   Result Value Ref Range    Extra Tube Hold for add-ons.    POCT GLUCOSE   Result Value Ref Range    POCT Glucose 271 (H) 74 - 99 mg/dL   CBC and Auto Differential   Result Value Ref Range    WBC 14.0 (H) 4.4 - 11.3  x10*3/uL    nRBC 0.0 0.0 - 0.0 /100 WBCs    RBC 3.19 (L) 4.00 - 5.20 x10*6/uL    Hemoglobin 9.3 (L) 12.0 - 16.0 g/dL    Hematocrit 30.0 (L) 36.0 - 46.0 %    MCV 94 80 - 100 fL    MCH 29.2 26.0 - 34.0 pg    MCHC 31.0 (L) 32.0 - 36.0 g/dL    RDW 14.4 11.5 - 14.5 %    Platelets 169 150 - 450 x10*3/uL    Neutrophils % 81.0 40.0 - 80.0 %    Immature Granulocytes %, Automated 4.8 (H) 0.0 - 0.9 %    Lymphocytes % 9.2 13.0 - 44.0 %    Monocytes % 4.8 2.0 - 10.0 %    Eosinophils % 0.0 0.0 - 6.0 %    Basophils % 0.2 0.0 - 2.0 %    Neutrophils Absolute 11.36 (H) 1.60 - 5.50 x10*3/uL    Immature Granulocytes Absolute, Automated 0.68 (H) 0.00 - 0.50 x10*3/uL    Lymphocytes Absolute 1.29 0.80 - 3.00 x10*3/uL    Monocytes Absolute 0.67 0.05 - 0.80 x10*3/uL    Eosinophils Absolute 0.00 0.00 - 0.40 x10*3/uL    Basophils Absolute 0.03 0.00 - 0.10 x10*3/uL   Comprehensive Metabolic Panel   Result Value Ref Range    Glucose 147 (H) 74 - 99 mg/dL    Sodium 136 136 - 145 mmol/L    Potassium 4.1 3.5 - 5.3 mmol/L    Chloride 100 98 - 107 mmol/L    Bicarbonate 28 21 - 32 mmol/L    Anion Gap 12 10 - 20 mmol/L    Urea Nitrogen 46 (H) 6 - 23 mg/dL    Creatinine 1.13 (H) 0.50 - 1.05 mg/dL    eGFR 49 (L) >60 mL/min/1.73m*2    Calcium 8.2 (L) 8.6 - 10.3 mg/dL    Albumin 2.7 (L) 3.4 - 5.0 g/dL    Alkaline Phosphatase 97 33 - 136 U/L    Total Protein 5.7 (L) 6.4 - 8.2 g/dL    AST 35 9 - 39 U/L    Bilirubin, Total 0.3 0.0 - 1.2 mg/dL    ALT 12 7 - 45 U/L   D-dimer, Non VTE   Result Value Ref Range    D-Dimer Non VTE, Quant (ng/mL FEU) 1,844 (H) <=500 ng/mL FEU   Iron and TIBC   Result Value Ref Range    Iron 17 (L) 35 - 150 ug/dL    UIBC 173 110 - 370 ug/dL    TIBC 190 (L) 240 - 445 ug/dL    % Saturation 9 (L) 25 - 45 %   POCT GLUCOSE   Result Value Ref Range    POCT Glucose 139 (H) 74 - 99 mg/dL   POCT GLUCOSE   Result Value Ref Range    POCT Glucose 200 (H) 74 - 99 mg/dL      Patient recently received an antibiotic (last 12 hours)       None            Plan discussed with interdisciplinary team, continue current and repeat labs in the AM.       Discharge planning discussed with patient and care team. Therapy evaluations ordered.   [facility]-  11   Anticipate - SNF    Patient aware and agreeable to current plan, continue plan as above.     I spent a total of 60 minutes on the date of the service which included preparing to see the patient, face-to-face patient care, completing clinical documentation, obtaining and/or reviewing separately obtained history, performing a medically appropriate examination, counseling and educating the patient/family/caregiver, ordering medications, tests, or procedures, communicating with other HCPs (not separately reported), independently interpreting results (not separately reported), communicating results to the patient/family/caregiver, and care coordination (not separately reported).        This patient currently has cardiac telemetry ordered; if you would like to modify or discontinue the telemetry order, click here to go to the orders activity to modify/discontinue the order.      This patient has a urinary catheter   Reason for the urinary catheter remaining today? critically ill patient who need accurate urinary output measurements          Assessment & Plan  COVID-19    Bandemia    Acute kidney injury superimposed on CKD      Rectal pain: continue with Tramadol PRN         AVERY GEORGE PA-S

## 2025-05-26 NOTE — PROGRESS NOTES
Occupational Therapy    Evaluation    Patient Name: Nancy Long  MRN: [id number]  Today's Date: 5/26/2025  Time Calculation  Start Time: 0959  Stop Time: 1009  Time Calculation (min): 10 min  Patient was seen from [id number] for interview, was found to be soiled/need for hygiene; PCA notified.  904/904-A    Assessment  IP OT Assessment  OT Assessment: Patient requires extensive assist for all care secondary to impaired activity tolerance, impaired sit/stand balance and generalized weakness; patient would benefit from O.T. services at a moderate intensity to improve independence with ADLs, transfers & mobility.  Prognosis: Good  Barriers to Discharge Home: Physical needs  Physical Needs: 24hr mobility assistance needed, 24hr ADL assistance needed  End of Session Communication: Bedside nurse  End of Session Patient Position: Bed, 2 rail up, Alarm on (call light in reach)    Plan:  Treatment Interventions: ADL retraining, Functional transfer training, Endurance training, Neuromuscular reeducation, Compensatory technique education  OT Frequency: 3 times per week  OT Discharge Recommendations: Moderate intensity level of continued care, 24 hr supervision due to cognition  OT - OK to Discharge: Yes    Subjective     Current Problem:  1. COVID-19        2. Acute renal insufficiency            General:  General  Reason for Referral: OT eval & treat for ADLs/safety (Covid-19, bandemia, KARLA)  Referred By: DEL Kwong  Past Medical History Relevant to Rehab: 5/23/25: confusion, diagnosed with covid last week.CT head: negative.  PMH: R hip fracture with ORIF 4/23/25, breast CA, heart failure, CAD, HTN, HLD, CAD, OA, RLS  Family/Caregiver Present: No  Co-Treatment: PT  Co-Treatment Reason: To maximize patient safety & mobility  Prior to Session Communication: Bedside nurse  Patient Position Received: Bed, 2 rail up, Alarm on  General Comment: Patient cleared for therapy by nursing.    Precautions:  Medical  "Precautions: Fall precautions  Precautions Comment: droplet, O2, davalos catheter, pureed diet, moderate thick liquids,    Vital Signs:  Vital Signs Comment: VSS    Pain:  Pain Assessment  Pain Assessment: 0-10  0-10 (Numeric) Pain Score: 9  Pain Type: Acute pain  Pain Location: Buttocks  Pain Interventions: Repositioned    Objective     Cognition:  Overall Cognitive Status:  (A & O to 2-3; states \"hospital\"; mild confusion noted, flat affect, delayed responses.)             Home Living:  Home Living Comments: Per EMR, prior to April 2025, patient was home alone in one story home; was independent ADLs, transfers & mobility without device in the home, cane for outings. At [facility] for rehab s/p right hip fracture with ORIF; Was WBAT          ADL:  Grooming Assistance: Minimal  UE Dressing Assistance: Minimal  LE Dressing Assistance: Total  Toileting Assistance with Device: Total    Activity Tolerance:  Impaired/deconditioned       Bed Mobility/Transfers:   Bed Mobility  Bed Mobility: Yes  Bed Mobility 1  Bed Mobility Comments 1: mod assist x 1 supine to sit with HOB elevated; mod assist x 2 sit to supine  Transfers  Transfer: Yes  Transfer 1  Trials/Comments 1: mod assist x 2 sit to stand from edge of bed with wheeled walker. retropulsive, forward flexed posture.    Ambulation/Gait Training:  Functional Mobility  Functional Mobility Performed:  (unable to tolerate)    Sensation:  Light Touch: No apparent deficits    Strength:  Strength Comments: BUE grossly 3+/5 proximally, 4-/5 distally        Outcome Measures: [facility] Daily Activity  Putting on and taking off regular lower body clothing: Total  Bathing (including washing, rinsing, drying): A lot  Putting on and taking off regular upper body clothing: A little  Toileting, which includes using toilet, bedpan or urinal: Total  Taking care of personal grooming such as brushing teeth: A little  Eating Meals: A little  Daily Activity - Total Score: 13                   "     EDUCATION:     Education Documentation  ADL Training, taught by Maddy Hernández OT at 5/26/2025 11:30 AM.  Learner: Patient  Readiness: Acceptance  Method: Explanation, Demonstration  Response: Verbalizes Understanding  Comment: OT POC    Education Comments  No comments found.        Goals:   Encounter Problems       Encounter Problems (Active)       OT Goals       Increase bed mobility & functional transfers to/from bed, chair & commode to min assist with DME for safety  (Progressing)       Start:  05/26/25    Expected End:  06/09/25            Increase UE grooming, bathing & dressing to SBA and LE bathing & dressing to mod assist with adaptive equipment prn (Progressing)       Start:  05/26/25    Expected End:  06/09/25            Tolerate 15 minutes UE therex with rest periods prn to promote increased activity tolerance for ADLs  (Progressing)       Start:  05/26/25    Expected End:  06/09/25

## 2025-05-26 NOTE — PROGRESS NOTES
Speech-Language Pathology    SLP Adult Inpatient Speech-Language Pathology Clinical Swallow Evaluation    Patient Name: Nancy Long  MRN: [id number]  Today's Date: 5/26/2025         Current Problem:   1. COVID-19        2. Acute renal insufficiency          Recommendations:  Additional Recommendations: Dysphagia treatment  Solid Diet Recommendations : Soft & bite sized/chopped (IDDSI Level 6)  Liquid Diet Recommendations: Thin (IDDSI Level 0)    Compensatory Swallowing Strategies:   -Full supervision with meals,   -Eat/feed slowly.   -DENTURES for meals.     Medication Administration Recommendations: Whole, With Pureed    *Confusion may impact pt's safe PO intake, and therefore it is suggested that this patient have 1:1 assist at meals for appropriate rate and bite sized of PO intake.      Assessment:  Pt presents with intact oral phase of the swallow, and suspected functional pharyngeal swallow given clinical bedside indicators.  Pt is managing secretions. Oral motor ROM is WFL. Pt wears full dentures, in mouth for this session.      PO trials thin liquids by consecutive cup sips, not able to follow commands for full 3 oz protocol d/t confusion. Puree, soft solids, meds while in puree provided by RN during this session.   ORAL PHASE: labial seal is intact, there is no labial loss of the oral bolus. Mastication is effective with full dentures. Oral bolus formation and manipulation WFL. No significant oral residue after the swallow.   No pocketing.   PHARYNGEAL PHASE: no overt s/s aspiration, no change in vocal quality or respirations. SpO2 remains mid 90s during session.      Will suggest pt maintain an oral diet, ok to advance to thin liquids and soft/bite sized diet from admission diet.    ST to follow, reach out to [phone] as needed prior to next visit.   Please make NPO if any changes in medical status or mentation that may impact safe chewing or swallowing, and consult ST for further testing.       Treatment  Provided: Yes ongoing PO trials with this patient after initial assessment, she can self feed, she is not fully aware of her modified diet but does eat/drink what is given to her. Nancy denies difficulty swallowing, states she is drinking thin liquids and indeed, there is a cup of thin liquids with straw on tray table when SLP arrives. RN is in room during session and provides meds whole with puree, without overt s/s aspiration. Pt also takes sips of water by straw with meds, she does not recall how she took them at [facility]. Will suggest a more modified administration of meds during hospital stay, whole in puree.  No overt s/s aspiration with ongoing PO trials, soft solids, maye crackers self fed, and thin liquids, pt self-feeds and alternates liquids/solids appropriately.   Goal is to return to baseline diet textures as able prior to dc.     Treatment Tolerance: Patient tolerated treatment well  Medical Staff Made Aware: Yes  Strengths: Motivation  Barriers: Cognition    Dysphagia goals: (start date 5/26)  Patient will consume prescribed diet without clinical or overt s/s aspiration. . 5/26 INITIATED/ONGOING  Patient will trial upgraded textures with SLP only for possible diet advancement as indicated given bedside and clinical indicators.   Pt/caregiver education. 5/26 INITIATED/ONGOING  Plan:  Treatment/Interventions: Assess diet tolerance, Diet recommendations, Patient/family education  SLP Plan: Skilled SLP  SLP Frequency: 2x per week  Duration: 2 weeks  SLP Discharge Recommendations:  (TBD)  Discussed POC: Patient, Nursing  Patient/Caregiver Agreeable: Yes    Subjective   COVID+ a week ago, precautions taken.   Pt seen bedside, fully upright and awake, pleasant, cooperative, not very conversational, can self feed. Was on regular textures, thin liquids at [facility] prior to admission.  Pt is now on puree, nectar thick liquids after RN screen at admission.     CXR 5/24  IMPRESSION:  Bilateral opacities  compatible with pneumonia.    Per chart review: Patient is an 80-year-old female with past medical history as below presents today for evaluation of altered mental status. Last week was diagnosed with COVID. Altered mental status. Last week was diagnosed with COVID. Patient is oriented at this time. States she just does not feel well feels overall weak. No shortness of breath chest pain abdominal pain dysuria diarrhea patient oriented for me. Overall feeling weak. No chest pain shortness of breath abdominal pain dysuria diarrhea       General Visit Information:  Patient Class: Inpatient  Living Environment: Nursing home (skilled/long-term) (Amari Bergman)  Caregiver Feedback: doing ok with swallowing current diet, did not eat much for breakfast. confused.  Reason for Referral: assess oropharyngeal swallow  Prior to Session Communication: Bedside nurse (GALE Lutz)  Date of Order: 05/24/25  [employer] Diet:  (regular textures, thin liquids at [facility] per hard chart paperwork/admission orders in chart.)  Current Diet : puree, nectar thick liquids.  Dysphagia Diagnosis: Within functional limits  Baseline Assessment:  Respiratory Status: Oxygen via nasal cannula (SpO2 94%)  Behavior/Cognition: Cooperative, Confused (A&O to self only.)  Vision: Functional for self-feeding  Baseline Vocal Quality: Normal  Pain:  Pain Assessment  Pain Assessment: 0-10  Oral/Motor Assessment:  Dentition: Complete Dentures  Oral Motor: Within Functional Limits  Labial ROM: Within Functional Limits  Lingual ROM: Within Functional Limits  Vocal Quality: Within Functional Limits  Intelligibility: Intelligible  Breath Support: Adequate for speech  Clinical Observations:  Patient Positioning: Upright in Bed  Management of Oral Secretions: Adequate  Suck Swallow Breathe Coordination: Functional  Was The 3 oz Swallow Protocol Completed:  (pt not following commands for consecutive sips of 3 oz in total.)    Inpatient:  Education Documentation  SLP has  provided pt and RN Keyla with the results and recommendations of this session.  Pt/RN/family verbalize understanding. Barriers to learning:  confusion, RN is pt's learner.   Head of bed sign posted in room with diet level recommendations, and safer swallow strategies to apply during PO intake, meals and meds.

## 2025-05-26 NOTE — CARE PLAN
The patient's goals for the shift include  pain control    The clinical goals for the shift include pt will remain free from injury      Problem: Fall/Injury  Goal: Not fall by end of shift  Outcome: Progressing  Goal: Be free from injury by end of the shift  Outcome: Progressing  Goal: Verbalize understanding of personal risk factors for fall in the hospital  Outcome: Progressing     Problem: Pain - Adult  Goal: Verbalizes/displays adequate comfort level or baseline comfort level  Outcome: Progressing     Problem: Safety - Adult  Goal: Free from fall injury  Outcome: Progressing     Problem: Discharge Planning  Goal: Discharge to home or other facility with appropriate resources  Outcome: Progressing     Problem: Chronic Conditions and Co-morbidities  Goal: Patient's chronic conditions and co-morbidity symptoms are monitored and maintained or improved  Outcome: Progressing     Problem: Nutrition  Goal: Nutrient intake appropriate for maintaining nutritional needs  Outcome: Progressing     Problem: Skin  Goal: Decreased wound size/increased tissue granulation at next dressing change  Outcome: Progressing  Flowsheets (Taken 5/24/2025 2116)  Decreased wound size/increased tissue granulation at next dressing change: Protective dressings over bony prominences  Goal: Participates in plan/prevention/treatment measures  Outcome: Progressing  Flowsheets (Taken 5/24/2025 2116)  Participates in plan/prevention/treatment measures: Elevate heels  Goal: Prevent/manage excess moisture  Outcome: Progressing  Flowsheets (Taken 5/24/2025 2116)  Prevent/manage excess moisture: Cleanse incontinence/protect with barrier cream  Goal: Prevent/minimize sheer/friction injuries  Outcome: Progressing  Flowsheets (Taken 5/24/2025 2116)  Prevent/minimize sheer/friction injuries:   Complete micro-shifts as needed if patient unable. Adjust patient position to relieve pressure points, not a full turn   HOB 30 degrees or less   Turn/reposition  every 2 hours/use positioning/transfer devices   Use pull sheet  Goal: Promote/optimize nutrition  Outcome: Progressing  Flowsheets (Taken 5/24/2025 2116)  Promote/optimize nutrition: Discuss with provider if NPO > 2 days  Goal: Promote skin healing  Outcome: Progressing  Flowsheets (Taken 5/24/2025 2116)  Promote skin healing:   Turn/reposition every 2 hours/use positioning/transfer devices   Rotate device position/do not position patient on device   Protective dressings over bony prominences     Problem: Diabetes  Goal: Achieve decreasing blood glucose levels by end of shift  Outcome: Progressing  Goal: Increase stability of blood glucose readings by end of shift  Outcome: Progressing  Goal: Decrease in ketones present in urine by end of shift  Outcome: Progressing  Goal: Maintain electrolyte levels within acceptable range throughout shift  Outcome: Progressing  Goal: Maintain glucose levels >70mg/dl to <250mg/dl throughout shift  Outcome: Progressing  Goal: No changes in neurological exam by end of shift  Outcome: Progressing  Goal: Learn about and adhere to nutrition recommendations by end of shift  Outcome: Progressing  Goal: Vital signs within normal range for age by end of shift  Outcome: Progressing  Goal: Increase self care and/or family involovement by end of shift  Outcome: Progressing  Goal: Receive DSME education by end of shift  Outcome: Progressing     Problem: Pain  Goal: Takes deep breaths with improved pain control throughout the shift  Outcome: Progressing  Goal: Turns in bed with improved pain control throughout the shift  Outcome: Progressing  Goal: Walks with improved pain control throughout the shift  Outcome: Progressing  Goal: Performs ADL's with improved pain control throughout shift  Outcome: Progressing  Goal: Participates in PT with improved pain control throughout the shift  Outcome: Progressing  Goal: Free from opioid side effects throughout the shift  Outcome: Progressing  Goal: Free  from acute confusion related to pain meds throughout the shift  Outcome: Progressing

## 2025-05-26 NOTE — PROGRESS NOTES
Physical Therapy    Physical Therapy Evaluation    Patient Name: Nancy Long  MRN: [id number]  Today's Date: 5/26/2025   Time Calculation  Start Time: 0959  Stop Time: 1009  Time Calculation (min): 10 min  904/904-A    Assessment/Plan   PT Assessment  PT Assessment Results: Decreased strength, Decreased endurance, Impaired balance, Decreased mobility, Decreased safety awareness, Pain  Rehab Prognosis: Good  Barriers to Discharge Home: Physical needs  Physical Needs: Ambulating household distances limited by function/safety  Evaluation/Treatment Tolerance: Patient limited by fatigue, Patient limited by pain  End of Session Communication: Bedside nurse  Assessment Comment: Continued skilled PT intervention indicated to facilitate increased strength, balance & gait stability  End of Session Patient Position: Bed, 2 rail up, Alarm on (hob elevated to comfort, call light in reach, pillows to sides for gluteal comfort)  IP OR SWING BED PT PLAN  Inpatient or Swing Bed: Inpatient  PT Plan  Treatment/Interventions: Bed mobility, Transfer training, Gait training, Balance training, Therapeutic exercise, Therapeutic activity  PT Plan: Ongoing PT  PT Frequency: 3 times per week  PT Recommended Transfer Status: Assist x2  PT - OK to Discharge: Yes (to next level of care when cleared by medical team)    Subjective     Current Problem:  1. COVID-19        2. Acute renal insufficiency          Problem List[1]  PMH: R hip fracture with ORIF 4/23/25, breast CA, heart failure, CAD, HTN, HLD, CAD, OA, RLS   General Visit Information:  General  Reason for Referral: PT eval & treat/impaired mobility DX: covid, bandemia, candace; 5/23/25 confusion, diagnosed w/ covid ~1wlk prior; ct head (-)  Referred By: Brenna Dickerson  Caregiver Feedback: Per conference w/ RN patient stable to participate in therapy  Co-Treatment: OT  Co-Treatment Reason: to maximize patient safety & mobility  Patient Position Received: Bed, 2 rail up, Alarm off, not on  "at start of session  General Comment: Pleasant & cooperative, receptive to mobility& instructions; flat affect, slow processing, limited activity tolerance, retropulsive, flexed posture, flexed posture    Home Living:  Home Living  Home Living Comments: Per EMR, prior to April 2025, patient was home alone in one story home; was independent ADLs, transfers & mobility without device in the home, cane for outings. At [facility] for rehab s/p right hip fracture with ORIF; Was WBAT  Precautions:  Precautions  Precautions Comment: fall, alarm, O2, pureed diet 4, thick liquid 2, davalos, telemetry, droplet +, R hip fracture with ORIF 4/23/25 wbat, corrective lenses  Pain:  Pain Assessment  Pain Assessment:  (buttock pain rated 9/10, pillows positioned for comfort after session)    Cognition:  Cognition  Overall Cognitive Status:  (oriented x2-3, identified place as \"hospital\" but not sure which ome)    General Assessments:      Activity Tolerance  Endurance: Decreased tolerance for upright activites  Sensation  Light Touch: No apparent deficits      Static Standing Balance  Static Standing-Balance Support:  (maintained standing 3oseconds mod assist x2 w/ ww support, retropulsive, flexed posture, cues for upright posture/use of ue's on ww for support)    Functional Assessments:     Bed Mobility  Bed Mobility:  (mod assist x1 supine>sit hob elevated, mod assist x2 sit>supine assist to manage trunk &le's)  Transfers  Transfer:  (mod assist x2 sit>stand w/ difficulty forward wt shift & le extension, mod assist x1 stand>sit w/ cues for safe hand placement & eccentric control)  Ambulation/Gait Training  Ambulation/Gait Training Performed:  (unable/unsafe, mod assist x2 for static standing, unable to advance le's)     Extremity/Trunk Assessments:        RLE   RLE :  (aarom wfl, hip strength 2+/5 distally grossly 4/5)  LLE   LLE :  (arom wfl, strength grossly 4/5)    Outcome Measures:     [facility] Basic Mobility  Turning from your back " to your side while in a flat bed without using bedrails: A little  Moving from lying on your back to sitting on the side of a flat bed without using bedrails: A lot  Moving to and from bed to chair (including a wheelchair): Total  Standing up from a chair using your arms (e.g. wheelchair or bedside chair): A lot  To walk in hospital room: Total  Climbing 3-5 steps with railing: Total  Basic Mobility - Total Score: 10   Goals:  Encounter Problems       Encounter Problems (Active)       PT Problem       STG - Pt will transition supine <> sitting with cga  (Progressing)       Start:  05/26/25    Expected End:  06/09/25            STG - Pt will transfer STS with cga  (Progressing)       Start:  05/26/25    Expected End:  06/09/25            STG - Pt will amb >=20' using ww with min assist x1  (Progressing)       Start:  05/26/25    Expected End:  06/09/25            STG - Pt will perform 2-3 sets of BLE therex x10 to maximize functional strength and independence  (Progressing)       Start:  05/26/25    Expected End:  06/09/25            STG - Pt will maintain standing supported balance >=3minutes with cga (Progressing)       Start:  05/26/25    Expected End:  06/09/25               Pain - Adult            Education Documentation  Mobility Training, taught by Odalis Shetty PT at 5/26/2025 12:23 PM.  Learner: Patient  Readiness: Acceptance  Method: Explanation  Response: Verbalizes Understanding  Comment: safety, activity progression, le rom         [1]   Patient Active Problem List  Diagnosis    Malignant neoplasm of lower-outer quadrant of female breast    Chronic diastolic heart failure    CAD (coronary artery disease)    HTN (hypertension)    HLD (hyperlipidemia)    Leg edema    Nocturnal leg cramps    Gastroesophageal reflux disease    RHYS (generalized anxiety disorder)    Osteoarthritis    Type 1 diabetes mellitus    Aortic stenosis    Bruit of left carotid artery    Venous ulcer of ankle, right    Chronic kidney  disease, stage 3b (Multi)    Decubitus ulcer of right leg, stage 1    Stress incontinence of urine    Restless leg    Intermittent claudication of both lower extremities due to atherosclerosis    Lower extremity cellulitis    Right carotid artery occlusion    Left carotid stenosis    Generalized abdominal pain    Small bowel obstruction (Multi)    Dependence on nicotine from cigarettes    Advanced care planning/counseling discussion    Leukocytosis    SBO (small bowel obstruction) (Multi)    Renal insufficiency    Current smoker    COVID-19    Bandemia    Acute kidney injury superimposed on CKD

## 2025-05-26 NOTE — PROGRESS NOTES
Nancy Long is a 80 y.o. female on day 2 of admission presenting with COVID-19.      Subjective   Patient serum myoglobin is normal.  She endorses pain in the hip and in her bedsore in the back.  Her D-dimer is elevated 1844  Her iron indices are low with serum iron of 17;  TIBC of 190  And 9% saturation  Renal chemistries are downtrending creatinine is 1.13 BUN is 46  Hemoglobin 9.3 WBC count 14,000  Patient urine cultures growing more than 100,000 colonies of  E coli  Objective          Vitals 24HR  Heart Rate:  [68-99]   Temp:  [36.2 °C (97.2 °F)-37.4 °C (99.3 °F)]   Resp:  [18]   BP: (133-166)/(62-75)   SpO2:  [94 %-97 %]         Intake/Output last 3 Shifts:    Intake/Output Summary (Last 24 hours) at 5/26/2025 1103  Last data filed at 5/26/2025 0900  Gross per 24 hour   Intake 120 ml   Output 1600 ml   Net -1480 ml       Physical Exam    HEENT; pupils react to light and accommodation  Neck; no JVD ; right carotid bruit no thyromegaly; no adenopathy  Lungs; bibasilar crackles on inspiration  Heart; regular rate no gallops or rubs no thrills  Abdomen active peristalsis no rebound guarding organomegaly or shifting dullness  Skin; decreased turgor  Neurological; patient is confused there is no clonus no asterixis or tremors;        Relevant Results     Results for orders placed or performed during the hospital encounter of 05/23/25 (from the past 24 hours)   POCT GLUCOSE   Result Value Ref Range    POCT Glucose 194 (H) 74 - 99 mg/dL   POCT GLUCOSE   Result Value Ref Range    POCT Glucose 261 (H) 74 - 99 mg/dL   Sterile Cup   Result Value Ref Range    Extra Tube Hold for add-ons.    POCT GLUCOSE   Result Value Ref Range    POCT Glucose 271 (H) 74 - 99 mg/dL   CBC and Auto Differential   Result Value Ref Range    WBC 14.0 (H) 4.4 - 11.3 x10*3/uL    nRBC 0.0 0.0 - 0.0 /100 WBCs    RBC 3.19 (L) 4.00 - 5.20 x10*6/uL    Hemoglobin 9.3 (L) 12.0 - 16.0 g/dL    Hematocrit 30.0 (L) 36.0 - 46.0 %    MCV 94 80 - 100 fL     MCH 29.2 26.0 - 34.0 pg    MCHC 31.0 (L) 32.0 - 36.0 g/dL    RDW 14.4 11.5 - 14.5 %    Platelets 169 150 - 450 x10*3/uL    Neutrophils % 81.0 40.0 - 80.0 %    Immature Granulocytes %, Automated 4.8 (H) 0.0 - 0.9 %    Lymphocytes % 9.2 13.0 - 44.0 %    Monocytes % 4.8 2.0 - 10.0 %    Eosinophils % 0.0 0.0 - 6.0 %    Basophils % 0.2 0.0 - 2.0 %    Neutrophils Absolute 11.36 (H) 1.60 - 5.50 x10*3/uL    Immature Granulocytes Absolute, Automated 0.68 (H) 0.00 - 0.50 x10*3/uL    Lymphocytes Absolute 1.29 0.80 - 3.00 x10*3/uL    Monocytes Absolute 0.67 0.05 - 0.80 x10*3/uL    Eosinophils Absolute 0.00 0.00 - 0.40 x10*3/uL    Basophils Absolute 0.03 0.00 - 0.10 x10*3/uL   Comprehensive Metabolic Panel   Result Value Ref Range    Glucose 147 (H) 74 - 99 mg/dL    Sodium 136 136 - 145 mmol/L    Potassium 4.1 3.5 - 5.3 mmol/L    Chloride 100 98 - 107 mmol/L    Bicarbonate 28 21 - 32 mmol/L    Anion Gap 12 10 - 20 mmol/L    Urea Nitrogen 46 (H) 6 - 23 mg/dL    Creatinine 1.13 (H) 0.50 - 1.05 mg/dL    eGFR 49 (L) >60 mL/min/1.73m*2    Calcium 8.2 (L) 8.6 - 10.3 mg/dL    Albumin 2.7 (L) 3.4 - 5.0 g/dL    Alkaline Phosphatase 97 33 - 136 U/L    Total Protein 5.7 (L) 6.4 - 8.2 g/dL    AST 35 9 - 39 U/L    Bilirubin, Total 0.3 0.0 - 1.2 mg/dL    ALT 12 7 - 45 U/L   D-dimer, Non VTE   Result Value Ref Range    D-Dimer Non VTE, Quant (ng/mL FEU) 1,844 (H) <=500 ng/mL FEU   Iron and TIBC   Result Value Ref Range    Iron 17 (L) 35 - 150 ug/dL    UIBC 173 110 - 370 ug/dL    TIBC 190 (L) 240 - 445 ug/dL    % Saturation 9 (L) 25 - 45 %   POCT GLUCOSE   Result Value Ref Range    POCT Glucose 139 (H) 74 - 99 mg/dL       US renal complete  Result Date: 5/25/2025  Interpreted By:  Shayla Melissa, STUDY: US RENAL COMPLETE; 5/25/2025 2:42 am   INDICATION: Signs/Symptoms:Renal Failure.   COMPARISON: None.   ACCESSION NUMBER(S): [id number]   ORDERING CLINICIAN: KRISTY STILES   TECHNIQUE: Grayscale and color Doppler imaging of the kidneys.    FINDINGS: The right kidney measures 11.3cm in length. The left kidney measures 11.7cm in length. There is no shadowing calculus, hydronephrosis, or solid mass identified. The renal cortical echogenicity is normal but there is mild bilateral renal cortical thinning. There is duplication of the collecting systems bilaterally.   Urinary bladder is distended without bladder wall thickening. There is some echogenic debris layering along the dependent portion of the urinary bladder. Neither the right nor left ureteral jet is observed.       Duplicated collecting systems bilaterally.   No renal atrophy or hydronephrosis.   Mild bilateral renal cortical thinning.   Distended urinary bladder containing a small amount of debris along its dependent portion. Neither the right nor left ureteral jet is seen.   MACRO: None.   Signed by: Shayla Melissa 5/25/2025 5:56 AM Dictation workstation:   [id number]                      Assessment & Plan  COVID-19    Bandemia    Acute kidney injury superimposed on CKD  Iron deficiency anemia  UTI  Microalbuminuria  Thrombophilia with elevated D-dimer     Plan  IV iron  P.o. tramadol as needed for pain repeat x-ray in the morning    I spent 30 minutes in the professional and overall care of this patient.      Ranjit Dumont MD

## 2025-05-26 NOTE — CARE PLAN
The patient's goals for the shift include      The clinical goals for the shift include maitain safety and comfort      Problem: Safety - Adult  Goal: Free from fall injury  Outcome: Progressing     Problem: Pain - Adult  Goal: Verbalizes/displays adequate comfort level or baseline comfort level  Outcome: Progressing     Problem: Chronic Conditions and Co-morbidities  Goal: Patient's chronic conditions and co-morbidity symptoms are monitored and maintained or improved  Outcome: Progressing     Problem: Skin  Goal: Decreased wound size/increased tissue granulation at next dressing change  Outcome: Progressing  Flowsheets (Taken 5/26/2025 9800)  Decreased wound size/increased tissue granulation at next dressing change: Promote sleep for wound healing

## 2025-05-26 NOTE — ED PROVIDER NOTES
HPI   Chief Complaint   Patient presents with    Altered Mental Status       Patient is an 80-year-old female with past medical history as below presents today for evaluation of altered mental status.  Last week was diagnosed with COVID.  Altered mental status.  Last week was diagnosed with COVID.  Patient is oriented at this time.  States she just does not feel well feels overall weak.  No shortness of breath chest pain abdominal pain dysuria diarrhea patient oriented for me.  Overall feeling weak.  No chest pain shortness of breath abdominal pain dysuria diarrhea              Patient History   Medical History[1]  Surgical History[2]  Family History[3]  Social History[4]    Physical Exam   ED Triage Vitals [05/23/25 2049]   Temp Heart Rate Resp BP   36.5 °C (97.7 °F) 72 16 170/72      SpO2 Temp Source Heart Rate Source Patient Position   98 % Temporal Monitor Sitting      BP Location FiO2 (%)     Right arm --       Physical Exam  Vitals and nursing note reviewed.   Constitutional:       Appearance: Normal appearance.   HENT:      Head: Normocephalic and atraumatic.      Nose: Nose normal.      Mouth/Throat:      Mouth: Mucous membranes are moist.   Eyes:      Conjunctiva/sclera: Conjunctivae normal.   Cardiovascular:      Rate and Rhythm: Normal rate and regular rhythm.      Pulses: Normal pulses.      Heart sounds: Normal heart sounds.   Pulmonary:      Effort: Pulmonary effort is normal.      Breath sounds: Normal breath sounds.   Abdominal:      General: Abdomen is flat.      Palpations: Abdomen is soft.      Tenderness: There is no abdominal tenderness. There is no guarding or rebound.   Musculoskeletal:         General: No deformity.   Neurological:      General: No focal deficit present.      Mental Status: She is alert and oriented to person, place, and time. Mental status is at baseline.   Psychiatric:         Mood and Affect: Mood normal.         Behavior: Behavior normal.           ED Course & MDM   ED  Course as of 05/26/25 0527   Fri May 23, 2025   2150 ECG 12 lead  EKG shows nsr rate 80 [SE]      ED Course User Index  [SE] Aleksandr Young MD         Diagnoses as of 05/26/25 0527   COVID-19   Acute renal insufficiency                 No data recorded     Dae Coma Scale Score: 15 (05/25/25 2037 : Deric Villegas, RN)                           Medical Decision Making  Patient presents for evaluation of altered mental status found to have acute renal insufficiency setting of recent and/or ongoing COVID-19 infection.  Patient was discussed with Dr. Saez who accepted the patient for admission    Amount and/or Complexity of Data Reviewed  Labs: ordered.  ECG/medicine tests: ordered and independent interpretation performed. Decision-making details documented in ED Course.    Risk  Prescription drug management.  Decision regarding hospitalization.        Procedure  Procedures       [1]   Past Medical History:  Diagnosis Date    Personal history of other diseases of the circulatory system     History of hypertension    Personal history of other endocrine, nutritional and metabolic disease     History of diabetes mellitus   [2]   Past Surgical History:  Procedure Laterality Date    APPENDECTOMY  01/03/2014    Appendectomy    BREAST LUMPECTOMY  01/03/2014    Breast Surgery Lumpectomy    CHOLECYSTECTOMY  01/03/2014    Cholecystectomy    OOPHORECTOMY  01/03/2014    Oophorectomy   [3] No family history on file.  [4]   Social History  Tobacco Use    Smoking status: Former     Types: Cigarettes    Smokeless tobacco: Never   Substance Use Topics    Alcohol use: Not Currently    Drug use: Never        Aleksandr Young MD  05/26/25 0527

## 2025-05-26 NOTE — CONSULTS
"Nutrition Initial Assessment:   Nutrition Assessment    Reason for Assessment: Admission nursing screening    Patient is a 80 y.o. female presenting with COVID-19      Nutrition History:  Energy Intake: Poor < 50 %  Pain affecting nutrition status:  (MUNDO)  Food and Nutrient History: Pt in COVID precautions, receiving breathing treatment, and noted to be confused. Unable to interview pt. Seen by SLP today and diet was advanced. Has magic cup reduced sugar ordered by physician. Has one documented meal intake this admission of 25%. Was seen by RD at previous admission in April 2025 - was eating ~66% of meals and had ensure clear ordered once diet was advanced after srugery for SBO.  Vitamin/Herbal Supplement Use: vitamin B12, calcium, vitamin B6; vitamin C; zinc per home med list       Anthropometrics:  Height: 170.2 cm (5' 7.01\")   Weight: 75.5 kg (166 lb 7.2 oz)   BMI (Calculated): 26.06  IBW/kg (Dietitian Calculated): 61.4 kg                         Weight History:   Wt Readings from Last 10 Encounters:   05/24/25 75.5 kg (166 lb 7.2 oz)   04/17/25 76.7 kg (169 lb)   03/18/25 78.5 kg (173 lb)   03/04/25 77.1 kg (170 lb)   08/20/24 75.3 kg (166 lb)   02/07/24 77.6 kg (171 lb)   08/07/23 81.4 kg (179 lb 6 oz)   01/27/23 81.2 kg (179 lb)   01/05/23 81.6 kg (180 lb)      Weight Change %:  Weight History / % Weight Change: No significant wt loss noted based on available wt records.  Significant Weight Loss: No    Nutrition Focused Physical Exam Findings:    Subcutaneous Fat Loss:   Defer Subcutaneous Fat Loss Assessment: Defer all  Defer All Reason: COVID precautions  Muscle Wasting:  Defer Muscle Wasting Assessment: Defer all  Defer All Reason: COVID precautions  Edema:  Edema: none  Physical Findings:  Skin: Positive (left heel PI, right heel PI, right leg wound, surgical umbilicus wound per nursing assessment)    Nutrition Significant Labs:    Reviewed   Nutrition Specific Medications:  Reviewed     I/O:   Last BM " Date: 05/25/25; Stool Appearance: Loose, Watery (05/26/25 0930)    Dietary Orders (From admission, onward)       Start     Ordered    05/26/25 1108  Adult diet Regular; Soft and bite sized 6; Thin 0  Diet effective now        Comments: Pills Whole in Apple sauce, straws okay   Question Answer Comment   Diet type Regular    Texture Soft and bite sized 6    Fluid consistency Thin 0        05/26/25 1108    05/25/25 1330  Oral nutritional supplements  Until discontinued        Question Answer Comment   Deliver with All meals    Select supplement: Magic Cup Reduced Sugar        05/25/25 1330    05/24/25 0807  May Participate in Room Service  ( ROOM SERVICE MAY PARTICIPATE)  Once        Question:  .  Answer:  Yes    05/24/25 0806                     Estimated Needs:      Method for Estimating Needs: 3727-8968 kcal/d (25-30 kcal/kg IBW)     Method for Estimating 24 Hour Protein Needs: 61 g/d (1 g/kg IBW) or as renal function permits     Method for Estimating 24 Hour Fluid Needs: 7691-6218 ml/d (1 ml/kcal or per MD)  Patient on Order Fluid Restriction: No        Nutrition Diagnosis   Malnutrition Diagnosis  Patient has Malnutrition Diagnosis:  (unable to determine at this time)    Nutrition Diagnosis  Patient has Nutrition Diagnosis: Yes  Diagnosis Status (1): New  Nutrition Diagnosis 1: Inadequate oral intake  Related to (1): acute illness  As Evidenced by (1): 25% documented intake of one meal       Nutrition Interventions/Recommendations   Nutrition prescription for oral nutrition    Nutrition Recommendations:  Individualized Nutrition Prescription Provided for : Regular diet with Glucerna BID and reduced sugar magic cup daily; texture/consistency per SLP    Nutrition Interventions/Goals:   Meals and Snacks: Texture-modified diet  Goal: Consumes 3 meals per day  Medical Food Supplement: Commercial beverage medical food supplement therapy  Goal: Glucerna BID (provides 220 kcal, 10 g protein per serving). Magic Cup  Reduced Sugar daily (provides 280 kcal and 9 g protein per serving).      Education Documentation  No documentation found.     N/A - unable to assess education needs       Nutrition Monitoring and Evaluation   Food/Nutrient Related History Monitoring  Monitoring and Evaluation Plan: Intake / amount of food, Estimated Energy Intake  Estimated Energy Intake: Energy intake 50 -75% of estimated energy needs  Intake / Amount of food: Consumes at least 50% or more of meals/snacks/supplements    Anthropometric Measurements  Monitoring and Evaluation Plan: Body weight  Body Weight: Body weight - Maintain stable weight    Biochemical Data, Medical Tests and Procedures  Monitoring and Evaluation Plan: Electrolyte/renal panel, Glucose/endocrine profile  Electrolyte and Renal Panel: Electrolytes within normal limits  Glucose/Endocrine Profile: Glucose within normal limits ( mg/dL)    Physical Exam Findings  Monitoring and Evaluation Plan: Skin  Skin Finding: Impaired wound healing - Skin to heal    Goal Status: New goal(s) identified    Time Spent (min): 45 minutes

## 2025-05-27 ENCOUNTER — APPOINTMENT (OUTPATIENT)
Dept: RADIOLOGY | Facility: HOSPITAL | Age: 80
End: 2025-05-27
Payer: MEDICARE

## 2025-05-27 LAB
ALBUMIN SERPL BCP-MCNC: 2.6 G/DL (ref 3.4–5)
ALP SERPL-CCNC: 89 U/L (ref 33–136)
ALT SERPL W P-5'-P-CCNC: 12 U/L (ref 7–45)
ANION GAP SERPL CALC-SCNC: 13 MMOL/L (ref 10–20)
AST SERPL W P-5'-P-CCNC: 35 U/L (ref 9–39)
BASOPHILS # BLD AUTO: 0.04 X10*3/UL (ref 0–0.1)
BASOPHILS NFR BLD AUTO: 0.3 %
BILIRUB SERPL-MCNC: 0.3 MG/DL (ref 0–1.2)
BUN SERPL-MCNC: 43 MG/DL (ref 6–23)
CALCIUM SERPL-MCNC: 8.3 MG/DL (ref 8.6–10.3)
CHLORIDE SERPL-SCNC: 101 MMOL/L (ref 98–107)
CO2 SERPL-SCNC: 28 MMOL/L (ref 21–32)
CREAT SERPL-MCNC: 1.2 MG/DL (ref 0.5–1.05)
EGFRCR SERPLBLD CKD-EPI 2021: 45 ML/MIN/1.73M*2
EOSINOPHIL # BLD AUTO: 0 X10*3/UL (ref 0–0.4)
EOSINOPHIL NFR BLD AUTO: 0 %
ERYTHROCYTE [DISTWIDTH] IN BLOOD BY AUTOMATED COUNT: 14.5 % (ref 11.5–14.5)
GLUCOSE BLD MANUAL STRIP-MCNC: 105 MG/DL (ref 74–99)
GLUCOSE BLD MANUAL STRIP-MCNC: 105 MG/DL (ref 74–99)
GLUCOSE BLD MANUAL STRIP-MCNC: 113 MG/DL (ref 74–99)
GLUCOSE BLD MANUAL STRIP-MCNC: 122 MG/DL (ref 74–99)
GLUCOSE BLD MANUAL STRIP-MCNC: 49 MG/DL (ref 74–99)
GLUCOSE BLD MANUAL STRIP-MCNC: 77 MG/DL (ref 74–99)
GLUCOSE SERPL-MCNC: 60 MG/DL (ref 74–99)
HCT VFR BLD AUTO: 29.5 % (ref 36–46)
HGB BLD-MCNC: 8.8 G/DL (ref 12–16)
IMM GRANULOCYTES # BLD AUTO: 0.77 X10*3/UL (ref 0–0.5)
IMM GRANULOCYTES NFR BLD AUTO: 5 % (ref 0–0.9)
LYMPHOCYTES # BLD AUTO: 1.32 X10*3/UL (ref 0.8–3)
LYMPHOCYTES NFR BLD AUTO: 8.6 %
MCH RBC QN AUTO: 28.3 PG (ref 26–34)
MCHC RBC AUTO-ENTMCNC: 29.8 G/DL (ref 32–36)
MCV RBC AUTO: 95 FL (ref 80–100)
MONOCYTES # BLD AUTO: 0.74 X10*3/UL (ref 0.05–0.8)
MONOCYTES NFR BLD AUTO: 4.8 %
NEUTROPHILS # BLD AUTO: 12.55 X10*3/UL (ref 1.6–5.5)
NEUTROPHILS NFR BLD AUTO: 81.3 %
NRBC BLD-RTO: 0 /100 WBCS (ref 0–0)
PLATELET # BLD AUTO: 193 X10*3/UL (ref 150–450)
POTASSIUM SERPL-SCNC: 4.3 MMOL/L (ref 3.5–5.3)
PROT SERPL-MCNC: 5.6 G/DL (ref 6.4–8.2)
RBC # BLD AUTO: 3.11 X10*6/UL (ref 4–5.2)
SODIUM SERPL-SCNC: 138 MMOL/L (ref 136–145)
WBC # BLD AUTO: 15.4 X10*3/UL (ref 4.4–11.3)

## 2025-05-27 PROCEDURE — 1100000001 HC PRIVATE ROOM DAILY

## 2025-05-27 PROCEDURE — 94640 AIRWAY INHALATION TREATMENT: CPT

## 2025-05-27 PROCEDURE — 36415 COLL VENOUS BLD VENIPUNCTURE: CPT | Performed by: INTERNAL MEDICINE

## 2025-05-27 PROCEDURE — 71045 X-RAY EXAM CHEST 1 VIEW: CPT | Performed by: RADIOLOGY

## 2025-05-27 PROCEDURE — 82947 ASSAY GLUCOSE BLOOD QUANT: CPT

## 2025-05-27 PROCEDURE — 2500000001 HC RX 250 WO HCPCS SELF ADMINISTERED DRUGS (ALT 637 FOR MEDICARE OP)

## 2025-05-27 PROCEDURE — 80053 COMPREHEN METABOLIC PANEL: CPT | Performed by: INTERNAL MEDICINE

## 2025-05-27 PROCEDURE — 2500000002 HC RX 250 W HCPCS SELF ADMINISTERED DRUGS (ALT 637 FOR MEDICARE OP, ALT 636 FOR OP/ED)

## 2025-05-27 PROCEDURE — 2500000005 HC RX 250 GENERAL PHARMACY W/O HCPCS: Performed by: NURSE PRACTITIONER

## 2025-05-27 PROCEDURE — 71045 X-RAY EXAM CHEST 1 VIEW: CPT

## 2025-05-27 PROCEDURE — 2500000004 HC RX 250 GENERAL PHARMACY W/ HCPCS (ALT 636 FOR OP/ED): Performed by: INTERNAL MEDICINE

## 2025-05-27 PROCEDURE — 92526 ORAL FUNCTION THERAPY: CPT | Mod: GN | Performed by: SPEECH-LANGUAGE PATHOLOGIST

## 2025-05-27 PROCEDURE — 85025 COMPLETE CBC W/AUTO DIFF WBC: CPT | Performed by: INTERNAL MEDICINE

## 2025-05-27 PROCEDURE — 2500000001 HC RX 250 WO HCPCS SELF ADMINISTERED DRUGS (ALT 637 FOR MEDICARE OP): Performed by: INTERNAL MEDICINE

## 2025-05-27 PROCEDURE — 51702 INSERT TEMP BLADDER CATH: CPT

## 2025-05-27 PROCEDURE — 2500000004 HC RX 250 GENERAL PHARMACY W/ HCPCS (ALT 636 FOR OP/ED): Performed by: NURSE PRACTITIONER

## 2025-05-27 PROCEDURE — 2500000002 HC RX 250 W HCPCS SELF ADMINISTERED DRUGS (ALT 637 FOR MEDICARE OP, ALT 636 FOR OP/ED): Mod: JZ | Performed by: INTERNAL MEDICINE

## 2025-05-27 RX ORDER — ZINC SULFATE 50(220)MG
50 CAPSULE ORAL DAILY
Status: DISCONTINUED | OUTPATIENT
Start: 2025-05-27 | End: 2025-05-29 | Stop reason: HOSPADM

## 2025-05-27 RX ADMIN — GABAPENTIN 200 MG: 100 CAPSULE ORAL at 15:10

## 2025-05-27 RX ADMIN — CILOSTAZOL 50 MG: 100 TABLET ORAL at 20:46

## 2025-05-27 RX ADMIN — GABAPENTIN 200 MG: 100 CAPSULE ORAL at 08:58

## 2025-05-27 RX ADMIN — METHOCARBAMOL 500 MG: 500 TABLET ORAL at 20:46

## 2025-05-27 RX ADMIN — HEPARIN SODIUM 5000 UNITS: 5000 INJECTION, SOLUTION INTRAVENOUS; SUBCUTANEOUS at 05:37

## 2025-05-27 RX ADMIN — INSULIN HUMAN 35 UNITS: 100 INJECTION, SUSPENSION SUBCUTANEOUS at 08:59

## 2025-05-27 RX ADMIN — IPRATROPIUM BROMIDE AND ALBUTEROL SULFATE 3 ML: .5; 3 SOLUTION RESPIRATORY (INHALATION) at 11:12

## 2025-05-27 RX ADMIN — CILOSTAZOL 50 MG: 100 TABLET ORAL at 08:58

## 2025-05-27 RX ADMIN — ERTAPENEM SODIUM 1 G: 1 INJECTION, POWDER, LYOPHILIZED, FOR SOLUTION INTRAMUSCULAR; INTRAVENOUS at 13:15

## 2025-05-27 RX ADMIN — IPRATROPIUM BROMIDE AND ALBUTEROL SULFATE 3 ML: .5; 3 SOLUTION RESPIRATORY (INHALATION) at 07:02

## 2025-05-27 RX ADMIN — GABAPENTIN 200 MG: 100 CAPSULE ORAL at 20:46

## 2025-05-27 RX ADMIN — ASPIRIN 81 MG CHEWABLE TABLET 81 MG: 81 TABLET CHEWABLE at 08:59

## 2025-05-27 RX ADMIN — DULOXETINE HYDROCHLORIDE 30 MG: 30 CAPSULE, DELAYED RELEASE ORAL at 08:58

## 2025-05-27 RX ADMIN — METHOCARBAMOL 500 MG: 500 TABLET ORAL at 15:10

## 2025-05-27 RX ADMIN — METOPROLOL TARTRATE 100 MG: 100 TABLET, FILM COATED ORAL at 20:45

## 2025-05-27 RX ADMIN — METHYLPREDNISOLONE 12 MG: 4 TABLET ORAL at 08:58

## 2025-05-27 RX ADMIN — ASPIRIN 81 MG CHEWABLE TABLET 81 MG: 81 TABLET CHEWABLE at 20:46

## 2025-05-27 RX ADMIN — Medication 1 CAPSULE: at 13:17

## 2025-05-27 RX ADMIN — HEPARIN SODIUM 5000 UNITS: 5000 INJECTION, SOLUTION INTRAVENOUS; SUBCUTANEOUS at 13:15

## 2025-05-27 RX ADMIN — Medication 32 PERCENT: at 18:53

## 2025-05-27 RX ADMIN — METHOCARBAMOL 500 MG: 500 TABLET ORAL at 11:42

## 2025-05-27 RX ADMIN — HEPARIN SODIUM 5000 UNITS: 5000 INJECTION, SOLUTION INTRAVENOUS; SUBCUTANEOUS at 22:31

## 2025-05-27 ASSESSMENT — COGNITIVE AND FUNCTIONAL STATUS - GENERAL
WALKING IN HOSPITAL ROOM: A LOT
DRESSING REGULAR LOWER BODY CLOTHING: A LOT
CLIMB 3 TO 5 STEPS WITH RAILING: TOTAL
MOVING FROM LYING ON BACK TO SITTING ON SIDE OF FLAT BED WITH BEDRAILS: A LOT
DAILY ACTIVITIY SCORE: 12
MOBILITY SCORE: 10
DRESSING REGULAR UPPER BODY CLOTHING: A LOT
MOVING TO AND FROM BED TO CHAIR: A LOT
WALKING IN HOSPITAL ROOM: TOTAL
DRESSING REGULAR UPPER BODY CLOTHING: A LOT
PERSONAL GROOMING: A LOT
TOILETING: A LOT
DRESSING REGULAR LOWER BODY CLOTHING: A LOT
STANDING UP FROM CHAIR USING ARMS: A LOT
MOVING TO AND FROM BED TO CHAIR: A LOT
CLIMB 3 TO 5 STEPS WITH RAILING: TOTAL
HELP NEEDED FOR BATHING: A LOT
EATING MEALS: A LOT
EATING MEALS: A LOT
TOILETING: A LOT
TURNING FROM BACK TO SIDE WHILE IN FLAT BAD: A LOT
HELP NEEDED FOR BATHING: A LOT
STANDING UP FROM CHAIR USING ARMS: A LOT
PERSONAL GROOMING: A LOT
TURNING FROM BACK TO SIDE WHILE IN FLAT BAD: A LOT
DAILY ACTIVITIY SCORE: 12
MOVING FROM LYING ON BACK TO SITTING ON SIDE OF FLAT BED WITH BEDRAILS: A LOT
MOBILITY SCORE: 11

## 2025-05-27 ASSESSMENT — PAIN SCALES - WONG BAKER
WONGBAKER_NUMERICALRESPONSE: NO HURT
WONGBAKER_NUMERICALRESPONSE: NO HURT

## 2025-05-27 ASSESSMENT — PAIN - FUNCTIONAL ASSESSMENT: PAIN_FUNCTIONAL_ASSESSMENT: 0-10

## 2025-05-27 ASSESSMENT — PAIN SCALES - GENERAL
PAINLEVEL_OUTOF10: 0 - NO PAIN
PAINLEVEL_OUTOF10: 0 - NO PAIN

## 2025-05-27 NOTE — CARE PLAN
The patient's goals for the shift include      The clinical goals for the shift include pt will remain free from injury      Problem: Fall/Injury  Goal: Not fall by end of shift  5/27/2025 1119 by Nelda Madden RN  Outcome: Progressing     Problem: Fall/Injury  Goal: Verbalize understanding of personal risk factors for fall in the hospital  5/27/2025 1326 by Nelda Madden RN  Outcome: Progressing  5/27/2025 1119 by Nelda Madden RN  Outcome: Progressing     Problem: Safety - Adult  Goal: Free from fall injury  5/27/2025 1326 by Nelda Madden RN  Outcome: Progressing  5/27/2025 1119 by Nelda Madden RN  Outcome: Progressing     Problem: Pain - Adult  Goal: Verbalizes/displays adequate comfort level or baseline comfort level  5/27/2025 1326 by Nelda Madden RN  Outcome: Progressing  5/27/2025 1119 by Nelda Madden RN  Outcome: Progressing

## 2025-05-27 NOTE — CARE PLAN
The patient's goals for the shift include  pain control    The clinical goals for the shift include pt will remain free from injury      Problem: Fall/Injury  Goal: Not fall by end of shift  Outcome: Progressing  Goal: Be free from injury by end of the shift  Outcome: Progressing  Goal: Verbalize understanding of personal risk factors for fall in the hospital  Outcome: Progressing     Problem: Pain - Adult  Goal: Verbalizes/displays adequate comfort level or baseline comfort level  Outcome: Progressing     Problem: Safety - Adult  Goal: Free from fall injury  Outcome: Progressing     Problem: Discharge Planning  Goal: Discharge to home or other facility with appropriate resources  Outcome: Progressing     Problem: Chronic Conditions and Co-morbidities  Goal: Patient's chronic conditions and co-morbidity symptoms are monitored and maintained or improved  Outcome: Progressing     Problem: Nutrition  Goal: Nutrient intake appropriate for maintaining nutritional needs  Outcome: Progressing     Problem: Skin  Goal: Decreased wound size/increased tissue granulation at next dressing change  Outcome: Progressing  Flowsheets (Taken 5/26/2025 1452 by Nelda Madden, RN)  Decreased wound size/increased tissue granulation at next dressing change: Promote sleep for wound healing  Goal: Participates in plan/prevention/treatment measures  Outcome: Progressing  Flowsheets (Taken 5/26/2025 1452 by Nelda Madden, RN)  Participates in plan/prevention/treatment measures: Elevate heels  Goal: Prevent/manage excess moisture  Outcome: Progressing  Flowsheets (Taken 5/26/2025 1452 by Nelda Madden, RN)  Prevent/manage excess moisture:   Monitor for/manage infection if present   Moisturize dry skin  Goal: Prevent/minimize sheer/friction injuries  Outcome: Progressing  Flowsheets (Taken 5/26/2025 1452 by Nelda Madden, RN)  Prevent/minimize sheer/friction injuries:   Complete micro-shifts as needed if patient unable. Adjust patient position  to relieve pressure points, not a full turn   Use pull sheet  Goal: Promote/optimize nutrition  Outcome: Progressing  Flowsheets (Taken 5/26/2025 1452 by Nelda Madden RN)  Promote/optimize nutrition: Monitor/record intake including meals  Goal: Promote skin healing  Outcome: Progressing  Flowsheets (Taken 5/24/2025 2116)  Promote skin healing:   Turn/reposition every 2 hours/use positioning/transfer devices   Rotate device position/do not position patient on device   Protective dressings over bony prominences     Problem: Diabetes  Goal: Achieve decreasing blood glucose levels by end of shift  Outcome: Progressing  Goal: Increase stability of blood glucose readings by end of shift  Outcome: Progressing  Goal: Decrease in ketones present in urine by end of shift  Outcome: Progressing  Goal: Maintain electrolyte levels within acceptable range throughout shift  Outcome: Progressing  Goal: Maintain glucose levels >70mg/dl to <250mg/dl throughout shift  Outcome: Progressing  Goal: No changes in neurological exam by end of shift  Outcome: Progressing  Goal: Learn about and adhere to nutrition recommendations by end of shift  Outcome: Progressing  Goal: Vital signs within normal range for age by end of shift  Outcome: Progressing  Goal: Increase self care and/or family involovement by end of shift  Outcome: Progressing  Goal: Receive DSME education by end of shift  Outcome: Progressing     Problem: Pain  Goal: Takes deep breaths with improved pain control throughout the shift  Outcome: Progressing  Goal: Turns in bed with improved pain control throughout the shift  Outcome: Progressing  Goal: Walks with improved pain control throughout the shift  Outcome: Progressing  Goal: Performs ADL's with improved pain control throughout shift  Outcome: Progressing  Goal: Participates in PT with improved pain control throughout the shift  Outcome: Progressing  Goal: Free from opioid side effects throughout the shift  Outcome:  Progressing  Goal: Free from acute confusion related to pain meds throughout the shift  Outcome: Progressing

## 2025-05-27 NOTE — PROGRESS NOTES
Nancy Long is a 80 y.o. female on day 3 of admission presenting with COVID-19.      Subjective   Patient fully evaluated on May 27. Per final cultures -    Urine Culture >=100,000 CFU/mL Escherichia coli Abnormal    Extended Spectrum Beta Lactamase (ESBL) producing organism   20,000 - 80,000 CFU/mL Escherichia coli Abnormal         Resulting Agency: [facility]     Susceptibility     Escherichia coli (1) Escherichia coli (2)     MICROSCAN MICROSCAN    Amoxicillin/Clavulanate Susceptible Susceptible    Ampicillin Resistant Resistant    Ampicillin/Sulbactam Intermediate Susceptible    Aztreonam Resistant     Cefazolin Resistant Susceptible    Cefazolin (uncomplicated UTIs only) Resistant Susceptible    Cefepime Resistant     Cefotaxime Resistant     Ceftazidime Resistant     Ceftriaxone Resistant     Cefuroxime (oral) Resistant     Ciprofloxacin Resistant Susceptible    Ertapenem Susceptible     Gentamicin Susceptible Susceptible    Levofloxacin Resistant Susceptible    Meropenem Susceptible     Nitrofurantoin Susceptible Susceptible    Piperacillin/Tazobactam Susceptible Susceptible    Trimethoprim/Sulfamethoxazole Susceptible Resistant                Linear View      Will continue present IV antibiotics, oral medications and recheck labs in a.m. Blood sugars have remained low despite steroids, will hold scheduled insulin, continue with sliding scale. Strict intake and output, will continue to monitor overnight and repeat labs in the AM.        Objective     Last Recorded Vitals  /68 (BP Location: Left arm, Patient Position: Lying)   Pulse 67   Temp 36.5 °C (97.7 °F) (Temporal)   Resp 18   Wt 75.5 kg (166 lb 7.2 oz)   SpO2 93%   Intake/Output last 3 Shifts:    Intake/Output Summary (Last 24 hours) at 5/27/2025 1321  Last data filed at 5/27/2025 0833  Gross per 24 hour   Intake 1873.75 ml   Output 650 ml   Net 1223.75 ml       Admission Weight  Weight: 72.6 kg (160 lb) (05/23/25 2049)    Daily  Weight  05/24/25 : 75.5 kg (166 lb 7.2 oz)    Image Results  ECG 12 lead  Sinus rhythm with Premature atrial complexes  Nonspecific ST abnormality  Abnormal ECG  When compared with ECG of 13-APR-2025 17:52, (unconfirmed)  Premature atrial complexes are now Present  Nonspecific T wave abnormality no longer evident in Inferior leads  XR chest 1 view  Narrative: Interpreted By:  Chano Marshall,   STUDY:  XR CHEST 1 VIEW;  5/27/2025 7:26 am      INDICATION:  Signs/Symptoms:COVID-pneumonia/atelectasis.      COMPARISON:  05/24/2025      ACCESSION NUMBER(S):  [id number]      ORDERING CLINICIAN:  KRISTY STILES      FINDINGS:  CARDIOMEDIASTINAL SILHOUETTE AND VASCULATURE:      Cardiac size:  Within normal limits.  Aortic shadow:  Within normal limits.      Mediastinal contours: Within normal limits.      Pulmonary vasculature:  The central vasculature is unremarkable      LUNGS:  There are patchy bilateral infiltrates greatest in the left lower  lung. Overall these appear increased from the previous exam.      ABDOMEN AND OTHER FINDINGS:  No remarkable upper abdominal findings.      BONES:  No acute osseous changes.      Impression: 1.  Progression of infiltrates most likely from pneumonia.      Signed by: Chano Marshall 5/27/2025 11:34 AM  Dictation workstation:   [id number]      Physical Exam    General: in no acute distress  Eyes: PERRLA   HENT: Normo-cephalic, atraumatic.   CV: Regular rate, regular rhythm.   Resp: Breathing non-labored,  diminished to auscultation bilaterally  GI: BS x4   : monitor intake and output  MSK/Extremities: No gross bony deformities. PT/OT on the case  Skin: Warm. Appropriate color, continue offloading  Neuro:  Face symmetric.   Psych: returning to baseline, improved     10 point ROS negative unless otherwise noted in HPI    Patient fully evaluated 5/27  for    Problem List Items Addressed This Visit       * (Principal) COVID-19 - Primary     Other Visit Diagnoses         Acute renal  insufficiency              Patient seen resting in bed with head of bed elevated, no s/s or c/o acute difficulties at this time.  Vital signs for last 24 hours Temp:  [36.4 °C (97.5 °F)-36.8 °C (98.2 °F)] 36.5 °C (97.7 °F)  Heart Rate:  [67-83] 67  Resp:  [18] 18  BP: (119-141)/(56-68) 141/68  FiO2 (%):  [32 %] 32 %    I/O this shift:  In: 240 [P.O.:240]  Out: 650 [Urine:650]  Patient still requiring frequent cardiac and SPO2 monitoring. Continue aggressive pulmonary hygiene and oral hygiene. Off loading as tolerated for skin integrity. Medications and labs reviewed-   Results for orders placed or performed during the hospital encounter of 05/23/25 (from the past 24 hours)   POCT GLUCOSE   Result Value Ref Range    POCT Glucose 195 (H) 74 - 99 mg/dL   POCT GLUCOSE   Result Value Ref Range    POCT Glucose 156 (H) 74 - 99 mg/dL   CBC and Auto Differential   Result Value Ref Range    WBC 15.4 (H) 4.4 - 11.3 x10*3/uL    nRBC 0.0 0.0 - 0.0 /100 WBCs    RBC 3.11 (L) 4.00 - 5.20 x10*6/uL    Hemoglobin 8.8 (L) 12.0 - 16.0 g/dL    Hematocrit 29.5 (L) 36.0 - 46.0 %    MCV 95 80 - 100 fL    MCH 28.3 26.0 - 34.0 pg    MCHC 29.8 (L) 32.0 - 36.0 g/dL    RDW 14.5 11.5 - 14.5 %    Platelets 193 150 - 450 x10*3/uL    Neutrophils % 81.3 40.0 - 80.0 %    Immature Granulocytes %, Automated 5.0 (H) 0.0 - 0.9 %    Lymphocytes % 8.6 13.0 - 44.0 %    Monocytes % 4.8 2.0 - 10.0 %    Eosinophils % 0.0 0.0 - 6.0 %    Basophils % 0.3 0.0 - 2.0 %    Neutrophils Absolute 12.55 (H) 1.60 - 5.50 x10*3/uL    Immature Granulocytes Absolute, Automated 0.77 (H) 0.00 - 0.50 x10*3/uL    Lymphocytes Absolute 1.32 0.80 - 3.00 x10*3/uL    Monocytes Absolute 0.74 0.05 - 0.80 x10*3/uL    Eosinophils Absolute 0.00 0.00 - 0.40 x10*3/uL    Basophils Absolute 0.04 0.00 - 0.10 x10*3/uL   Comprehensive Metabolic Panel   Result Value Ref Range    Glucose 60 (L) 74 - 99 mg/dL    Sodium 138 136 - 145 mmol/L    Potassium 4.3 3.5 - 5.3 mmol/L    Chloride 101 98 - 107  mmol/L    Bicarbonate 28 21 - 32 mmol/L    Anion Gap 13 10 - 20 mmol/L    Urea Nitrogen 43 (H) 6 - 23 mg/dL    Creatinine 1.20 (H) 0.50 - 1.05 mg/dL    eGFR 45 (L) >60 mL/min/1.73m*2    Calcium 8.3 (L) 8.6 - 10.3 mg/dL    Albumin 2.6 (L) 3.4 - 5.0 g/dL    Alkaline Phosphatase 89 33 - 136 U/L    Total Protein 5.6 (L) 6.4 - 8.2 g/dL    AST 35 9 - 39 U/L    Bilirubin, Total 0.3 0.0 - 1.2 mg/dL    ALT 12 7 - 45 U/L   POCT GLUCOSE   Result Value Ref Range    POCT Glucose 49 (L) 74 - 99 mg/dL   POCT GLUCOSE   Result Value Ref Range    POCT Glucose 77 74 - 99 mg/dL   POCT GLUCOSE   Result Value Ref Range    POCT Glucose 113 (H) 74 - 99 mg/dL   POCT GLUCOSE   Result Value Ref Range    POCT Glucose 105 (H) 74 - 99 mg/dL      Patient recently received an antibiotic (last 12 hours)       None           Plan discussed with interdisciplinary team, continue current and repeat labs in the AM.       Discharge planning discussed with patient and care team. Therapy evaluations ordered.   [facility]-  11   Anticipate - SNF    Patient aware and agreeable to current plan, continue plan as above.     I spent a total of 60 minutes on the date of the service which included preparing to see the patient, face-to-face patient care, completing clinical documentation, obtaining and/or reviewing separately obtained history, performing a medically appropriate examination, counseling and educating the patient/family/caregiver, ordering medications, tests, or procedures, communicating with other HCPs (not separately reported), independently interpreting results (not separately reported), communicating results to the patient/family/caregiver, and care coordination (not separately reported).               This patient has a urinary catheter   Reason for the urinary catheter remaining today? critically ill patient who need accurate urinary output measurements          Assessment & Plan  COVID-19    Bandemia    Acute kidney injury superimposed on  CKD      Rectal pain: continue with Tramadol PRN         ALEXIS MarinS

## 2025-05-27 NOTE — PROGRESS NOTES
5/27/2025  Per notes- pt not oriented.  Called emergency contact- friend- Vidhya- left voicemail asking for return call.  Ct Team will follow.  Alka GROSS    Per Liaison from [facility]- pt came in from their skilled facility.    Awaiting call back from contact.   Alka GROSS    1400  Received call back from Friend, Vidhya. Asked if she had POA- stated yes.  Verified insurance, address, phone.  Pt has been at [facility] for about 2 weeks.  Was not happy there.  Discussed that therapy is recommending she goes to SNF to continue therapy.  She thinks pt would like a different one.  Stated she will be in tomorrow and will talk with pt them. Informed her pt is not oriented today.   Vidhya asked about A/L.  Questions answered.    Left SNF list, Senior living guide and [employer] pamphlet in room for them to review.   CT Team will follow.   Alka GROSS

## 2025-05-27 NOTE — PROGRESS NOTES
Speech-Language Pathology    SLP Adult Inpatient  Speech-Language Pathology Treatment     Patient Name: Nancy Long  MRN: [id number]  Today's Date: 5/27/2025  Time Calculation  Start Time: 1130  Stop Time: 1147  Time Calculation (min): 17 min     Current Problem:   1. COVID-19        2. Acute renal insufficiency          Therapeutic Swallow:  Therapeutic Swallow Intervention : PO Trials, Compensatory Strategies, Caregiver Education  Solid Diet Recommendations: Easy to Chew (IDDSI Level 7)  Liquid Diet Recommendations: Thin (IDDSI Level 0)    Compensatory Swallowing Strategies:   -Full supervision with meals,   -Eat/feed slowly.   -DENTURES for meals.      Medication Administration Recommendations: Whole, With Pureed     *Confusion may impact pt's safe PO intake, and therefore it is suggested that this patient have 1:1 assist at meals for appropriate rate and bite sized of PO intake.      SLP Tx per POC:  Nancy is provided with educated regarding purpose of today's visit, diet texture trials and advancement if able. Pt denies dysphagia.   Recall, Nancy was on regular textures and thin liquids at [facility] prior to arrival at [facility] for increased confusion and recent covid+ diagnosis.     PO trials soft/bite sized, thin liquids by straw and regular textured solids today. Pt is wearing dentures.   Oral phase of the swallow is intact, and no overt s/s aspiration with all PO intake during this session.   Suggest diet textures advancement to easy to chew solids and continue thin liquids. Suspect she can return to regular textures once back at SNF, back at her baseline.  No further ST indicated in acute care setting. Discussed case with GALE Ramires.       SLP TX Intervention Outcome: Making Progress Towards Goals  Treatment Tolerance: Patient tolerated treatment well  Medical Staff Made Aware: Yes  Education Provided: Yes    Dysphagia goals: (start date 5/26)  Patient will consume prescribed diet without clinical or overt s/s  aspiration. . 5/27 GOAL MET  Patient will trial upgraded textures with SLP only for possible diet advancement as indicated given bedside and clinical indicators. 5/27 GOAL MET, advance to easy to chew solids.   Patient/caregiver education. 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 Pt's speech is intelligible, voice is audible.  Cognition has improved a bit. Pt is more conversational today and aware she is in the hospital. SpO2 93% on nasal cannula. Temp 97.7.  WBC 15.4    General Visit Information:  Prior to Session Communication: Bedside nurse (GALE Lopez)  Baseline Assessment:  Respiratory Status: Room air  Behavior/Cognition: Cooperative, Pleasant mood (A&O to self and place.)  Patient Positioning: Upright in Bed   Pain Assessment:  Pain Assessment  Pain Assessment: 0-10    Inpatient:  Education Documentation  SLP has provided pt and GALE Lopez with the results and recommendations of this session.    Pt/RN verbalize understanding. Barriers to learning:  RN is pt;'s learner.   Head of bed sign posted in room UPDATED with diet level recommendations, and safer swallow strategies to apply during PO intake, meals and meds.

## 2025-05-27 NOTE — PROGRESS NOTES
Nancy Long is a 80 y.o. female on day 3 of admission presenting with COVID-19.      Subjective   Denies any chest pain or shortness of breath x-ray persistent pneumonitis, UTI with list ESBL from E. Coli.  On ertapenem  Renal chemistries  there not BUN 43 creatinine 1.20  Glucose was 60 this morning ; but she is insulin dose to be adjusted  Objective          Vitals 24HR  Heart Rate:  [67-83]   Temp:  [36.4 °C (97.5 °F)-36.8 °C (98.2 °F)]   Resp:  [18]   BP: (119-141)/(56-68)   SpO2:  [90 %-93 %]         Intake/Output last 3 Shifts:    Intake/Output Summary (Last 24 hours) at 5/27/2025 1229  Last data filed at 5/27/2025 0833  Gross per 24 hour   Intake 1873.75 ml   Output 650 ml   Net 1223.75 ml       Physical Exam    HEENT; pupils react to light and accommodation  Neck; no JVD ; right carotid bruit no thyromegaly; no adenopathy  Lungs; bibasilar crackles on inspiration  Heart; regular rate no gallops or rubs no thrills  Abdomen active peristalsis no rebound guarding organomegaly or shifting dullness  Skin; decreased turgor  Neurological; patient is confused there is no clonus no asterixis or tremors;        Relevant Results     Results for orders placed or performed during the hospital encounter of 05/23/25 (from the past 24 hours)   POCT GLUCOSE   Result Value Ref Range    POCT Glucose 195 (H) 74 - 99 mg/dL   POCT GLUCOSE   Result Value Ref Range    POCT Glucose 156 (H) 74 - 99 mg/dL   CBC and Auto Differential   Result Value Ref Range    WBC 15.4 (H) 4.4 - 11.3 x10*3/uL    nRBC 0.0 0.0 - 0.0 /100 WBCs    RBC 3.11 (L) 4.00 - 5.20 x10*6/uL    Hemoglobin 8.8 (L) 12.0 - 16.0 g/dL    Hematocrit 29.5 (L) 36.0 - 46.0 %    MCV 95 80 - 100 fL    MCH 28.3 26.0 - 34.0 pg    MCHC 29.8 (L) 32.0 - 36.0 g/dL    RDW 14.5 11.5 - 14.5 %    Platelets 193 150 - 450 x10*3/uL    Neutrophils % 81.3 40.0 - 80.0 %    Immature Granulocytes %, Automated 5.0 (H) 0.0 - 0.9 %    Lymphocytes % 8.6 13.0 - 44.0 %    Monocytes % 4.8 2.0 -  10.0 %    Eosinophils % 0.0 0.0 - 6.0 %    Basophils % 0.3 0.0 - 2.0 %    Neutrophils Absolute 12.55 (H) 1.60 - 5.50 x10*3/uL    Immature Granulocytes Absolute, Automated 0.77 (H) 0.00 - 0.50 x10*3/uL    Lymphocytes Absolute 1.32 0.80 - 3.00 x10*3/uL    Monocytes Absolute 0.74 0.05 - 0.80 x10*3/uL    Eosinophils Absolute 0.00 0.00 - 0.40 x10*3/uL    Basophils Absolute 0.04 0.00 - 0.10 x10*3/uL   Comprehensive Metabolic Panel   Result Value Ref Range    Glucose 60 (L) 74 - 99 mg/dL    Sodium 138 136 - 145 mmol/L    Potassium 4.3 3.5 - 5.3 mmol/L    Chloride 101 98 - 107 mmol/L    Bicarbonate 28 21 - 32 mmol/L    Anion Gap 13 10 - 20 mmol/L    Urea Nitrogen 43 (H) 6 - 23 mg/dL    Creatinine 1.20 (H) 0.50 - 1.05 mg/dL    eGFR 45 (L) >60 mL/min/1.73m*2    Calcium 8.3 (L) 8.6 - 10.3 mg/dL    Albumin 2.6 (L) 3.4 - 5.0 g/dL    Alkaline Phosphatase 89 33 - 136 U/L    Total Protein 5.6 (L) 6.4 - 8.2 g/dL    AST 35 9 - 39 U/L    Bilirubin, Total 0.3 0.0 - 1.2 mg/dL    ALT 12 7 - 45 U/L   POCT GLUCOSE   Result Value Ref Range    POCT Glucose 49 (L) 74 - 99 mg/dL   POCT GLUCOSE   Result Value Ref Range    POCT Glucose 77 74 - 99 mg/dL   POCT GLUCOSE   Result Value Ref Range    POCT Glucose 113 (H) 74 - 99 mg/dL   POCT GLUCOSE   Result Value Ref Range    POCT Glucose 105 (H) 74 - 99 mg/dL       XR chest 1 view  Result Date: 5/27/2025  Interpreted By:  Chano Marshall, STUDY: XR CHEST 1 VIEW;  5/27/2025 7:26 am   INDICATION: Signs/Symptoms:COVID-pneumonia/atelectasis.   COMPARISON: 05/24/2025   ACCESSION NUMBER(S): [id number]   ORDERING CLINICIAN: KRISTY STILES   FINDINGS: CARDIOMEDIASTINAL SILHOUETTE AND VASCULATURE:   Cardiac size:  Within normal limits. Aortic shadow:  Within normal limits.   Mediastinal contours: Within normal limits.   Pulmonary vasculature:  The central vasculature is unremarkable   LUNGS: There are patchy bilateral infiltrates greatest in the left lower lung. Overall these appear increased from the  previous exam.   ABDOMEN AND OTHER FINDINGS: No remarkable upper abdominal findings.   BONES: No acute osseous changes.       1.  Progression of infiltrates most likely from pneumonia.   Signed by: Chano Marshall 5/27/2025 11:34 AM Dictation workstation:   [id number]                      Assessment & Plan  COVID-19    Bandemia  Sarcopenia  Pneumonia  Hypoglycemia  Acute kidney injury superimposed on CKD  Iron deficiency anemia  UTI  Microalbuminuria  Thrombophilia with elevated D-dimer  Zinc Level  Adjust Insulin  I spent 30 minutes in the professional and overall care of this patient.      Ranjit Dumont MD

## 2025-05-28 ENCOUNTER — APPOINTMENT (OUTPATIENT)
Dept: RADIOLOGY | Facility: HOSPITAL | Age: 80
End: 2025-05-28
Payer: MEDICARE

## 2025-05-28 LAB
ALBUMIN SERPL BCP-MCNC: 2.9 G/DL (ref 3.4–5)
ALP SERPL-CCNC: 100 U/L (ref 33–136)
ALT SERPL W P-5'-P-CCNC: 19 U/L (ref 7–45)
ANION GAP SERPL CALC-SCNC: 11 MMOL/L (ref 10–20)
AST SERPL W P-5'-P-CCNC: 49 U/L (ref 9–39)
ATRIAL RATE: 80 BPM
BASOPHILS # BLD MANUAL: 0 X10*3/UL (ref 0–0.1)
BASOPHILS NFR BLD MANUAL: 0 %
BILIRUB SERPL-MCNC: 0.4 MG/DL (ref 0–1.2)
BUN SERPL-MCNC: 36 MG/DL (ref 6–23)
CALCIUM SERPL-MCNC: 8.3 MG/DL (ref 8.6–10.3)
CHLORIDE SERPL-SCNC: 102 MMOL/L (ref 98–107)
CITRATE 24H UR-MRATE: ABNORMAL MG/D (ref 320–1240)
CITRATE UR-MCNC: 33 MG/L
CITRATE/CREATININE [MASS RATIO] IN URINE COLLECTED FOR UNSPECIFIED DURATION: 51 MG/G
CO2 SERPL-SCNC: 27 MMOL/L (ref 21–32)
COLLECT DURATION TIME SPEC: ABNORMAL HR
CREAT 24H UR-MRATE: ABNORMAL MG/D (ref 400–1300)
CREAT ?TM UR-MCNC: 65 MG/DL
CREAT SERPL-MCNC: 1.09 MG/DL (ref 0.5–1.05)
EGFRCR SERPLBLD CKD-EPI 2021: 51 ML/MIN/1.73M*2
EOSINOPHIL # BLD MANUAL: 0 X10*3/UL (ref 0–0.4)
EOSINOPHIL NFR BLD MANUAL: 0 %
ERYTHROCYTE [DISTWIDTH] IN BLOOD BY AUTOMATED COUNT: 14.2 % (ref 11.5–14.5)
GLUCOSE BLD MANUAL STRIP-MCNC: 142 MG/DL (ref 74–99)
GLUCOSE BLD MANUAL STRIP-MCNC: 179 MG/DL (ref 74–99)
GLUCOSE BLD MANUAL STRIP-MCNC: 71 MG/DL (ref 74–99)
GLUCOSE BLD MANUAL STRIP-MCNC: 96 MG/DL (ref 74–99)
GLUCOSE SERPL-MCNC: 65 MG/DL (ref 74–99)
HCT VFR BLD AUTO: 34.2 % (ref 36–46)
HGB BLD-MCNC: 10.2 G/DL (ref 12–16)
IMM GRANULOCYTES # BLD AUTO: 0.92 X10*3/UL (ref 0–0.5)
IMM GRANULOCYTES NFR BLD AUTO: 7.3 % (ref 0–0.9)
LYMPHOCYTES # BLD MANUAL: 1.13 X10*3/UL (ref 0.8–3)
LYMPHOCYTES NFR BLD MANUAL: 9 %
MCH RBC QN AUTO: 28.2 PG (ref 26–34)
MCHC RBC AUTO-ENTMCNC: 29.8 G/DL (ref 32–36)
MCV RBC AUTO: 95 FL (ref 80–100)
METAMYELOCYTES # BLD MANUAL: 0.13 X10*3/UL
METAMYELOCYTES NFR BLD MANUAL: 1 %
MONOCYTES # BLD MANUAL: 0.63 X10*3/UL (ref 0.05–0.8)
MONOCYTES NFR BLD MANUAL: 5 %
NEUTROPHILS # BLD MANUAL: 10.71 X10*3/UL (ref 1.6–5.5)
NEUTS BAND # BLD MANUAL: 0.5 X10*3/UL (ref 0–0.5)
NEUTS BAND NFR BLD MANUAL: 4 %
NEUTS SEG # BLD MANUAL: 10.21 X10*3/UL (ref 1.6–5)
NEUTS SEG NFR BLD MANUAL: 81 %
NRBC BLD-RTO: 0 /100 WBCS (ref 0–0)
P AXIS: 63 DEGREES
P OFFSET: 202 MS
P ONSET: 146 MS
PLATELET # BLD AUTO: 220 X10*3/UL (ref 150–450)
POLYCHROMASIA BLD QL SMEAR: ABNORMAL
POTASSIUM SERPL-SCNC: 4.4 MMOL/L (ref 3.5–5.3)
PR INTERVAL: 154 MS
PROT SERPL-MCNC: 6.2 G/DL (ref 6.4–8.2)
Q ONSET: 223 MS
QRS COUNT: 13 BEATS
QRS DURATION: 68 MS
QT INTERVAL: 400 MS
QTC CALCULATION(BAZETT): 461 MS
QTC FREDERICIA: 440 MS
R AXIS: 74 DEGREES
RBC # BLD AUTO: 3.62 X10*6/UL (ref 4–5.2)
RBC MORPH BLD: ABNORMAL
SODIUM SERPL-SCNC: 136 MMOL/L (ref 136–145)
SPECIMEN VOL ?TM UR: ABNORMAL ML
T AXIS: 92 DEGREES
T OFFSET: 423 MS
TOTAL CELLS COUNTED BLD: 100
VENTRICULAR RATE: 80 BPM
WBC # BLD AUTO: 12.6 X10*3/UL (ref 4.4–11.3)

## 2025-05-28 PROCEDURE — 71046 X-RAY EXAM CHEST 2 VIEWS: CPT | Performed by: RADIOLOGY

## 2025-05-28 PROCEDURE — 94640 AIRWAY INHALATION TREATMENT: CPT

## 2025-05-28 PROCEDURE — 82947 ASSAY GLUCOSE BLOOD QUANT: CPT

## 2025-05-28 PROCEDURE — 2500000004 HC RX 250 GENERAL PHARMACY W/ HCPCS (ALT 636 FOR OP/ED): Performed by: NURSE PRACTITIONER

## 2025-05-28 PROCEDURE — 2500000001 HC RX 250 WO HCPCS SELF ADMINISTERED DRUGS (ALT 637 FOR MEDICARE OP): Performed by: INTERNAL MEDICINE

## 2025-05-28 PROCEDURE — 85007 BL SMEAR W/DIFF WBC COUNT: CPT | Performed by: INTERNAL MEDICINE

## 2025-05-28 PROCEDURE — 80053 COMPREHEN METABOLIC PANEL: CPT | Performed by: INTERNAL MEDICINE

## 2025-05-28 PROCEDURE — 2500000002 HC RX 250 W HCPCS SELF ADMINISTERED DRUGS (ALT 637 FOR MEDICARE OP, ALT 636 FOR OP/ED): Mod: JZ | Performed by: INTERNAL MEDICINE

## 2025-05-28 PROCEDURE — 36415 COLL VENOUS BLD VENIPUNCTURE: CPT | Performed by: INTERNAL MEDICINE

## 2025-05-28 PROCEDURE — 84630 ASSAY OF ZINC: CPT | Performed by: INTERNAL MEDICINE

## 2025-05-28 PROCEDURE — 2500000004 HC RX 250 GENERAL PHARMACY W/ HCPCS (ALT 636 FOR OP/ED): Mod: JZ | Performed by: INTERNAL MEDICINE

## 2025-05-28 PROCEDURE — 85027 COMPLETE CBC AUTOMATED: CPT | Performed by: INTERNAL MEDICINE

## 2025-05-28 PROCEDURE — 71046 X-RAY EXAM CHEST 2 VIEWS: CPT

## 2025-05-28 PROCEDURE — 2500000001 HC RX 250 WO HCPCS SELF ADMINISTERED DRUGS (ALT 637 FOR MEDICARE OP)

## 2025-05-28 PROCEDURE — 1100000001 HC PRIVATE ROOM DAILY

## 2025-05-28 RX ORDER — DEXTROSE MONOHYDRATE AND SODIUM CHLORIDE 5; .45 G/100ML; G/100ML
75 INJECTION, SOLUTION INTRAVENOUS CONTINUOUS
Status: ACTIVE | OUTPATIENT
Start: 2025-05-28 | End: 2025-05-29

## 2025-05-28 RX ADMIN — IPRATROPIUM BROMIDE AND ALBUTEROL SULFATE 3 ML: .5; 3 SOLUTION RESPIRATORY (INHALATION) at 13:17

## 2025-05-28 RX ADMIN — ASPIRIN 81 MG CHEWABLE TABLET 81 MG: 81 TABLET CHEWABLE at 10:04

## 2025-05-28 RX ADMIN — ASPIRIN 81 MG CHEWABLE TABLET 81 MG: 81 TABLET CHEWABLE at 20:16

## 2025-05-28 RX ADMIN — HEPARIN SODIUM 5000 UNITS: 5000 INJECTION, SOLUTION INTRAVENOUS; SUBCUTANEOUS at 05:17

## 2025-05-28 RX ADMIN — METHYLPREDNISOLONE 8 MG: 4 TABLET ORAL at 10:19

## 2025-05-28 RX ADMIN — Medication 1 CAPSULE: at 10:04

## 2025-05-28 RX ADMIN — ERTAPENEM SODIUM 1 G: 1 INJECTION, POWDER, LYOPHILIZED, FOR SOLUTION INTRAMUSCULAR; INTRAVENOUS at 10:19

## 2025-05-28 RX ADMIN — METOPROLOL TARTRATE 100 MG: 100 TABLET, FILM COATED ORAL at 20:16

## 2025-05-28 RX ADMIN — CILOSTAZOL 50 MG: 100 TABLET ORAL at 10:03

## 2025-05-28 RX ADMIN — IPRATROPIUM BROMIDE AND ALBUTEROL SULFATE 3 ML: .5; 3 SOLUTION RESPIRATORY (INHALATION) at 06:47

## 2025-05-28 RX ADMIN — METHOCARBAMOL 500 MG: 500 TABLET ORAL at 20:16

## 2025-05-28 RX ADMIN — HEPARIN SODIUM 5000 UNITS: 5000 INJECTION, SOLUTION INTRAVENOUS; SUBCUTANEOUS at 14:27

## 2025-05-28 RX ADMIN — GABAPENTIN 200 MG: 100 CAPSULE ORAL at 10:03

## 2025-05-28 RX ADMIN — DULOXETINE HYDROCHLORIDE 30 MG: 30 CAPSULE, DELAYED RELEASE ORAL at 10:03

## 2025-05-28 RX ADMIN — CILOSTAZOL 50 MG: 100 TABLET ORAL at 20:16

## 2025-05-28 RX ADMIN — METHOCARBAMOL 500 MG: 500 TABLET ORAL at 14:27

## 2025-05-28 RX ADMIN — HEPARIN SODIUM 5000 UNITS: 5000 INJECTION, SOLUTION INTRAVENOUS; SUBCUTANEOUS at 22:51

## 2025-05-28 RX ADMIN — GABAPENTIN 200 MG: 100 CAPSULE ORAL at 14:27

## 2025-05-28 RX ADMIN — DEXTROSE AND SODIUM CHLORIDE 75 ML/HR: 5; .45 INJECTION, SOLUTION INTRAVENOUS at 14:27

## 2025-05-28 RX ADMIN — GABAPENTIN 200 MG: 100 CAPSULE ORAL at 20:16

## 2025-05-28 RX ADMIN — IPRATROPIUM BROMIDE AND ALBUTEROL SULFATE 3 ML: .5; 3 SOLUTION RESPIRATORY (INHALATION) at 19:58

## 2025-05-28 RX ADMIN — METHOCARBAMOL 500 MG: 500 TABLET ORAL at 10:19

## 2025-05-28 ASSESSMENT — COGNITIVE AND FUNCTIONAL STATUS - GENERAL
TOILETING: A LOT
EATING MEALS: A LOT
MOVING TO AND FROM BED TO CHAIR: A LOT
DAILY ACTIVITIY SCORE: 12
DRESSING REGULAR LOWER BODY CLOTHING: A LOT
WALKING IN HOSPITAL ROOM: TOTAL
STANDING UP FROM CHAIR USING ARMS: A LOT
TURNING FROM BACK TO SIDE WHILE IN FLAT BAD: A LOT
MOBILITY SCORE: 10
MOVING FROM LYING ON BACK TO SITTING ON SIDE OF FLAT BED WITH BEDRAILS: A LOT
DRESSING REGULAR UPPER BODY CLOTHING: A LOT
HELP NEEDED FOR BATHING: A LOT
CLIMB 3 TO 5 STEPS WITH RAILING: TOTAL
PERSONAL GROOMING: A LOT

## 2025-05-28 ASSESSMENT — PAIN SCALES - GENERAL
PAINLEVEL_OUTOF10: 0 - NO PAIN
PAINLEVEL_OUTOF10: 0 - NO PAIN

## 2025-05-28 ASSESSMENT — PAIN SCALES - WONG BAKER: WONGBAKER_NUMERICALRESPONSE: NO HURT

## 2025-05-28 NOTE — PROGRESS NOTES
Nancy Long is a 80 y.o. female on day 4 of admission presenting with COVID-19.      Subjective   5/28: patient fully evaluated bedside, friend present in the room. NC 92 on 4L. Feeling weak. Glucose @ 65, renal function has slightly improved. Repeat chest xray: Progression of left lung infiltrate, improvement or near resolution of right lung infiltrate.--WBC down trending. Input from pulmonology appreciated. Hbg improving, @ 10.2. Continue with IV antibiotics for resistant UTI. Poor oral intake of fluids, add D5.        Objective     Last Recorded Vitals  /76 (BP Location: Left arm, Patient Position: Lying)   Pulse 83   Temp 37.3 °C (99.1 °F) (Temporal)   Resp 16   Wt 75.5 kg (166 lb 7.2 oz)   SpO2 91%   Intake/Output last 3 Shifts:    Intake/Output Summary (Last 24 hours) at 5/28/2025 1313  Last data filed at 5/28/2025 0844  Gross per 24 hour   Intake 50 ml   Output 1200 ml   Net -1150 ml       Admission Weight  Weight: 72.6 kg (160 lb) (05/23/25 2049)    Daily Weight  05/24/25 : 75.5 kg (166 lb 7.2 oz)    Image Results  ECG 12 lead  Sinus rhythm with Premature atrial complexes  Nonspecific ST abnormality  Abnormal ECG  When compared with ECG of 13-APR-2025 17:52, (unconfirmed)  Premature atrial complexes are now Present  Nonspecific T wave abnormality no longer evident in Inferior leads  See ED provider note for full interpretation and clinical correlation  Confirmed by Ailyn Caceres ([id number]) on 5/28/2025 1:07:28 PM  XR chest 2 views  Narrative: Interpreted By:  Chano Marshall,   STUDY:  XR CHEST 2 VIEWS;  5/28/2025 9:45 am      INDICATION:  Signs/Symptoms:sob.      COMPARISON:  05/27/2025      ACCESSION NUMBER(S):  [id number]      ORDERING CLINICIAN:  OLIVIA OLIVEROS      FINDINGS:  CARDIOMEDIASTINAL SILHOUETTE AND VASCULATURE:      Cardiac size:  Within normal limits.  Aortic shadow:  Within normal limits.      Mediastinal contours: Within normal limits.      Pulmonary vasculature:  The central  vasculature is unremarkable      LUNGS:  There has been progression of infiltrate in the left lower lung,  which appears to be primarily at the posterior segment of the right  upper lobe and lingula considering the lateral projection, and  focally at the left base posteriorly.. This is probably due to  atelectasis or consolidated pneumonia. There may also be superimposed  congestion. Otherwise there has been improvement of right lung  infiltrate that was probably from congestion.      ABDOMEN AND OTHER FINDINGS:  No remarkable upper abdominal findings.      BONES:  No acute osseous changes.      Impression: 1.  Progression of left lung infiltrate, improvement or near  resolution of right lung infiltrate.      Signed by: Chano Marshall 5/28/2025 10:06 AM  Dictation workstation:   [id number]      Physical Exam      General: in no acute distress  Eyes: PERRLA   HENT: Normo-cephalic, atraumatic.   CV: Regular rate, regular rhythm.   Resp: Breathing non-labored,  diminished to auscultation bilaterally  GI: BS x4   : monitor intake and output  MSK/Extremities: No gross bony deformities. PT/OT on the case  Skin: Warm. Appropriate color, continue offloading  Neuro:  Face symmetric.   Psych: returning to baseline, improved     10 point ROS negative unless otherwise noted in HPI    Patient fully evaluated 5/28  for    Problem List Items Addressed This Visit          Infectious Diseases    * (Principal) COVID-19 - Primary     Other Visit Diagnoses         Acute renal insufficiency              Patient seen resting in bed with head of bed elevated, no s/s or c/o acute difficulties at this time.  Vital signs for last 24 hours Temp:  [36.6 °C (97.9 °F)-37.3 °C (99.1 °F)] 37.3 °C (99.1 °F)  Heart Rate:  [83-92] 83  Resp:  [16-18] 16  BP: (144-190)/(67-88) 173/76  FiO2 (%):  [32 %] 32 %    I/O this shift:  In: -   Out: 650 [Urine:650]  Patient still requiring frequent cardiac and SPO2 monitoring. Continue aggressive pulmonary  hygiene and oral hygiene. Off loading as tolerated for skin integrity. Medications and labs reviewed-   Results for orders placed or performed during the hospital encounter of 05/23/25 (from the past 24 hours)   POCT GLUCOSE   Result Value Ref Range    POCT Glucose 122 (H) 74 - 99 mg/dL   POCT GLUCOSE   Result Value Ref Range    POCT Glucose 105 (H) 74 - 99 mg/dL   CBC and Auto Differential   Result Value Ref Range    WBC 12.6 (H) 4.4 - 11.3 x10*3/uL    nRBC 0.0 0.0 - 0.0 /100 WBCs    RBC 3.62 (L) 4.00 - 5.20 x10*6/uL    Hemoglobin 10.2 (L) 12.0 - 16.0 g/dL    Hematocrit 34.2 (L) 36.0 - 46.0 %    MCV 95 80 - 100 fL    MCH 28.2 26.0 - 34.0 pg    MCHC 29.8 (L) 32.0 - 36.0 g/dL    RDW 14.2 11.5 - 14.5 %    Platelets 220 150 - 450 x10*3/uL    Immature Granulocytes %, Automated 7.3 (H) 0.0 - 0.9 %    Immature Granulocytes Absolute, Automated 0.92 (H) 0.00 - 0.50 x10*3/uL   Comprehensive Metabolic Panel   Result Value Ref Range    Glucose 65 (L) 74 - 99 mg/dL    Sodium 136 136 - 145 mmol/L    Potassium 4.4 3.5 - 5.3 mmol/L    Chloride 102 98 - 107 mmol/L    Bicarbonate 27 21 - 32 mmol/L    Anion Gap 11 10 - 20 mmol/L    Urea Nitrogen 36 (H) 6 - 23 mg/dL    Creatinine 1.09 (H) 0.50 - 1.05 mg/dL    eGFR 51 (L) >60 mL/min/1.73m*2    Calcium 8.3 (L) 8.6 - 10.3 mg/dL    Albumin 2.9 (L) 3.4 - 5.0 g/dL    Alkaline Phosphatase 100 33 - 136 U/L    Total Protein 6.2 (L) 6.4 - 8.2 g/dL    AST 49 (H) 9 - 39 U/L    Bilirubin, Total 0.4 0.0 - 1.2 mg/dL    ALT 19 7 - 45 U/L   Manual Differential   Result Value Ref Range    Neutrophils %, Manual 81.0 40.0 - 80.0 %    Bands %, Manual 4.0 0.0 - 5.0 %    Lymphocytes %, Manual 9.0 13.0 - 44.0 %    Monocytes %, Manual 5.0 2.0 - 10.0 %    Eosinophils %, Manual 0.0 0.0 - 6.0 %    Basophils %, Manual 0.0 0.0 - 2.0 %    Metamyelocytes %, Manual 1.0 0.0 - 0.0 %    Seg Neutrophils Absolute, Manual 10.21 (H) 1.60 - 5.00 x10*3/uL    Bands Absolute, Manual 0.50 0.00 - 0.50 x10*3/uL    Lymphocytes  Absolute, Manual 1.13 0.80 - 3.00 x10*3/uL    Monocytes Absolute, Manual 0.63 0.05 - 0.80 x10*3/uL    Eosinophils Absolute, Manual 0.00 0.00 - 0.40 x10*3/uL    Basophils Absolute, Manual 0.00 0.00 - 0.10 x10*3/uL    Metamyelocytes Absolute, Manual 0.13 0.00 - 0.00 x10*3/uL    Total Cells Counted 100     Neutrophils Absolute, Manual 10.71 (H) 1.60 - 5.50 x10*3/uL    RBC Morphology See Below     Polychromasia Mild    POCT GLUCOSE   Result Value Ref Range    POCT Glucose 71 (L) 74 - 99 mg/dL   POCT GLUCOSE   Result Value Ref Range    POCT Glucose 96 74 - 99 mg/dL      Patient recently received an antibiotic (last 12 hours)       Date/Time Action Medication Dose Rate    05/28/25 1019 New Bag    ertapenem (INVanz) 1 g in sodium chloride 0.9% 50 mL IV 1 g 100 mL/hr           Plan discussed with interdisciplinary team, continue current and repeat labs in the AM.       Discharge planning discussed with patient and care team. Therapy evaluations ordered.   [facility]-  10  Anticipate - SNF    Patient aware and agreeable to current plan, continue plan as above.     I spent a total of 60 minutes on the date of the service which included preparing to see the patient, face-to-face patient care, completing clinical documentation, obtaining and/or reviewing separately obtained history, performing a medically appropriate examination, counseling and educating the patient/family/caregiver, ordering medications, tests, or procedures, communicating with other HCPs (not separately reported), independently interpreting results (not separately reported), communicating results to the patient/family/caregiver, and care coordination (not separately reported).                            Assessment & Plan  COVID-19    Bandemia    Acute kidney injury superimposed on CKD      Poor oral intake of fluids, add D5          ANGEL MERCADO

## 2025-05-28 NOTE — CARE PLAN
The patient's goals for the shift include      The clinical goals for the shift include maintain safety and comfort    Over the shift, the patient did  make progress toward the following goals. Barriers to progression include discomfort confusion. Recommendations to address these barriers include reorient.

## 2025-05-28 NOTE — CONSULTS
Assessment and Plan  Patient is 80 y.o. female  with the following medical Problems:  Healthcare associated pneumonia  Acute hypoxic respiratory failure  UTI with ESBL E. Coli  Metabolic Encephalopathy      Plan of Care:  Continue with Invanz  Supplemental O2 to keep sat 88-94%  Speech therapy evaluation  Incentive spirometer  DVT prophylaxis      History of Present Illness:   Nancy Long is a 80 y.o. female presenting to emergency department from [facility] via EMS for evaluation of increased confusion.  Patient was diagnosed with COVID-19 1 week ago and per skilled nursing facility staff has had an increase in confusion over the last 1 week.  Patient is alert and oriented x 3 with mild confusion and unaware of her situation.  Patient denies any pain, nausea, vomiting, or difficulty breathing.  Per paperwork patient is near completion of a p.o. dose of doxycycline for right lower extremity cellulitis.     In ED, hemoglobin 11.2, bandemia present.  Glucose 128, chloride 97, BUN 73, creatinine 2.57, GFR 18, calcium 8.3.  Patient given LR x 1 L bolus.  CT of the head completed and negative for acute process per radiology review.  Chest x-ray ordered and pending.  Urinalysis ordered and pending.  Blood pressure 142/63, heart rate 80, respirations 17, temperature 36.5 °C, SpO2 94% on room air.  Lactate 0.9.  Troponin 24 with a repeat pending.  Patient will be admitted to telemetry under the care of Dr. Saez who will continue to follow.  I was asked to do H&P and place initial admission orders.    Past Medical/Surgical History:   Medical History[1]  Surgical History[2]    Family History:   No relevant family history has been documented for this patient.    Allergies:     Allergies[3]      Social history:   reports that she has quit smoking. Her smoking use included cigarettes. She has never used smokeless tobacco. She reports that she does not currently use alcohol. She reports that she  does not use drugs.    Current Medications:   No recently discontinued medications to reconcile    Review of Systems:   General: denies weight gain, denies loss of appetite, fever, chills, night sweats.  HEENT: denies headaches, dizziness, head trauma, visual changes, eye pain, tinnitus, nosebleeds, hoarseness or throat pain    Respiratory: denies chest pain, dyspnea, cough and hemoptysis  Cardiovascular: denies orthopnea, paroxysmal nocturnal dyspnea, leg swelling, and previous heart attack.    Gastrointestinal: denies pain, nausea vomiting, diarrhea, constipation, melena or bleeding.  Genitourinary: denies hematuria, frequency, urgency or dysuria  Neurology: denies syncope, seizures, paralysis, paraesthesia   Endocrine: denies polyuria, polydipsia, skin or hair changes, and heat or cold intolerance  Musculoskeletal: denies joint pain, swelling, arthritis or myalgia  Hematologic: denies bleeding, adenopathy and easy bruising  Skin: denies rashes and skin discoloration  Psychiatry: denies depression    Physical Exam:   Vital Signs:   Vitals:    05/28/25 0732   BP: 173/76   Pulse: 83   Resp:    Temp: 37.3 °C (99.1 °F)   SpO2: 91%       Input/Output:    Intake/Output Summary (Last 24 hours) at 5/28/2025 1258  Last data filed at 5/28/2025 0844  Gross per 24 hour   Intake 50 ml   Output 1200 ml   Net -1150 ml       Oxygen requirements:    Ventilator Information:  FiO2 (%):  [32 %] 32 %          General appearance: Not in pain or distress, in no respiratory distress    HEENT: Atraumatic/normocephalic, EOMI, KENNY, pharynx clear, moist mucosa, redness of the uvula appreciated,   Neck: Supple, no jugular venous distension, lymphadenopathy, thyromegaly or carotid bruits  Chest: Equal normal breath sounds, no wheezing, no crackles and no tenderness over ribs   Cardiovascular: Normal S1, S2, regular rate and rhythm, no murmur, rub or gallop  Abdomen: Normal sounds present, soft, lax with no tenderness, no hepatosplenomegaly,  and no masses  Extremities: No edema. Pulses are equally present.   Skin: intact, no rashes   Neurologic: Alert and oriented x 2-3, No focal deficit     Investigations:  Labs, radiological imaging and cardiac work up were personally reviewed          .       STAFF PHYSICIAN NOTE OF PERSONAL INVOLVEMENT IN CARE  As the attending physician, I certify that I personally reviewed the patient's history and personally examined the patient to confirm the physical findings described above, and that I reviewed the relevant imaging studies and available reports.  I also discussed the differential diagnosis and all of the proposed management plans with the patient and individuals accompanying the patient to this visit.  They had the opportunity to ask questions about the proposed management plans and to have those questions answered.          [1]   Past Medical History:  Diagnosis Date    Personal history of other diseases of the circulatory system     History of hypertension    Personal history of other endocrine, nutritional and metabolic disease     History of diabetes mellitus   [2]   Past Surgical History:  Procedure Laterality Date    APPENDECTOMY  01/03/2014    Appendectomy    BREAST LUMPECTOMY  01/03/2014    Breast Surgery Lumpectomy    CHOLECYSTECTOMY  01/03/2014    Cholecystectomy    OOPHORECTOMY  01/03/2014    Oophorectomy   [3]   Allergies  Allergen Reactions    Penicillins Unknown    Sulfa (Sulfonamide Antibiotics) Unknown

## 2025-05-28 NOTE — PROGRESS NOTES
Spoke with pt's friend Vidhya regarding dc planning.,  she reviewed the list from [facility] and preferences are [facility], asking team to make referrals.    [facility] nwas 10, no pre cert needed.  Pt remains confused and not medically ready for dc.   Lyn Rdz RN TCC

## 2025-05-28 NOTE — CARE PLAN
The patient's goals for the shift include      The clinical goals for the shift include pt to remain free of falls and be comfortable      Problem: Fall/Injury  Goal: Verbalize understanding of personal risk factors for fall in the hospital  Outcome: Progressing     Problem: Safety - Adult  Goal: Free from fall injury  Outcome: Progressing     Problem: Chronic Conditions and Co-morbidities  Goal: Patient's chronic conditions and co-morbidity symptoms are monitored and maintained or improved  Outcome: Progressing     Problem: Skin  Goal: Decreased wound size/increased tissue granulation at next dressing change  Outcome: Progressing  Flowsheets (Taken 5/28/2025 1408)  Decreased wound size/increased tissue granulation at next dressing change: Promote sleep for wound healing     Problem: Skin  Goal: Participates in plan/prevention/treatment measures  Outcome: Progressing  Flowsheets (Taken 5/28/2025 1408)  Participates in plan/prevention/treatment measures: Elevate heels     Problem: Skin  Goal: Prevent/manage excess moisture  Outcome: Progressing  Flowsheets (Taken 5/28/2025 1408)  Prevent/manage excess moisture: Monitor for/manage infection if present     Problem: Skin  Goal: Prevent/minimize sheer/friction injuries  Outcome: Progressing  Flowsheets (Taken 5/28/2025 1408)  Prevent/minimize sheer/friction injuries:   Use pull sheet   Complete micro-shifts as needed if patient unable. Adjust patient position to relieve pressure points, not a full turn     Problem: Skin  Goal: Promote/optimize nutrition  Outcome: Progressing  Flowsheets (Taken 5/28/2025 1408)  Promote/optimize nutrition: Monitor/record intake including meals

## 2025-05-28 NOTE — PROGRESS NOTES
Speech-Language Pathology                 Therapy Communication Note    Patient Name: Nancy Long  MRN: [id number]  Department: [facility] 9  Room: 904/904-A  Today's Date: 5/28/2025     Discipline: Speech Language Pathology    Missed Time: Cancel    Comment:  Swallow eval orders received this date. Please see full assessment and follow-up treatment earlier in this hospital stay.

## 2025-05-28 NOTE — PROGRESS NOTES
Nancy Long is a 80 y.o. female on day 4 of admission presenting with COVID-19.      Subjective   No new voiced complaint  Patient renal chemistries are stable, serum creatinine downtrending to 1.09  Continue to have episodes of hypoglycemia despite insulin been placed on hold.  Patient had 1 episode of increased blood pressure currently on metoprolol once daily.  Today radiographic studies disclose worsening left lung infiltrate but improvement and resolution of the right lung infiltrates  Objective          Vitals 24HR  Heart Rate:  [83-92]   Temp:  [36.6 °C (97.9 °F)-37.3 °C (99.1 °F)]   Resp:  [16-18]   BP: (144-190)/(67-88)   SpO2:  [88 %-95 %]         Intake/Output last 3 Shifts:    Intake/Output Summary (Last 24 hours) at 5/28/2025 1332  Last data filed at 5/28/2025 0844  Gross per 24 hour   Intake 50 ml   Output 1200 ml   Net -1150 ml       Physical Exam    HEENT; pupils react to light and accommodation  Neck; no JVD ; right carotid bruit no thyromegaly; no adenopathy  Lungs; bibasilar crackles on inspiration  Heart; regular rate no gallops or rubs no thrills  Abdomen active peristalsis no rebound guarding organomegaly or shifting dullness  Skin; decreased turgor  Neurological; patient is confused there is no clonus no asterixis or tremors;        Relevant Results     Results for orders placed or performed during the hospital encounter of 05/23/25 (from the past 24 hours)   POCT GLUCOSE   Result Value Ref Range    POCT Glucose 122 (H) 74 - 99 mg/dL   POCT GLUCOSE   Result Value Ref Range    POCT Glucose 105 (H) 74 - 99 mg/dL   CBC and Auto Differential   Result Value Ref Range    WBC 12.6 (H) 4.4 - 11.3 x10*3/uL    nRBC 0.0 0.0 - 0.0 /100 WBCs    RBC 3.62 (L) 4.00 - 5.20 x10*6/uL    Hemoglobin 10.2 (L) 12.0 - 16.0 g/dL    Hematocrit 34.2 (L) 36.0 - 46.0 %    MCV 95 80 - 100 fL    MCH 28.2 26.0 - 34.0 pg    MCHC 29.8 (L) 32.0 - 36.0 g/dL    RDW 14.2 11.5 - 14.5 %    Platelets 220 150 - 450 x10*3/uL     Immature Granulocytes %, Automated 7.3 (H) 0.0 - 0.9 %    Immature Granulocytes Absolute, Automated 0.92 (H) 0.00 - 0.50 x10*3/uL   Comprehensive Metabolic Panel   Result Value Ref Range    Glucose 65 (L) 74 - 99 mg/dL    Sodium 136 136 - 145 mmol/L    Potassium 4.4 3.5 - 5.3 mmol/L    Chloride 102 98 - 107 mmol/L    Bicarbonate 27 21 - 32 mmol/L    Anion Gap 11 10 - 20 mmol/L    Urea Nitrogen 36 (H) 6 - 23 mg/dL    Creatinine 1.09 (H) 0.50 - 1.05 mg/dL    eGFR 51 (L) >60 mL/min/1.73m*2    Calcium 8.3 (L) 8.6 - 10.3 mg/dL    Albumin 2.9 (L) 3.4 - 5.0 g/dL    Alkaline Phosphatase 100 33 - 136 U/L    Total Protein 6.2 (L) 6.4 - 8.2 g/dL    AST 49 (H) 9 - 39 U/L    Bilirubin, Total 0.4 0.0 - 1.2 mg/dL    ALT 19 7 - 45 U/L   Manual Differential   Result Value Ref Range    Neutrophils %, Manual 81.0 40.0 - 80.0 %    Bands %, Manual 4.0 0.0 - 5.0 %    Lymphocytes %, Manual 9.0 13.0 - 44.0 %    Monocytes %, Manual 5.0 2.0 - 10.0 %    Eosinophils %, Manual 0.0 0.0 - 6.0 %    Basophils %, Manual 0.0 0.0 - 2.0 %    Metamyelocytes %, Manual 1.0 0.0 - 0.0 %    Seg Neutrophils Absolute, Manual 10.21 (H) 1.60 - 5.00 x10*3/uL    Bands Absolute, Manual 0.50 0.00 - 0.50 x10*3/uL    Lymphocytes Absolute, Manual 1.13 0.80 - 3.00 x10*3/uL    Monocytes Absolute, Manual 0.63 0.05 - 0.80 x10*3/uL    Eosinophils Absolute, Manual 0.00 0.00 - 0.40 x10*3/uL    Basophils Absolute, Manual 0.00 0.00 - 0.10 x10*3/uL    Metamyelocytes Absolute, Manual 0.13 0.00 - 0.00 x10*3/uL    Total Cells Counted 100     Neutrophils Absolute, Manual 10.71 (H) 1.60 - 5.50 x10*3/uL    RBC Morphology See Below     Polychromasia Mild    POCT GLUCOSE   Result Value Ref Range    POCT Glucose 71 (L) 74 - 99 mg/dL   POCT GLUCOSE   Result Value Ref Range    POCT Glucose 96 74 - 99 mg/dL       XR chest 2 views  Result Date: 5/28/2025  Interpreted By:  Chano Marshall, STUDY: XR CHEST 2 VIEWS;  5/28/2025 9:45 am   INDICATION: Signs/Symptoms:sob.   COMPARISON: 05/27/2025    ACCESSION NUMBER(S): [id number]   ORDERING CLINICIAN: OLIVIA OLIVEROS   FINDINGS: CARDIOMEDIASTINAL SILHOUETTE AND VASCULATURE:   Cardiac size:  Within normal limits. Aortic shadow:  Within normal limits.   Mediastinal contours: Within normal limits.   Pulmonary vasculature:  The central vasculature is unremarkable   LUNGS: There has been progression of infiltrate in the left lower lung, which appears to be primarily at the posterior segment of the right upper lobe and lingula considering the lateral projection, and focally at the left base posteriorly.. This is probably due to atelectasis or consolidated pneumonia. There may also be superimposed congestion. Otherwise there has been improvement of right lung infiltrate that was probably from congestion.   ABDOMEN AND OTHER FINDINGS: No remarkable upper abdominal findings.   BONES: No acute osseous changes.       1.  Progression of left lung infiltrate, improvement or near resolution of right lung infiltrate.   Signed by: Chano Marshall 5/28/2025 10:06 AM Dictation workstation:   [id number]    XR chest 1 view  Result Date: 5/27/2025  Interpreted By:  Chano Marshall, STUDY: XR CHEST 1 VIEW;  5/27/2025 7:26 am   INDICATION: Signs/Symptoms:COVID-pneumonia/atelectasis.   COMPARISON: 05/24/2025   ACCESSION NUMBER(S): [id number]   ORDERING CLINICIAN: KRISTY STILES   FINDINGS: CARDIOMEDIASTINAL SILHOUETTE AND VASCULATURE:   Cardiac size:  Within normal limits. Aortic shadow:  Within normal limits.   Mediastinal contours: Within normal limits.   Pulmonary vasculature:  The central vasculature is unremarkable   LUNGS: There are patchy bilateral infiltrates greatest in the left lower lung. Overall these appear increased from the previous exam.   ABDOMEN AND OTHER FINDINGS: No remarkable upper abdominal findings.   BONES: No acute osseous changes.       1.  Progression of infiltrates most likely from pneumonia.   Signed by: Chano Marshall 5/27/2025 11:34 AM Dictation  workstation:   [id number]                      Assessment & Plan  COVID-19    Bandemia  Sarcopenia  Pneumonia  Hypoglycemia  Acute kidney injury superimposed on CKD  Iron deficiency anemia  UTI  Microalbuminuria  Thrombophilia with elevated D-dimer  Continue to monitor labs    I spent 30 minutes in the professional and overall care of this patient.      Ranjit Dumont MD

## 2025-05-29 VITALS
OXYGEN SATURATION: 95 % | RESPIRATION RATE: 16 BRPM | BODY MASS INDEX: 26.12 KG/M2 | TEMPERATURE: 97.7 F | HEART RATE: 65 BPM | WEIGHT: 166.45 LBS | HEIGHT: 67 IN | SYSTOLIC BLOOD PRESSURE: 141 MMHG | DIASTOLIC BLOOD PRESSURE: 63 MMHG

## 2025-05-29 DIAGNOSIS — D72.825 BANDEMIA: ICD-10-CM

## 2025-05-29 LAB
ALBUMIN SERPL BCP-MCNC: 2.4 G/DL (ref 3.4–5)
ALP SERPL-CCNC: 78 U/L (ref 33–136)
ALT SERPL W P-5'-P-CCNC: 17 U/L (ref 7–45)
ANION GAP SERPL CALC-SCNC: 12 MMOL/L (ref 10–20)
AST SERPL W P-5'-P-CCNC: 40 U/L (ref 9–39)
BASOPHILS # BLD AUTO: 0.02 X10*3/UL (ref 0–0.1)
BASOPHILS NFR BLD AUTO: 0.2 %
BILIRUB SERPL-MCNC: 0.4 MG/DL (ref 0–1.2)
BUN SERPL-MCNC: 32 MG/DL (ref 6–23)
CALCIUM SERPL-MCNC: 8 MG/DL (ref 8.6–10.3)
CHLORIDE SERPL-SCNC: 102 MMOL/L (ref 98–107)
CO2 SERPL-SCNC: 26 MMOL/L (ref 21–32)
CREAT SERPL-MCNC: 0.96 MG/DL (ref 0.5–1.05)
DACRYOCYTES BLD QL SMEAR: NORMAL
EGFRCR SERPLBLD CKD-EPI 2021: 59 ML/MIN/1.73M*2
EOSINOPHIL # BLD AUTO: 0 X10*3/UL (ref 0–0.4)
EOSINOPHIL NFR BLD AUTO: 0 %
ERYTHROCYTE [DISTWIDTH] IN BLOOD BY AUTOMATED COUNT: 14.1 % (ref 11.5–14.5)
ERYTHROCYTE [DISTWIDTH] IN BLOOD BY AUTOMATED COUNT: 14.3 % (ref 11.5–14.5)
GLUCOSE BLD MANUAL STRIP-MCNC: 183 MG/DL (ref 74–99)
GLUCOSE BLD MANUAL STRIP-MCNC: 189 MG/DL (ref 74–99)
GLUCOSE BLD MANUAL STRIP-MCNC: 203 MG/DL (ref 74–99)
GLUCOSE SERPL-MCNC: 215 MG/DL (ref 74–99)
HCT VFR BLD AUTO: 27.9 % (ref 36–46)
HCT VFR BLD AUTO: 28.6 % (ref 36–46)
HGB BLD-MCNC: 8.5 G/DL (ref 12–16)
HGB BLD-MCNC: 8.5 G/DL (ref 12–16)
HYPOCHROMIA BLD QL SMEAR: NORMAL
IMM GRANULOCYTES # BLD AUTO: 0.65 X10*3/UL (ref 0–0.5)
IMM GRANULOCYTES NFR BLD AUTO: 7.6 % (ref 0–0.9)
LYMPHOCYTES # BLD AUTO: 1.08 X10*3/UL (ref 0.8–3)
LYMPHOCYTES NFR BLD AUTO: 12.6 %
MCH RBC QN AUTO: 28.2 PG (ref 26–34)
MCH RBC QN AUTO: 28.5 PG (ref 26–34)
MCHC RBC AUTO-ENTMCNC: 29.7 G/DL (ref 32–36)
MCHC RBC AUTO-ENTMCNC: 30.5 G/DL (ref 32–36)
MCV RBC AUTO: 94 FL (ref 80–100)
MCV RBC AUTO: 95 FL (ref 80–100)
MONOCYTES # BLD AUTO: 0.54 X10*3/UL (ref 0.05–0.8)
MONOCYTES NFR BLD AUTO: 6.3 %
NEUTROPHILS # BLD AUTO: 6.31 X10*3/UL (ref 1.6–5.5)
NEUTROPHILS NFR BLD AUTO: 73.3 %
NRBC BLD-RTO: 0 /100 WBCS (ref 0–0)
NRBC BLD-RTO: 0 /100 WBCS (ref 0–0)
OVALOCYTES BLD QL SMEAR: NORMAL
PLATELET # BLD AUTO: 148 X10*3/UL (ref 150–450)
PLATELET # BLD AUTO: 154 X10*3/UL (ref 150–450)
POTASSIUM SERPL-SCNC: 4.1 MMOL/L (ref 3.5–5.3)
PROT SERPL-MCNC: 5.4 G/DL (ref 6.4–8.2)
RBC # BLD AUTO: 2.98 X10*6/UL (ref 4–5.2)
RBC # BLD AUTO: 3.01 X10*6/UL (ref 4–5.2)
RBC MORPH BLD: NORMAL
SODIUM SERPL-SCNC: 136 MMOL/L (ref 136–145)
WBC # BLD AUTO: 8.6 X10*3/UL (ref 4.4–11.3)
WBC # BLD AUTO: 9.4 X10*3/UL (ref 4.4–11.3)

## 2025-05-29 PROCEDURE — 2500000004 HC RX 250 GENERAL PHARMACY W/ HCPCS (ALT 636 FOR OP/ED): Mod: JZ | Performed by: INTERNAL MEDICINE

## 2025-05-29 PROCEDURE — 2500000004 HC RX 250 GENERAL PHARMACY W/ HCPCS (ALT 636 FOR OP/ED): Performed by: NURSE PRACTITIONER

## 2025-05-29 PROCEDURE — 2500000001 HC RX 250 WO HCPCS SELF ADMINISTERED DRUGS (ALT 637 FOR MEDICARE OP)

## 2025-05-29 PROCEDURE — 2500000002 HC RX 250 W HCPCS SELF ADMINISTERED DRUGS (ALT 637 FOR MEDICARE OP, ALT 636 FOR OP/ED): Mod: JZ | Performed by: INTERNAL MEDICINE

## 2025-05-29 PROCEDURE — 2500000002 HC RX 250 W HCPCS SELF ADMINISTERED DRUGS (ALT 637 FOR MEDICARE OP, ALT 636 FOR OP/ED): Performed by: NURSE PRACTITIONER

## 2025-05-29 PROCEDURE — 85027 COMPLETE CBC AUTOMATED: CPT | Performed by: INTERNAL MEDICINE

## 2025-05-29 PROCEDURE — 36415 COLL VENOUS BLD VENIPUNCTURE: CPT | Performed by: INTERNAL MEDICINE

## 2025-05-29 PROCEDURE — 82947 ASSAY GLUCOSE BLOOD QUANT: CPT

## 2025-05-29 PROCEDURE — 80053 COMPREHEN METABOLIC PANEL: CPT | Performed by: INTERNAL MEDICINE

## 2025-05-29 PROCEDURE — 2500000001 HC RX 250 WO HCPCS SELF ADMINISTERED DRUGS (ALT 637 FOR MEDICARE OP): Performed by: INTERNAL MEDICINE

## 2025-05-29 PROCEDURE — 94640 AIRWAY INHALATION TREATMENT: CPT

## 2025-05-29 PROCEDURE — 85025 COMPLETE CBC W/AUTO DIFF WBC: CPT | Performed by: INTERNAL MEDICINE

## 2025-05-29 RX ORDER — IPRATROPIUM BROMIDE AND ALBUTEROL SULFATE 2.5; .5 MG/3ML; MG/3ML
3 SOLUTION RESPIRATORY (INHALATION) EVERY 2 HOUR PRN
Start: 2025-05-29

## 2025-05-29 RX ORDER — TRAMADOL HYDROCHLORIDE 50 MG/1
50 TABLET, FILM COATED ORAL EVERY 8 HOURS PRN
Start: 2025-05-29

## 2025-05-29 RX ORDER — HEPARIN SODIUM 5000 [USP'U]/ML
5000 INJECTION, SOLUTION INTRAVENOUS; SUBCUTANEOUS EVERY 8 HOURS
Start: 2025-05-29

## 2025-05-29 RX ORDER — IPRATROPIUM BROMIDE AND ALBUTEROL SULFATE 2.5; .5 MG/3ML; MG/3ML
3 SOLUTION RESPIRATORY (INHALATION)
Start: 2025-05-29

## 2025-05-29 RX ORDER — ZINC OXIDE 20 G/100G
1 OINTMENT TOPICAL
Start: 2025-05-29

## 2025-05-29 RX ADMIN — Medication 1 CAPSULE: at 08:25

## 2025-05-29 RX ADMIN — IPRATROPIUM BROMIDE AND ALBUTEROL SULFATE 3 ML: .5; 3 SOLUTION RESPIRATORY (INHALATION) at 08:41

## 2025-05-29 RX ADMIN — METHYLPREDNISOLONE 4 MG: 4 TABLET ORAL at 08:24

## 2025-05-29 RX ADMIN — ASPIRIN 81 MG CHEWABLE TABLET 81 MG: 81 TABLET CHEWABLE at 08:24

## 2025-05-29 RX ADMIN — INSULIN LISPRO 2 UNITS: 100 INJECTION, SOLUTION INTRAVENOUS; SUBCUTANEOUS at 08:24

## 2025-05-29 RX ADMIN — IPRATROPIUM BROMIDE AND ALBUTEROL SULFATE 3 ML: .5; 3 SOLUTION RESPIRATORY (INHALATION) at 12:50

## 2025-05-29 RX ADMIN — INSULIN LISPRO 4 UNITS: 100 INJECTION, SOLUTION INTRAVENOUS; SUBCUTANEOUS at 12:17

## 2025-05-29 RX ADMIN — HEPARIN SODIUM 5000 UNITS: 5000 INJECTION, SOLUTION INTRAVENOUS; SUBCUTANEOUS at 05:34

## 2025-05-29 RX ADMIN — METHOCARBAMOL 500 MG: 500 TABLET ORAL at 14:56

## 2025-05-29 RX ADMIN — TRAMADOL HYDROCHLORIDE 50 MG: 50 TABLET, COATED ORAL at 08:25

## 2025-05-29 RX ADMIN — DULOXETINE HYDROCHLORIDE 30 MG: 30 CAPSULE, DELAYED RELEASE ORAL at 08:25

## 2025-05-29 RX ADMIN — GABAPENTIN 200 MG: 100 CAPSULE ORAL at 08:24

## 2025-05-29 RX ADMIN — METHOCARBAMOL 500 MG: 500 TABLET ORAL at 08:24

## 2025-05-29 RX ADMIN — CILOSTAZOL 50 MG: 100 TABLET ORAL at 08:25

## 2025-05-29 RX ADMIN — GABAPENTIN 200 MG: 100 CAPSULE ORAL at 14:57

## 2025-05-29 RX ADMIN — ERTAPENEM SODIUM 1 G: 1 INJECTION, POWDER, LYOPHILIZED, FOR SOLUTION INTRAMUSCULAR; INTRAVENOUS at 11:29

## 2025-05-29 RX ADMIN — HEPARIN SODIUM 5000 UNITS: 5000 INJECTION, SOLUTION INTRAVENOUS; SUBCUTANEOUS at 14:57

## 2025-05-29 ASSESSMENT — COGNITIVE AND FUNCTIONAL STATUS - GENERAL
EATING MEALS: A LOT
TURNING FROM BACK TO SIDE WHILE IN FLAT BAD: A LOT
CLIMB 3 TO 5 STEPS WITH RAILING: TOTAL
TOILETING: A LOT
MOBILITY SCORE: 10
DRESSING REGULAR LOWER BODY CLOTHING: A LOT
DAILY ACTIVITIY SCORE: 12
STANDING UP FROM CHAIR USING ARMS: A LOT
HELP NEEDED FOR BATHING: A LOT
MOVING FROM LYING ON BACK TO SITTING ON SIDE OF FLAT BED WITH BEDRAILS: A LOT
WALKING IN HOSPITAL ROOM: TOTAL
PERSONAL GROOMING: A LOT
DRESSING REGULAR UPPER BODY CLOTHING: A LOT
MOVING TO AND FROM BED TO CHAIR: A LOT

## 2025-05-29 ASSESSMENT — PAIN SCALES - GENERAL: PAINLEVEL_OUTOF10: 0 - NO PAIN

## 2025-05-29 NOTE — PROGRESS NOTES
Called family friend Vidhya and left detailed message regarding this dc and transport time of 1630 to [facility].   Left her a retrun phone number to call back.  Lyn Rdz RN TCC

## 2025-05-29 NOTE — PROGRESS NOTES
Pt due for nutrition therapy follow up-  Noted discharge order with scheduled  time today - full follow up deferred, although reached out to nursing regarding pt's PO intake and encouraged staff to offer sips of glucerna or other caloric beverages when in the room with pt as intake has been poor, mostly <25% of meals. Will continue to follow if pt remains admitted.

## 2025-05-29 NOTE — PROGRESS NOTES
Assessment and Plan  Patient is 80 y.o. female  with the following medical Problems:  Healthcare associated pneumonia  Acute hypoxic respiratory failure  UTI with ESBL E. Coli  Metabolic Encephalopathy      Plan of Care:  Continue with Invanz  Supplemental O2 to keep sat 88-94%  Speech therapy evaluation  Incentive spirometer  DVT prophylaxis      History of Present Illness:   Nancy Long is a 80 y.o. female presenting to emergency department from [facility] via EMS for evaluation of increased confusion.  Patient was diagnosed with COVID-19 1 week ago and per skilled nursing facility staff has had an increase in confusion over the last 1 week.  Patient is alert and oriented x 3 with mild confusion and unaware of her situation.  Patient denies any pain, nausea, vomiting, or difficulty breathing.  Per paperwork patient is near completion of a p.o. dose of doxycycline for right lower extremity cellulitis.     In ED, hemoglobin 11.2, bandemia present.  Glucose 128, chloride 97, BUN 73, creatinine 2.57, GFR 18, calcium 8.3.  Patient given LR x 1 L bolus.  CT of the head completed and negative for acute process per radiology review.  Chest x-ray ordered and pending.  Urinalysis ordered and pending.  Blood pressure 142/63, heart rate 80, respirations 17, temperature 36.5 °C, SpO2 94% on room air.  Lactate 0.9.  Troponin 24 with a repeat pending.  Patient will be admitted to telemetry under the care of Dr. Saez who will continue to follow.  I was asked to do H&P and place initial admission orders.    Daily progress:  May 29, 2025: Chest x-ray from 8/28/2025 was personally reviewed and independently interpreted and showed improvement in the right lower lobe opacity however with progression of the left lower lobe infiltrate.  White blood cell count continues to improve.  Creatinine has normalized.  Patient was discussed with primary team.    Past Medical/Surgical History:   Medical  History[1]  Surgical History[2]    Family History:   No relevant family history has been documented for this patient.    Allergies:     Allergies[3]      Social history:   reports that she has quit smoking. Her smoking use included cigarettes. She has never used smokeless tobacco. She reports that she does not currently use alcohol. She reports that she does not use drugs.    Current Medications:   No recently discontinued medications to reconcile    Review of Systems:   General: denies weight gain, denies loss of appetite, fever, chills, night sweats.  HEENT: denies headaches, dizziness, head trauma, visual changes, eye pain, tinnitus, nosebleeds, hoarseness or throat pain    Respiratory: denies chest pain, dyspnea, cough and hemoptysis  Cardiovascular: denies orthopnea, paroxysmal nocturnal dyspnea, leg swelling, and previous heart attack.    Gastrointestinal: denies pain, nausea vomiting, diarrhea, constipation, melena or bleeding.  Genitourinary: denies hematuria, frequency, urgency or dysuria  Neurology: denies syncope, seizures, paralysis, paraesthesia   Endocrine: denies polyuria, polydipsia, skin or hair changes, and heat or cold intolerance  Musculoskeletal: denies joint pain, swelling, arthritis or myalgia  Hematologic: denies bleeding, adenopathy and easy bruising  Skin: denies rashes and skin discoloration  Psychiatry: denies depression    Physical Exam:   Vital Signs:   Vitals:    05/29/25 1250   BP:    Pulse:    Resp:    Temp:    SpO2: 98%       Input/Output:    Intake/Output Summary (Last 24 hours) at 5/29/2025 1453  Last data filed at 5/29/2025 0500  Gross per 24 hour   Intake 121.25 ml   Output 700 ml   Net -578.75 ml       Oxygen requirements:    Ventilator Information:  FiO2 (%):  [36 %-44 %] 36 %          General appearance: Not in pain or distress, in no respiratory distress    HEENT: Atraumatic/normocephalic, EOMI, KENNY, pharynx clear, moist mucosa, redness of the uvula appreciated,   Neck:  Supple, no jugular venous distension, lymphadenopathy, thyromegaly or carotid bruits  Chest: Equal normal breath sounds, no wheezing, no crackles and no tenderness over ribs   Cardiovascular: Normal S1, S2, regular rate and rhythm, no murmur, rub or gallop  Abdomen: Normal sounds present, soft, lax with no tenderness, no hepatosplenomegaly, and no masses  Extremities: No edema. Pulses are equally present.   Skin: intact, no rashes   Neurologic: Alert and oriented x 2-3, No focal deficit     Investigations:  Labs, radiological imaging and cardiac work up were personally reviewed          .       STAFF PHYSICIAN NOTE OF PERSONAL INVOLVEMENT IN CARE  As the attending physician, I certify that I personally reviewed the patient's history and personally examined the patient to confirm the physical findings described above, and that I reviewed the relevant imaging studies and available reports.  I also discussed the differential diagnosis and all of the proposed management plans with the patient and individuals accompanying the patient to this visit.  They had the opportunity to ask questions about the proposed management plans and to have those questions answered.          [1]   Past Medical History:  Diagnosis Date    Personal history of other diseases of the circulatory system     History of hypertension    Personal history of other endocrine, nutritional and metabolic disease     History of diabetes mellitus   [2]   Past Surgical History:  Procedure Laterality Date    APPENDECTOMY  01/03/2014    Appendectomy    BREAST LUMPECTOMY  01/03/2014    Breast Surgery Lumpectomy    CHOLECYSTECTOMY  01/03/2014    Cholecystectomy    OOPHORECTOMY  01/03/2014    Oophorectomy   [3]   Allergies  Allergen Reactions    Penicillins Unknown    Sulfa (Sulfonamide Antibiotics) Unknown

## 2025-05-29 NOTE — PROGRESS NOTES
05/29/25 1053   Discharge Planning   Assistance Needed COLBY called and left message for friend Mel re: [facility]. SW left  waiting on call back.     ADDED [id number]: COLBY spoke with Mel. She is agreeable for pt to go to [facility]. Facility is aware they are [facility] and they can accept.

## 2025-05-29 NOTE — PROGRESS NOTES
Nancy Long is a 80 y.o. female on day 5 of admission presenting with COVID-19.      Subjective   Patient renal chemistries at baseline creatinine today 0.96  Hemoglobin at 8.5 with iron deficiency  Objective          Vitals 24HR  Heart Rate:  [67-84]   Temp:  [36 °C (96.8 °F)-37.3 °C (99.1 °F)]   Resp:  [16]   BP: (150-162)/(63-72)   SpO2:  [92 %-98 %]         Intake/Output last 3 Shifts:    Intake/Output Summary (Last 24 hours) at 5/29/2025 1424  Last data filed at 5/29/2025 0500  Gross per 24 hour   Intake 121.25 ml   Output 700 ml   Net -578.75 ml       Physical Exam    HEENT; pupils react to light and accommodation  Neck; no JVD ; right carotid bruit no thyromegaly; no adenopathy  Lungs; bibasilar crackles on inspiration  Heart; regular rate no gallops or rubs no thrills  Abdomen active peristalsis no rebound guarding organomegaly or shifting dullness  Skin; decreased turgor  Neurological; patient is confused there is no clonus no asterixis or tremors;        Relevant Results     Results for orders placed or performed during the hospital encounter of 05/23/25 (from the past 24 hours)   POCT GLUCOSE   Result Value Ref Range    POCT Glucose 142 (H) 74 - 99 mg/dL   POCT GLUCOSE   Result Value Ref Range    POCT Glucose 179 (H) 74 - 99 mg/dL   CBC and Auto Differential   Result Value Ref Range    WBC 8.6 4.4 - 11.3 x10*3/uL    nRBC 0.0 0.0 - 0.0 /100 WBCs    RBC 3.01 (L) 4.00 - 5.20 x10*6/uL    Hemoglobin 8.5 (L) 12.0 - 16.0 g/dL    Hematocrit 28.6 (L) 36.0 - 46.0 %    MCV 95 80 - 100 fL    MCH 28.2 26.0 - 34.0 pg    MCHC 29.7 (L) 32.0 - 36.0 g/dL    RDW 14.3 11.5 - 14.5 %    Platelets 154 150 - 450 x10*3/uL    Neutrophils % 73.3 40.0 - 80.0 %    Immature Granulocytes %, Automated 7.6 (H) 0.0 - 0.9 %    Lymphocytes % 12.6 13.0 - 44.0 %    Monocytes % 6.3 2.0 - 10.0 %    Eosinophils % 0.0 0.0 - 6.0 %    Basophils % 0.2 0.0 - 2.0 %    Neutrophils Absolute 6.31 (H) 1.60 - 5.50 x10*3/uL    Immature Granulocytes  Absolute, Automated 0.65 (H) 0.00 - 0.50 x10*3/uL    Lymphocytes Absolute 1.08 0.80 - 3.00 x10*3/uL    Monocytes Absolute 0.54 0.05 - 0.80 x10*3/uL    Eosinophils Absolute 0.00 0.00 - 0.40 x10*3/uL    Basophils Absolute 0.02 0.00 - 0.10 x10*3/uL   Comprehensive Metabolic Panel   Result Value Ref Range    Glucose 215 (H) 74 - 99 mg/dL    Sodium 136 136 - 145 mmol/L    Potassium 4.1 3.5 - 5.3 mmol/L    Chloride 102 98 - 107 mmol/L    Bicarbonate 26 21 - 32 mmol/L    Anion Gap 12 10 - 20 mmol/L    Urea Nitrogen 32 (H) 6 - 23 mg/dL    Creatinine 0.96 0.50 - 1.05 mg/dL    eGFR 59 (L) >60 mL/min/1.73m*2    Calcium 8.0 (L) 8.6 - 10.3 mg/dL    Albumin 2.4 (L) 3.4 - 5.0 g/dL    Alkaline Phosphatase 78 33 - 136 U/L    Total Protein 5.4 (L) 6.4 - 8.2 g/dL    AST 40 (H) 9 - 39 U/L    Bilirubin, Total 0.4 0.0 - 1.2 mg/dL    ALT 17 7 - 45 U/L   Morphology   Result Value Ref Range    RBC Morphology See Below     Hypochromia Mild     Ovalocytes Few     Teardrop Cells Few    POCT GLUCOSE   Result Value Ref Range    POCT Glucose 183 (H) 74 - 99 mg/dL   POCT GLUCOSE   Result Value Ref Range    POCT Glucose 203 (H) 74 - 99 mg/dL   CBC   Result Value Ref Range    WBC 9.4 4.4 - 11.3 x10*3/uL    nRBC 0.0 0.0 - 0.0 /100 WBCs    RBC 2.98 (L) 4.00 - 5.20 x10*6/uL    Hemoglobin 8.5 (L) 12.0 - 16.0 g/dL    Hematocrit 27.9 (L) 36.0 - 46.0 %    MCV 94 80 - 100 fL    MCH 28.5 26.0 - 34.0 pg    MCHC 30.5 (L) 32.0 - 36.0 g/dL    RDW 14.1 11.5 - 14.5 %    Platelets 148 (L) 150 - 450 x10*3/uL       XR chest 2 views  Result Date: 5/28/2025  Interpreted By:  Chano Marshall, STUDY: XR CHEST 2 VIEWS;  5/28/2025 9:45 am   INDICATION: Signs/Symptoms:sob.   COMPARISON: 05/27/2025   ACCESSION NUMBER(S): [id number]   ORDERING CLINICIAN: OLIVIA OLIVEROS   FINDINGS: CARDIOMEDIASTINAL SILHOUETTE AND VASCULATURE:   Cardiac size:  Within normal limits. Aortic shadow:  Within normal limits.   Mediastinal contours: Within normal limits.   Pulmonary vasculature:  The  central vasculature is unremarkable   LUNGS: There has been progression of infiltrate in the left lower lung, which appears to be primarily at the posterior segment of the right upper lobe and lingula considering the lateral projection, and focally at the left base posteriorly.. This is probably due to atelectasis or consolidated pneumonia. There may also be superimposed congestion. Otherwise there has been improvement of right lung infiltrate that was probably from congestion.   ABDOMEN AND OTHER FINDINGS: No remarkable upper abdominal findings.   BONES: No acute osseous changes.       1.  Progression of left lung infiltrate, improvement or near resolution of right lung infiltrate.   Signed by: Chano Marshall 5/28/2025 10:06 AM Dictation workstation:   [id number]                      Assessment & Plan  COVID-19    Bandemia  Sarcopenia  Pneumonia  Hypoglycemia  Acute kidney injury superimposed on CKD  Iron deficiency anemia  UTI  Microalbuminuria  Thrombophilia with elevated D-dimer  Continue current therapy    I spent 30 minutes in the professional and overall care of this patient.      Ranjit Dumont MD

## 2025-05-29 NOTE — DISCHARGE SUMMARY
Discharge Diagnosis  COVID-19           Issues Requiring Follow-Up  5/29: Patient seen and fully evaluated at bedside. Patient is calm, cooperative and able to participate. Patient states that she is feeling much better and is ready for discharge. Continue with antibiotics for resistant UTI.    Discharge Meds     Medication List      START taking these medications     ertapenem 1 g in sodium chloride 0.9% 50 mL IV; Infuse 1 g at 100 mL/hr   over 30 minutes into a venous catheter once every 24 hours.; Start taking   on: May 30, 2025   heparin (porcine) 5,000 unit/mL injection; Inject 1 mL (5,000 Units)   under the skin every 8 hours.   * ipratropium-albuteroL 0.5-2.5 mg/3 mL nebulizer solution; Commonly   known as: Duo-Neb; Take 3 mL by nebulization 3 times a day.   * ipratropium-albuteroL 0.5-2.5 mg/3 mL nebulizer solution; Commonly   known as: Duo-Neb; Take 3 mL by nebulization every 2 hours if needed for   wheezing or shortness of breath.   oxygen gas therapy; Commonly known as: O2; Inhale 3 L/min at 180,000   mL/hr once every 24 hours.   traMADol 50 mg tablet; Commonly known as: Ultram; Take 1 tablet (50 mg)   by mouth every 8 hours if needed for moderate pain (4 - 6).   zinc oxide 20 % ointment; Apply 1 Application topically every 1 hour if   needed for irritation.  * This list has 2 medication(s) that are the same as other medications   prescribed for you. Read the directions carefully, and ask your doctor or   other care provider to review them with you.     CHANGE how you take these medications     metoprolol tartrate 100 mg tablet; Commonly known as: Lopressor; Take 1   tablet (100 mg) by mouth once daily.; What changed: how much to take, when   to take this   rosuvastatin 20 mg tablet; Commonly known as: Crestor; Take 1 tablet (20   mg) by mouth once daily.; What changed: when to take this     CONTINUE taking these medications     acetaminophen 500 mg tablet; Commonly known as: Tylenol   ascorbic acid 500  mg tablet; Commonly known as: Vitamin C   aspirin 325 mg tablet   cilostazol 50 mg tablet; Commonly known as: Pletal; Take 1 tablet (50   mg) by mouth 2 times a day.   DULoxetine 30 mg DR capsule; Commonly known as: Cymbalta   Foltx 2-1.13-25 mg tablet; Generic drug: B12-levomefolate calcium-B6   gabapentin 300 mg capsule; Commonly known as: Neurontin; Take 1 capsule   (300 mg) by mouth 3 times a day.   * insulin NPH (Isophane) 100 unit/mL injection; Commonly known as:   HumuLIN N,NovoLIN N   * insulin NPH (Isophane) 100 unit/mL injection; Commonly known as:   HumuLIN N,NovoLIN N   lansoprazole 30 mg DR capsule; Commonly known as: Prevacid   lidocaine 5 % patch; Commonly known as: Lidoderm   losartan 100 mg tablet; Commonly known as: Cozaar; Take 1 tablet (100   mg) by mouth once daily.   methocarbamol 500 mg tablet; Commonly known as: Robaxin   ondansetron ODT 8 mg disintegrating tablet; Commonly known as:   Zofran-ODT   OneTouch Ultra Test; Generic drug: blood sugar diagnostic   polyethylene glycol 17 gram packet; Commonly known as: Glycolax, Miralax   zinc sulfate 220 mg (50 mg elemental) capsule; Commonly known as:   Zincate  * This list has 2 medication(s) that are the same as other medications   prescribed for you. Read the directions carefully, and ask your doctor or   other care provider to review them with you.     STOP taking these medications     nicotine 14 mg/24 hr patch; Commonly known as: Nicoderm CQ   oxyCODONE 5 mg immediate release tablet; Commonly known as: Roxicodone   torsemide 20 mg tablet; Commonly known as: Demadex       Test Results Pending At Discharge  Pending Labs       Order Current Status    Zinc In process            Hospital Course         Gianfranco Saez MD   Physician  Internal Medicine     Progress Notes      Signed     Date of Service: 5/28/2025  1:12 PM     Signed       Expand All Collapse All    Nancy Long is a 80 y.o. female on day 4 of admission presenting with  COVID-19.        Subjective  5/28: patient fully evaluated bedside, friend present in the room. NC 92 on 4L. Feeling weak. Glucose @ 65, renal function has slightly improved. Repeat chest xray: Progression of left lung infiltrate, improvement or near resolution of right lung infiltrate.--WBC down trending. Input from pulmonology appreciated. Hbg improving, @ 10.2. Continue with IV antibiotics for resistant UTI. Poor oral intake of fluids, add D5.         Objective  Last Recorded Vitals  /76 (BP Location: Left arm, Patient Position: Lying)   Pulse 83   Temp 37.3 °C (99.1 °F) (Temporal)   Resp 16   Wt 75.5 kg (166 lb 7.2 oz)   SpO2 91%   Intake/Output last 3 Shifts:     Intake/Output Summary (Last 24 hours) at 5/28/2025 1313  Last data filed at 5/28/2025 0844      Gross per 24 hour   Intake 50 ml   Output 1200 ml   Net -1150 ml         Admission Weight  Weight: 72.6 kg (160 lb) (05/23/25 2049)     Daily Weight  05/24/25 : 75.5 kg (166 lb 7.2 oz)     Image Results  ECG 12 lead  Sinus rhythm with Premature atrial complexes  Nonspecific ST abnormality  Abnormal ECG  When compared with ECG of 13-APR-2025 17:52, (unconfirmed)  Premature atrial complexes are now Present  Nonspecific T wave abnormality no longer evident in Inferior leads  See ED provider note for full interpretation and clinical correlation  Confirmed by Ailyn Caceres ([id number]) on 5/28/2025 1:07:28 PM  XR chest 2 views  Narrative: Interpreted By:  Chano Marshall,   STUDY:  XR CHEST 2 VIEWS;  5/28/2025 9:45 am      INDICATION:  Signs/Symptoms:sob.      COMPARISON:  05/27/2025      ACCESSION NUMBER(S):  [id number]      ORDERING CLINICIAN:  OLIVIA OLIVEROS      FINDINGS:  CARDIOMEDIASTINAL SILHOUETTE AND VASCULATURE:      Cardiac size:  Within normal limits.  Aortic shadow:  Within normal limits.      Mediastinal contours: Within normal limits.      Pulmonary vasculature:  The central vasculature is unremarkable      LUNGS:  There has been  progression of infiltrate in the left lower lung,  which appears to be primarily at the posterior segment of the right  upper lobe and lingula considering the lateral projection, and  focally at the left base posteriorly.. This is probably due to  atelectasis or consolidated pneumonia. There may also be superimposed  congestion. Otherwise there has been improvement of right lung  infiltrate that was probably from congestion.      ABDOMEN AND OTHER FINDINGS:  No remarkable upper abdominal findings.      BONES:  No acute osseous changes.      Impression: 1.  Progression of left lung infiltrate, improvement or near  resolution of right lung infiltrate.      Signed by: Chano Marshall 5/28/2025 10:06 AM  Dictation workstation:   [id number]        Physical Exam        General: in no acute distress  Eyes: PERRLA   HENT: Normo-cephalic, atraumatic.   CV: Regular rate, regular rhythm.   Resp: Breathing non-labored,  diminished to auscultation bilaterally  GI: BS x4   : monitor intake and output  MSK/Extremities: No gross bony deformities. PT/OT on the case  Skin: Warm. Appropriate color, continue offloading  Neuro:  Face symmetric.   Psych: returning to baseline, improved      10 point ROS negative unless otherwise noted in HPI     Patient fully evaluated 5/28  for    Problem List Items Addressed This Visit                  Infectious Diseases     * (Principal) COVID-19 - Primary      Other Visit Diagnoses           Acute renal insufficiency                 Patient seen resting in bed with head of bed elevated, no s/s or c/o acute difficulties at this time.  Vital signs for last 24 hours Temp:  [36.6 °C (97.9 °F)-37.3 °C (99.1 °F)] 37.3 °C (99.1 °F)  Heart Rate:  [83-92] 83  Resp:  [16-18] 16  BP: (144-190)/(67-88) 173/76  FiO2 (%):  [32 %] 32 %    I/O this shift:  In: -   Out: 650 [Urine:650]  Patient still requiring frequent cardiac and SPO2 monitoring. Continue aggressive pulmonary hygiene and oral hygiene. Off loading  as tolerated for skin integrity. Medications and labs reviewed-         Results for orders placed or performed during the hospital encounter of 05/23/25 (from the past 24 hours)   POCT GLUCOSE   Result Value Ref Range     POCT Glucose 122 (H) 74 - 99 mg/dL   POCT GLUCOSE   Result Value Ref Range     POCT Glucose 105 (H) 74 - 99 mg/dL   CBC and Auto Differential   Result Value Ref Range     WBC 12.6 (H) 4.4 - 11.3 x10*3/uL     nRBC 0.0 0.0 - 0.0 /100 WBCs     RBC 3.62 (L) 4.00 - 5.20 x10*6/uL     Hemoglobin 10.2 (L) 12.0 - 16.0 g/dL     Hematocrit 34.2 (L) 36.0 - 46.0 %     MCV 95 80 - 100 fL     MCH 28.2 26.0 - 34.0 pg     MCHC 29.8 (L) 32.0 - 36.0 g/dL     RDW 14.2 11.5 - 14.5 %     Platelets 220 150 - 450 x10*3/uL     Immature Granulocytes %, Automated 7.3 (H) 0.0 - 0.9 %     Immature Granulocytes Absolute, Automated 0.92 (H) 0.00 - 0.50 x10*3/uL   Comprehensive Metabolic Panel   Result Value Ref Range     Glucose 65 (L) 74 - 99 mg/dL     Sodium 136 136 - 145 mmol/L     Potassium 4.4 3.5 - 5.3 mmol/L     Chloride 102 98 - 107 mmol/L     Bicarbonate 27 21 - 32 mmol/L     Anion Gap 11 10 - 20 mmol/L     Urea Nitrogen 36 (H) 6 - 23 mg/dL     Creatinine 1.09 (H) 0.50 - 1.05 mg/dL     eGFR 51 (L) >60 mL/min/1.73m*2     Calcium 8.3 (L) 8.6 - 10.3 mg/dL     Albumin 2.9 (L) 3.4 - 5.0 g/dL     Alkaline Phosphatase 100 33 - 136 U/L     Total Protein 6.2 (L) 6.4 - 8.2 g/dL     AST 49 (H) 9 - 39 U/L     Bilirubin, Total 0.4 0.0 - 1.2 mg/dL     ALT 19 7 - 45 U/L   Manual Differential   Result Value Ref Range     Neutrophils %, Manual 81.0 40.0 - 80.0 %     Bands %, Manual 4.0 0.0 - 5.0 %     Lymphocytes %, Manual 9.0 13.0 - 44.0 %     Monocytes %, Manual 5.0 2.0 - 10.0 %     Eosinophils %, Manual 0.0 0.0 - 6.0 %     Basophils %, Manual 0.0 0.0 - 2.0 %     Metamyelocytes %, Manual 1.0 0.0 - 0.0 %     Seg Neutrophils Absolute, Manual 10.21 (H) 1.60 - 5.00 x10*3/uL     Bands Absolute, Manual 0.50 0.00 - 0.50 x10*3/uL      Lymphocytes Absolute, Manual 1.13 0.80 - 3.00 x10*3/uL     Monocytes Absolute, Manual 0.63 0.05 - 0.80 x10*3/uL     Eosinophils Absolute, Manual 0.00 0.00 - 0.40 x10*3/uL     Basophils Absolute, Manual 0.00 0.00 - 0.10 x10*3/uL     Metamyelocytes Absolute, Manual 0.13 0.00 - 0.00 x10*3/uL     Total Cells Counted 100       Neutrophils Absolute, Manual 10.71 (H) 1.60 - 5.50 x10*3/uL     RBC Morphology See Below       Polychromasia Mild     POCT GLUCOSE   Result Value Ref Range     POCT Glucose 71 (L) 74 - 99 mg/dL   POCT GLUCOSE   Result Value Ref Range     POCT Glucose 96 74 - 99 mg/dL      Patient recently received an antibiotic (last 12 hours)         Date/Time Action Medication Dose Rate     05/28/25 1019 New Bag    ertapenem (INVanz) 1 g in sodium chloride 0.9% 50 mL IV 1 g 100 mL/hr             Plan discussed with interdisciplinary team, continue current and repeat labs in the AM.         Discharge planning discussed with patient and care team. Therapy evaluations ordered.   [facility]-  10  Anticipate - SNF     Patient aware and agreeable to current plan, continue plan as above.      I spent a total of 60 minutes on the date of the service which included preparing to see the patient, face-to-face patient care, completing clinical documentation, obtaining and/or reviewing separately obtained history, performing a medically appropriate examination, counseling and educating the patient/family/caregiver, ordering medications, tests, or procedures, communicating with other HCPs (not separately reported), independently interpreting results (not separately reported), communicating results to the patient/family/caregiver, and care coordination (not separately reported).                                  Assessment & Plan  COVID-19     Bandemia     Acute kidney injury superimposed on CKD        Poor oral intake of fluids, add D5         ANGEL MERCADO                    Revision History         Pertinent Physical Exam  At Time of Discharge  Physical Exam  Patient fully evaluated  for 05/29  Assessment & Plan  COVID-19    Bandemia    Acute kidney injury superimposed on CKD      Patient with significant clinical improvement noted, patient medically cleared for discharge today. Patient seen resting in bed with head of bed elevated, no s/s or c/o acute difficulties at this time. Vital signs for last 24 hours:  Temp:  [36 °C (96.8 °F)-37.3 °C (99.1 °F)] 36 °C (96.8 °F)  Heart Rate:  [67-84] 67  Resp:  [16] 16  BP: (150-162)/(63-72) 154/70  FiO2 (%):  [36 %-44 %] 36 % Medications and labs reviewed-   Results for orders placed or performed during the hospital encounter of 05/23/25 (from the past 24 hours)   POCT GLUCOSE   Result Value Ref Range    POCT Glucose 142 (H) 74 - 99 mg/dL   POCT GLUCOSE   Result Value Ref Range    POCT Glucose 179 (H) 74 - 99 mg/dL   CBC and Auto Differential   Result Value Ref Range    WBC 8.6 4.4 - 11.3 x10*3/uL    nRBC 0.0 0.0 - 0.0 /100 WBCs    RBC 3.01 (L) 4.00 - 5.20 x10*6/uL    Hemoglobin 8.5 (L) 12.0 - 16.0 g/dL    Hematocrit 28.6 (L) 36.0 - 46.0 %    MCV 95 80 - 100 fL    MCH 28.2 26.0 - 34.0 pg    MCHC 29.7 (L) 32.0 - 36.0 g/dL    RDW 14.3 11.5 - 14.5 %    Platelets 154 150 - 450 x10*3/uL    Neutrophils % 73.3 40.0 - 80.0 %    Immature Granulocytes %, Automated 7.6 (H) 0.0 - 0.9 %    Lymphocytes % 12.6 13.0 - 44.0 %    Monocytes % 6.3 2.0 - 10.0 %    Eosinophils % 0.0 0.0 - 6.0 %    Basophils % 0.2 0.0 - 2.0 %    Neutrophils Absolute 6.31 (H) 1.60 - 5.50 x10*3/uL    Immature Granulocytes Absolute, Automated 0.65 (H) 0.00 - 0.50 x10*3/uL    Lymphocytes Absolute 1.08 0.80 - 3.00 x10*3/uL    Monocytes Absolute 0.54 0.05 - 0.80 x10*3/uL    Eosinophils Absolute 0.00 0.00 - 0.40 x10*3/uL    Basophils Absolute 0.02 0.00 - 0.10 x10*3/uL   Comprehensive Metabolic Panel   Result Value Ref Range    Glucose 215 (H) 74 - 99 mg/dL    Sodium 136 136 - 145 mmol/L    Potassium 4.1 3.5 - 5.3 mmol/L    Chloride 102 98 -  107 mmol/L    Bicarbonate 26 21 - 32 mmol/L    Anion Gap 12 10 - 20 mmol/L    Urea Nitrogen 32 (H) 6 - 23 mg/dL    Creatinine 0.96 0.50 - 1.05 mg/dL    eGFR 59 (L) >60 mL/min/1.73m*2    Calcium 8.0 (L) 8.6 - 10.3 mg/dL    Albumin 2.4 (L) 3.4 - 5.0 g/dL    Alkaline Phosphatase 78 33 - 136 U/L    Total Protein 5.4 (L) 6.4 - 8.2 g/dL    AST 40 (H) 9 - 39 U/L    Bilirubin, Total 0.4 0.0 - 1.2 mg/dL    ALT 17 7 - 45 U/L   Morphology   Result Value Ref Range    RBC Morphology See Below     Hypochromia Mild     Ovalocytes Few     Teardrop Cells Few    POCT GLUCOSE   Result Value Ref Range    POCT Glucose 183 (H) 74 - 99 mg/dL   POCT GLUCOSE   Result Value Ref Range    POCT Glucose 203 (H) 74 - 99 mg/dL   CBC   Result Value Ref Range    WBC 9.4 4.4 - 11.3 x10*3/uL    nRBC 0.0 0.0 - 0.0 /100 WBCs    RBC 2.98 (L) 4.00 - 5.20 x10*6/uL    Hemoglobin 8.5 (L) 12.0 - 16.0 g/dL    Hematocrit 27.9 (L) 36.0 - 46.0 %    MCV 94 80 - 100 fL    MCH 28.5 26.0 - 34.0 pg    MCHC 30.5 (L) 32.0 - 36.0 g/dL    RDW 14.1 11.5 - 14.5 %    Platelets 148 (L) 150 - 450 x10*3/uL      Patient recently received an antibiotic (last 12 hours)       Date/Time Action Medication Dose Rate    05/29/25 1129 New Bag    ertapenem (INVanz) 1 g in sodium chloride 0.9% 50 mL IV 1 g 100 mL/hr           Susceptibility data from last 90 days.  Collected Specimen Info Organism Amoxicillin/Clavulanate Ampicillin Ampicillin/Sulbactam Aztreonam Cefazolin Cefazolin (uncomplicated UTIs only) Cefepime Cefotaxime Ceftazidime Ceftriaxone Cefuroxime (oral)   05/24/25 Urine from Clean Catch/Voided Escherichia coli (1)  S  R  I  R  R  R  R  R  R  R  R     Escherichia coli (2)  S  R  S   S  S          Collected Specimen Info Organism Ciprofloxacin Ertapenem Gentamicin Levofloxacin Meropenem Nitrofurantoin Piperacillin/Tazobactam Trimethoprim/Sulfamethoxazole   05/24/25 Urine from Clean Catch/Voided Escherichia coli (1)  R  S  S  R  S  S  S  S     Escherichia coli (2)  S   S  S    S  S  R        Continue aggressive pulmonary hygiene and oral hygiene. Off loading as tolerated for skin integrity. No new complaints per patient, stable at time of exam.  Plan discussed with interdisciplinary team, no acute events overnight, patient denies chest pain, worsening SOB at this time. Ok to discharge, will continue current and repeat labs in the AM if patient still hospitalized. Patient aware and agreeable to current plan, continue plan as above.     I spent 60 minutes on the date of the service which included preparing to see the patient, face-to-face patient care, completing clinical documentation, obtaining and/or reviewing separately obtained history, performing a medically appropriate examination, counseling and educating the patient/family/caregiver, ordering medications, tests, or procedures, communicating with other HCPs (not separately reported), independently interpreting results (not separately reported), communicating results to the patient/family/caregiver, and care coordination (not separately reported    Outpatient Follow-Up  Future Appointments   Date Time Provider Department Center   9/4/2025 10:30 AM PAR MAC 3 VASC LAB [id number] [facility] [id number]   9/4/2025 11:30 AM Tl Parish MD [id number] [place]   9/9/2025 11:15 AM Justin Oliver MD [id number] [place]         ALEXIS RYDERS

## 2025-05-30 LAB — ZINC SERPL-MCNC: 64.4 UG/DL (ref 60–120)

## 2025-06-01 LAB
ATRIAL RATE: 75 BPM
P AXIS: 24 DEGREES
P OFFSET: 197 MS
P ONSET: 144 MS
PR INTERVAL: 156 MS
Q ONSET: 222 MS
QRS COUNT: 13 BEATS
QRS DURATION: 80 MS
QT INTERVAL: 388 MS
QTC CALCULATION(BAZETT): 433 MS
QTC FREDERICIA: 417 MS
R AXIS: 40 DEGREES
T AXIS: 141 DEGREES
T OFFSET: 416 MS
VENTRICULAR RATE: 75 BPM

## 2025-06-03 ENCOUNTER — LAB REQUISITION (OUTPATIENT)
Dept: LAB | Facility: HOSPITAL | Age: 80
End: 2025-06-03
Payer: MEDICARE

## 2025-06-03 DIAGNOSIS — R41.0 DISORIENTATION, UNSPECIFIED: ICD-10-CM

## 2025-06-03 LAB
ANION GAP SERPL CALC-SCNC: 14 MMOL/L (ref 10–20)
ANION GAP SERPL CALC-SCNC: 20 MMOL/L (ref 10–20)
BASOPHILS # BLD AUTO: 0.03 X10*3/UL (ref 0–0.1)
BASOPHILS # BLD MANUAL: 0 X10*3/UL (ref 0–0.1)
BASOPHILS NFR BLD AUTO: 0.2 %
BASOPHILS NFR BLD MANUAL: 0 %
BUN SERPL-MCNC: 29 MG/DL (ref 6–23)
BUN SERPL-MCNC: 30 MG/DL (ref 6–23)
BURR CELLS BLD QL SMEAR: ABNORMAL
CALCIUM SERPL-MCNC: 7.9 MG/DL (ref 8.6–10.3)
CALCIUM SERPL-MCNC: 8.1 MG/DL (ref 8.6–10.3)
CHLORIDE SERPL-SCNC: 107 MMOL/L (ref 98–107)
CHLORIDE SERPL-SCNC: 111 MMOL/L (ref 98–107)
CO2 SERPL-SCNC: 24 MMOL/L (ref 21–32)
CO2 SERPL-SCNC: 25 MMOL/L (ref 21–32)
CREAT SERPL-MCNC: 0.81 MG/DL (ref 0.5–1.05)
CREAT SERPL-MCNC: 0.88 MG/DL (ref 0.5–1.05)
EGFRCR SERPLBLD CKD-EPI 2021: 66 ML/MIN/1.73M*2
EGFRCR SERPLBLD CKD-EPI 2021: 73 ML/MIN/1.73M*2
EOSINOPHIL # BLD AUTO: 0.01 X10*3/UL (ref 0–0.4)
EOSINOPHIL # BLD MANUAL: 0.38 X10*3/UL (ref 0–0.4)
EOSINOPHIL NFR BLD AUTO: 0.1 %
EOSINOPHIL NFR BLD MANUAL: 2 %
ERYTHROCYTE [DISTWIDTH] IN BLOOD BY AUTOMATED COUNT: 14.2 % (ref 11.5–14.5)
ERYTHROCYTE [DISTWIDTH] IN BLOOD BY AUTOMATED COUNT: 14.6 % (ref 11.5–14.5)
GLUCOSE SERPL-MCNC: 17 MG/DL (ref 74–99)
GLUCOSE SERPL-MCNC: 170 MG/DL (ref 74–99)
HCT VFR BLD AUTO: 28 % (ref 36–46)
HCT VFR BLD AUTO: 30.8 % (ref 36–46)
HGB BLD-MCNC: 8.5 G/DL (ref 12–16)
HGB BLD-MCNC: 8.6 G/DL (ref 12–16)
IMM GRANULOCYTES # BLD AUTO: 0.24 X10*3/UL (ref 0–0.5)
IMM GRANULOCYTES # BLD AUTO: 0.43 X10*3/UL (ref 0–0.5)
IMM GRANULOCYTES NFR BLD AUTO: 1.5 % (ref 0–0.9)
IMM GRANULOCYTES NFR BLD AUTO: 2.3 % (ref 0–0.9)
LYMPHOCYTES # BLD AUTO: 1.13 X10*3/UL (ref 0.8–3)
LYMPHOCYTES # BLD MANUAL: 1.71 X10*3/UL (ref 0.8–3)
LYMPHOCYTES NFR BLD AUTO: 6.9 %
LYMPHOCYTES NFR BLD MANUAL: 9 %
MCH RBC QN AUTO: 29 PG (ref 26–34)
MCH RBC QN AUTO: 29.2 PG (ref 26–34)
MCHC RBC AUTO-ENTMCNC: 27.9 G/DL (ref 32–36)
MCHC RBC AUTO-ENTMCNC: 30.4 G/DL (ref 32–36)
MCV RBC AUTO: 104 FL (ref 80–100)
MCV RBC AUTO: 96 FL (ref 80–100)
MONOCYTES # BLD AUTO: 1.01 X10*3/UL (ref 0.05–0.8)
MONOCYTES # BLD MANUAL: 0.57 X10*3/UL (ref 0.05–0.8)
MONOCYTES NFR BLD AUTO: 6.2 %
MONOCYTES NFR BLD MANUAL: 3 %
NEUTROPHILS # BLD AUTO: 13.99 X10*3/UL (ref 1.6–5.5)
NEUTROPHILS NFR BLD AUTO: 85.1 %
NEUTS SEG # BLD MANUAL: 16.34 X10*3/UL (ref 1.6–5)
NEUTS SEG NFR BLD MANUAL: 86 %
NRBC BLD-RTO: 0 /100 WBCS (ref 0–0)
NRBC BLD-RTO: 0 /100 WBCS (ref 0–0)
OVALOCYTES BLD QL SMEAR: ABNORMAL
PLATELET # BLD AUTO: 231 X10*3/UL (ref 150–450)
PLATELET # BLD AUTO: 239 X10*3/UL (ref 150–450)
POLYCHROMASIA BLD QL SMEAR: ABNORMAL
POTASSIUM SERPL-SCNC: 3.5 MMOL/L (ref 3.5–5.3)
POTASSIUM SERPL-SCNC: 5 MMOL/L (ref 3.5–5.3)
RBC # BLD AUTO: 2.91 X10*6/UL (ref 4–5.2)
RBC # BLD AUTO: 2.97 X10*6/UL (ref 4–5.2)
RBC MORPH BLD: ABNORMAL
SODIUM SERPL-SCNC: 146 MMOL/L (ref 136–145)
SODIUM SERPL-SCNC: 146 MMOL/L (ref 136–145)
TARGETS BLD QL SMEAR: ABNORMAL
TOTAL CELLS COUNTED BLD: 100
WBC # BLD AUTO: 16.4 X10*3/UL (ref 4.4–11.3)
WBC # BLD AUTO: 19 X10*3/UL (ref 4.4–11.3)

## 2025-06-03 PROCEDURE — 85025 COMPLETE CBC W/AUTO DIFF WBC: CPT | Mod: OUT | Performed by: INTERNAL MEDICINE

## 2025-06-03 PROCEDURE — 80048 BASIC METABOLIC PNL TOTAL CA: CPT | Mod: OUT | Performed by: INTERNAL MEDICINE

## 2025-06-03 PROCEDURE — 82374 ASSAY BLOOD CARBON DIOXIDE: CPT | Mod: OUT | Performed by: INTERNAL MEDICINE

## 2025-06-03 PROCEDURE — 85027 COMPLETE CBC AUTOMATED: CPT | Mod: OUT | Performed by: INTERNAL MEDICINE

## 2025-06-03 PROCEDURE — 85007 BL SMEAR W/DIFF WBC COUNT: CPT | Mod: OUT | Performed by: INTERNAL MEDICINE

## 2025-06-26 ENCOUNTER — APPOINTMENT (OUTPATIENT)
Dept: VASCULAR MEDICINE | Facility: CLINIC | Age: 80
End: 2025-06-26
Payer: MEDICARE

## 2025-06-26 ENCOUNTER — APPOINTMENT (OUTPATIENT)
Dept: CARDIOLOGY | Facility: CLINIC | Age: 80
End: 2025-06-26
Payer: MEDICARE

## 2025-08-13 PROBLEM — N25.81 HYPERPARATHYROIDISM DUE TO RENAL INSUFFICIENCY (MULTI): Status: ACTIVE | Noted: 2025-04-22
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
## 2025-08-13 PROBLEM — M10.9 GOUT: Status: ACTIVE | Noted: 2025-04-22

## 2025-08-13 PROBLEM — S72.144A CLOSED NONDISPLACED INTERTROCHANTERIC FRACTURE OF RIGHT FEMUR: Status: ACTIVE | Noted: 2025-04-22

## 2025-09-04 ENCOUNTER — APPOINTMENT (OUTPATIENT)
Dept: CARDIOLOGY | Facility: CLINIC | Age: 80
End: 2025-09-04
Payer: MEDICARE

## 2025-09-04 PROBLEM — L89.609 PRESSURE INJURY OF SKIN OF HEEL: Status: ACTIVE | Noted: 2025-05-29

## 2025-09-04 PROBLEM — I35.9 AORTIC VALVE DISORDER: Status: ACTIVE | Noted: 2025-05-29

## 2025-09-04 PROBLEM — Z99.81 DEPENDENCE ON SUPPLEMENTAL OXYGEN: Status: ACTIVE | Noted: 2025-05-29

## 2025-09-04 PROBLEM — B96.0 INFECTION DUE TO MYCOPLASMA PNEUMONIAE: Status: ACTIVE | Noted: 2025-05-29

## 2025-09-04 PROBLEM — J96.01 ACUTE HYPOXEMIC RESPIRATORY FAILURE: Status: ACTIVE | Noted: 2025-05-29

## 2025-09-04 PROBLEM — I70.209 ATHEROSCLEROSIS OF ARTERY OF LOWER EXTREMITY: Status: ACTIVE | Noted: 2025-05-29

## 2025-09-04 PROBLEM — J18.9 PNEUMONIA: Status: ACTIVE | Noted: 2025-05-29

## 2025-09-04 PROBLEM — N39.0 URINARY TRACT INFECTIOUS DISEASE: Status: ACTIVE | Noted: 2025-05-29

## 2025-09-04 PROBLEM — D63.1 ANEMIA DUE TO CHRONIC KIDNEY DISEASE: Status: ACTIVE | Noted: 2025-05-29

## 2025-09-04 PROBLEM — K59.00 CONSTIPATION: Status: ACTIVE | Noted: 2025-05-29

## 2025-09-04 PROBLEM — M62.81 MUSCLE WEAKNESS: Status: ACTIVE | Noted: 2025-05-29

## 2025-09-04 PROBLEM — R13.12 OROPHARYNGEAL DYSPHAGIA: Status: ACTIVE | Noted: 2025-05-29

## 2025-09-04 PROBLEM — L89.159 PRESSURE INJURY OF SKIN OF SACRAL REGION: Status: ACTIVE | Noted: 2025-05-29

## 2025-09-04 PROBLEM — N18.9 ANEMIA DUE TO CHRONIC KIDNEY DISEASE: Status: ACTIVE | Noted: 2025-05-29

## 2025-09-04 PROBLEM — R26.2 DIFFICULTY WALKING: Status: ACTIVE | Noted: 2025-05-29

## 2025-09-04 PROBLEM — E10.21 DIABETIC NEPHROPATHY ASSOCIATED WITH TYPE 1 DIABETES MELLITUS: Status: ACTIVE | Noted: 2025-05-29

## 2025-09-04 PROBLEM — R25.2 CRAMP IN LOWER LEG ASSOCIATED WITH REST: Status: ACTIVE | Noted: 2025-05-29

## 2025-09-04 PROBLEM — N31.9 NEUROGENIC BLADDER: Status: ACTIVE | Noted: 2025-06-05

## (undated) DEVICE — HOLSTER, JET SAFETY

## (undated) DEVICE — ASSEMBLY, STRYKER FLOW 2, SUCTION IRRIGATOR, WITH TIP

## (undated) DEVICE — TROCAR, KII OPTICAL BLADELESS 5MM Z THREAD 100MM LNGTH

## (undated) DEVICE — HOLSTER, ELECTROSURGERY ACCESSORY, STERILE

## (undated) DEVICE — TUBE SET, PNEUMOLAR HEATED, SMOKE EVACU, HIGH-FLOW

## (undated) DEVICE — SCISSOR TIP, ENDOCUT, LAPAROSCOPIC

## (undated) DEVICE — Device

## (undated) DEVICE — EVACUATOR, WOUND, SUCTION, CLOSED, JACKSON-PRATT, 100 CC, SILICONE

## (undated) DEVICE — CAUTERY, PENCIL, PUSH BUTTON, SMOKE EVAC, 70MM

## (undated) DEVICE — CLEAN KIT, ANTIFOG SCOPE, SEE SHARP 150MM

## (undated) DEVICE — SLEEVE, VASO PRESS, CALF GARMENT, MEDIUM, GREEN

## (undated) DEVICE — GRASPER TIP, LONG FENESTRATED, DISP

## (undated) DEVICE — DRESSING, DRAIN SPONGE, EXCILON AMD, 4X4 IN, 6 PLY, ST

## (undated) DEVICE — STRIP, SKIN CLOSURE, STERI STRIP, REINFORCED, 0.5 X 4 IN

## (undated) DEVICE — DRAIN, CHANNEL, ROUND, FULL FLUTE 19F

## (undated) DEVICE — TROCAR SYSTEM, BALLOON, KII GELPORT, 12 X 100MM

## (undated) DEVICE — GRASPER TIP, LAPCLINCH, DISP

## (undated) DEVICE — HANDLE, PH, FOR YANKAUER SUCTION DEVICE

## (undated) DEVICE — CORD, MONOPOLAR, 10 FT, DISPOSABLE